# Patient Record
Sex: FEMALE | Race: WHITE | NOT HISPANIC OR LATINO | Employment: OTHER | URBAN - METROPOLITAN AREA
[De-identification: names, ages, dates, MRNs, and addresses within clinical notes are randomized per-mention and may not be internally consistent; named-entity substitution may affect disease eponyms.]

---

## 2017-02-05 ENCOUNTER — HOSPITAL ENCOUNTER (OUTPATIENT)
Dept: RADIOLOGY | Age: 67
Discharge: HOME | End: 2017-02-05
Attending: PHYSICIAN ASSISTANT | Admitting: FAMILY MEDICINE
Payer: COMMERCIAL

## 2017-02-05 ENCOUNTER — TRANSCRIBE ORDERS (OUTPATIENT)
Dept: URGENT CARE | Age: 67
End: 2017-02-05

## 2017-02-05 ENCOUNTER — OFFICE VISIT (OUTPATIENT)
Dept: URGENT CARE | Age: 67
End: 2017-02-05
Payer: COMMERCIAL

## 2017-02-05 DIAGNOSIS — M79.671 PAIN OF RIGHT FOOT: ICD-10-CM

## 2017-02-05 PROCEDURE — 73630 X-RAY EXAM OF FOOT: CPT

## 2017-02-05 PROCEDURE — 99203 OFFICE O/P NEW LOW 30 MIN: CPT | Performed by: FAMILY MEDICINE

## 2017-02-06 ENCOUNTER — ALLSCRIPTS OFFICE VISIT (OUTPATIENT)
Dept: OTHER | Facility: OTHER | Age: 67
End: 2017-02-06

## 2017-02-14 RX ORDER — CYCLOBENZAPRINE HCL 10 MG
10 TABLET ORAL ONCE AS NEEDED
COMMUNITY
End: 2020-04-08

## 2017-02-14 RX ORDER — OMEGA-3/DHA/EPA/FISH OIL 60 MG-90MG
1 CAPSULE ORAL DAILY
Status: ON HOLD | COMMUNITY
End: 2018-02-01 | Stop reason: ALTCHOICE

## 2017-02-14 RX ORDER — LISINOPRIL 10 MG/1
10 TABLET ORAL DAILY
COMMUNITY

## 2017-02-14 RX ORDER — CETIRIZINE HYDROCHLORIDE 10 MG/1
10 TABLET, CHEWABLE ORAL DAILY
COMMUNITY
End: 2019-04-08

## 2017-02-15 ENCOUNTER — ANESTHESIA EVENT (OUTPATIENT)
Dept: GASTROENTEROLOGY | Facility: HOSPITAL | Age: 67
End: 2017-02-15
Payer: COMMERCIAL

## 2017-02-15 ENCOUNTER — GENERIC CONVERSION - ENCOUNTER (OUTPATIENT)
Dept: OTHER | Facility: OTHER | Age: 67
End: 2017-02-15

## 2017-02-15 ENCOUNTER — ANESTHESIA (OUTPATIENT)
Dept: GASTROENTEROLOGY | Facility: HOSPITAL | Age: 67
End: 2017-02-15
Payer: COMMERCIAL

## 2017-02-15 ENCOUNTER — HOSPITAL ENCOUNTER (OUTPATIENT)
Facility: HOSPITAL | Age: 67
Setting detail: OUTPATIENT SURGERY
Discharge: HOME | End: 2017-02-15
Attending: INTERNAL MEDICINE | Admitting: INTERNAL MEDICINE
Payer: COMMERCIAL

## 2017-02-15 VITALS
OXYGEN SATURATION: 96 % | HEART RATE: 80 BPM | BODY MASS INDEX: 24.25 KG/M2 | WEIGHT: 160 LBS | RESPIRATION RATE: 16 BRPM | SYSTOLIC BLOOD PRESSURE: 132 MMHG | HEIGHT: 68 IN | TEMPERATURE: 97.1 F | DIASTOLIC BLOOD PRESSURE: 89 MMHG

## 2017-02-15 DIAGNOSIS — K21.9 ESOPHAGEAL REFLUX: ICD-10-CM

## 2017-02-15 PROCEDURE — 88305 TISSUE EXAM BY PATHOLOGIST: CPT | Performed by: INTERNAL MEDICINE

## 2017-02-15 PROCEDURE — 88342 IMHCHEM/IMCYTCHM 1ST ANTB: CPT | Performed by: INTERNAL MEDICINE

## 2017-02-15 RX ORDER — FENTANYL CITRATE 50 UG/ML
INJECTION, SOLUTION INTRAMUSCULAR; INTRAVENOUS AS NEEDED
Status: DISCONTINUED | OUTPATIENT
Start: 2017-02-15 | End: 2017-02-15 | Stop reason: SURG

## 2017-02-15 RX ORDER — SODIUM CHLORIDE 9 MG/ML
125 INJECTION, SOLUTION INTRAVENOUS CONTINUOUS
Status: DISCONTINUED | OUTPATIENT
Start: 2017-02-15 | End: 2017-02-15 | Stop reason: HOSPADM

## 2017-02-15 RX ORDER — SODIUM CHLORIDE 9 MG/ML
125 INJECTION, SOLUTION INTRAVENOUS CONTINUOUS
Status: CANCELLED | OUTPATIENT
Start: 2017-02-15

## 2017-02-15 RX ORDER — ONDANSETRON 2 MG/ML
4 INJECTION INTRAMUSCULAR; INTRAVENOUS ONCE
Status: DISCONTINUED | OUTPATIENT
Start: 2017-02-15 | End: 2017-02-15 | Stop reason: HOSPADM

## 2017-02-15 RX ORDER — DIPHENHYDRAMINE HYDROCHLORIDE 50 MG/ML
25 INJECTION INTRAMUSCULAR; INTRAVENOUS ONCE
Status: COMPLETED | OUTPATIENT
Start: 2017-02-15 | End: 2017-02-15

## 2017-02-15 RX ORDER — PROPOFOL 10 MG/ML
INJECTION, EMULSION INTRAVENOUS AS NEEDED
Status: DISCONTINUED | OUTPATIENT
Start: 2017-02-15 | End: 2017-02-15 | Stop reason: SURG

## 2017-02-15 RX ORDER — PROPOFOL 10 MG/ML
INJECTION, EMULSION INTRAVENOUS CONTINUOUS PRN
Status: DISCONTINUED | OUTPATIENT
Start: 2017-02-15 | End: 2017-02-15 | Stop reason: SURG

## 2017-02-15 RX ADMIN — PROPOFOL 140 MCG/KG/MIN: 10 INJECTION, EMULSION INTRAVENOUS at 13:03

## 2017-02-15 RX ADMIN — SODIUM CHLORIDE 125 ML/HR: 0.9 INJECTION, SOLUTION INTRAVENOUS at 12:49

## 2017-02-15 RX ADMIN — PROPOFOL 100 MG: 10 INJECTION, EMULSION INTRAVENOUS at 13:03

## 2017-02-15 RX ADMIN — DIPHENHYDRAMINE HYDROCHLORIDE 25 MG: 50 INJECTION, SOLUTION INTRAMUSCULAR; INTRAVENOUS at 13:46

## 2017-02-15 RX ADMIN — FENTANYL CITRATE 50 MCG: 50 INJECTION, SOLUTION INTRAMUSCULAR; INTRAVENOUS at 13:03

## 2017-02-15 NOTE — ANESTHESIA PREPROCEDURE EVALUATION
Patient summary reviewed and Nursing notes reviewed  Mallampati: II  TM distance: >3 FB  Neck ROM: full    PULMONARY - negative    NEURO/PSYCH - negative    ENDO/OTHER - negative    DENTAL   DENTAL Positives: upper dentures    CARDIOVASCULAR  CARDIOVASCULAR Positives: cardiovascular exam normalhypertension - well controlled     Comments:    GI/HEPATIC/RENAL  GI/HEPATIC/RENAL Positives: GERD poorly controlled,     Anesthesia type: IV sedation with anesthesia  intravenous induction   Risks, benefits, and alternatives discussed with patient.  ASA 2

## 2017-02-15 NOTE — ANESTHESIA POSTPROCEDURE EVALUATION
Post-Op Assessment Note      CV Status:  Stable    Mental Status:  Alert and awake    Hydration Status:  Euvolemic    PONV Controlled:  Controlled    Airway Patency:  Patent    Post Op Vitals Reviewed: Yes            Staff: Anesthesiologist, with CRNAs            BP      Temp     Pulse     Resp      SpO2

## 2017-02-17 ENCOUNTER — GENERIC CONVERSION - ENCOUNTER (OUTPATIENT)
Dept: OTHER | Facility: OTHER | Age: 67
End: 2017-02-17

## 2017-02-27 ENCOUNTER — TRANSCRIBE ORDERS (OUTPATIENT)
Dept: ADMINISTRATIVE | Facility: HOSPITAL | Age: 67
End: 2017-02-27

## 2017-02-27 DIAGNOSIS — Z12.31 SCREENING MAMMOGRAM FOR HIGH-RISK PATIENT: Primary | ICD-10-CM

## 2017-03-09 ENCOUNTER — HOSPITAL ENCOUNTER (OUTPATIENT)
Dept: RADIOLOGY | Age: 67
Discharge: HOME | End: 2017-03-09
Payer: COMMERCIAL

## 2017-03-09 ENCOUNTER — TRANSCRIBE ORDERS (OUTPATIENT)
Dept: ADMINISTRATIVE | Age: 67
End: 2017-03-09

## 2017-03-09 DIAGNOSIS — M79.671 RIGHT FOOT PAIN: ICD-10-CM

## 2017-03-09 DIAGNOSIS — M79.671 RIGHT FOOT PAIN: Primary | ICD-10-CM

## 2017-03-09 PROCEDURE — 73630 X-RAY EXAM OF FOOT: CPT

## 2017-03-29 ENCOUNTER — ALLSCRIPTS OFFICE VISIT (OUTPATIENT)
Dept: OTHER | Facility: OTHER | Age: 67
End: 2017-03-29

## 2017-04-17 ENCOUNTER — HOSPITAL ENCOUNTER (OUTPATIENT)
Dept: RADIOLOGY | Age: 67
Discharge: HOME | End: 2017-04-17
Payer: COMMERCIAL

## 2017-04-17 ENCOUNTER — TRANSCRIBE ORDERS (OUTPATIENT)
Dept: ADMINISTRATIVE | Age: 67
End: 2017-04-17

## 2017-04-17 DIAGNOSIS — R04.2 COUGHING UP BLOOD: Primary | ICD-10-CM

## 2017-04-17 DIAGNOSIS — Z12.31 ENCOUNTER FOR SCREENING MAMMOGRAM FOR HIGH-RISK PATIENT: ICD-10-CM

## 2017-04-17 DIAGNOSIS — R04.2 COUGHING UP BLOOD: ICD-10-CM

## 2017-04-17 PROCEDURE — 71020 HB CHEST X-RAY 2VW FRONTAL&LATL: CPT

## 2017-04-17 PROCEDURE — G0202 SCR MAMMO BI INCL CAD: HCPCS

## 2017-05-17 ENCOUNTER — APPOINTMENT (OUTPATIENT)
Dept: LAB | Age: 67
End: 2017-05-17
Payer: COMMERCIAL

## 2017-05-17 ENCOUNTER — TRANSCRIBE ORDERS (OUTPATIENT)
Dept: ADMINISTRATIVE | Age: 67
End: 2017-05-17

## 2017-05-17 DIAGNOSIS — R73.01 IMPAIRED FASTING GLUCOSE: ICD-10-CM

## 2017-05-17 DIAGNOSIS — E04.0 GOITER, SPECIFIED AS SIMPLE: ICD-10-CM

## 2017-05-17 DIAGNOSIS — D50.0 IRON DEFICIENCY ANEMIA SECONDARY TO BLOOD LOSS (CHRONIC): ICD-10-CM

## 2017-05-17 DIAGNOSIS — E78.2 MIXED HYPERLIPIDEMIA: Primary | ICD-10-CM

## 2017-05-17 DIAGNOSIS — E78.2 MIXED HYPERLIPIDEMIA: ICD-10-CM

## 2017-05-17 LAB
ALBUMIN SERPL BCP-MCNC: 3.9 G/DL (ref 3.5–5)
ALP SERPL-CCNC: 79 U/L (ref 46–116)
ALT SERPL W P-5'-P-CCNC: 30 U/L (ref 12–78)
ANION GAP SERPL CALCULATED.3IONS-SCNC: 6 MMOL/L (ref 4–13)
AST SERPL W P-5'-P-CCNC: 15 U/L (ref 5–45)
BASOPHILS # BLD AUTO: 0.02 THOUSANDS/ΜL (ref 0–0.1)
BASOPHILS NFR BLD AUTO: 0 % (ref 0–1)
BILIRUB SERPL-MCNC: 0.76 MG/DL (ref 0.2–1)
BUN SERPL-MCNC: 15 MG/DL (ref 5–25)
CALCIUM SERPL-MCNC: 9.6 MG/DL (ref 8.3–10.1)
CHLORIDE SERPL-SCNC: 105 MMOL/L (ref 100–108)
CHOLEST SERPL-MCNC: 229 MG/DL (ref 50–200)
CO2 SERPL-SCNC: 30 MMOL/L (ref 21–32)
CREAT SERPL-MCNC: 0.62 MG/DL (ref 0.6–1.3)
EOSINOPHIL # BLD AUTO: 0.25 THOUSAND/ΜL (ref 0–0.61)
EOSINOPHIL NFR BLD AUTO: 5 % (ref 0–6)
ERYTHROCYTE [DISTWIDTH] IN BLOOD BY AUTOMATED COUNT: 13.3 % (ref 11.6–15.1)
EST. AVERAGE GLUCOSE BLD GHB EST-MCNC: 120 MG/DL
GFR SERPL CREATININE-BSD FRML MDRD: >60 ML/MIN/1.73SQ M
GLUCOSE P FAST SERPL-MCNC: 107 MG/DL (ref 65–99)
HBA1C MFR BLD: 5.8 % (ref 4.2–6.3)
HCT VFR BLD AUTO: 42.9 % (ref 34.8–46.1)
HDLC SERPL-MCNC: 75 MG/DL (ref 40–60)
HGB BLD-MCNC: 13.7 G/DL (ref 11.5–15.4)
LDLC SERPL CALC-MCNC: 131 MG/DL (ref 0–100)
LYMPHOCYTES # BLD AUTO: 2.21 THOUSANDS/ΜL (ref 0.6–4.47)
LYMPHOCYTES NFR BLD AUTO: 43 % (ref 14–44)
MCH RBC QN AUTO: 30.2 PG (ref 26.8–34.3)
MCHC RBC AUTO-ENTMCNC: 31.9 G/DL (ref 31.4–37.4)
MCV RBC AUTO: 95 FL (ref 82–98)
MONOCYTES # BLD AUTO: 0.39 THOUSAND/ΜL (ref 0.17–1.22)
MONOCYTES NFR BLD AUTO: 8 % (ref 4–12)
NEUTROPHILS # BLD AUTO: 2.24 THOUSANDS/ΜL (ref 1.85–7.62)
NEUTS SEG NFR BLD AUTO: 44 % (ref 43–75)
NRBC BLD AUTO-RTO: 0 /100 WBCS
PLATELET # BLD AUTO: 219 THOUSANDS/UL (ref 149–390)
PMV BLD AUTO: 11 FL (ref 8.9–12.7)
POTASSIUM SERPL-SCNC: 4.8 MMOL/L (ref 3.5–5.3)
PROT SERPL-MCNC: 7.4 G/DL (ref 6.4–8.2)
RBC # BLD AUTO: 4.53 MILLION/UL (ref 3.81–5.12)
SODIUM SERPL-SCNC: 141 MMOL/L (ref 136–145)
TRIGL SERPL-MCNC: 113 MG/DL
TSH SERPL DL<=0.05 MIU/L-ACNC: 2.65 UIU/ML (ref 0.36–3.74)
WBC # BLD AUTO: 5.13 THOUSAND/UL (ref 4.31–10.16)

## 2017-05-17 PROCEDURE — 80053 COMPREHEN METABOLIC PANEL: CPT

## 2017-05-17 PROCEDURE — 83036 HEMOGLOBIN GLYCOSYLATED A1C: CPT

## 2017-05-17 PROCEDURE — 85025 COMPLETE CBC W/AUTO DIFF WBC: CPT

## 2017-05-17 PROCEDURE — 84443 ASSAY THYROID STIM HORMONE: CPT

## 2017-05-17 PROCEDURE — 36415 COLL VENOUS BLD VENIPUNCTURE: CPT

## 2017-05-17 PROCEDURE — 80061 LIPID PANEL: CPT

## 2017-06-06 ENCOUNTER — ALLSCRIPTS OFFICE VISIT (OUTPATIENT)
Dept: OTHER | Facility: OTHER | Age: 67
End: 2017-06-06

## 2017-09-12 ENCOUNTER — ALLSCRIPTS OFFICE VISIT (OUTPATIENT)
Dept: OTHER | Facility: OTHER | Age: 67
End: 2017-09-12

## 2017-09-14 ENCOUNTER — TRANSCRIBE ORDERS (OUTPATIENT)
Dept: ADMINISTRATIVE | Age: 67
End: 2017-09-14

## 2017-09-14 ENCOUNTER — APPOINTMENT (OUTPATIENT)
Dept: LAB | Age: 67
End: 2017-09-14
Payer: COMMERCIAL

## 2017-09-14 DIAGNOSIS — K29.70 GASTRITIS, PRESENCE OF BLEEDING UNSPECIFIED, UNSPECIFIED CHRONICITY, UNSPECIFIED GASTRITIS TYPE: Primary | ICD-10-CM

## 2017-09-14 DIAGNOSIS — K29.70 GASTRITIS, PRESENCE OF BLEEDING UNSPECIFIED, UNSPECIFIED CHRONICITY, UNSPECIFIED GASTRITIS TYPE: ICD-10-CM

## 2017-09-14 PROCEDURE — 82784 ASSAY IGA/IGD/IGG/IGM EACH: CPT

## 2017-09-14 PROCEDURE — 86255 FLUORESCENT ANTIBODY SCREEN: CPT

## 2017-09-14 PROCEDURE — 83516 IMMUNOASSAY NONANTIBODY: CPT

## 2017-09-14 PROCEDURE — 36415 COLL VENOUS BLD VENIPUNCTURE: CPT

## 2017-09-15 LAB
ENDOMYSIUM IGA SER QL: NEGATIVE
GLIADIN PEPTIDE IGA SER-ACNC: 7 UNITS (ref 0–19)
GLIADIN PEPTIDE IGG SER-ACNC: 2 UNITS (ref 0–19)
IGA SERPL-MCNC: 242 MG/DL (ref 87–352)
TTG IGA SER-ACNC: <2 U/ML (ref 0–3)
TTG IGG SER-ACNC: <2 U/ML (ref 0–5)

## 2017-09-20 ENCOUNTER — GENERIC CONVERSION - ENCOUNTER (OUTPATIENT)
Dept: OTHER | Facility: OTHER | Age: 67
End: 2017-09-20

## 2017-10-12 ENCOUNTER — HOSPITAL ENCOUNTER (OUTPATIENT)
Dept: GASTROENTEROLOGY | Facility: HOSPITAL | Age: 67
Discharge: HOME | End: 2017-10-12
Payer: COMMERCIAL

## 2017-10-12 VITALS
HEART RATE: 83 BPM | TEMPERATURE: 97.8 F | HEIGHT: 67 IN | WEIGHT: 160 LBS | SYSTOLIC BLOOD PRESSURE: 133 MMHG | RESPIRATION RATE: 20 BRPM | OXYGEN SATURATION: 100 % | BODY MASS INDEX: 25.11 KG/M2 | DIASTOLIC BLOOD PRESSURE: 91 MMHG

## 2017-10-12 PROCEDURE — 91020 GASTRIC MOTILITY STUDIES: CPT

## 2017-10-12 PROCEDURE — 91038 ESOPH IMPED FUNCT TEST > 1HR: CPT

## 2017-10-26 NOTE — PROGRESS NOTES
Assessment  1  Esophagitis, reflux (530 11) (K21 0)   2  Intestinal metaplasia of gastric mucosa (537 89) (K31 89)   3  Tingling sensation (782 0) (R20 2)    Plan  Esophageal reflux    · Pantoprazole Sodium 40 MG Oral Tablet Delayed Release; take 1 tablet by  mouth 2 times per day   Rx By: Donal Hess; Dispense: 90 Days ; #:180 Tablet Delayed Release; Refill: 3;For: Esophageal reflux; HAYLEI = N; Rx auto-faxed to [facility] #[id number] Last Updated By: System, [employer]; 9/12/2017 11:00:05 AM  Esophagitis, reflux    · Esophageal Manometry; Status:Active; Requested for:12Sep2017;    Perform:[facility]; Order Comments: On PPI; Due:22Sep2017; Last Updated Edkeaton Loving; 9/12/2017 11:20:18 AM;Ordered;For:Esophagitis, reflux; Ordered By:Dustin Phoenix;   · Esophagus gastroesophageal reflux test with nasal catheter pH electrodes placement  24 hours; Status:Active; Requested for:12Sep2017;    Perform:[facility]; Order Comments:On pantoprazole 40 mg po bid; Due:22Sep2017; Last Updated Edwmeredith Loving; 9/12/2017 11:20:32 AM;Ordered;For:Esophagitis, reflux; Ordered By:Dustin Phoenix;  PMH: History of gastritis    · (1) CELIAC DISEASE AB PROFILE; Status:Active; Requested for:24Jsn9369;    Perform:[facility]; Due:71Boo8237; Ordered; [address]: History of gastritis; Ordered By:Dustin Phoenix;   · Follow-up visit in 3 months Evaluation and Treatment  Follow-up  Status: Complete   Done: 59VFP1375   Ordered; For: PMH: History of gastritis; Ordered By: Donal Hess Performed:  Due: 18FST6700; Last Updated By: Shubham Ely; 9/12/2017 11:19:10 AM  Tingling sensation    · (LC) Vitamin B12 and Folate; Status:Active; Requested for:18Uba7516;    Perform:[facility]; Due:31Lkb8944; Ordered; For:Tingling sensation; Ordered By:Dustin Phoenix;    Discussion/Summary  Discussion Summary:   1  She continued to have reflux symptoms while on pantoprazole 40 mg twice a day  She reports daily symptoms especially worse at night   I will schedule her for 24 hour pH study with impedance to assess for adequate acid control a study would be good or PPI b i d  nd also evaluate for non acid acid reflux  will also get esophageal manometric study  study will be done on PPI b i d  had an extensive discussion regarding long-term side effects of PPI and also non medical management of gastroesophageal reflux disease  side effects include osteoporosis, chronic kidney disease, infection, vitamin B12 deficiency  Will check B12 level  also discussed none medical management including Stretta procedure and fundoplication  Focal intestinal metaplasia and stomach -although repeat EGD in February 2017 with multiple biopsies  will see her back in 3 months  Counseling Documentation With Imm: The patient was counseled regarding prognosis,-- patient and family education,-- impressions  Goals and Barriers: The patient has the current Goals: See above  The patent has the current Barriers: None  Chief Complaint  Chief Complaint Free Text Note Form: Patient is here for followup for reflux  History of Present Illness  HPI: She continued to have reflux symptoms despite taking pantoprazole 40 mg po bid  She has regurgitation and hoarsen of her voice  She was seen by Dr Hossein Maldonado and per patient no inflammation in her throat  symptoms are worse at night  She tried wedge pillow but she couldn't sleep well had esophagitis on EGD  reports tingling sensation in her feet      Review of Systems  Complete-Female GI Adult:   Constitutional: No fever, no chills, feels well, no tiredness, no recent weight gain or weight loss  Eyes: No complaints of eye pain, no red eyes, no eyesight problems, no discharge, no dry eyes, no itching of eyes  ENT: no complaints of earache, no loss of hearing, no nose bleeds, no nasal discharge, no sore throat, no hoarseness     Cardiovascular: No complaints of slow heart rate, no fast heart rate, no chest pain, no palpitations, no leg claudication, no lower extremity edema  Respiratory: No complaints of shortness of breath, no wheezing, no cough, no SOB on exertion, no orthopnea, no PND  Gastrointestinal: GERD, gastritis  Genitourinary: No complaints of dysuria, no incontinence, no pelvic pain, no dysmenorrhea, no vaginal discharge or bleeding  Musculoskeletal: No complaints of arthralgias, no myalgias, no joint swelling or stiffness, no limb pain or swelling  Integumentary: No complaints of skin rash or lesions, no itching, no skin wounds, no breast pain or lump  Neurological: No complaints of headache, no confusion, no convulsions, no numbness, no dizziness or fainting, no tingling, no limb weakness, no difficulty walking  Psychiatric: Not suicidal, no sleep disturbance, no anxiety or depression, no change in personality, no emotional problems  Endocrine: No complaints of proptosis, no hot flashes, no muscle weakness, no deepening of the voice, no feelings of weakness  Hematologic/Lymphatic: No complaints of swollen glands, no swollen glands in the neck, does not bleed easily, does not bruise easily  ROS Reviewed:   ROS reviewed  Active Problems  1  History of Adenocarcinoma In Situ Of The Breast (233 0)   2  Atypical chest pain (786 59) (R07 89)   3  Chronic low back pain (724 2,338 29) (M54 5,G89 29)   4  Clostridium difficile colitis (008 45) (A04 7)   5  Contusion of right foot, initial encounter (924 20) (S90 31XA)   6  DDD (degenerative disc disease), lumbar (722 52) (M51 36)   7  Diarrhea (787 91) (R19 7)   8  Esophageal reflux (530 81) (K21 9)   9  Esophageal stricture (530 3) (K22 2)   10  Esophagitis, reflux (530 11) (K21 0)   11  First degree burn of hand, left, initial encounter   12  Hip pain (719 45) (M25 559)   13  Hyperlipidemia (272 4) (E78 5)   14  Impaired fasting glucose (790 21) (R73 01)   15  Intestinal metaplasia of gastric mucosa (537 89) (K31 89)   16  Lumbar spinal stenosis (724 02) (M48 06)   17   Multilevel degenerative disc disease (722 6) (M53 9)   18  Osteoarthritis of spine with radiculopathy, lumbar region (721 3) (M47 26)   19  Osteopenia (733 90) (M85 80)   20  Piriformis syndrome of left side (355 0) (G57 02)   21  Sacroiliac joint pain (724 6) (M53 3)   22  Sciatica (724 3) (M54 30)   23  Spondylosis of cervical region without myelopathy or radiculopathy (721 0) (M47 812)   24  Thigh pain, musculoskeletal, left (729 5) (M04 370)    Past Medical History  1  History of Abdominal pain, LLQ (left lower quadrant) (789 04) (R10 32)   2  History of Acute serous otitis media, unspecified laterality   3  History of Adenocarcinoma In Situ Of The Breast (233 0)   4  History of Age At First Period 15 Years Old (Menarche)   11  History of Allergic Reaction (995 3)   6  History of Blood pressure elevated (401 9) (I10)   7  History of Breast Cancer (V10 3)   8  History of Breast Cancer (V10 3)   9  History of Breast Cancer (V10 3)   10  History of Disc degeneration, lumbosacral (722 52) (M51 37)   11  History of Dysfunction of Eustachian tube, unspecified laterality (381 81) (H69 80)   12  Former smoker (H84 87) (N85 706)   13  History of Fracture Of The Ankle (824 8)   14  History of Ganglion Of The Left Elbow (727 42)   15  History of Ganglion Of The Left Wrist (727 42)   16  History of diarrhea (V12 79) (Z87 898)   17  History of low back pain (V13 59) (Z87 39)   18  History of osteopenia (V13 59) (Z87 39)   19  History of sinusitis (V12 69) (Z87 09)   20  History of upper respiratory infection (V12 09) (Z87 09)   21  History of Loss Of Hair From Head   22  History of Plantar fasciitis (728 71) (M72 2)   23  History of Retinal Hemorrhage (362 81)  Active Problems And Past Medical History Reviewed: The active problems and past medical history were reviewed and updated today  Surgical History  1  History of Complete Colonoscopy   2  History of Left Breast Lumpectomy   3   History of Place Radiotherapy Expandable Cath Into Breast Following Partial Mastectomy  Surgical History Reviewed: The surgical history was reviewed and updated today  Family History  Mother    1  Family history of Osteoporosis (V17 81)  Father    2  Family history of Coronary Artery Disease (V17 49)   3  Family history of Diabetes Mellitus (V18 0)   4  Family history of Impaired Fasting Glucose  Maternal Aunt    5  Family history of Breast Cancer (V16 3)  Family History    6  Family history of Reported Family History Of Heart Disease  Family History Reviewed: The family history was reviewed and updated today  Social History   · Being A Social Drinker   · Former smoker (A81 33) (D91 506)   · Denied: History of IV drugs   · Never A Smoker   · Denied: History of Uses marijuana  Social History Reviewed: The social history was reviewed and updated today  The social history was reviewed and is unchanged  Current Meds   1  Centrum TABS; Therapy: (Recorded:12May2016) to Recorded   2  Citracal TABS; Therapy: (Recorded:06Feb2017) to Recorded   3  Fish Oil 500 MG Oral Capsule; Therapy: (Recorded:06Feb2017) to Recorded   4  Flexeril 10 MG TABS; Therapy: (Recorded:12May2016) to Recorded   5  Lisinopril 10 MG Oral Tablet; Therapy: (Recorded:06Feb2017) to Recorded   6  Pantoprazole Sodium 40 MG Oral Tablet Delayed Release; take 1 tablet by mouth 2   times per day; Therapy: 33GWJ7231 to (Evaluate:11Nov2017)  Requested for: 22Cvm0737; Last   Rx:05Xne0268 Ordered   7  RaNITidine HCl - 150 MG Oral Tablet; TAKE 1 TABLET AT BEDTIME; Therapy: 26LYN1946 to (Evaluate:27Bjh2434)  Requested for: 49EPJ6404; Last   FK:[id number] Ordered   8  Ultimate Probiotic Formula CAPS; Therapy: (Recorded:06Feb2017) to Recorded   9  Vitamin D3 CAPS; Therapy: (Recorded:06Feb2017) to Recorded   10  ZyrTEC 10 MG CHEW;    Therapy: (Recorded:12May2016) to Recorded  Medication List Reviewed: The medication list was reviewed and updated today  Allergies  1   Codeine Sulfate TABS   2  Morphine Sulfate (Concentrate) SOLN   3  Naprosyn TABS   4  OxyCODONE HCl TABS   5  Penicillins   6  Sulfa Drugs    Vitals  Vital Signs    Recorded: 12Sep2017 10:40AM   Temperature 97 3 F, Tympanic   Heart Rate 83   Respiration 18   Systolic 973, LUE, Sitting   Diastolic 90, LUE, Sitting   Height 5 ft 7 in   Weight 163 lb    BMI Calculated 25 53   BSA Calculated 1 85     Physical Exam    Constitutional   General appearance: No acute distress, well appearing and well nourished  Ears, Nose, Mouth, and Throat   Oropharynx: Normal with no erythema, edema, exudate or lesions  Pulmonary   Respiratory effort: No increased work of breathing or signs of respiratory distress  Auscultation of lungs: Clear to auscultation  Cardiovascular   Auscultation of heart: Normal rate and rhythm, normal S1 and S2, without murmurs  Abdomen   Abdomen: Non-tender, no masses  Liver and spleen: No hepatomegaly or splenomegaly  Lymphatic   Palpation of lymph nodes in neck: No lymphadenopathy  Skin   Skin and subcutaneous tissue: Normal without rashes or lesions      Psychiatric   Orientation to person, place, and time: Normal          Future Appointments    Date/Time Provider Specialty Site   12/28/2017 02:40 PM Nayana Alvarado MD Gastroenterology Adult [facility] [address] [place] [address] [address]     Signatures   Electronically signed by : Brando Victor MD; Sep 12 2017 11:00PM EST                       (Author)

## 2017-11-22 ENCOUNTER — ALLSCRIPTS OFFICE VISIT (OUTPATIENT)
Dept: OTHER | Facility: OTHER | Age: 67
End: 2017-11-22

## 2017-11-23 NOTE — PROGRESS NOTES
Assessment    1  Intestinal metaplasia of gastric mucosa (537 89) (K31 89)   2  Esophageal reflux (530 81) (K21 9)    Plan  Esophageal reflux    · Pantoprazole Sodium 40 MG Oral Tablet Delayed Release; TAKE 1 TABLETDAILY   Rx By: Patricia Lind; Dispense: 30 Days ; #:30 Tablet Delayed Release; Refill: 5;Esophageal reflux; HAYLIE = N; Rx auto-faxed to [id number] Last Updated By: System, [employer]; 11/22/2017 11:04:25 AM  Intestinal metaplasia of gastric mucosa    · Follow-up visit in 6 months Evaluation and Treatment  Follow-up  with pa  Status: HoldFor - Scheduling  Requested for: 79YHE0063   Ordered; For: Intestinal metaplasia of gastric mucosa; Ordered By: Patricia Lind Performed:  Due: 91ILC0079   · EGD; Status:Active; Requested for:22Nov2017;    Perform:[facility]; Order Comments:biopsy and dilation of GEJ using CRE balloon; BKN:[id number]; Last Updated By:Marie Hutchinson; 11/22/2017 11:16:38 AM;Ordered;metaplasia of gastric mucosa; Ordered By:Dustin Phoenix;    Discussion/Summary  Discussion Summary:   1  Gastroesophageal reflux disease her symptoms include frequent throat clearing and regurgitation of phlegm  Her esophageal manometry was grossly normal except slightly impaired relaxation of lower esophageal sphincter  This is not consistent with reflux disease  24 hours pH study with impedance showed total reflux episodes but there was good correlation with her symptoms and acid reflux  This study was done while she was on Protonix 40 mg twice a day  She had increased acid exposure  is concerned about long-term effect of high-dose PPI  I recommend cutting down her pantoprazole to 40 mg twice a day  reviewed reflux precaution including elevating head of bed by 4-6 inch at bedtime  at bedtime  will schedule her for EGD -possible dilation of distal esophagus if stricture is seen  intestinal metaplasia-I will schedule her for EGD with random biopsy from the stomach for surveillance   Biopsy will be taken from [facility]  Counseling Documentation With Imm: The patient was counseled regarding prognosis,-- patient and family education,-- impressions  Goals and Barriers: The patient has the current Goals: See above  The patent has the current Barriers: None  Patient's Capacity to Self-Care: Patient is able to Self-Care  Chief Complaint  Chief Complaint Free Text Note Form: Patient is here for followup for of labs and procedures  History of Present Illness  HPI: 26-year-old female here for follow-up visit for gastroesophageal reflux disease  Her predominant symptoms are constant throat clearing and regurgitation of phlegm  She has no heartburn, dysphagia or weight loss  Her EGD showed mild esophagitis in the distal esophagus and focal intestinal metaplasia in the stomach  had focal narrowing of distal esophagus on barium swallow but this was not noted on her EGD  did esophageal manometry which showed normal esophageal peristalsis  Integrated relaxation pressure is at borderline possibly slightly impaired LES relaxation which is not consistent with her reflux symptoms  She had complete bolus transit  hours pH with impedance showed slightly increased acid exposure  DeMeester score was not calculated  Total number of reflux was within normal  Her regurgitation symptoms (which was labeled as heartburn) correlated with acid reflux  Review of Systems  Complete-Female GI Adult:  Constitutional: No fever, no chills, feels well, no tiredness, no recent weight gain or weight loss  Eyes: No complaints of eye pain, no red eyes, no eyesight problems, no discharge, no dry eyes, no itching of eyes  ENT: no complaints of earache, no loss of hearing, no nose bleeds, no nasal discharge, no sore throat, no hoarseness  Cardiovascular: No complaints of slow heart rate, no fast heart rate, no chest pain, no palpitations, no leg claudication, no lower extremity edema    Respiratory: No complaints of shortness of breath, no wheezing, no cough, no SOB on exertion, no orthopnea, no PND  Gastrointestinal: as noted in HPI  Genitourinary: No complaints of dysuria, no incontinence, no pelvic pain, no dysmenorrhea, no vaginal discharge or bleeding  Musculoskeletal: No complaints of arthralgias, no myalgias, no joint swelling or stiffness, no limb pain or swelling  Integumentary: No complaints of skin rash or lesions, no itching, no skin wounds, no breast pain or lump  Neurological: No complaints of headache, no confusion, no convulsions, no numbness, no dizziness or fainting, no tingling, no limb weakness, no difficulty walking  Psychiatric: Not suicidal, no sleep disturbance, no anxiety or depression, no change in personality, no emotional problems  Endocrine: No complaints of proptosis, no hot flashes, no muscle weakness, no deepening of the voice, no feelings of weakness  Hematologic/Lymphatic: No complaints of swollen glands, no swollen glands in the neck, does not bleed easily, does not bruise easily  ROS Reviewed:   ROS reviewed  Active Problems  1  History of Adenocarcinoma In Situ Of The Breast (233 0)   2  Atypical chest pain (786 59) (R07 89)   3  Chronic low back pain (724 2,338 29) (M54 5,G89 29)   4  Clostridium difficile colitis (008 45) (A04 72)   5  Contusion of right foot, initial encounter (924 20) (S90 31XA)   6  DDD (degenerative disc disease), lumbar (722 52) (M51 36)   7  Diarrhea (787 91) (R19 7)   8  Esophageal reflux (530 81) (K21 9)   9  Esophageal stricture (530 3) (K22 2)   10  Esophagitis, reflux (530 11) (K21 0)   11  First degree burn of hand, left, initial encounter   12  Hip pain (719 45) (M25 559)   13  Hyperlipidemia (272 4) (E78 5)   14  Impaired fasting glucose (790 21) (R73 01)   15  Intestinal metaplasia of gastric mucosa (537 89) (K31 89)   16  Lumbar spinal stenosis (724 02) (M48 061)   17  Multilevel degenerative disc disease (722 6) (M53 9)   18   Osteoarthritis of spine with radiculopathy, lumbar region (721 3) (M47 26)   19  Osteopenia (733 90) (M85 80)   20  Piriformis syndrome of left side (355 0) (G57 02)   21  Sacroiliac joint pain (724 6) (M53 3)   22  Sciatica (724 3) (M54 30)   23  Spondylosis of cervical region without myelopathy or radiculopathy (721 0) (M47 812)   24  Thigh pain, musculoskeletal, left (729 5) (M79 652)   25  Tingling sensation (782 0) (R20 2)    Past Medical History  1  History of Abdominal pain, LLQ (left lower quadrant) (789 04) (R10 32)   2  History of Acute serous otitis media, unspecified laterality   3  History of Adenocarcinoma In Situ Of The Breast (233 0)   4  History of Age At First Period 15 Years Old (Menarche)   11  History of Allergic Reaction (995 3)   6  History of Blood pressure elevated (401 9) (I10)   7  History of Breast Cancer (V10 3)   8  History of Breast Cancer (V10 3)   9  History of Breast Cancer (V10 3)   10  History of Disc degeneration, lumbosacral (722 52) (M51 37)   11  History of Dysfunction of Eustachian tube, unspecified laterality (381 81) (H69 80)   12  Former smoker (X21 63) (W13 732)   13  History of Fracture Of The Ankle (824 8)   14  History of Ganglion Of The Left Elbow (727 42)   15  History of Ganglion Of The Left Wrist (727 42)   16  History of diarrhea (V12 79) (Z87 898)   17  History of low back pain (V13 59) (Z87 39)   18  History of osteopenia (V13 59) (Z87 39)   19  History of sinusitis (V12 69) (Z87 09)   20  History of upper respiratory infection (V12 09) (Z87 09)   21  History of Loss Of Hair From Head   22  History of Plantar fasciitis (728 71) (M72 2)   23  History of Retinal Hemorrhage (362 81)  Active Problems And Past Medical History Reviewed: The active problems and past medical history were reviewed and updated today  Surgical History  1  History of Complete Colonoscopy   2  History of Left Breast Lumpectomy   3   History of Place Radiotherapy Expandable Cath Into Breast Following Partial Mastectomy  Surgical History Reviewed: The surgical history was reviewed and updated today  Family History  Mother    1  Family history of Osteoporosis (V17 81)  Father    2  Family history of Coronary Artery Disease (V17 49)   3  Family history of Diabetes Mellitus (V18 0)   4  Family history of Impaired Fasting Glucose  Maternal Aunt    5  Family history of Breast Cancer (V16 3)  Family History    6  Family history of Reported Family History Of Heart Disease  Family History Reviewed: The family history was reviewed and updated today  Social History     · Being A Social Drinker   · Former smoker (L15 93) (R91 329)   · Denied: History of IV drugs   · Never A Smoker   · Denied: History of Uses marijuana  Social History Reviewed: The social history was reviewed and updated today  The social history was reviewed and is unchanged  Current Meds   1  Centrum TABS; Therapy: (Recorded:12May2016) to Recorded   2  Citracal TABS; Therapy: (Recorded:06Feb2017) to Recorded   3  Flexeril 10 MG TABS; Therapy: (Recorded:12May2016) to Recorded   4  Lisinopril 10 MG Oral Tablet; Therapy: (Recorded:06Feb2017) to Recorded   5  Pantoprazole Sodium 40 MG Oral Tablet Delayed Release; take 1 tablet by mouth 2 times per day; Therapy: 65LMF8680 to (Evaluate:74Egb0022)  Requested for: 94Ruk5969; Last Rx:63Cct3623 Ordered   6  RaNITidine HCl - 150 MG Oral Tablet; TAKE 1 TABLET AT BEDTIME; Therapy: 64KXC6934 to (Evaluate:31Xzd0299)  Requested for: 22ALB6509; Last VT:22UCR3988 Ordered   7  Ultimate Probiotic Formula CAPS; Therapy: (Recorded:06Feb2017) to Recorded   8  Vitamin D3 CAPS; Therapy: (Recorded:06Feb2017) to Recorded   9  ZyrTEC 10 MG CHEW; Therapy: (Recorded:12May2016) to Recorded  Medication List Reviewed: The medication list was reviewed and updated today  Allergies  1  Codeine Sulfate TABS   2  Morphine Sulfate (Concentrate) SOLN   3  Naprosyn TABS   4  OxyCODONE HCl TABS   5  Penicillins   6  Sulfa Drugs    Vitals  Vital Signs    Recorded: 22Nov2017 10:35AM   Temperature 97 8 F, Tympanic   Heart Rate 91   Respiration 18   Systolic 447, LUE, Sitting   Diastolic 93, LUE, Sitting   Height 5 ft 7 in   Weight 165 lb 4 oz   BMI Calculated 25 88   BSA Calculated 1 86       Physical Exam   Constitutional  General appearance: No acute distress, well appearing and well nourished  Ears, Nose, Mouth, and Throat  Oropharynx: Normal with no erythema, edema, exudate or lesions  Pulmonary  Respiratory effort: No increased work of breathing or signs of respiratory distress  Auscultation of lungs: Clear to auscultation  Cardiovascular  Auscultation of heart: Normal rate and rhythm, normal S1 and S2, without murmurs  Abdomen  Abdomen: Non-tender, no masses  Liver and spleen: No hepatomegaly or splenomegaly  Lymphatic  Palpation of lymph nodes in neck: No lymphadenopathy  Musculoskeletal  Inspection/palpation of joints, bones, and muscles: Normal    Skin  Skin and subcutaneous tissue: Normal without rashes or lesions  Neurologic  Reflexes: 2+ and symmetric  Psychiatric  Orientation to person, place, and time: Normal          Results/Data  (1) CELIAC DISEASE AB PROFILE 84Ket0632 07:12AM Leslie Gone     Test Name Result Flag Reference   tTG IGG <2 U/mL  0 - 5     Negative        0 - 5                               Weak Positive   6 - 9                               Positive           >9   tTG IGA <2 U/mL  0 - 3     Negative        0 -  3                               Weak Positive   4 - 10                               Positive           >10  Tissue Transglutaminase (tTG) has been identified  as the endomysial antigen  Studies have demonstr-  ated that endomysial IgA antibodies have over 99%  specificity for gluten sensitive enteropathy     GLIADA 7 units  0 - 19     Negative                   0 - 19                    Weak Positive             20 - 30                    Moderate to Strong Positive   >30 GLIADG 2 units  0 - 19     Negative                   0 - 19                    Weak Positive             20 - 30                    Moderate to Strong Positive   >30   ENDOMYSIAL AB IGA Negative  Negative     Performed at:  [facility]  [id number] : Paco Woodruff MD, Phone:  [phone]    mg/dL  87 - 352     (1) COMPREHENSIVE METABOLIC PANEL 18NNF9042 57:75TS EPIC, Provider   Test ordered by: Aldo Herrera     Test Name Result Flag Reference   SODIUM 141 mmol/L  136-145   POTASSIUM 4 8 mmol/L  3 5-5 3   CHLORIDE 105 mmol/L  100-108   CARBON DIOXIDE 30 mmol/L  21-32   ANION GAP (CALC) 6 mmol/L  4-13   BLOOD UREA NITROGEN 15 mg/dL  5-25   CREATININE 0 62 mg/dL  0 60-1 30   Standardized to IDMS reference method   CALCIUM 9 6 mg/dL  8 3-10 1   BILI, TOTAL 0 76 mg/dL  0 20-1 00   ALK PHOSPHATAS 79 U/L     ALT (SGPT) 30 U/L  12-78   AST(SGOT) 15 U/L  5-45   ALBUMIN 3 9 g/dL  3 5-5 0   TOTAL PROTEIN 7 4 g/dL  6 4-8 2   eGFR Non-African American      >60 0 ml/min/1 73sq m     [facility] Disease Education Program recommendations are as follows: GFR calculation is accurate only with a steady state creatinine Chronic Kidney disease less than 60 ml/min/1 73 sq  meters Kidney failure less than 15 ml/min/1 73 sq  meters     GLUCOSE FASTING 107 mg/dL H 65-99     Future Appointments    Date/Time Provider Specialty Site   02/01/2018 07:30 AM Corazon Wolf MD Gastroenterology Adult [facility] [address] [facility]       Signatures   Electronically signed by : Angelo Morgan MD; Nov 22 2017  2:02PM EST                       (Author)

## 2018-01-11 NOTE — RESULT NOTES
Verified Results  (1) TISSUE EXAM [id number] 01:05PM Patricia Lind     Test Name Result Flag Reference   LAB AP CASE REPORT (Report)     Surgical Pathology Report             Case: [id number]                   Authorizing Provider: Cecelia Olvera MD      Collected:      02/15/2017 1305        Ordering Location:   [address] [place] [address]   Received:      02/15/2017 [address] [place] [address] Endoscopy                               Pathologist:      Myranda Calixto MD                               Specimen:  Stomach, Cold bx, gastritis, r/o H  Pylori   LAB AP FINAL DIAGNOSIS      Stomach, biopsy:   - Chronic inactive gastritis with focal intestinal metaplasia   - H pylori organisms are not identified on immunostain   - No dysplasia or malignancy identified    Electronically signed by Myranda Calixto MD on 2/16/2017 at 1:13 PM   LAB AP NOTE      Immunohistochemical stains are performed with adequate controls  LAB AP SURGICAL ADDITIONAL INFORMATION (Report)     These tests were developed and their performance characteristics   determined by [facility] or [employer]  They may not be cleared or approved by the [place]  Food and   Drug Administration  The FDA has determined that such clearance or   approval is not necessary  These tests are used for clinical purposes  They should not be regarded as investigational or for research  This   laboratory has been approved by CLIA 88, designated as a high-complexity   laboratory and is qualified to perform these tests  Interpretation performed at , [address]    LAB AP GROSS DESCRIPTION (Report)     A  The specimen is received in formalin, labeled with the patient's name   and hospital number, and is designated stomach biopsy gastritis rule out   H  pylori  The specimen consists of 2 tan soft tissue fragments measuring   0 2 and 0 4 cm  Entirely submitted  One cassette      Note: The estimated total formalin fixation time based upon information   provided by the submitting clinician and the standard processing schedule   is 15 5 hours      MAC

## 2018-01-12 NOTE — MISCELLANEOUS
Message   Recorded as Task   Date: 09/26/2016 09:27 AM, Created By: Rubi Williamson   Task Name: Miscellaneous   Assigned To: Evelyn Ruth   Regarding Patient: Mar Velasquez, Status: Active   CommentHadley Amaro - 26 Sep 5744 6:00 AM     TASK CREATED  Patient states that she got relief for a couple weeks and is having the same pain just more constant  Can she have another injection or should she be seen  Huan Johnston - 26 Sep 2016 9:44 AM     TASK REPLIED TO: Previously Assigned To Huan Johnston                      It is early to do repeat the second injection but we had also discussed LESI  It may be best to discuss in ov  Rubi Williamson - 27 Sep 9218 5:70 AM     TASK EDITED                 Patient scheduled for 10/18/2016 to discuss tx        Active Problems    1  History of Adenocarcinoma In Situ Of The Breast (233 0)   2  Atypical chest pain (786 59) (R07 89)   3  Chronic low back pain (724 2,338 29) (M54 5,G89 29)   4  Clostridium difficile colitis (008 45) (A04 7)   5  DDD (degenerative disc disease), lumbar (722 52) (M51 36)   6  Diarrhea (787 91) (R19 7)   7  Esophageal reflux (530 81) (K21 9)   8  First degree burn of hand, left, initial encounter (944 18) (T23 102A)   9  Hip pain (719 45) (M25 559)   10  Hyperlipidemia (272 4) (E78 5)   11  Impaired fasting glucose (790 21) (R73 01)   12  Lumbar spinal stenosis (724 02) (M48 06)   13  Multilevel degenerative disc disease (722 6) (M53 9)   14  Osteoarthritis of spine with radiculopathy, lumbar region (721 3) (M47 26)   15  Osteopenia (733 90) (M85 80)   16  Piriformis syndrome of left side (355 0) (G57 02)   17  Sciatica (724 3) (M54 30)   18  Spondylosis of cervical region without myelopathy or radiculopathy (721 0) (M47 812)   19  Thigh pain, musculoskeletal, left (729 5) (M79 652)    Current Meds   1  Bactroban 2 % External Ointment (Mupirocin); APPLY A SMALL AMOUNT 3 TIMES   DAILY AS DIRECTED;    Therapy: 22OQC7827 to (Last Rx:63Pcq8568)  Requested for: 76Awq0967 Ordered   2  Biotin TABS; Therapy: (Recorded:12May2016) to Recorded   3  Centrum TABS; Therapy: (Recorded:12May2016) to Recorded   4  Cyclobenzaprine HCl - 10 MG Oral Tablet (Flexeril); TAKE 1 TABLET DAILY AS   NEEDED; Therapy: 32LJA1696 to (Evaluate:19Nov2013)  Requested for: 77TTR1745; Last   Rx:23May2013 Ordered   5  Flexeril 10 MG TABS (Cyclobenzaprine HCl); Therapy: (Recorded:12May2016) to Recorded   6  MethylPREDNISolone 4 MG Oral Tablet; USE AS DIRECTED; Therapy: 71QKV3643 to (Last Rx:12May2016)  Requested for: 14GTB4798 Ordered   7  Omega-3 CAPS; Therapy: (Recorded:12May2016) to Recorded   8  Omeprazole 20 MG Oral Capsule Delayed Release; TAKE 1 CAPSULE DAILY AS   NEEDED; Therapy: 53DIQ3084 to (Evaluate:19Nov2013)  Requested for: 98ZQL7778; Last   Rx:23May2013 Ordered   9  PriLOSEC 20 MG Oral Capsule Delayed Release (Omeprazole); Therapy: (Recorded:12May2016) to Recorded   10  Vitamin D 2000 UNIT Oral Tablet; Therapy: (Recorded:12May2016) to Recorded   11  ZyrTEC 10 MG CHEW;    Therapy: (Recorded:12May2016) to Recorded    Allergies    1  Codeine Sulfate TABS   2  Morphine Sulfate (Concentrate) SOLN   3  Penicillins   4   Sulfa Drugs    Signatures   Electronically signed by : Layla Sandy, ; Sep 27 2016  8:57AM EST                       (Author)

## 2018-01-13 NOTE — RESULT NOTES
Verified Results  (1) CELIAC DISEASE AB PROFILE 17Uia9152 07:12AM Sera Madden     Test Name Result Flag Reference   tTG IGG <2 U/mL  0 - 5   Negative        0 - 5                                Weak Positive   6 - 9                                Positive           >9   tTG IGA <2 U/mL  0 - 3   Negative        0 -  3                                Weak Positive   4 - 10                                Positive           >10   Tissue Transglutaminase (tTG) has been identified   as the endomysial antigen  Studies have demonstr-   ated that endomysial IgA antibodies have over 99%   specificity for gluten sensitive enteropathy     GLIADA 7 units  0 - 19   Negative                   0 - 19                     Weak Positive             20 - 30                     Moderate to Strong Positive   >30   GLIADG 2 units  0 - 19   Negative                   0 - 19                     Weak Positive             20 - 30                     Moderate to Strong Positive   >30   ENDOMYSIAL AB IGA Negative  Negative   Performed at:  [facility] [address]  [id number]  : Juanis Barraza MD, Phone:  [phone]    mg/dL  35 - 695

## 2018-01-14 VITALS
WEIGHT: 163 LBS | BODY MASS INDEX: 25.58 KG/M2 | TEMPERATURE: 97.3 F | SYSTOLIC BLOOD PRESSURE: 137 MMHG | RESPIRATION RATE: 18 BRPM | DIASTOLIC BLOOD PRESSURE: 90 MMHG | HEART RATE: 83 BPM | HEIGHT: 67 IN

## 2018-01-14 VITALS
DIASTOLIC BLOOD PRESSURE: 93 MMHG | SYSTOLIC BLOOD PRESSURE: 155 MMHG | HEART RATE: 91 BPM | RESPIRATION RATE: 18 BRPM | HEIGHT: 67 IN | WEIGHT: 165.25 LBS | BODY MASS INDEX: 25.94 KG/M2 | TEMPERATURE: 97.8 F

## 2018-01-15 VITALS
TEMPERATURE: 97.9 F | SYSTOLIC BLOOD PRESSURE: 137 MMHG | RESPIRATION RATE: 16 BRPM | DIASTOLIC BLOOD PRESSURE: 92 MMHG | BODY MASS INDEX: 25.64 KG/M2 | HEIGHT: 67 IN | WEIGHT: 163.38 LBS | HEART RATE: 92 BPM

## 2018-01-15 VITALS
WEIGHT: 164 LBS | OXYGEN SATURATION: 98 % | SYSTOLIC BLOOD PRESSURE: 150 MMHG | HEART RATE: 90 BPM | DIASTOLIC BLOOD PRESSURE: 90 MMHG | TEMPERATURE: 98 F | BODY MASS INDEX: 24.86 KG/M2 | HEIGHT: 68 IN

## 2018-01-15 NOTE — PROGRESS NOTES
Assessment    1  Piriformis syndrome of left side (355 0) (G57 02)    Discussion/Summary    70-year-old female, with left-sided perform a syndrome  That was effectively treated with an injection by pain management into the perform this region  Patient is very happy with her care  Recommend that the patient continue physical therapy, stretching, , strengthening, type exercises for her left hip  She has a recurrence in her symptoms  She may follow up with pain management for another round of injections versus epidural steroid injections  Followup when necessary  Chief Complaint    1  Back Pain  Followup left lower extremity  Pain      History of Present Illness  HPI: Patient is a 70-year-old female, with a left-sided L3-4 disc herniation/stenosis, Who was sent to pain management for consideration of injections  She received a left-sided piriformis injection  Patient reports, that her pain has nearly completely resolved  She is very happy with her care  She reports that occasionally she will feel a little bit of pain  However, she is very pleased with the outcome  She has not been compliant with formal physical therapy  However, reports, that she has remained active/walking  Review of Systems    Constitutional: No fever, no chills, feels well, no tiredness, no recent weight gain or loss  Eyes: No complaints of eyesight problems, no red eyes  ENT: no loss of hearing, no nosebleeds, no sore throat  Cardiovascular: No complaints of chest pain, no palpitations, no leg claudication or lower extremity edema  Respiratory: no compliants of shortness of breath, no wheezing, no cough  Gastrointestinal: no complaints of abdominal pain, no constipation, no nausea or diarrhea, no vomiting, no bloody stools  Genitourinary: no complaints of dysuria, no incontinence  Musculoskeletal: as noted in HPI  Integumentary: no complaints of skin rash or lesion, no itching or dry skin, no skin wounds  Neurological: no complaints of headache, no confusion, no numbness or tingling, no dizziness  Endocrine: No complaints of muscle weakness, no feelings of weakness, no frequent urination, no excessive thirst    Psychiatric: No suicidal thoughts, no anxiety, no feelings of depression  ROS reviewed  Active Problems    1  History of Adenocarcinoma In Situ Of The Breast (233 0)   2  Atypical chest pain (786 59) (R07 89)   3  Chronic low back pain (724 2,338 29) (M54 5,G89 29)   4  Clostridium difficile colitis (008 45) (A04 7)   5  DDD (degenerative disc disease), lumbar (722 52) (M51 36)   6  Diarrhea (787 91) (R19 7)   7  Esophageal reflux (530 81) (K21 9)   8  First degree burn of hand, left, initial encounter (944 18) (T23 102A)   9  Hip pain (719 45) (M25 559)   10  Hyperlipidemia (272 4) (E78 5)   11  Impaired fasting glucose (790 21) (R73 01)   12  Lumbar spinal stenosis (724 02) (M48 06)   13  Multilevel degenerative disc disease (722 6) (M53 9)   14  Osteoarthritis of spine with radiculopathy, lumbar region (721 3) (M47 26)   15  Osteopenia (733 90) (M85 80)   16  Piriformis syndrome of left side (355 0) (G57 02)   17  Sciatica (724 3) (M54 30)   18  Spondylosis of cervical region without myelopathy or radiculopathy (721 0) (M47 812)   19   Thigh pain, musculoskeletal, left (729 5) (G48 132)    Past Medical History    · History of Abdominal pain, LLQ (left lower quadrant) (789 04) (R10 32)   · History of Acute serous otitis media, unspecified laterality   · History of Adenocarcinoma In Situ Of The Breast (233 0)   · History of Age At First Period 15 Years Old (Menarche)   · History of Allergic Reaction (995 3)   · History of Blood pressure elevated (401 9) (I10)   · History of Breast Cancer (V10 3)   · History of Breast Cancer (V10 3)   · History of Breast Cancer (V10 3)   · History of Disc degeneration, lumbosacral (722 52) (M51 37)   · History of Dysfunction of eustachian tube, unspecified laterality (381 81) (H69 80)   · Former smoker (V15 82) (E12 125)   · History of Fracture Of The Ankle (824 8)   · History of Ganglion Of The Left Elbow (727 42)   · History of Ganglion Of The Left Wrist (727 42)   · History of diarrhea (V12 79) (R69 650)   · History of low back pain (V13 59) (Z87 39)   · History of osteopenia (V13 59) (Z87 39)   · History of sinusitis (V12 69) (Z87 09)   · History of upper respiratory infection (V12 09) (Z87 09)   · History of Loss Of Hair From Head   · History of Plantar fasciitis (728 71) (M72 2)   · History of Retinal Hemorrhage (362 81)    The active problems and past medical history were reviewed and updated today  Surgical History    · History of Complete Colonoscopy   · History of Left Breast Lumpectomy   · History of Place Radiotherapy Expandable Cath Into Breast Following Partial  Mastectomy    The surgical history was reviewed and updated today  Family History  Mother    · Family history of Osteoporosis (V17 81)  Father    · Family history of Coronary Artery Disease (V17 49)   · Family history of Diabetes Mellitus (V18 0)   · Family history of Impaired Fasting Glucose  Maternal Aunt    · Family history of Breast Cancer (V16 3)  Family History    · Family history of Reported Family History Of Heart Disease    The family history was reviewed and updated today  Social History    · Being A Social Drinker   · Former smoker (A72 43) (B28 542)   · Denied: History of IV drugs   · Never A Smoker   · Denied: History of Uses marijuana  The social history was reviewed and updated today  The social history was reviewed and is unchanged  Current Meds   1  Bactroban 2 % External Ointment; APPLY A SMALL AMOUNT 3 TIMES DAILY AS   DIRECTED; Therapy: 89EUU1554 to (Last Rx:39Tol0272)  Requested for: 25Tnh7109 Ordered   2  Biotin TABS; Therapy: (Recorded:46Oqn2131) to Recorded   3  Centrum TABS; Therapy: (Recorded:12May2016) to Recorded   4   Cyclobenzaprine HCl - 10 MG Oral Tablet; TAKE 1 TABLET DAILY AS NEEDED; Therapy: 94PRO5269 to (Evaluate:19Nov2013)  Requested for: 00OFQ4191; Last   Rx:23May2013 Ordered   5  Flexeril 10 MG TABS; Therapy: (Recorded:12May2016) to Recorded   6  MethylPREDNISolone 4 MG Oral Tablet; USE AS DIRECTED; Therapy: 37VVL7751 to (Last Rx:12May2016)  Requested for: 93NHF5298 Ordered   7  Omega-3 CAPS; Therapy: (Recorded:12May2016) to Recorded   8  Omeprazole 20 MG Oral Capsule Delayed Release; TAKE 1 CAPSULE DAILY AS   NEEDED; Therapy: 48BJR5858 to (Evaluate:19Nov2013)  Requested for: 42QLH3988; Last   Rx:23May2013 Ordered   9  PriLOSEC 20 MG Oral Capsule Delayed Release; Therapy: (Recorded:12May2016) to Recorded   10  Vitamin D 2000 UNIT Oral Tablet; Therapy: (Recorded:12May2016) to Recorded   11  ZyrTEC 10 MG CHEW;    Therapy: (Recorded:12May2016) to Recorded    The medication list was reviewed and updated today  Allergies    1  Codeine Sulfate TABS   2  Morphine Sulfate (Concentrate) SOLN   3  Penicillins   4  Sulfa Drugs    Vitals   Recorded: 55GIL0609 82:65KH   Systolic 599   Diastolic 88   Heart Rate 989   Weight 162 lb    BMI Calculated 25   BSA Calculated 1 86     Physical Exam   Lumbar spine  Nontender to palpation along the midline, paraspinal musculature  Nontender to palpation of the left SI joint  Nontender to palpation of the left greater trochanter, piriformis fossa, posterior, gluteal musculature and posterior thigh musculature  Patient is 5 out of 5 strength in the quadriceps, hamstring, tibialis anterior, gastrocnemius/soleus complex  She is able to ambulate normally  Grossly sensate in the deep peroneal, superficial peroneal, sural, and saphenous nerve distribution       Constitutional - General appearance: Normal    Musculoskeletal - Gait and station: Normal    Cardiovascular - Pulses: Normal    Lymphatic - Palpation of lymph nodes in other areas: Normal    Skin - Palpation of skin and subcutaneous tissue: Normal    Neurologic - Sensation: Normal  Upper extremity peripheral neuro exam: Normal  Lower extremity peripheral neuro exam: Normal    Psychiatric - Orientation to person, place, and time: Normal  Mood and affect: Normal    Eyes   Conjunctiva and lids: Normal        Attending Note  Attending Note [facility]: Attending Note: I interviewed, took the history and examined the patient, the staff discussed the patient on the day of the visit, I discussed the case with the Resident and reviewed the Resident's note, I supervised the Resident and I agree with the Resident management plan as it was presented to me  Level of Participation: I was present in clinic and examined the patient  Patient's History: Patient presents for followup regarding left lower extremity radiculopathy /Piriformis syndrome  She recently had injections for pain management and seen significant improvement in symptoms  She still gets occasional left buttock pain  She denies any distal leg pain or weakness  She does not report incontinence of bowel or bladder  Key Parts of the Exam: Minimally tender to palpation over the left  Piriformis fossa  Neurologically stable construct, L2-S1 bilaterally  Negative straight leg raise bilaterally  Patient ambulates independently  She is nontender over the midline  She is awake, alert, and oriented x3  Affect is appropriate  Diagnosis and Plan: Piriformis syndrome being managed conservatively  Followup when necessary        Signatures   Electronically signed by : BIANCA Juarez ; Aug 22 2016 11:34AM EST                       (Author)

## 2018-01-15 NOTE — RESULT NOTES
Message   Recorded as Task   Date: 12/08/2016 10:04 AM, Created By: Emanuel Hernandez   Task Name: Follow Up   Assigned To: [facility],Team   Regarding Patient: Valentino Cantu, Status: Active   CommentEvelene Pipes - 08 Dec 1084 52:53 AM     TASK CREATED  S/P L PIRIFORMIS INJ ON 11/30/2016 W/ DR HERNDON - NO F/U Abida Brannon - 08 Dec 6551 01:78 PM     TASK EDITED  1st attempt     Lm on VM to Kaylynn Hernandez - 08 Dec 9131 34:20 PM     TASK IN PROGRESS   Emanuel Hernandez - 12 Dec 6541 5:52 AM     TASK EDITED  Pt reports 90-95% relief and is doing very well  Tej Fraga She will f/u VARUN Bettencourt - 12 Dec 2016 10:54 AM     TASK REPLIED TO: Previously Assigned To [place]  Thanks          Signatures   Electronically signed by : Fantasma Mueller, ; Dec 12 2016 11:30AM EST                       (Author)

## 2018-01-15 NOTE — RESULT NOTES
Message   Recorded as Task   Date: 10/24/2016 09:40 AM, Created By: Sherly Sparks   Task Name: Med Renewal Request   Assigned To: [facility]   Regarding Patient: Preston Torre, Status: Active   Comment:    Telma Hamilton - 24 Oct 2016 9:40 AM     TASK CREATED  Caller: Self; Renew Medication; [phone] (Home)  Received a vmlom from the pt  today at [phone] requesting a refill be sent  into the [facility] pharmacy for a 90day supply of the Pennsaid cream  She states it is helping with the pain  HD to advise  thanks   Huan Johnston - 24 Oct 2016 4:35 PM     TASK REPLIED TO: Previously Assigned To [facility],Team                      I've ordered through [facility], it will come in a pump  If it is not approved, I will order generic  Telma Hamilton - 25 Oct 2016 8:37 AM     TASK EDITED  Is there anyway she can have a 90 day supply? She s/w her insurance company and they will approve the 90 day supply  HD to advise  thanks   [place] - 25 Oct 2016 12:07 PM     TASK REPLIED TO: Previously Assigned To Huan Johnston                  I sent it for 90 days and 3 pump bottles  Telma Hamilton - 25 Oct 2016 12:57 PM     TASK EDITED   S/w the pt  and she is aware          Signatures   Electronically signed by : Laurent Edmond, ; Oct 25 2016 12:57PM EST                       (Author)

## 2018-01-22 ENCOUNTER — ANESTHESIA EVENT (OUTPATIENT)
Dept: PERIOP | Facility: AMBULARY SURGERY CENTER | Age: 68
End: 2018-01-22
Payer: COMMERCIAL

## 2018-01-30 RX ORDER — RANITIDINE 150 MG/1
150 TABLET ORAL
COMMUNITY
End: 2019-04-08

## 2018-01-30 RX ORDER — PANTOPRAZOLE SODIUM 40 MG/1
40 TABLET, DELAYED RELEASE ORAL 2 TIMES DAILY
COMMUNITY
End: 2018-09-13 | Stop reason: SDUPTHER

## 2018-02-01 ENCOUNTER — ANESTHESIA (OUTPATIENT)
Dept: PERIOP | Facility: AMBULARY SURGERY CENTER | Age: 68
End: 2018-02-01
Payer: COMMERCIAL

## 2018-02-01 ENCOUNTER — HOSPITAL ENCOUNTER (OUTPATIENT)
Facility: AMBULARY SURGERY CENTER | Age: 68
Setting detail: OUTPATIENT SURGERY
Discharge: HOME | End: 2018-02-01
Attending: INTERNAL MEDICINE | Admitting: INTERNAL MEDICINE
Payer: COMMERCIAL

## 2018-02-01 VITALS
OXYGEN SATURATION: 75 % | DIASTOLIC BLOOD PRESSURE: 93 MMHG | SYSTOLIC BLOOD PRESSURE: 146 MMHG | WEIGHT: 165 LBS | RESPIRATION RATE: 18 BRPM | HEART RATE: 76 BPM | TEMPERATURE: 97.1 F | HEIGHT: 67 IN | BODY MASS INDEX: 25.9 KG/M2

## 2018-02-01 DIAGNOSIS — K31.89 OTHER DISEASES OF STOMACH AND DUODENUM: ICD-10-CM

## 2018-02-01 PROCEDURE — 88305 TISSUE EXAM BY PATHOLOGIST: CPT | Performed by: PATHOLOGY

## 2018-02-01 PROCEDURE — 88342 IMHCHEM/IMCYTCHM 1ST ANTB: CPT | Performed by: PATHOLOGY

## 2018-02-01 PROCEDURE — 88342 IMHCHEM/IMCYTCHM 1ST ANTB: CPT | Performed by: INTERNAL MEDICINE

## 2018-02-01 PROCEDURE — 88305 TISSUE EXAM BY PATHOLOGIST: CPT | Performed by: INTERNAL MEDICINE

## 2018-02-01 PROCEDURE — 43239 EGD BIOPSY SINGLE/MULTIPLE: CPT | Performed by: INTERNAL MEDICINE

## 2018-02-01 RX ORDER — PROPOFOL 10 MG/ML
INJECTION, EMULSION INTRAVENOUS AS NEEDED
Status: DISCONTINUED | OUTPATIENT
Start: 2018-02-01 | End: 2018-02-01 | Stop reason: SURG

## 2018-02-01 RX ORDER — SODIUM CHLORIDE 9 MG/ML
125 INJECTION, SOLUTION INTRAVENOUS CONTINUOUS
Status: DISCONTINUED | OUTPATIENT
Start: 2018-02-01 | End: 2018-02-01 | Stop reason: HOSPADM

## 2018-02-01 RX ADMIN — LIDOCAINE HYDROCHLORIDE 50 MG: 20 INJECTION, SOLUTION INTRAVENOUS at 07:45

## 2018-02-01 RX ADMIN — SODIUM CHLORIDE: 0.9 INJECTION, SOLUTION INTRAVENOUS at 07:45

## 2018-02-01 RX ADMIN — PROPOFOL 40 MG: 10 INJECTION, EMULSION INTRAVENOUS at 07:48

## 2018-02-01 RX ADMIN — PROPOFOL 60 MG: 10 INJECTION, EMULSION INTRAVENOUS at 07:52

## 2018-02-01 RX ADMIN — PROPOFOL 120 MG: 10 INJECTION, EMULSION INTRAVENOUS at 07:45

## 2018-02-01 RX ADMIN — PROPOFOL 40 MG: 10 INJECTION, EMULSION INTRAVENOUS at 07:54

## 2018-02-01 RX ADMIN — PROPOFOL 40 MG: 10 INJECTION, EMULSION INTRAVENOUS at 07:50

## 2018-02-01 NOTE — OP NOTE
OPERATIVE REPORT  PATIENT NAME: Hugo Harley    :  1950  MRN: [id number]  Pt Location: [facility]  GI ROOM 01    SURGERY DATE: 2018    Surgeon(s) and Role:     * Michael Frazier MD - Primary  ESOPHAGOGASTRODUODENOSCOPY    PROCEDURE: EGD    SEDATION: Monitored anesthesia care, check anesthesia records    ASA Class: 2    INDICATIONS:  Gastroesophageal reflux disease  Personal history of gastric intestinal metaplasia  Here for surveillance EGD  CONSENT:  Informed consent was obtained for the procedure, including sedation after explaining the risks and benefits of the procedure  Risks including but not limited to bleeding, perforation, infection, and missed lesion  PREPARATION:   Telemetry, pulse oximetry, blood pressure were monitored throughout the procedure  Patient was identified by myself both verbally and by visual inspection of ID band  DESCRIPTION:   Patient was placed in the left lateral decubitus position and was sedated with the above medication  The gastroscope was introduced in to the oropharynx and the esophagus was intubated under direct visualization  Scope was passed down the esophagus up to 2nd part of the duodenum  A careful inspection was made as the gastroscope was withdrawn, including a retroflexed view of the stomach; findings and interventions are described below  FINDINGS:    #1  Esophagus-normal esophagus  No stricture or esophagitis  Random biopsy taken from proximal and distal esophagus using cold biopsy forceps  Biopsies taken to rule out eosinophilic esophagitis  #2  Stomach-mild gastritis in the antrum  Multiple random biopsies were taken for surveillance  Biopsies were taken from lesser curve, proximal body, distal body, incisura, proximal antrum and distal antrum  No mucosal lesion or polyp seen    #3  Duodenum-normal duodenum  IMPRESSIONS:      Mild gastritis otherwise normal esophagus and duodenum    Multiple biopsies were taken from stomach for surveillance (history of gastric intestinal metaplasia)  No stricture noted  RECOMMENDATIONS:     Continue reflux precaution  Continue Protonix  Follow-up biopsy  EGD is recommended in 3 years for surveillance of intestinal metaplasia in the stomach  COMPLICATIONS:  None; patient tolerated the procedure well            DISPOSITION: PACU           CONDITION: Stable      SIGNATURE: Juju Mariano MD  DATE: February 1, 2018  TIME: 8:06 AM

## 2018-02-01 NOTE — DISCHARGE INSTRUCTIONS
Mild gastritis otherwise normal esophagus and duodenum  Multiple biopsies were taken from esophagus and stomach  No mass or stricture seen  Recommendation  Continue Protonix  Continue reflux precautions  Follow up biopsy result  EGD is recommended in 3 years

## 2018-02-01 NOTE — ANESTHESIA POSTPROCEDURE EVALUATION
Post-Op Assessment Note      CV Status:  Stable    Mental Status:  Alert and awake    Hydration Status:  Euvolemic    PONV Controlled:  Controlled    Airway Patency:  Patent    Post Op Vitals Reviewed:  Yes              /63 (02/01/18 0801)    Temp (!) 97 1 °F (36 2 °C) (02/01/18 0801)    Pulse 80 (02/01/18 0801)   Resp 18 (02/01/18 0801)    SpO2 96 % (02/01/18 0801)

## 2018-02-01 NOTE — H&P
History and Physical - [facility]  Annella Dancer 79 y o  female MRN: [id number]    HPI: Annella Dancer is a 79y o  year old female who presents for EGD  This is surveillance EGD for personal history of gastric intestinal metaplasia  She also has gastroesophageal reflux disease  Currently on pantoprazole 40 mg daily  Review of Systems    Historical Information   Past Medical History:   Diagnosis Date    Cancer [facility])     left     Chronic low back pain     Clostridium difficile colitis     DDD (degenerative disc disease), lumbar     GERD (gastroesophageal reflux disease)     Hypertension     Laryngopharyngeal reflux (LPR)     Lumbar spinal stenosis     Osteoarthritis of spine with radiculopathy, lumbar region     Osteopenia     Piriformis syndrome of left side     Sciatica     Spondylosis of cervical region without myelopathy or radiculopathy      Past Surgical History:   Procedure Laterality Date    BREAST LUMPECTOMY      BREAST LUMPECTOMY Left     BREAST SURGERY      COLONOSCOPY      [place] ESOPHAGOGASTRODUODENOSCOPY TRANSORAL DIAGNOSTIC N/A 2/15/2017    Procedure: ESOPHAGOGASTRODUODENOSCOPY (EGD); Surgeon: Marianne Martinez MD;  Location: [facility];   Service: Gastroenterology     Social History   History   Alcohol Use    Yes     Comment: social     History   Drug Use No     History   Smoking Status    Former Smoker   Smokeless Tobacco    Former User     Family History   Problem Relation Age of Onset    Osteoporosis Mother     Coronary artery disease Father     Diabetes Father     Cancer Maternal Aunt        Meds/Allergies     Prescriptions Prior to Admission   Medication    cetirizine (ZyrTEC) 10 MG chewable tablet    Cholecalciferol (VITAMIN D-3 PO)    cyclobenzaprine (FLEXERIL) 10 mg tablet    lisinopril (ZESTRIL) 10 mg tablet    Multiple Vitamins-Minerals (CENTRUM ADULTS PO)    pantoprazole (PROTONIX) 40 mg tablet    Probiotic Product (PROBIOTIC PO)    ranitidine (ZANTAC) 150 mg tablet    Calcium Citrate (CITRACAL PO)       Allergies   Allergen Reactions    Codeine     Morphine Sulfate [Morphine]     Naprosyn [Naproxen]     Oxycodone     Penicillins     Sulfa Antibiotics        Objective     Blood pressure 162/83, pulse 80, temperature (!) 97 °F (36 1 °C), temperature source Temporal, resp  rate 18, height 5' 7" (1 702 m), weight 74 8 kg (165 lb), SpO2 100 %, not currently breastfeeding  PHYSICAL EXAM    Gen: NAD  CV: RRR  CHEST: Clear  ABD: soft, NT/ND  EXT: no edema  Neuro: AAO      ASSESSMENT/PLAN:  This is a 79y o  year old female here for surveillance EGD for personal history of gastric intestinal metaplasia  PLAN:   Procedure:  EGD with biopsy

## 2018-02-01 NOTE — ANESTHESIA PREPROCEDURE EVALUATION
Review of Systems/Medical History          Cardiovascular  Hyperlipidemia, Hypertension ,    Pulmonary       GI/Hepatic    GERD ,   Comment: C Difff Colitis          Endo/Other     GYN       Hematology   Musculoskeletal  Sciatica, Osteoarthritis,   Arthritis     Neurology    Neuromuscular disease ,    Psychology       Piriformis Syndrome of Left Side    Physical Exam    Airway    Mallampati score: I  TM Distance: >3 FB  Neck ROM: full     Dental   upper dentures,     Cardiovascular      Pulmonary      Other Findings        Anesthesia Plan  ASA Score- 2     Anesthesia Type- IV sedation with anesthesia with ASA Monitors  Additional Monitors:   Airway Plan:         Plan Factors-    Induction- intravenous  Postoperative Plan-     Informed Consent- Anesthetic plan and risks discussed with patient and spouse  I personally reviewed this patient with the CRNA  Discussed and agreed on the Anesthesia Plan with the CRNA  Peggy Peace

## 2018-02-08 DIAGNOSIS — K21.9 CHRONIC GERD: Primary | ICD-10-CM

## 2018-02-08 RX ORDER — DEXLANSOPRAZOLE 60 MG/1
60 CAPSULE, DELAYED RELEASE ORAL DAILY
Qty: 90 CAPSULE | Refills: 2 | Status: SHIPPED | OUTPATIENT
Start: 2018-02-08 | End: 2019-04-08

## 2018-02-08 NOTE — TELEPHONE ENCOUNTER
I discussed the results with the patient  Gastric biopsy negative for intestinal metaplasia but given her prior history recommend repeat EGD in 3 months  She continued to have excessive mucus reflux symptoms  Not responding to Protonix  I will switch her to Dexilant 60 mg once daily  Prescription sent to her pharmacy

## 2018-03-08 ENCOUNTER — TRANSCRIBE ORDERS (OUTPATIENT)
Dept: ADMINISTRATIVE | Facility: HOSPITAL | Age: 68
End: 2018-03-08

## 2018-03-08 DIAGNOSIS — R13.10 DYSPHAGIA, UNSPECIFIED TYPE: Primary | ICD-10-CM

## 2018-03-14 ENCOUNTER — HOSPITAL ENCOUNTER (OUTPATIENT)
Dept: RADIOLOGY | Facility: HOSPITAL | Age: 68
Discharge: HOME | End: 2018-03-14
Attending: OTOLARYNGOLOGY
Payer: COMMERCIAL

## 2018-03-14 DIAGNOSIS — R13.10 DYSPHAGIA, UNSPECIFIED TYPE: ICD-10-CM

## 2018-03-14 PROCEDURE — 74220 X-RAY XM ESOPHAGUS 1CNTRST: CPT

## 2018-03-26 ENCOUNTER — TRANSCRIBE ORDERS (OUTPATIENT)
Dept: ADMINISTRATIVE | Facility: HOSPITAL | Age: 68
End: 2018-03-26

## 2018-03-26 DIAGNOSIS — Z12.39 SCREENING BREAST EXAMINATION: Primary | ICD-10-CM

## 2018-03-26 DIAGNOSIS — Z00.00 ROUTINE ADULT HEALTH MAINTENANCE: ICD-10-CM

## 2018-04-05 ENCOUNTER — APPOINTMENT (OUTPATIENT)
Dept: LAB | Age: 68
End: 2018-04-05
Payer: COMMERCIAL

## 2018-04-05 ENCOUNTER — TRANSCRIBE ORDERS (OUTPATIENT)
Dept: ADMINISTRATIVE | Age: 68
End: 2018-04-05

## 2018-04-05 DIAGNOSIS — E78.2 MIXED HYPERLIPIDEMIA: Primary | ICD-10-CM

## 2018-04-05 DIAGNOSIS — D50.0 BLOOD LOSS ANEMIA: ICD-10-CM

## 2018-04-05 DIAGNOSIS — E78.2 MIXED HYPERLIPIDEMIA: ICD-10-CM

## 2018-04-05 LAB
ALBUMIN SERPL BCP-MCNC: 4.1 G/DL (ref 3.5–5)
ALP SERPL-CCNC: 79 U/L (ref 46–116)
ALT SERPL W P-5'-P-CCNC: 33 U/L (ref 12–78)
ANION GAP SERPL CALCULATED.3IONS-SCNC: 6 MMOL/L (ref 4–13)
AST SERPL W P-5'-P-CCNC: 18 U/L (ref 5–45)
BASOPHILS # BLD AUTO: 0.02 THOUSANDS/ΜL (ref 0–0.1)
BASOPHILS NFR BLD AUTO: 0 % (ref 0–1)
BILIRUB SERPL-MCNC: 0.84 MG/DL (ref 0.2–1)
BUN SERPL-MCNC: 13 MG/DL (ref 5–25)
CALCIUM SERPL-MCNC: 9.2 MG/DL (ref 8.3–10.1)
CHLORIDE SERPL-SCNC: 104 MMOL/L (ref 100–108)
CHOLEST SERPL-MCNC: 238 MG/DL (ref 50–200)
CO2 SERPL-SCNC: 29 MMOL/L (ref 21–32)
CREAT SERPL-MCNC: 0.69 MG/DL (ref 0.6–1.3)
EOSINOPHIL # BLD AUTO: 0.24 THOUSAND/ΜL (ref 0–0.61)
EOSINOPHIL NFR BLD AUTO: 4 % (ref 0–6)
ERYTHROCYTE [DISTWIDTH] IN BLOOD BY AUTOMATED COUNT: 13.2 % (ref 11.6–15.1)
GFR SERPL CREATININE-BSD FRML MDRD: 90 ML/MIN/1.73SQ M
GLUCOSE P FAST SERPL-MCNC: 95 MG/DL (ref 65–99)
HCT VFR BLD AUTO: 44.6 % (ref 34.8–46.1)
HDLC SERPL-MCNC: 67 MG/DL (ref 40–60)
HGB BLD-MCNC: 14 G/DL (ref 11.5–15.4)
LDLC SERPL CALC-MCNC: 146 MG/DL (ref 0–100)
LYMPHOCYTES # BLD AUTO: 2.17 THOUSANDS/ΜL (ref 0.6–4.47)
LYMPHOCYTES NFR BLD AUTO: 34 % (ref 14–44)
MCH RBC QN AUTO: 30.3 PG (ref 26.8–34.3)
MCHC RBC AUTO-ENTMCNC: 31.4 G/DL (ref 31.4–37.4)
MCV RBC AUTO: 97 FL (ref 82–98)
MONOCYTES # BLD AUTO: 0.46 THOUSAND/ΜL (ref 0.17–1.22)
MONOCYTES NFR BLD AUTO: 7 % (ref 4–12)
NEUTROPHILS # BLD AUTO: 3.57 THOUSANDS/ΜL (ref 1.85–7.62)
NEUTS SEG NFR BLD AUTO: 55 % (ref 43–75)
NRBC BLD AUTO-RTO: 0 /100 WBCS
PLATELET # BLD AUTO: 255 THOUSANDS/UL (ref 149–390)
PMV BLD AUTO: 10.3 FL (ref 8.9–12.7)
POTASSIUM SERPL-SCNC: 4.1 MMOL/L (ref 3.5–5.3)
PROT SERPL-MCNC: 7.9 G/DL (ref 6.4–8.2)
RBC # BLD AUTO: 4.62 MILLION/UL (ref 3.81–5.12)
SODIUM SERPL-SCNC: 139 MMOL/L (ref 136–145)
TRIGL SERPL-MCNC: 127 MG/DL
WBC # BLD AUTO: 6.48 THOUSAND/UL (ref 4.31–10.16)

## 2018-04-05 PROCEDURE — 85025 COMPLETE CBC W/AUTO DIFF WBC: CPT

## 2018-04-05 PROCEDURE — 36415 COLL VENOUS BLD VENIPUNCTURE: CPT

## 2018-04-05 PROCEDURE — 80061 LIPID PANEL: CPT

## 2018-04-05 PROCEDURE — 80053 COMPREHEN METABOLIC PANEL: CPT

## 2018-05-01 ENCOUNTER — HOSPITAL ENCOUNTER (OUTPATIENT)
Dept: RADIOLOGY | Age: 68
Discharge: HOME | End: 2018-05-01
Payer: COMMERCIAL

## 2018-05-01 DIAGNOSIS — Z12.39 SCREENING BREAST EXAMINATION: ICD-10-CM

## 2018-05-01 DIAGNOSIS — Z00.00 ROUTINE ADULT HEALTH MAINTENANCE: ICD-10-CM

## 2018-05-01 PROCEDURE — 77067 SCR MAMMO BI INCL CAD: CPT

## 2018-05-01 PROCEDURE — 77080 DXA BONE DENSITY AXIAL: CPT

## 2018-05-01 NOTE — PERIOPERATIVE NURSING NOTE
Manometry catheter placed down right side  Pt tolerated 20 liquid swallows  Catheter removed without difficulty  PH probe placed down right side at 37cm  Pt taught [place] monitor using teach back  Pt left in NAD  Shola Cleary is a 23 y.o. male    Chief Complaint   Patient presents with    New Patient     Establish care     Other     went to [facility] ED on 04/12/18 for fever and swollen spleen     1. Have you been to the ER, urgent care clinic since your last visit? Hospitalized since your last visit? yes see above    2. Have you seen or consulted any other health care providers outside of the [address] [place] [address] since your last visit? Include any pap smears or colon screening.  No    Health Maintenance Due   Topic Date Due    HPV Age 9Y-34Y (2 of 1 - Male 3 Dose Series) 05/03/2017

## 2018-05-10 ENCOUNTER — TRANSCRIBE ORDERS (OUTPATIENT)
Dept: ADMINISTRATIVE | Facility: HOSPITAL | Age: 68
End: 2018-05-10

## 2018-05-10 DIAGNOSIS — K21.9 GASTROESOPHAGEAL REFLUX DISEASE WITHOUT ESOPHAGITIS: ICD-10-CM

## 2018-05-10 DIAGNOSIS — R05.9 COUGH: ICD-10-CM

## 2018-05-10 DIAGNOSIS — J04.0 LARYNGITIS: ICD-10-CM

## 2018-05-10 DIAGNOSIS — R13.19 ESOPHAGEAL DYSPHAGIA: Primary | ICD-10-CM

## 2018-05-21 ENCOUNTER — APPOINTMENT (OUTPATIENT)
Dept: LAB | Age: 68
End: 2018-05-21
Payer: COMMERCIAL

## 2018-05-21 ENCOUNTER — HOSPITAL ENCOUNTER (OUTPATIENT)
Dept: RADIOLOGY | Age: 68
Discharge: HOME | End: 2018-05-21
Payer: COMMERCIAL

## 2018-05-21 DIAGNOSIS — R05.9 COUGH: ICD-10-CM

## 2018-05-21 DIAGNOSIS — R13.19 ESOPHAGEAL DYSPHAGIA: ICD-10-CM

## 2018-05-21 DIAGNOSIS — J04.0 LARYNGITIS: ICD-10-CM

## 2018-05-21 DIAGNOSIS — K21.9 GASTROESOPHAGEAL REFLUX DISEASE WITHOUT ESOPHAGITIS: ICD-10-CM

## 2018-05-21 LAB
25(OH)D3 SERPL-MCNC: 40.5 NG/ML (ref 30–100)
T4 FREE SERPL-MCNC: 0.89 NG/DL (ref 0.76–1.46)
TSH SERPL DL<=0.05 MIU/L-ACNC: 1.53 UIU/ML (ref 0.36–3.74)

## 2018-05-21 PROCEDURE — 86038 ANTINUCLEAR ANTIBODIES: CPT

## 2018-05-21 PROCEDURE — 70491 CT SOFT TISSUE NECK W/DYE: CPT

## 2018-05-21 PROCEDURE — 84439 ASSAY OF FREE THYROXINE: CPT

## 2018-05-21 PROCEDURE — 82306 VITAMIN D 25 HYDROXY: CPT

## 2018-05-21 PROCEDURE — 86235 NUCLEAR ANTIGEN ANTIBODY: CPT

## 2018-05-21 PROCEDURE — 86618 LYME DISEASE ANTIBODY: CPT

## 2018-05-21 PROCEDURE — 36415 COLL VENOUS BLD VENIPUNCTURE: CPT

## 2018-05-21 PROCEDURE — 83516 IMMUNOASSAY NONANTIBODY: CPT

## 2018-05-21 PROCEDURE — 86003 ALLG SPEC IGE CRUDE XTRC EA: CPT

## 2018-05-21 PROCEDURE — 84443 ASSAY THYROID STIM HORMONE: CPT

## 2018-05-21 PROCEDURE — 86430 RHEUMATOID FACTOR TEST QUAL: CPT

## 2018-05-21 RX ADMIN — IOHEXOL 85 ML: 350 INJECTION, SOLUTION INTRAVENOUS at 12:26

## 2018-05-22 LAB
ALLERGEN COMMENT: NORMAL
B BURGDOR IGG SER IA-ACNC: 0.09
B BURGDOR IGM SER IA-ACNC: 0.17
ENA SS-A AB SER-ACNC: <0.2 AI (ref 0–0.9)
ENA SS-B AB SER-ACNC: <0.2 AI (ref 0–0.9)
RHEUMATOID FACT SER QL LA: NEGATIVE
WHEAT IGE QN: <0.1 KUA/I

## 2018-05-23 LAB — RYE IGE QN: NEGATIVE

## 2018-05-25 LAB — MISCELLANEOUS LAB TEST RESULT: NORMAL

## 2018-08-02 ENCOUNTER — TRANSCRIBE ORDERS (OUTPATIENT)
Dept: ADMINISTRATIVE | Age: 68
End: 2018-08-02

## 2018-08-02 ENCOUNTER — APPOINTMENT (OUTPATIENT)
Dept: LAB | Age: 68
End: 2018-08-02
Payer: COMMERCIAL

## 2018-08-02 DIAGNOSIS — R35.0 URINARY FREQUENCY: Primary | ICD-10-CM

## 2018-08-02 DIAGNOSIS — R35.0 URINARY FREQUENCY: ICD-10-CM

## 2018-08-02 LAB
BILIRUB UR QL STRIP: NEGATIVE
CLARITY UR: CLEAR
COLOR UR: YELLOW
GLUCOSE UR STRIP-MCNC: NEGATIVE MG/DL
HGB UR QL STRIP.AUTO: NEGATIVE
KETONES UR STRIP-MCNC: NEGATIVE MG/DL
LEUKOCYTE ESTERASE UR QL STRIP: NEGATIVE
NITRITE UR QL STRIP: NEGATIVE
PH UR STRIP.AUTO: 7 [PH] (ref 4.5–8)
PROT UR STRIP-MCNC: NEGATIVE MG/DL
SP GR UR STRIP.AUTO: 1.01 (ref 1–1.03)
UROBILINOGEN UR QL STRIP.AUTO: 0.2 E.U./DL

## 2018-08-02 PROCEDURE — 81003 URINALYSIS AUTO W/O SCOPE: CPT | Performed by: FAMILY MEDICINE

## 2018-08-02 PROCEDURE — 87086 URINE CULTURE/COLONY COUNT: CPT

## 2018-08-03 LAB — BACTERIA UR CULT: NORMAL

## 2018-09-04 ENCOUNTER — TRANSCRIBE ORDERS (OUTPATIENT)
Dept: ADMINISTRATIVE | Facility: HOSPITAL | Age: 68
End: 2018-09-04

## 2018-09-04 DIAGNOSIS — E04.1 THYROID NODULE: Primary | ICD-10-CM

## 2018-09-11 ENCOUNTER — HOSPITAL ENCOUNTER (OUTPATIENT)
Dept: RADIOLOGY | Age: 68
Discharge: HOME | End: 2018-09-11
Payer: COMMERCIAL

## 2018-09-11 DIAGNOSIS — E04.1 THYROID NODULE: ICD-10-CM

## 2018-09-11 PROCEDURE — 76536 US EXAM OF HEAD AND NECK: CPT

## 2018-09-12 ENCOUNTER — TELEPHONE (OUTPATIENT)
Dept: GASTROENTEROLOGY | Facility: AMBULARY SURGERY CENTER | Age: 68
End: 2018-09-12

## 2018-09-13 ENCOUNTER — TELEPHONE (OUTPATIENT)
Dept: GASTROENTEROLOGY | Facility: AMBULARY SURGERY CENTER | Age: 68
End: 2018-09-13

## 2018-09-13 DIAGNOSIS — K21.00 REFLUX ESOPHAGITIS: Primary | ICD-10-CM

## 2018-09-13 RX ORDER — PANTOPRAZOLE SODIUM 40 MG/1
40 TABLET, DELAYED RELEASE ORAL DAILY
Qty: 90 TABLET | Refills: 0 | Status: SHIPPED | OUTPATIENT
Start: 2018-09-13 | End: 2019-04-08

## 2018-09-13 RX ORDER — PANTOPRAZOLE SODIUM 40 MG/1
40 TABLET, DELAYED RELEASE ORAL 2 TIMES DAILY
Refills: 0 | Status: CANCELLED | OUTPATIENT
Start: 2018-09-13

## 2018-09-13 NOTE — TELEPHONE ENCOUNTER
Pt called requesting a refill of pantoprazole 40mg, once per day for 90 days supply  Pt stated the pharmacy need prior auth because she was taking twice per day before   [facility] pharmacy [phone]

## 2018-09-13 NOTE — TELEPHONE ENCOUNTER
Called the pharmacy, and the pharmacist verified that the Pantoprazole went through fine and does not need prior authorization for 90 day supply

## 2018-10-17 ENCOUNTER — APPOINTMENT (OUTPATIENT)
Dept: LAB | Age: 68
End: 2018-10-17
Payer: COMMERCIAL

## 2018-10-17 ENCOUNTER — TRANSCRIBE ORDERS (OUTPATIENT)
Dept: ADMINISTRATIVE | Age: 68
End: 2018-10-17

## 2018-10-17 DIAGNOSIS — R74.02 NONSPECIFIC ELEVATION OF LEVELS OF TRANSAMINASE OR LACTIC ACID DEHYDROGENASE (LDH): ICD-10-CM

## 2018-10-17 DIAGNOSIS — R74.01 NONSPECIFIC ELEVATION OF LEVELS OF TRANSAMINASE OR LACTIC ACID DEHYDROGENASE (LDH): ICD-10-CM

## 2018-10-17 DIAGNOSIS — E78.2 MIXED HYPERLIPIDEMIA: ICD-10-CM

## 2018-10-17 DIAGNOSIS — E78.2 MIXED HYPERLIPIDEMIA: Primary | ICD-10-CM

## 2018-10-17 DIAGNOSIS — D50.0 IRON DEFICIENCY ANEMIA SECONDARY TO BLOOD LOSS (CHRONIC): ICD-10-CM

## 2018-10-17 LAB
ALBUMIN SERPL BCP-MCNC: 4.2 G/DL (ref 3.5–5)
ALP SERPL-CCNC: 96 U/L (ref 46–116)
ALT SERPL W P-5'-P-CCNC: 29 U/L (ref 12–78)
ANION GAP SERPL CALCULATED.3IONS-SCNC: 5 MMOL/L (ref 4–13)
AST SERPL W P-5'-P-CCNC: 16 U/L (ref 5–45)
BASOPHILS # BLD AUTO: 0.04 THOUSANDS/ΜL (ref 0–0.1)
BASOPHILS NFR BLD AUTO: 1 % (ref 0–1)
BILIRUB SERPL-MCNC: 0.62 MG/DL (ref 0.2–1)
BUN SERPL-MCNC: 17 MG/DL (ref 5–25)
CALCIUM SERPL-MCNC: 9.5 MG/DL (ref 8.3–10.1)
CHLORIDE SERPL-SCNC: 105 MMOL/L (ref 100–108)
CHOLEST SERPL-MCNC: 243 MG/DL (ref 50–200)
CO2 SERPL-SCNC: 28 MMOL/L (ref 21–32)
CREAT SERPL-MCNC: 0.72 MG/DL (ref 0.6–1.3)
EOSINOPHIL # BLD AUTO: 0.16 THOUSAND/ΜL (ref 0–0.61)
EOSINOPHIL NFR BLD AUTO: 3 % (ref 0–6)
ERYTHROCYTE [DISTWIDTH] IN BLOOD BY AUTOMATED COUNT: 13.3 % (ref 11.6–15.1)
GFR SERPL CREATININE-BSD FRML MDRD: 86 ML/MIN/1.73SQ M
GLUCOSE P FAST SERPL-MCNC: 107 MG/DL (ref 65–99)
HCT VFR BLD AUTO: 44.4 % (ref 34.8–46.1)
HDLC SERPL-MCNC: 57 MG/DL (ref 40–60)
HGB BLD-MCNC: 13.6 G/DL (ref 11.5–15.4)
IMM GRANULOCYTES # BLD AUTO: 0.01 THOUSAND/UL (ref 0–0.2)
IMM GRANULOCYTES NFR BLD AUTO: 0 % (ref 0–2)
LDLC SERPL CALC-MCNC: 163 MG/DL (ref 0–100)
LYMPHOCYTES # BLD AUTO: 2.07 THOUSANDS/ΜL (ref 0.6–4.47)
LYMPHOCYTES NFR BLD AUTO: 39 % (ref 14–44)
MCH RBC QN AUTO: 29.3 PG (ref 26.8–34.3)
MCHC RBC AUTO-ENTMCNC: 30.6 G/DL (ref 31.4–37.4)
MCV RBC AUTO: 96 FL (ref 82–98)
MONOCYTES # BLD AUTO: 0.47 THOUSAND/ΜL (ref 0.17–1.22)
MONOCYTES NFR BLD AUTO: 9 % (ref 4–12)
NEUTROPHILS # BLD AUTO: 2.53 THOUSANDS/ΜL (ref 1.85–7.62)
NEUTS SEG NFR BLD AUTO: 48 % (ref 43–75)
NONHDLC SERPL-MCNC: 186 MG/DL
NRBC BLD AUTO-RTO: 0 /100 WBCS
PLATELET # BLD AUTO: 232 THOUSANDS/UL (ref 149–390)
PMV BLD AUTO: 10.3 FL (ref 8.9–12.7)
POTASSIUM SERPL-SCNC: 4 MMOL/L (ref 3.5–5.3)
PROT SERPL-MCNC: 7.8 G/DL (ref 6.4–8.2)
RBC # BLD AUTO: 4.64 MILLION/UL (ref 3.81–5.12)
SODIUM SERPL-SCNC: 138 MMOL/L (ref 136–145)
TRIGL SERPL-MCNC: 117 MG/DL
WBC # BLD AUTO: 5.28 THOUSAND/UL (ref 4.31–10.16)

## 2018-10-17 PROCEDURE — 36415 COLL VENOUS BLD VENIPUNCTURE: CPT

## 2018-10-17 PROCEDURE — 80061 LIPID PANEL: CPT

## 2018-10-17 PROCEDURE — 80053 COMPREHEN METABOLIC PANEL: CPT

## 2018-10-17 PROCEDURE — 85025 COMPLETE CBC W/AUTO DIFF WBC: CPT

## 2018-11-04 ENCOUNTER — OFFICE VISIT (OUTPATIENT)
Dept: URGENT CARE | Age: 68
End: 2018-11-04
Payer: COMMERCIAL

## 2018-11-04 VITALS
HEIGHT: 67 IN | OXYGEN SATURATION: 97 % | DIASTOLIC BLOOD PRESSURE: 72 MMHG | WEIGHT: 170 LBS | TEMPERATURE: 97.6 F | RESPIRATION RATE: 16 BRPM | SYSTOLIC BLOOD PRESSURE: 118 MMHG | HEART RATE: 90 BPM | BODY MASS INDEX: 26.68 KG/M2

## 2018-11-04 DIAGNOSIS — S61.213A LACERATION OF LEFT MIDDLE FINGER WITHOUT FOREIGN BODY WITHOUT DAMAGE TO NAIL, INITIAL ENCOUNTER: Primary | ICD-10-CM

## 2018-11-04 PROCEDURE — 99213 OFFICE O/P EST LOW 20 MIN: CPT | Performed by: FAMILY MEDICINE

## 2018-11-05 NOTE — PROGRESS NOTES
[facility]    NAME: Bryan Banegas is a 76 y o  female  : 1950    MRN: [id number]  DATE: 2018  TIME: 8:35 PM    Assessment and Plan   Laceration of left middle finger without foreign body without damage to nail, initial encounter [S61 [id number]]  1  Laceration of left middle finger without foreign body without damage to nail, initial encounter         Patient Instructions   Patient Instructions   Surgifoam should adhere to wound and cause hemostasis (stopping of bleeding)  Typically the foam will come off slowly with time  Initially keep wound / foam dry (5-7 days) then may get wet in shower  Over time may gently trim any loose foam but do not recommend ripping off as bleeding may restart  As finger heals, may try soaking and gently loosening surgifoam if not coming off on it's own over next 1-2 weeks  Would keep area elevated as best possible over next several hours  May use cold compress over dressing for pain as needed  Watch for signs of infection which could include increased pain, swelling, redness, purulent drainage  Seek further evaluation if so  Chief Complaint     Chief Complaint   Patient presents with    Laceration       History of Present Illness   Bryan Banegas presents to the clinic c/o  51-year-old female that cut the tip of her LMF with sharp scissors today around 1 PM   Won't stop bleeding  Last tetanus w/n year  Review of Systems   Review of Systems   Constitutional: Negative  Respiratory: Negative  Cardiovascular: Negative  Skin: Positive for wound         Current Medications     Long-Term Prescriptions   Medication Sig Dispense Refill    cetirizine (ZyrTEC) 10 MG chewable tablet Chew 10 mg daily      cyclobenzaprine (FLEXERIL) 10 mg tablet Take 10 mg by mouth once as needed for muscle spasms (takes one per week on average)      dexlansoprazole (DEXILANT) 60 MG capsule Take 1 capsule (60 mg total) by mouth daily (Patient not taking: Reported on 11/4/2018 ) 90 capsule 2    lisinopril (ZESTRIL) 10 mg tablet Take 10 mg by mouth daily      pantoprazole (PROTONIX) 40 mg tablet Take 1 tablet (40 mg total) by mouth daily (Patient not taking: Reported on 11/4/2018 ) 90 tablet 0    ranitidine (ZANTAC) 150 mg tablet Take 150 mg by mouth daily at bedtime         Current Allergies     Allergies as of 11/04/2018 - Reviewed 11/04/2018   Allergen Reaction Noted    Codeine  02/14/2017    Morphine sulfate [morphine]  02/14/2017    Naprosyn [naproxen]  02/14/2017    Oxycodone  02/14/2017    Penicillins  02/14/2017    Sulfa antibiotics  02/14/2017          The following portions of the patient's history were reviewed and updated as appropriate: allergies, current medications, past family history, past medical history, past social history, past surgical history and problem list   Past Medical History:   Diagnosis Date    Cancer ([facility] [address] )     left     Chronic low back pain     Clostridium difficile colitis     DDD (degenerative disc disease), lumbar     GERD (gastroesophageal reflux disease)     Hypertension     Laryngopharyngeal reflux (LPR)     Lumbar spinal stenosis     Osteoarthritis of spine with radiculopathy, lumbar region     Osteopenia     Piriformis syndrome of left side     Sciatica     Spondylosis of cervical region without myelopathy or radiculopathy      Past Surgical History:   Procedure Laterality Date    BREAST LUMPECTOMY      BREAST LUMPECTOMY Left     BREAST SURGERY      COLONOSCOPY      [facility] ESOPHAGOGASTRODUODENOSCOPY TRANSORAL DIAGNOSTIC N/A 2/15/2017    Procedure: ESOPHAGOGASTRODUODENOSCOPY (EGD); Surgeon: Stacy Reis MD;  [facility]; Service: Gastroenterology    [facility] ESOPHAGOGASTRODUODENOSCOPY TRANSORAL DIAGNOSTIC N/A 2/1/2018    Procedure: ESOPHAGOGASTRODUODENOSCOPY (EGD); Surgeon: Stacy Reis MD;  [facility];   Service: Gastroenterology     Family History   Problem Relation Age of Onset  Osteoporosis Mother     Coronary artery disease Father     Diabetes Father     Cancer Maternal Aunt        Objective   /72 (BP Location: Left arm, Patient Position: Sitting, Cuff Size: Standard)   Pulse 90   Temp 97 6 °F (36 4 °C) (Temporal)   Resp 16   Ht 5' 7" (1 702 m)   Wt 77 1 kg (170 lb)   SpO2 97%   BMI 26 63 kg/m²          Physical Exam     Physical Exam   Constitutional: She is oriented to person, place, and time  She appears well-developed and well-nourished  No distress  Neurological: She is alert and oriented to person, place, and time  Skin: She is not diaphoretic  Distal tip LMF with avulsion laceration approx 5x8 mm  Bleeding  Psychiatric: She has a normal mood and affect  Laceration repair  Date/Time: 11/4/2018 8:34 PM  Performed by: Millie Matta by: Kay Peralta   Consent: Verbal consent obtained    Consent given by: patient  Patient identity confirmed: verbally with patient  Body area: upper extremity  Location details: left long finger  Laceration length: 0 8 cm  Foreign bodies: no foreign bodies  Tendon involvement: none  Nerve involvement: none  Vascular damage: no    Sedation:  Patient sedated: no    Wound Dehiscence:  Superficial Wound Dehiscence: simple closure      Procedure Details:  Irrigation solution: saline  Debridement: none  Degree of undermining: none  Wound skin closure material used: surgifoam   Dressing: pressure dressing and gauze roll  Patient tolerance: Patient tolerated the procedure well with no immediate complications

## 2018-11-05 NOTE — PROGRESS NOTES
Pt  Cut her left middle finger tip off with kitchen sheers at 13:00 today  It has been bleeding profusely  TD in 2015

## 2018-11-05 NOTE — PATIENT INSTRUCTIONS
Surgifoam should adhere to wound and cause hemostasis (stopping of bleeding)  Typically the foam will come off slowly with time  Initially keep wound / foam dry (5-7 days) then may get wet in shower  Over time may gently trim any loose foam but do not recommend ripping off as bleeding may restart  As finger heals, may try soaking and gently loosening surgifoam if not coming off on it's own over next 1-2 weeks  Would keep area elevated as best possible over next several hours  May use cold compress over dressing for pain as needed  Watch for signs of infection which could include increased pain, swelling, redness, purulent drainage  Seek further evaluation if so

## 2019-03-11 ENCOUNTER — OFFICE VISIT (OUTPATIENT)
Dept: OBGYN CLINIC | Facility: HOSPITAL | Age: 69
End: 2019-03-11
Payer: COMMERCIAL

## 2019-03-11 ENCOUNTER — HOSPITAL ENCOUNTER (OUTPATIENT)
Dept: RADIOLOGY | Facility: HOSPITAL | Age: 69
Discharge: HOME | End: 2019-03-11
Attending: ORTHOPAEDIC SURGERY
Payer: COMMERCIAL

## 2019-03-11 VITALS
SYSTOLIC BLOOD PRESSURE: 145 MMHG | BODY MASS INDEX: 26.68 KG/M2 | DIASTOLIC BLOOD PRESSURE: 85 MMHG | HEART RATE: 94 BPM | WEIGHT: 170 LBS | HEIGHT: 67 IN

## 2019-03-11 DIAGNOSIS — M54.41 CHRONIC BILATERAL LOW BACK PAIN WITH RIGHT-SIDED SCIATICA: ICD-10-CM

## 2019-03-11 DIAGNOSIS — G89.29 CHRONIC BILATERAL LOW BACK PAIN WITH RIGHT-SIDED SCIATICA: ICD-10-CM

## 2019-03-11 DIAGNOSIS — M54.16 LUMBAR RADICULOPATHY: Primary | ICD-10-CM

## 2019-03-11 DIAGNOSIS — M54.16 LUMBAR RADICULOPATHY: ICD-10-CM

## 2019-03-11 PROCEDURE — 99214 OFFICE O/P EST MOD 30 MIN: CPT | Performed by: ORTHOPAEDIC SURGERY

## 2019-03-11 PROCEDURE — 72110 X-RAY EXAM L-2 SPINE 4/>VWS: CPT

## 2019-03-11 NOTE — ASSESSMENT & PLAN NOTE
Patient presents for evaluation of right-sided lower back pain that radiates into her calf muscle  She has had previous symptoms in the past on the left side which was treated conservatively with injections  Her last MRI from several years ago demonstrated multilevel areas of DDD and bilateral foraminal stenosis  On physical exam  She is tender to palpation in the region of the right lumbosacral spine piriformis fossa and right SI joint  She has a positive Anupama finger sign on the right  Positive Javier's test bilaterally  Negative modified straight leg raise bilaterally  5/5 strength L2-S1 bilaterally      X-ray today demonstrates extensive DDD from L2-S1 with mild loss of lordosis    Assessment and plan  Lower back and right leg symptoms are multifactorial in origin including lumbar DDD, of sacroiliitis  Referral to pain management will be provided for right SI joint injection as well as L4-5 and L5-S1 foraminal injections/BERT

## 2019-03-11 NOTE — PROGRESS NOTES
Assessment/Plan:    Lumbar radiculopathy  Patient presents for evaluation of right-sided lower back pain that radiates into her calf muscle  She has had previous symptoms in the past on the left side which was treated conservatively with injections  Her last MRI from several years ago demonstrated multilevel areas of DDD and bilateral foraminal stenosis  On physical exam  She is tender to palpation in the region of the right lumbosacral spine piriformis fossa and right SI joint  She has a positive Aunpama finger sign on the right  Positive Javier's test bilaterally  Negative modified straight leg raise bilaterally  5/5 strength L2-S1 bilaterally  X-ray today demonstrates extensive DDD from L2-S1 with mild loss of lordosis    Assessment and plan  Lower back and right leg symptoms are multifactorial in origin including lumbar DDD, of sacroiliitis  Referral to pain management will be provided for right SI joint injection as well as L4-5 and L5-S1 foraminal injections/BERT            · Low back pain with right posterior buttock and thigh pain  · History of left leg pain treated with successful BERT  · Patient refuses physical therapy  · Referral to pain management for lumbar and SI joint injections  · Follow up as needed     Problem List Items Addressed This Visit        Nervous and Auditory    Lumbar radiculopathy - Primary     Patient presents for evaluation of right-sided lower back pain that radiates into her calf muscle  She has had previous symptoms in the past on the left side which was treated conservatively with injections  Her last MRI from several years ago demonstrated multilevel areas of DDD and bilateral foraminal stenosis  On physical exam  She is tender to palpation in the region of the right lumbosacral spine piriformis fossa and right SI joint  She has a positive Anupama finger sign on the right  Positive Javier's test bilaterally  Negative modified straight leg raise bilaterally    5/5 strength L2-S1 bilaterally  X-ray today demonstrates extensive DDD from L2-S1 with mild loss of lordosis    Assessment and plan  Lower back and right leg symptoms are multifactorial in origin including lumbar DDD, of sacroiliitis  Referral to pain management will be provided for right SI joint injection as well as L4-5 and L5-S1 foraminal injections/BERT         Relevant Orders    XR spine lumbar minimum 4 views non injury    Ambulatory referral to Pain Management    Chronic bilateral low back pain with right-sided sciatica    Relevant Orders    Ambulatory referral to Pain Management            Subjective:      Patient ID: Wiley Salomon is a 76 y o  female  HPI   The patient is here for follow up of low back pain  She was last seen in 2016 for left low back and leg pain alleviated with 2x lumbar injections  Today she complains of bilateral low back pain, right buttock, lateral thigh and anterolateral shin pain  She rates her pain at 4/10 and 8-9/10 at its high point  Lying in bed upright aggravates  She has to sleep upright due to Laryngeal reflux  She does use flexeril occasionally  She denies past lumbar surgery  The following portions of the patient's history were reviewed and updated as appropriate: allergies, current medications, past family history, past medical history, past social history, past surgical history and problem list     Review of Systems   Constitutional: Negative for chills, fever and unexpected weight change  HENT: Negative for hearing loss, nosebleeds and sore throat  Eyes: Negative for pain, redness and visual disturbance  Respiratory: Negative for cough, shortness of breath and wheezing  Cardiovascular: Negative for chest pain, palpitations and leg swelling  Gastrointestinal: Negative for abdominal pain, nausea and vomiting  Genitourinary: Negative for dyspareunia, dysuria and frequency  Skin: Negative for rash and wound     Neurological: Negative for dizziness, numbness and headaches  Psychiatric/Behavioral: Negative for decreased concentration and suicidal ideas  The patient is not nervous/anxious  Objective:      /85   Pulse 94   Ht 5' 7" (1 702 m)   Wt 77 1 kg (170 lb)   BMI 26 63 kg/m²          Physical Exam   Constitutional: She is oriented to person, place, and time  She appears well-developed and well-nourished  HENT:   Head: Normocephalic  Eyes: Conjunctivae are normal    Neck: Normal range of motion  Cardiovascular: Normal rate  Pulmonary/Chest: Effort normal    Neurological: She is alert and oriented to person, place, and time  Skin: Skin is warm and dry         Patient ambulates without assistance   Tender to palpation Right SI joint  Modified straight leg raise negative bilterally  Strength L2-S1 5/5 bilaterally   Sensation L2-S1 intact bilaterally   Javier's test positive bilaterally      Imaging:  Lumbar x-rays taken and reviewed    Scribe Attestation    I,:   Ranjith Butler am acting as a scribe while in the presence of the attending physician :        I,:   Karen Portillo MD personally performed the services described in this documentation    as scribed in my presence :

## 2019-03-27 ENCOUNTER — APPOINTMENT (OUTPATIENT)
Dept: LAB | Age: 69
End: 2019-03-27
Payer: COMMERCIAL

## 2019-03-27 ENCOUNTER — APPOINTMENT (OUTPATIENT)
Dept: RADIOLOGY | Age: 69
End: 2019-03-27
Payer: COMMERCIAL

## 2019-03-27 ENCOUNTER — TRANSCRIBE ORDERS (OUTPATIENT)
Dept: ADMINISTRATIVE | Age: 69
End: 2019-03-27

## 2019-03-27 DIAGNOSIS — E78.2 MIXED HYPERLIPIDEMIA: ICD-10-CM

## 2019-03-27 DIAGNOSIS — M25.511 ACUTE PAIN OF RIGHT SHOULDER: ICD-10-CM

## 2019-03-27 DIAGNOSIS — D50.0 IRON DEFICIENCY ANEMIA SECONDARY TO BLOOD LOSS (CHRONIC): ICD-10-CM

## 2019-03-27 DIAGNOSIS — M25.561 ACUTE PAIN OF RIGHT KNEE: ICD-10-CM

## 2019-03-27 DIAGNOSIS — E78.2 MIXED HYPERLIPIDEMIA: Primary | ICD-10-CM

## 2019-03-27 DIAGNOSIS — R73.01 IMPAIRED FASTING GLUCOSE: ICD-10-CM

## 2019-03-27 LAB
ALBUMIN SERPL BCP-MCNC: 4.4 G/DL (ref 3.5–5)
ALP SERPL-CCNC: 85 U/L (ref 46–116)
ALT SERPL W P-5'-P-CCNC: 33 U/L (ref 12–78)
ANION GAP SERPL CALCULATED.3IONS-SCNC: 7 MMOL/L (ref 4–13)
AST SERPL W P-5'-P-CCNC: 20 U/L (ref 5–45)
BASOPHILS # BLD AUTO: 0.04 THOUSANDS/ΜL (ref 0–0.1)
BASOPHILS NFR BLD AUTO: 1 % (ref 0–1)
BILIRUB SERPL-MCNC: 0.96 MG/DL (ref 0.2–1)
BUN SERPL-MCNC: 19 MG/DL (ref 5–25)
CALCIUM SERPL-MCNC: 9.9 MG/DL (ref 8.3–10.1)
CHLORIDE SERPL-SCNC: 102 MMOL/L (ref 100–108)
CHOLEST SERPL-MCNC: 261 MG/DL (ref 50–200)
CO2 SERPL-SCNC: 29 MMOL/L (ref 21–32)
CREAT SERPL-MCNC: 0.7 MG/DL (ref 0.6–1.3)
EOSINOPHIL # BLD AUTO: 0.15 THOUSAND/ΜL (ref 0–0.61)
EOSINOPHIL NFR BLD AUTO: 3 % (ref 0–6)
ERYTHROCYTE [DISTWIDTH] IN BLOOD BY AUTOMATED COUNT: 13.2 % (ref 11.6–15.1)
EST. AVERAGE GLUCOSE BLD GHB EST-MCNC: 117 MG/DL
GFR SERPL CREATININE-BSD FRML MDRD: 89 ML/MIN/1.73SQ M
GLUCOSE P FAST SERPL-MCNC: 99 MG/DL (ref 65–99)
HBA1C MFR BLD: 5.7 % (ref 4.2–6.3)
HCT VFR BLD AUTO: 46.5 % (ref 34.8–46.1)
HDLC SERPL-MCNC: 68 MG/DL (ref 40–60)
HGB BLD-MCNC: 14.4 G/DL (ref 11.5–15.4)
IMM GRANULOCYTES # BLD AUTO: 0.02 THOUSAND/UL (ref 0–0.2)
IMM GRANULOCYTES NFR BLD AUTO: 0 % (ref 0–2)
LDLC SERPL CALC-MCNC: 166 MG/DL (ref 0–100)
LYMPHOCYTES # BLD AUTO: 1.94 THOUSANDS/ΜL (ref 0.6–4.47)
LYMPHOCYTES NFR BLD AUTO: 33 % (ref 14–44)
MCH RBC QN AUTO: 29.7 PG (ref 26.8–34.3)
MCHC RBC AUTO-ENTMCNC: 31 G/DL (ref 31.4–37.4)
MCV RBC AUTO: 96 FL (ref 82–98)
MONOCYTES # BLD AUTO: 0.51 THOUSAND/ΜL (ref 0.17–1.22)
MONOCYTES NFR BLD AUTO: 9 % (ref 4–12)
NEUTROPHILS # BLD AUTO: 3.24 THOUSANDS/ΜL (ref 1.85–7.62)
NEUTS SEG NFR BLD AUTO: 54 % (ref 43–75)
NONHDLC SERPL-MCNC: 193 MG/DL
NRBC BLD AUTO-RTO: 0 /100 WBCS
PLATELET # BLD AUTO: 220 THOUSANDS/UL (ref 149–390)
PMV BLD AUTO: 10.3 FL (ref 8.9–12.7)
POTASSIUM SERPL-SCNC: 4.3 MMOL/L (ref 3.5–5.3)
PROT SERPL-MCNC: 8.1 G/DL (ref 6.4–8.2)
RBC # BLD AUTO: 4.85 MILLION/UL (ref 3.81–5.12)
SODIUM SERPL-SCNC: 138 MMOL/L (ref 136–145)
TRIGL SERPL-MCNC: 136 MG/DL
WBC # BLD AUTO: 5.9 THOUSAND/UL (ref 4.31–10.16)

## 2019-03-27 PROCEDURE — 80061 LIPID PANEL: CPT

## 2019-03-27 PROCEDURE — 80053 COMPREHEN METABOLIC PANEL: CPT

## 2019-03-27 PROCEDURE — 83036 HEMOGLOBIN GLYCOSYLATED A1C: CPT

## 2019-03-27 PROCEDURE — 73562 X-RAY EXAM OF KNEE 3: CPT

## 2019-03-27 PROCEDURE — 85025 COMPLETE CBC W/AUTO DIFF WBC: CPT

## 2019-03-27 PROCEDURE — 36415 COLL VENOUS BLD VENIPUNCTURE: CPT

## 2019-03-27 PROCEDURE — 73030 X-RAY EXAM OF SHOULDER: CPT

## 2019-04-08 ENCOUNTER — CLINICAL SUPPORT (OUTPATIENT)
Dept: PAIN MEDICINE | Facility: CLINIC | Age: 69
End: 2019-04-08
Payer: COMMERCIAL

## 2019-04-08 VITALS
HEART RATE: 90 BPM | SYSTOLIC BLOOD PRESSURE: 126 MMHG | DIASTOLIC BLOOD PRESSURE: 86 MMHG | HEIGHT: 67 IN | BODY MASS INDEX: 26.68 KG/M2 | WEIGHT: 170 LBS | TEMPERATURE: 98.8 F

## 2019-04-08 DIAGNOSIS — M48.061 SPINAL STENOSIS OF LUMBAR REGION, UNSPECIFIED WHETHER NEUROGENIC CLAUDICATION PRESENT: ICD-10-CM

## 2019-04-08 DIAGNOSIS — M70.61 TROCHANTERIC BURSITIS OF RIGHT HIP: ICD-10-CM

## 2019-04-08 DIAGNOSIS — M47.816 LUMBAR SPONDYLOSIS: ICD-10-CM

## 2019-04-08 DIAGNOSIS — M54.16 LUMBAR RADICULOPATHY: ICD-10-CM

## 2019-04-08 DIAGNOSIS — M46.1 SACROILIITIS (HCC): Primary | ICD-10-CM

## 2019-04-08 DIAGNOSIS — M75.51 SUBACROMIAL BURSITIS OF RIGHT SHOULDER JOINT: ICD-10-CM

## 2019-04-08 DIAGNOSIS — M54.41 CHRONIC BILATERAL LOW BACK PAIN WITH RIGHT-SIDED SCIATICA: ICD-10-CM

## 2019-04-08 DIAGNOSIS — G89.29 CHRONIC BILATERAL LOW BACK PAIN WITH RIGHT-SIDED SCIATICA: ICD-10-CM

## 2019-04-08 PROCEDURE — 99204 OFFICE O/P NEW MOD 45 MIN: CPT | Performed by: ANESTHESIOLOGY

## 2019-04-08 RX ORDER — MONTELUKAST SODIUM 10 MG/1
10 TABLET ORAL
COMMUNITY
End: 2019-09-25 | Stop reason: ALTCHOICE

## 2019-04-08 RX ORDER — OMEPRAZOLE 40 MG/1
40 CAPSULE, DELAYED RELEASE ORAL DAILY
COMMUNITY
End: 2019-06-03

## 2019-04-08 RX ORDER — AZELASTINE HYDROCHLORIDE 137 UG/1
SPRAY, METERED NASAL
COMMUNITY
End: 2019-08-06

## 2019-04-12 PROBLEM — K21.9 REFLUX LARYNGITIS: Status: ACTIVE | Noted: 2019-04-12

## 2019-04-12 PROBLEM — J38.4 LARYNGEAL EDEMA: Status: ACTIVE | Noted: 2019-04-12

## 2019-04-12 PROBLEM — J31.0 CHRONIC RHINITIS: Status: ACTIVE | Noted: 2019-04-12

## 2019-04-12 PROBLEM — R13.14 PHARYNGOESOPHAGEAL DYSPHAGIA: Status: ACTIVE | Noted: 2019-04-12

## 2019-04-12 PROBLEM — K21.9 GASTROESOPHAGEAL REFLUX DISEASE: Status: ACTIVE | Noted: 2019-04-12

## 2019-04-12 PROBLEM — J30.9 ALLERGIC RHINITIS: Status: ACTIVE | Noted: 2019-04-12

## 2019-04-12 PROBLEM — K22.4 ESOPHAGEAL DYSMOTILITIES: Status: ACTIVE | Noted: 2019-04-12

## 2019-04-12 PROBLEM — R49.0 DYSPHONIA: Status: ACTIVE | Noted: 2019-04-12

## 2019-04-12 PROBLEM — J04.0 REFLUX LARYNGITIS: Status: ACTIVE | Noted: 2019-04-12

## 2019-04-12 PROBLEM — J38.01 PARESIS OF RIGHT VOCAL FOLD: Status: ACTIVE | Noted: 2019-04-12

## 2019-05-06 ENCOUNTER — HOSPITAL ENCOUNTER (OUTPATIENT)
Dept: RADIOLOGY | Age: 69
Discharge: HOME | End: 2019-05-06
Payer: COMMERCIAL

## 2019-05-06 VITALS — HEIGHT: 67 IN | BODY MASS INDEX: 26.68 KG/M2 | WEIGHT: 170 LBS

## 2019-05-06 DIAGNOSIS — Z12.31 ENCOUNTER FOR SCREENING MAMMOGRAM FOR MALIGNANT NEOPLASM OF BREAST: ICD-10-CM

## 2019-05-06 PROCEDURE — 77067 SCR MAMMO BI INCL CAD: CPT

## 2019-06-06 ENCOUNTER — TRANSCRIBE ORDERS (OUTPATIENT)
Dept: ADMINISTRATIVE | Age: 69
End: 2019-06-06

## 2019-06-06 ENCOUNTER — APPOINTMENT (OUTPATIENT)
Dept: LAB | Age: 69
End: 2019-06-06
Payer: COMMERCIAL

## 2019-06-06 DIAGNOSIS — R35.0 URINARY FREQUENCY: ICD-10-CM

## 2019-06-06 DIAGNOSIS — R35.0 URINARY FREQUENCY: Primary | ICD-10-CM

## 2019-06-06 DIAGNOSIS — I16.0 HYPERTENSIVE URGENCY: ICD-10-CM

## 2019-06-06 DIAGNOSIS — R23.2 FLUSHING: ICD-10-CM

## 2019-06-06 LAB
CORTIS AM PEAK SERPL-MCNC: 15.9 UG/DL (ref 4.2–22.4)
FOLATE SERPL-MCNC: >20 NG/ML (ref 3.1–17.5)
T3FREE SERPL-MCNC: 2.78 PG/ML (ref 2.3–4.2)
T4 FREE SERPL-MCNC: 0.92 NG/DL (ref 0.76–1.46)
TSH SERPL DL<=0.05 MIU/L-ACNC: 1.51 UIU/ML (ref 0.36–3.74)
VIT B12 SERPL-MCNC: 355 PG/ML (ref 100–900)

## 2019-06-06 PROCEDURE — 84439 ASSAY OF FREE THYROXINE: CPT

## 2019-06-06 PROCEDURE — 82533 TOTAL CORTISOL: CPT

## 2019-06-06 PROCEDURE — 82746 ASSAY OF FOLIC ACID SERUM: CPT

## 2019-06-06 PROCEDURE — 82607 VITAMIN B-12: CPT

## 2019-06-06 PROCEDURE — 36415 COLL VENOUS BLD VENIPUNCTURE: CPT

## 2019-06-06 PROCEDURE — 82384 ASSAY THREE CATECHOLAMINES: CPT

## 2019-06-06 PROCEDURE — 84481 FREE ASSAY (FT-3): CPT

## 2019-06-06 PROCEDURE — 83835 ASSAY OF METANEPHRINES: CPT

## 2019-06-06 PROCEDURE — 82570 ASSAY OF URINE CREATININE: CPT

## 2019-06-06 PROCEDURE — 87086 URINE CULTURE/COLONY COUNT: CPT

## 2019-06-06 PROCEDURE — 84443 ASSAY THYROID STIM HORMONE: CPT

## 2019-06-06 PROCEDURE — 84585 ASSAY OF URINE VMA: CPT

## 2019-06-07 LAB — BACTERIA UR CULT: NORMAL

## 2019-06-09 LAB
METANEPH FREE SERPL-MCNC: 27 PG/ML (ref 0–62)
NORMETANEPHRINE SERPL-MCNC: 66 PG/ML (ref 0–145)

## 2019-06-10 ENCOUNTER — ANNUAL EXAM (OUTPATIENT)
Dept: OBGYN CLINIC | Facility: CLINIC | Age: 69
End: 2019-06-10
Payer: COMMERCIAL

## 2019-06-10 VITALS
SYSTOLIC BLOOD PRESSURE: 162 MMHG | WEIGHT: 172 LBS | BODY MASS INDEX: 27 KG/M2 | DIASTOLIC BLOOD PRESSURE: 80 MMHG | HEIGHT: 67 IN

## 2019-06-10 DIAGNOSIS — Z01.419 ENCOUNTER FOR ANNUAL ROUTINE GYNECOLOGICAL EXAMINATION: ICD-10-CM

## 2019-06-10 DIAGNOSIS — N95.2 VAGINAL ATROPHY: ICD-10-CM

## 2019-06-10 DIAGNOSIS — Z12.39 SCREENING FOR BREAST CANCER: Primary | ICD-10-CM

## 2019-06-10 LAB — MISCELLANEOUS LAB TEST RESULT: NORMAL

## 2019-06-10 PROCEDURE — G0101 CA SCREEN;PELVIC/BREAST EXAM: HCPCS | Performed by: OBSTETRICS & GYNECOLOGY

## 2019-06-11 LAB
DOPAMINE 24H UR-MRATE: <30 PG/ML (ref 0–48)
EPINEPH PLAS-MCNC: <15 PG/ML (ref 0–62)
NOREPINEPH PLAS-MCNC: 332 PG/ML (ref 0–874)

## 2019-06-19 PROBLEM — R05.9 COUGH: Status: ACTIVE | Noted: 2019-06-19

## 2019-06-19 PROBLEM — R10.13 DYSPEPSIA: Status: ACTIVE | Noted: 2019-06-19

## 2019-07-15 ENCOUNTER — TRANSCRIBE ORDERS (OUTPATIENT)
Dept: ADMINISTRATIVE | Facility: HOSPITAL | Age: 69
End: 2019-07-15

## 2019-07-15 ENCOUNTER — HOSPITAL ENCOUNTER (OUTPATIENT)
Dept: RADIOLOGY | Facility: IMAGING CENTER | Age: 69
Discharge: HOME | End: 2019-07-15
Payer: COMMERCIAL

## 2019-07-15 DIAGNOSIS — M15.0 PRIMARY GENERALIZED HYPERTROPHIC OSTEOARTHROSIS: ICD-10-CM

## 2019-07-15 DIAGNOSIS — M79.672 LEFT FOOT PAIN: Primary | ICD-10-CM

## 2019-07-15 DIAGNOSIS — M79.672 LEFT FOOT PAIN: ICD-10-CM

## 2019-07-15 PROCEDURE — 73610 X-RAY EXAM OF ANKLE: CPT

## 2019-07-15 PROCEDURE — 73630 X-RAY EXAM OF FOOT: CPT

## 2019-07-31 ENCOUNTER — OFFICE VISIT (OUTPATIENT)
Dept: CARDIOLOGY CLINIC | Facility: CLINIC | Age: 69
End: 2019-07-31
Payer: COMMERCIAL

## 2019-07-31 VITALS
SYSTOLIC BLOOD PRESSURE: 138 MMHG | HEART RATE: 74 BPM | BODY MASS INDEX: 26.89 KG/M2 | WEIGHT: 171.3 LBS | HEIGHT: 67 IN | DIASTOLIC BLOOD PRESSURE: 86 MMHG

## 2019-07-31 DIAGNOSIS — R06.00 DYSPNEA ON EXERTION: ICD-10-CM

## 2019-07-31 DIAGNOSIS — I10 ESSENTIAL HYPERTENSION: ICD-10-CM

## 2019-07-31 DIAGNOSIS — Z76.89 ENCOUNTER TO ESTABLISH CARE: Primary | ICD-10-CM

## 2019-07-31 DIAGNOSIS — R06.02 SHORTNESS OF BREATH: ICD-10-CM

## 2019-07-31 PROBLEM — R06.09 DYSPNEA ON EXERTION: Status: ACTIVE | Noted: 2019-07-31

## 2019-07-31 PROCEDURE — 93000 ELECTROCARDIOGRAM COMPLETE: CPT | Performed by: INTERNAL MEDICINE

## 2019-07-31 PROCEDURE — 99204 OFFICE O/P NEW MOD 45 MIN: CPT | Performed by: INTERNAL MEDICINE

## 2019-07-31 PROCEDURE — 3075F SYST BP GE 130 - 139MM HG: CPT | Performed by: INTERNAL MEDICINE

## 2019-07-31 RX ORDER — FAMOTIDINE 20 MG/1
20 TABLET, FILM COATED ORAL DAILY
COMMUNITY
End: 2020-01-28

## 2019-07-31 NOTE — PROGRESS NOTES
Cardiology   Chandni Sanchez 71 y o  female MRN: [id number]        Impression:  1  Flushing - may be vagal reaction to her reflux  No obvious cardiac etiology  2  Dyspnea on exertion - will want to evaluate for structural heart disease and myocardial ischemia  3  Hypertension - borderline control  Recommendations:  1  Continue current treatment for reflux  2  Echocardiogram to evaluate for structural heart disease  3  Exercise stress echocardiogram to evaluate for myocardial ischemia  4  Continue remainder of medications  5  Follow up in one month  HPI: Chandni Sanchez is a 71y o  year old female with hypertension who presents for evaluation of facial flushing  Initially would occur with drinking ETOH, but now occurring intermittently  No chest pain, but some increased dyspnea on exertion which is new  Rare palpitations  Does have significant reflux, for which she is being treated  Review of Systems   Constitutional:        Flushing   HENT: Negative  Eyes: Negative  Respiratory: Positive for shortness of breath  Negative for chest tightness  Cardiovascular: Negative for chest pain, palpitations and leg swelling  Gastrointestinal: Negative  Endocrine: Negative  Genitourinary: Negative  Musculoskeletal: Negative  Skin: Negative  Allergic/Immunologic: Negative  Neurological: Negative  Hematological: Negative  Psychiatric/Behavioral: Negative  All other systems reviewed and are negative          Past Medical History:   Diagnosis Date    Breast cancer ([facility] [address] ) 08/21/2008    age 62    Cancer ([facility] [address] )     left     Chronic low back pain     Clostridium difficile colitis     DDD (degenerative disc disease), lumbar     GERD (gastroesophageal reflux disease)     History of radiation therapy 09/2008    for breast cancer    Hypertension     Laryngopharyngeal reflux (LPR)     Lumbar spinal stenosis     Osteoarthritis of spine with radiculopathy, lumbar region  Osteopenia     Piriformis syndrome of left side     Sciatica     Spondylosis of cervical region without myelopathy or radiculopathy      Past Surgical History:   Procedure Laterality Date    BREAST BIOPSY Left 07/30/2008    malignant    BREAST CYST ASPIRATION Left 1998    BREAST LUMPECTOMY Left 08/21/2008    BREAST SURGERY      COLONOSCOPY  2015    [facility] ESOPHAGOGASTRODUODENOSCOPY TRANSORAL DIAGNOSTIC N/A 2/15/2017    Procedure: ESOPHAGOGASTRODUODENOSCOPY (EGD); Surgeon: Jagdeep Crow MD;  [facility]; Service: Gastroenterology    [facility] ESOPHAGOGASTRODUODENOSCOPY TRANSORAL DIAGNOSTIC N/A 2/1/2018    Procedure: ESOPHAGOGASTRODUODENOSCOPY (EGD); Surgeon: Jagdeep Crow MD;  [facility]; Service: Gastroenterology     Social History     Substance and Sexual Activity   Alcohol Use Yes    Frequency: 2-3 times a week    Drinks per session: 1 or 2    Comment: social     Social History     Substance and Sexual Activity   Drug Use No     Social History     Tobacco Use   Smoking Status Former Smoker   Smokeless Tobacco Never Used     Family History   Problem Relation Age of Onset    Osteoporosis Mother     Coronary artery disease Father    Bettie Silence Diabetes Father    Bettie Silence COPD Father     No Known Problems Maternal Aunt     Breast cancer Paternal Aunt 68    No Known Problems Paternal Aunt     No Known Problems Maternal Aunt        Allergies:   Allergies   Allergen Reactions    Codeine     Morphine Sulfate [Morphine]     Naprosyn [Naproxen]     Oxycodone     Penicillins     Sulfa Antibiotics        Medications:     Current Outpatient Medications:     Cholecalciferol (VITAMIN D-3 PO), Take 1,000 mg by mouth daily  , Disp: , Rfl:     famotidine (PEPCID) 20 mg tablet, Take 20 mg by mouth daily, Disp: , Rfl:     lisinopril (ZESTRIL) 10 mg tablet, Take 10 mg by mouth daily, Disp: , Rfl:     montelukast (SINGULAIR) 10 mg tablet, Take 10 mg by mouth daily at bedtime, Disp: , Rfl:     Multiple Vitamins-Minerals (CENTRUM ADULTS PO), Take 1 tablet by mouth daily, Disp: , Rfl:     Probiotic Product (PROBIOTIC PO), Take 1 tablet by mouth, Disp: , Rfl:     RABEprazole (ACIPHEX) 20 MG tablet, Take 1 tablet (20 mg total) by mouth every morning Take 30 minutes prior to breakfast, Disp: 30 tablet, Rfl: 6    Azelastine HCl 137 MCG/SPRAY SOLN, into each nostril, Disp: , Rfl:     cyclobenzaprine (FLEXERIL) 10 mg tablet, Take 10 mg by mouth once as needed for muscle spasms (takes one per week on average), Disp: , Rfl:       Wt Readings from Last 3 Encounters:   07/31/19 77 7 kg (171 lb 4 8 oz)   06/10/19 78 kg (172 lb)   05/06/19 77 1 kg (170 lb)     Temp Readings from Last 3 Encounters:   04/08/19 98 8 °F (37 1 °C) (Oral)   11/04/18 97 6 °F (36 4 °C) (Temporal)   02/01/18 (!) 97 1 °F (36 2 °C) (Temporal)     BP Readings from Last 3 Encounters:   07/31/19 138/86   06/10/19 162/80   06/03/19 144/94     Pulse Readings from Last 3 Encounters:   07/31/19 74   04/08/19 90   03/11/19 94         Physical Exam   Constitutional: She is oriented to person, place, and time  She appears well-developed  HENT:   Head: Atraumatic  Eyes: EOM are normal    Neck: Normal range of motion  Cardiovascular: Normal rate, regular rhythm and normal heart sounds  Exam reveals no gallop and no friction rub  No murmur heard  Pulmonary/Chest: Effort normal and breath sounds normal  No stridor  No respiratory distress  Abdominal: Soft  Musculoskeletal: Normal range of motion  Neurological: She is alert and oriented to person, place, and time  Skin: Skin is warm and dry           Laboratory Studies:  CMP:  Lab Results   Component Value Date     10/22/2015    K 4 3 03/27/2019     03/27/2019    CO2 29 03/27/2019    ANIONGAP 8 10/22/2015    BUN 19 03/27/2019    CREATININE 0 70 03/27/2019    GLUCOSE 109 10/22/2015    AST 20 03/27/2019    ALT 33 03/27/2019    BILITOT 0 79 10/22/2015    EGFR 89 03/27/2019       Lipid Profile:   Lab Results   Component Value Date    CHOL 231 10/22/2015     Lab Results   Component Value Date    HDL 68 (H) 03/27/2019     Lab Results   Component Value Date    LDLCALC 166 (H) 03/27/2019     Lab Results   Component Value Date    TRIG 136 03/27/2019       Cardiac testing:   EKG reviewed personally: NSR 74 Inf-Lat TWI

## 2019-07-31 NOTE — PATIENT INSTRUCTIONS
Recommendations:  1  Continue current treatment for reflux  2  Echocardiogram to evaluate for structural heart disease  3  Exercise stress echocardiogram to evaluate for myocardial ischemia  4  Continue remainder of medications  5  Follow up in one month

## 2019-08-06 ENCOUNTER — OFFICE VISIT (OUTPATIENT)
Dept: OBGYN CLINIC | Facility: HOSPITAL | Age: 69
End: 2019-08-06
Payer: COMMERCIAL

## 2019-08-06 VITALS
HEIGHT: 67 IN | SYSTOLIC BLOOD PRESSURE: 136 MMHG | HEART RATE: 85 BPM | WEIGHT: 170.6 LBS | DIASTOLIC BLOOD PRESSURE: 84 MMHG | BODY MASS INDEX: 26.78 KG/M2

## 2019-08-06 DIAGNOSIS — M25.561 RIGHT KNEE PAIN, UNSPECIFIED CHRONICITY: ICD-10-CM

## 2019-08-06 DIAGNOSIS — M17.11 PATELLOFEMORAL ARTHRITIS OF RIGHT KNEE: Primary | ICD-10-CM

## 2019-08-06 PROCEDURE — 20610 DRAIN/INJ JOINT/BURSA W/O US: CPT | Performed by: ORTHOPAEDIC SURGERY

## 2019-08-06 PROCEDURE — 99213 OFFICE O/P EST LOW 20 MIN: CPT | Performed by: ORTHOPAEDIC SURGERY

## 2019-08-06 RX ORDER — BUPIVACAINE HYDROCHLORIDE 2.5 MG/ML
2 INJECTION, SOLUTION INFILTRATION; PERINEURAL
Status: COMPLETED | OUTPATIENT
Start: 2019-08-06 | End: 2019-08-06

## 2019-08-06 RX ORDER — METHYLPREDNISOLONE ACETATE 40 MG/ML
2 INJECTION, SUSPENSION INTRA-ARTICULAR; INTRALESIONAL; INTRAMUSCULAR; SOFT TISSUE
Status: COMPLETED | OUTPATIENT
Start: 2019-08-06 | End: 2019-08-06

## 2019-08-06 RX ADMIN — METHYLPREDNISOLONE ACETATE 2 ML: 40 INJECTION, SUSPENSION INTRA-ARTICULAR; INTRALESIONAL; INTRAMUSCULAR; SOFT TISSUE at 11:39

## 2019-08-06 RX ADMIN — BUPIVACAINE HYDROCHLORIDE 2 ML: 2.5 INJECTION, SOLUTION INFILTRATION; PERINEURAL at 11:39

## 2019-08-06 NOTE — PROGRESS NOTES
Assessment:  1  Patellofemoral arthritis of right knee  Large joint arthrocentesis: R knee   2  Right knee pain, unspecified chronicity         Plan:     Weight Bearing  as Tolerated   Patient received right knee steroid injection in the office today   Provided patient with VMO strengthening exercises   Will contact patient in 2 months for an update   Follow-up in 3 months        The above stated was discussed in layman's terms and the patient expressed understanding  All questions were answered to the patient's satisfaction  Subjective:   Kojo Arellano is a 71 y o  female who presents right knee pain  Patient states she started having right knee pain in March 2019 which subsided  , denies any falls or trauma  She notes about 3 weeks ago she started getting increased pain again over the anterior right knee  Pain is a constant dull ache  Denies any instability or locking  Pain is worse with kneeling, squatting, going down steps  Patient is taking Advil p r n  For pain  Patient has not had any physical therapy, surgeries, or injections in the right knee        Review of systems negative unless otherwise specified in HPI    Past Medical History:   Diagnosis Date    Breast cancer ([facility] [address] ) 08/21/2008    age 62    Cancer ([facility] [address] )     left     Chronic low back pain     Clostridium difficile colitis     DDD (degenerative disc disease), lumbar     GERD (gastroesophageal reflux disease)     History of radiation therapy 09/2008    for breast cancer    Hypertension     Laryngopharyngeal reflux (LPR)     Lumbar spinal stenosis     Osteoarthritis of spine with radiculopathy, lumbar region     Osteopenia     Piriformis syndrome of left side     Sciatica     Spondylosis of cervical region without myelopathy or radiculopathy        Past Surgical History:   Procedure Laterality Date    BREAST BIOPSY Left 07/30/2008    malignant    BREAST CYST ASPIRATION Left 1998    BREAST LUMPECTOMY Left 08/21/2008  BREAST SURGERY      COLONOSCOPY  2015    [place] ESOPHAGOGASTRODUODENOSCOPY TRANSORAL DIAGNOSTIC N/A 2/15/2017    Procedure: ESOPHAGOGASTRODUODENOSCOPY (EGD); Surgeon: Shivani Mane MD;  [facility]; Service: Gastroenterology    [place] ESOPHAGOGASTRODUODENOSCOPY TRANSORAL DIAGNOSTIC N/A 2/1/2018    Procedure: ESOPHAGOGASTRODUODENOSCOPY (EGD); Surgeon: Shivani Mane MD;  [facility];   Service: Gastroenterology       Family History   Problem Relation Age of Onset    Osteoporosis Mother     Coronary artery disease Father     Diabetes Father     COPD Father     No Known Problems Maternal Aunt     Breast cancer Paternal Aunt 68    No Known Problems Paternal Aunt     No Known Problems Maternal Aunt        Social History     Occupational History    Not on file   Tobacco Use    Smoking status: Former Smoker    Smokeless tobacco: Never Used   Substance and Sexual Activity    Alcohol use: Yes     Frequency: 2-3 times a week     Drinks per session: 1 or 2     Comment: social    Drug use: No    Sexual activity: Not Currently         Current Outpatient Medications:     Cholecalciferol (VITAMIN D-3 PO), Take 1,000 mg by mouth daily  , Disp: , Rfl:     cyclobenzaprine (FLEXERIL) 10 mg tablet, Take 10 mg by mouth once as needed for muscle spasms (takes one per week on average), Disp: , Rfl:     famotidine (PEPCID) 20 mg tablet, Take 20 mg by mouth daily, Disp: , Rfl:     lisinopril (ZESTRIL) 10 mg tablet, Take 10 mg by mouth daily, Disp: , Rfl:     montelukast (SINGULAIR) 10 mg tablet, Take 10 mg by mouth daily at bedtime, Disp: , Rfl:     Multiple Vitamins-Minerals (CENTRUM ADULTS PO), Take 1 tablet by mouth daily, Disp: , Rfl:     Probiotic Product (PROBIOTIC PO), Take 1 tablet by mouth, Disp: , Rfl:     RABEprazole (ACIPHEX) 20 MG tablet, Take 1 tablet (20 mg total) by mouth every morning Take 30 minutes prior to breakfast, Disp: 30 tablet, Rfl: 6    Allergies   Allergen Reactions    Codeine     Morphine Sulfate [Morphine]     Naprosyn [Naproxen]     Oxycodone     Penicillins     Sulfa Antibiotics             Vitals:    08/06/19 1128   BP: 136/84   Pulse: 85       Objective:            Physical Exam  · General: Awake, Alert, Oriented  · Eyes: Pupils equal, round and reactive to light  · Heart: regular rate and rhythm  · Lungs: No audible wheezing  · Abdomen: soft                    Ortho Exam  Right knee   No lacerations, no abrasions,no open wounds   No erythema   Mild swelling   No TTP over quadriceps tendon  No pain with ROM   Negative patella grind test  Mild tenderness to palpation of medial joint line   Stable to Coronal and Sagittal plan swelling   Neurovascularly Intact Distally     Diagnostics, reviewed and taken today if performed as documented:     The attending physician has personally reviewed the pertinent films in PACS and interpretation is as follows:  XR Right knee : mild Patellofemoral arthritis , no acute osseous abnormality     Procedures, if performed today:    Large joint arthrocentesis: R knee  Date/Time: 8/6/2019 11:39 AM  Consent given by: patient  Site marked: site marked  Timeout: Immediately prior to procedure a time out was called to verify the correct patient, procedure, equipment, support staff and site/side marked as required   Supporting Documentation  Indications: pain   Procedure Details  Location: knee - R knee  Preparation: Patient was prepped and draped in the usual sterile fashion  Needle size: 22 G  Ultrasound guidance: no  Approach: anterolateral  Medications administered: 2 mL bupivacaine 0 25 %; 2 mL methylPREDNISolone acetate 40 mg/mL    Patient tolerance: patient tolerated the procedure well with no immediate complications  Dressing:  Sterile dressing applied          None performed      Scribe Attestation    I,:   Thee Connolly am acting as a scribe while in the presence of the attending physician :        I,:   Peng Lizarraga MD personally performed the services described in this documentation    as scribed in my presence :              Portions of the record may have been created with voice recognition software  Occasional wrong word or "sound a like" substitutions may have occurred due to the inherent limitations of voice recognition software  Read the chart carefully and recognize, using context, where substitutions have occurred

## 2019-08-22 ENCOUNTER — APPOINTMENT (OUTPATIENT)
Dept: LAB | Age: 69
End: 2019-08-22
Payer: COMMERCIAL

## 2019-08-22 ENCOUNTER — TRANSCRIBE ORDERS (OUTPATIENT)
Dept: ADMINISTRATIVE | Age: 69
End: 2019-08-22

## 2019-08-22 DIAGNOSIS — R35.0 URINARY FREQUENCY: ICD-10-CM

## 2019-08-22 DIAGNOSIS — R35.0 URINARY FREQUENCY: Primary | ICD-10-CM

## 2019-08-22 PROCEDURE — 87086 URINE CULTURE/COLONY COUNT: CPT

## 2019-08-23 LAB — BACTERIA UR CULT: NORMAL

## 2019-08-28 ENCOUNTER — TELEPHONE (OUTPATIENT)
Dept: CARDIOLOGY CLINIC | Facility: CLINIC | Age: 69
End: 2019-08-28

## 2019-08-28 NOTE — TELEPHONE ENCOUNTER
Insurance denied her Stress Echo, to talk to a Medical Director call [phone] option 4 and enter ref# [id number]  What would you like to do?

## 2019-08-29 DIAGNOSIS — R06.00 DYSPNEA, UNSPECIFIED TYPE: Primary | ICD-10-CM

## 2019-09-10 ENCOUNTER — TELEPHONE (OUTPATIENT)
Dept: CARDIOLOGY CLINIC | Facility: CLINIC | Age: 69
End: 2019-09-10

## 2019-09-11 ENCOUNTER — HOSPITAL ENCOUNTER (OUTPATIENT)
Dept: NON INVASIVE DIAGNOSTICS | Facility: CLINIC | Age: 69
Discharge: HOME | End: 2019-09-11
Payer: COMMERCIAL

## 2019-09-11 DIAGNOSIS — R06.02 SHORTNESS OF BREATH: ICD-10-CM

## 2019-09-11 DIAGNOSIS — R06.00 DYSPNEA, UNSPECIFIED TYPE: ICD-10-CM

## 2019-09-11 PROCEDURE — 93306 TTE W/DOPPLER COMPLETE: CPT

## 2019-09-11 PROCEDURE — 93018 CV STRESS TEST I&R ONLY: CPT | Performed by: INTERNAL MEDICINE

## 2019-09-11 PROCEDURE — 93017 CV STRESS TEST TRACING ONLY: CPT

## 2019-09-11 PROCEDURE — 93016 CV STRESS TEST SUPVJ ONLY: CPT | Performed by: INTERNAL MEDICINE

## 2019-09-11 PROCEDURE — 93306 TTE W/DOPPLER COMPLETE: CPT | Performed by: INTERNAL MEDICINE

## 2019-09-12 LAB
ARRHY DURING EX: NORMAL
CHEST PAIN STATEMENT: NORMAL
MAX DIASTOLIC BP: 98 MMHG
MAX HEART RATE: 151 BPM
MAX PREDICTED HEART RATE: 151 BPM
MAX. SYSTOLIC BP: 170 MMHG
PROTOCOL NAME: NORMAL
REASON FOR TERMINATION: NORMAL
TARGET HR FORMULA: NORMAL
TEST INDICATION: NORMAL
TIME IN EXERCISE PHASE: NORMAL

## 2019-09-18 ENCOUNTER — TELEPHONE (OUTPATIENT)
Dept: CARDIOLOGY CLINIC | Facility: CLINIC | Age: 69
End: 2019-09-18

## 2019-09-18 NOTE — TELEPHONE ENCOUNTER
----- Message from Elvira Mosquera MD sent at 9/17/2019  5:27 PM EDT -----  Please call patient and let her know her echo and stress test both looked good  Can discuss more at office visit next week  Thanks

## 2019-09-25 ENCOUNTER — OFFICE VISIT (OUTPATIENT)
Dept: CARDIOLOGY CLINIC | Facility: CLINIC | Age: 69
End: 2019-09-25
Payer: COMMERCIAL

## 2019-09-25 VITALS
WEIGHT: 169 LBS | DIASTOLIC BLOOD PRESSURE: 72 MMHG | HEART RATE: 84 BPM | SYSTOLIC BLOOD PRESSURE: 118 MMHG | HEIGHT: 66 IN | BODY MASS INDEX: 27.16 KG/M2

## 2019-09-25 DIAGNOSIS — I10 ESSENTIAL HYPERTENSION: Primary | ICD-10-CM

## 2019-09-25 DIAGNOSIS — R06.00 DYSPNEA ON EXERTION: ICD-10-CM

## 2019-09-25 PROCEDURE — 3074F SYST BP LT 130 MM HG: CPT | Performed by: INTERNAL MEDICINE

## 2019-09-25 PROCEDURE — 99214 OFFICE O/P EST MOD 30 MIN: CPT | Performed by: INTERNAL MEDICINE

## 2019-09-25 PROCEDURE — 3078F DIAST BP <80 MM HG: CPT | Performed by: INTERNAL MEDICINE

## 2019-09-25 NOTE — PROGRESS NOTES
Cardiology   Abhilash Weaver 71 y o  female MRN: [id number]        Impression:  1  Flushing - no cardiac etiology  Only related to ETOH  2  Dyspnea on exertion - no evidence of ischemia  3  Hypertension - good control      Recommendations:  1  Continue current medications  2  Follow up in 6 months  HPI: Abhilash Weaver is a 71y o  year old female with hypertension and flushing who presents for follow up  Had echo and stress test that demonstrated no ischemia  Only has flushing with ETOH  Otherwise, asymptomatic  Review of Systems   Constitutional: Negative  HENT: Negative  Eyes: Negative  Respiratory: Negative for chest tightness and shortness of breath  Cardiovascular: Negative for chest pain, palpitations and leg swelling  Gastrointestinal: Negative  Endocrine: Negative  Genitourinary: Negative  Musculoskeletal: Negative  Skin: Negative  Allergic/Immunologic: Negative  Neurological: Negative  Hematological: Negative  Psychiatric/Behavioral: Negative  All other systems reviewed and are negative          Past Medical History:   Diagnosis Date    Breast cancer ([facility] [address] ) 08/21/2008    age 62    Cancer ([facility] [address] )     left     Chronic low back pain     Clostridium difficile colitis     DDD (degenerative disc disease), lumbar     GERD (gastroesophageal reflux disease)     History of radiation therapy 09/2008    for breast cancer    Hypertension     Laryngopharyngeal reflux (LPR)     Lumbar spinal stenosis     Osteoarthritis of spine with radiculopathy, lumbar region     Osteopenia     Piriformis syndrome of left side     Sciatica     Spondylosis of cervical region without myelopathy or radiculopathy      Past Surgical History:   Procedure Laterality Date    BREAST BIOPSY Left 07/30/2008    malignant    BREAST CYST ASPIRATION Left 1998    BREAST LUMPECTOMY Left 08/21/2008    BREAST SURGERY      COLONOSCOPY  2015    [place] ESOPHAGOGASTRODUODENOSCOPY TRANSORAL DIAGNOSTIC N/A 2/15/2017    Procedure: ESOPHAGOGASTRODUODENOSCOPY (EGD); Surgeon: Andreea Haynes MD;  [facility]; Service: Gastroenterology    [place] ESOPHAGOGASTRODUODENOSCOPY TRANSORAL DIAGNOSTIC N/A 2/1/2018    Procedure: ESOPHAGOGASTRODUODENOSCOPY (EGD); Surgeon: Andreea Haynes MD;  [facility]; Service: Gastroenterology     Social History     Substance and Sexual Activity   Alcohol Use Yes    Frequency: 2-3 times a week    Drinks per session: 1 or 2    Comment: social     Social History     Substance and Sexual Activity   Drug Use No     Social History     Tobacco Use   Smoking Status Former Smoker   Smokeless Tobacco Never Used     Family History   Problem Relation Age of Onset    Osteoporosis Mother     Coronary artery disease Father    Marie Diabetes Father    Marie COPD Father     No Known Problems Maternal Aunt     Breast cancer Paternal Aunt 68    No Known Problems Paternal Aunt     No Known Problems Maternal Aunt        Allergies:   Allergies   Allergen Reactions    Codeine     Morphine Sulfate [Morphine]     Naprosyn [Naproxen]     Oxycodone     Penicillins     Sulfa Antibiotics        Medications:     Current Outpatient Medications:     Cholecalciferol (VITAMIN D-3 PO), Take 1,000 mg by mouth daily  , Disp: , Rfl:     cyclobenzaprine (FLEXERIL) 10 mg tablet, Take 10 mg by mouth once as needed for muscle spasms (takes one per week on average), Disp: , Rfl:     famotidine (PEPCID) 20 mg tablet, Take 20 mg by mouth daily, Disp: , Rfl:     lisinopril (ZESTRIL) 10 mg tablet, Take 10 mg by mouth daily, Disp: , Rfl:     Multiple Vitamins-Minerals (CENTRUM ADULTS PO), Take 1 tablet by mouth daily, Disp: , Rfl:     Probiotic Product (PROBIOTIC PO), Take 1 tablet by mouth, Disp: , Rfl:       Wt Readings from Last 3 Encounters:   09/25/19 76 7 kg (169 lb)   08/19/19 77 1 kg (170 lb)   08/06/19 77 4 kg (170 lb 9 6 oz)     Temp Readings from Last 3 Encounters:   04/08/19 98 8 °F (37 1 °C) (Oral)   18 97 6 °F (36 4 °C) (Temporal)   18 (!) 97 1 °F (36 2 °C) (Temporal)     BP Readings from Last 3 Encounters:   19 118/72   19 132/84   19 136/84     Pulse Readings from Last 3 Encounters:   19 84   19 78   19 85         Physical Exam   Constitutional: She is oriented to person, place, and time  She appears well-developed  HENT:   Head: Atraumatic  Eyes: EOM are normal    Neck: Normal range of motion  Cardiovascular: Normal rate, regular rhythm and normal heart sounds  Exam reveals no friction rub  No murmur heard  Pulmonary/Chest: Effort normal and breath sounds normal  No respiratory distress  Abdominal: Soft  Musculoskeletal: Normal range of motion  Neurological: She is alert and oriented to person, place, and time  Skin: Skin is warm and dry  Psychiatric: She has a normal mood and affect           Laboratory Studies:  CMP:  Lab Results   Component Value Date     10/22/2015    K 4 3 2019     2019    CO2 29 2019    ANIONGAP 8 10/22/2015    BUN 19 2019    CREATININE 0 70 2019    GLUCOSE 109 10/22/2015    AST 20 2019    ALT 33 2019    BILITOT 0 79 10/22/2015    EGFR 89 2019       Lipid Profile:   Lab Results   Component Value Date    CHOL 231 10/22/2015     Lab Results   Component Value Date    HDL 68 (H) 2019     Lab Results   Component Value Date    LDLCALC 166 (H) 2019     Lab Results   Component Value Date    TRIG 136 2019       Cardiac testing:     Results for orders placed during the hospital encounter of 19   Echo complete with contrast if indicated    Narrative [address]  [address]  [phone]    Transthoracic Echocardiogram  2D, M-mode, Doppler, and Color Doppler    Study date:  11-Sep-2019    Patient: Guanaco Tello  MR number: [id number]  Account number: [de-identified]  : 1950  Age: 71 years  Gender: Female  Status: Outpatient  Location: [address] [place] [address] [facility]  Height: 66 in  Weight: 170 lb  BP: 132/ 84 mmHg    Indications: Shortness of breath  Diagnoses: R06 02 - Shortness of breath    Sonographer:  Bettylou Kanner, RDCS  Primary Physician:  Valdemar Bruno DO  Referring Physician:  Mundo Hagen MD  Group:  [facility]  Interpreting Physician:  Shala Worthington MD    SUMMARY    LEFT VENTRICLE:  Systolic function was normal  Ejection fraction was estimated to be 65 %  There were no regional wall motion abnormalities  Doppler parameters were consistent with abnormal left ventricular relaxation (grade 1 diastolic dysfunction)  RIGHT VENTRICLE:  The size was normal   Systolic function was normal     HISTORY: PRIOR HISTORY: GERD, hypertension    PROCEDURE: The study was performed in the [address] [facility]  This was a routine study  The transthoracic approach was used  The study included complete 2D imaging, M-mode, complete spectral Doppler, and color Doppler  The  heart rate was 74 bpm, at the start of the study  Images were obtained from the parasternal, apical, subcostal, and suprasternal notch acoustic windows  Image quality was adequate  LEFT VENTRICLE: Size was normal  Systolic function was normal  Ejection fraction was estimated to be 65 %  There were no regional wall motion abnormalities  Wall thickness was normal  There was no evidence of concentric hypertrophy  DOPPLER: Doppler parameters were consistent with abnormal left ventricular relaxation (grade 1 diastolic dysfunction)  RIGHT VENTRICLE: The size was normal  Systolic function was normal  Wall thickness was normal     LEFT ATRIUM: Size was normal     RIGHT ATRIUM: Size was normal     MITRAL VALVE: Valve structure was normal  There was normal leaflet separation  DOPPLER: The transmitral velocity was within the normal range   There was no evidence for stenosis  There was no significant regurgitation  AORTIC VALVE: The valve was trileaflet  Leaflets exhibited normal thickness and normal cuspal separation  DOPPLER: Transaortic velocity was within the normal range  There was no evidence for stenosis  There was no significant  regurgitation  TRICUSPID VALVE: The valve structure was normal  There was normal leaflet separation  DOPPLER: The transtricuspid velocity was within the normal range  There was no evidence for stenosis  There was trace regurgitation  The tricuspid jet  envelope definition was inadequate for estimation of RV systolic pressure  PULMONIC VALVE: Leaflets exhibited normal thickness, no calcification, and normal cuspal separation  DOPPLER: The transpulmonic velocity was within the normal range  There was no significant regurgitation  PERICARDIUM: There was no pericardial effusion  The pericardium was normal in appearance  AORTA: The root exhibited normal size  SYSTEMIC VEINS: IVC: The inferior vena cava was normal in size   Respirophasic changes were normal     SYSTEM MEASUREMENT TABLES    2D  %FS: 40 74 %  Ao Diam: 3 04 cm  EDV(Teich): 87 03 ml  EF(Cube): 79 19 %  EF(Teich): 71 76 %  ESV(Cube): 17 56 ml  ESV(Teich): 24 58 ml  IVSd: 0 93 cm  LA Area: 14 6 cm2  LA Diam: 2 75 cm  LVEDV MOD A4C: 74 43 ml  LVEF MOD A4C: 67 05 %  LVESV MOD A4C: 24 52 ml  LVIDd: 4 39 cm  LVIDs: 2 6 cm  LVLd A4C: 8 14 cm  LVLs A4C: 6 72 cm  LVPWd: 0 88 cm  RA Area: 14 2 cm2  RV Diam : 2 9 cm  SV MOD A4C: 49 91 ml  SV(Cube): 66 82 ml  SV(Teich): 62 45 ml    MM  TAPSE: 2 29 cm    PW  E': 0 05 m/s  E/E': 12 7  MV A [place]: 0 97 m/s  MV Dec [place]: 2 38 m/s2  MV DecT: 270 44 ms  MV E Erlin: 0 64 m/s  MV E/A Ratio: 0 66    [employer] Accredited Echocardiography Laboratory    Prepared and electronically signed by    Odalys Sorenson MD  Signed 11-Sep-2019 10:37:31

## 2019-09-30 PROBLEM — R11.0 NAUSEA: Status: ACTIVE | Noted: 2019-09-30

## 2019-09-30 PROBLEM — R49.0 MUSCLE TENSION DYSPHONIA: Status: ACTIVE | Noted: 2019-09-30

## 2019-09-30 PROBLEM — J38.3 GLOTTIC INSUFFICIENCY: Status: ACTIVE | Noted: 2019-09-30

## 2019-09-30 PROBLEM — J38.01 VOCAL FOLD PARESIS, RIGHT: Status: ACTIVE | Noted: 2019-09-30

## 2019-10-10 ENCOUNTER — APPOINTMENT (OUTPATIENT)
Dept: LAB | Age: 69
End: 2019-10-10
Payer: COMMERCIAL

## 2019-10-10 ENCOUNTER — TRANSCRIBE ORDERS (OUTPATIENT)
Dept: ADMINISTRATIVE | Age: 69
End: 2019-10-10

## 2019-10-10 DIAGNOSIS — E78.2 MIXED HYPERLIPIDEMIA: ICD-10-CM

## 2019-10-10 DIAGNOSIS — R73.01 IMPAIRED FASTING GLUCOSE: ICD-10-CM

## 2019-10-10 DIAGNOSIS — E78.2 MIXED HYPERLIPIDEMIA: Primary | ICD-10-CM

## 2019-10-10 LAB
ALBUMIN SERPL BCP-MCNC: 4.3 G/DL (ref 3.5–5)
ALP SERPL-CCNC: 80 U/L (ref 46–116)
ALT SERPL W P-5'-P-CCNC: 31 U/L (ref 12–78)
ANION GAP SERPL CALCULATED.3IONS-SCNC: 6 MMOL/L (ref 4–13)
AST SERPL W P-5'-P-CCNC: 17 U/L (ref 5–45)
BILIRUB SERPL-MCNC: 0.66 MG/DL (ref 0.2–1)
BUN SERPL-MCNC: 16 MG/DL (ref 5–25)
CALCIUM SERPL-MCNC: 10 MG/DL (ref 8.3–10.1)
CHLORIDE SERPL-SCNC: 106 MMOL/L (ref 100–108)
CHOLEST SERPL-MCNC: 227 MG/DL (ref 50–200)
CO2 SERPL-SCNC: 28 MMOL/L (ref 21–32)
CREAT SERPL-MCNC: 0.72 MG/DL (ref 0.6–1.3)
EST. AVERAGE GLUCOSE BLD GHB EST-MCNC: 126 MG/DL
GFR SERPL CREATININE-BSD FRML MDRD: 86 ML/MIN/1.73SQ M
GLUCOSE P FAST SERPL-MCNC: 108 MG/DL (ref 65–99)
HBA1C MFR BLD: 6 % (ref 4.2–6.3)
HDLC SERPL-MCNC: 59 MG/DL (ref 40–60)
LDLC SERPL CALC-MCNC: 136 MG/DL (ref 0–100)
NONHDLC SERPL-MCNC: 168 MG/DL
POTASSIUM SERPL-SCNC: 4.7 MMOL/L (ref 3.5–5.3)
PROT SERPL-MCNC: 7.7 G/DL (ref 6.4–8.2)
SODIUM SERPL-SCNC: 140 MMOL/L (ref 136–145)
TRIGL SERPL-MCNC: 161 MG/DL

## 2019-10-10 PROCEDURE — 36415 COLL VENOUS BLD VENIPUNCTURE: CPT

## 2019-10-10 PROCEDURE — 80053 COMPREHEN METABOLIC PANEL: CPT

## 2019-10-10 PROCEDURE — 80061 LIPID PANEL: CPT

## 2019-10-10 PROCEDURE — 83036 HEMOGLOBIN GLYCOSYLATED A1C: CPT

## 2019-11-12 ENCOUNTER — OFFICE VISIT (OUTPATIENT)
Dept: OBGYN CLINIC | Facility: HOSPITAL | Age: 69
End: 2019-11-12
Payer: COMMERCIAL

## 2019-11-12 VITALS
SYSTOLIC BLOOD PRESSURE: 142 MMHG | HEIGHT: 66 IN | DIASTOLIC BLOOD PRESSURE: 89 MMHG | HEART RATE: 84 BPM | WEIGHT: 162 LBS | BODY MASS INDEX: 26.03 KG/M2

## 2019-11-12 DIAGNOSIS — M79.601 RIGHT ARM PAIN: Primary | ICD-10-CM

## 2019-11-12 DIAGNOSIS — M17.11 PATELLOFEMORAL ARTHRITIS OF RIGHT KNEE: ICD-10-CM

## 2019-11-12 PROCEDURE — 99213 OFFICE O/P EST LOW 20 MIN: CPT | Performed by: ORTHOPAEDIC SURGERY

## 2019-11-12 NOTE — PROGRESS NOTES
Michelle Manuel 71 y o  female MRN: [id number]      Chief Complaint:   right knee pain right shoulder pain    HPI:   71 y  o female complaining of right knee pain right shoulder pain  Patient has been previously evaluated and treated for right knee pain due to patellofemoral osteoarthritis  Patient states the pain is worse with deep bends getting to standing position from a seated position  States the pain is localized anterior portion of her knee  States he is able to ambulate 2 miles without difficulty in her knee  She did receive intra-articular steroid injection which gave her 3 weeks of pain relief  She states the pain is localized to her knee without radiation  She denies any changes numbness tingling right lower extremity  Patient also complaining of right shoulder pain she has is pain is localized in the anterior portion of her right shoulder worse with motion mildly relieved with rest she also states his worse with opening doors and rotation of her forearm she denies any numbness tingling or weakness in her right upper extremity  Patient states he has been doing home range of motion exercises for right knee with only mild pain relief                  Review Of Systems:   · Skin: Normal  · Neuro: See HPI  · Musculoskeletal: See HPI  · All other systems reviewed and are negative    Past Medical History:   Past Medical History:   Diagnosis Date    [facility] 08/21/2008    age 62    [facility] )     left     Chronic low back pain     Clostridium difficile colitis     DDD (degenerative disc disease), lumbar     GERD (gastroesophageal reflux disease)     History of radiation therapy 09/2008    for breast cancer    Hypertension     Laryngopharyngeal reflux (LPR)     Lumbar spinal stenosis     Osteoarthritis of spine with radiculopathy, lumbar region     Osteopenia     Piriformis syndrome of left side     Sciatica     Spondylosis of cervical region without myelopathy or radiculopathy        Past Surgical History:   Past Surgical History:   Procedure Laterality Date    BREAST BIOPSY Left 07/30/2008    malignant    BREAST CYST ASPIRATION Left 1998    BREAST LUMPECTOMY Left 08/21/2008    BREAST SURGERY      COLONOSCOPY  2015    [place] ESOPHAGOGASTRODUODENOSCOPY TRANSORAL DIAGNOSTIC N/A 2/15/2017    Procedure: ESOPHAGOGASTRODUODENOSCOPY (EGD); Surgeon: Charles Vizcaino MD;  [facility]; Service: Gastroenterology    PA ESOPHAGOGASTRODUODENOSCOPY TRANSORAL DIAGNOSTIC N/A 2/1/2018    Procedure: ESOPHAGOGASTRODUODENOSCOPY (EGD); Surgeon: Charles Vizcaino MD;  [facility];   Service: Gastroenterology       Family History:  Family history reviewed and non-contributory  Family History   Problem Relation Age of Onset    Osteoporosis Mother     Coronary artery disease Father     Diabetes Father    [facility] COPD Father     No Known Problems Maternal Aunt     Breast cancer Paternal Aunt 68    No Known Problems Paternal Aunt     No Known Problems Maternal Aunt          Social History:  Social History     Socioeconomic History    Marital status: /Civil Union     Spouse name: None    Number of children: None    Years of education: None    Highest education level: None   Occupational History    None   Social Needs    Financial resource strain: None    Food insecurity:     Worry: None     Inability: None    Transportation needs:     Medical: None     Non-medical: None   Tobacco Use    Smoking status: Former Smoker    Smokeless tobacco: Never Used   Substance and Sexual Activity    Alcohol use: Yes     Frequency: 2-3 times a week     Drinks per session: 1 or 2     Comment: social    Drug use: No    Sexual activity: Not Currently   Lifestyle    Physical activity:     Days per week: None     Minutes per session: None    Stress: None   Relationships    Social connections:     Talks on phone: None     Gets together: None     Attends Qu[employer] service: None     Active member of club or organization: None     Attends meetings of clubs or organizations: None     Relationship status: None    Intimate partner violence:     Fear of current or ex partner: None     Emotionally abused: None     Physically abused: None     Forced sexual activity: None   Other Topics Concern    None   Social History Narrative    None       Allergies:    Allergies   Allergen Reactions    Codeine     Morphine Sulfate [Morphine]     Naprosyn [Naproxen]     Oxycodone     Penicillins     Sulfa Antibiotics        Labs:  0   Lab Value Date/Time    HCT 46 5 (H) 03/27/2019 1131    HCT 44 4 10/17/2018 0731    HCT 44 6 04/05/2018 0800    HGB 14 4 03/27/2019 1131    HGB 13 6 10/17/2018 0731    HGB 14 0 04/05/2018 0800    WBC 5 90 03/27/2019 1131    WBC 5 28 10/17/2018 0731    WBC 6 48 04/05/2018 0800       Meds:    Current Outpatient Medications:     azelastine (ASTELIN) 0 1 % nasal spray, 1 spray into each nostril 2 (two) times a day Use in each nostril as directed, Disp: 1 Bottle, Rfl: 6    Cholecalciferol (VITAMIN D-3 PO), Take 1,000 mg by mouth daily  , Disp: , Rfl:     cyclobenzaprine (FLEXERIL) 10 mg tablet, Take 10 mg by mouth once as needed for muscle spasms (takes one per week on average), Disp: , Rfl:     famotidine (PEPCID) 20 mg tablet, Take 20 mg by mouth daily, Disp: , Rfl:     lisinopril (ZESTRIL) 10 mg tablet, Take 10 mg by mouth daily, Disp: , Rfl:     Multiple Vitamins-Minerals (CENTRUM ADULTS PO), Take 1 tablet by mouth daily, Disp: , Rfl:     Probiotic Product (PROBIOTIC PO), Take 1 tablet by mouth, Disp: , Rfl:       Physical Exam:     General Appearance:    Alert, cooperative, no distress, appears stated age   Head:    Normocephalic, without obvious abnormality, atraumatic   Eyes:    conjunctiva/corneas clear, both eyes        Nose:   Nares normal, septum midline, no drainage    Throat:   Lips normal; teeth and gums normal   Neck:    symmetrical, trachea midline, ; thyroid:  no enlargement/   Back:     Symmetric, no curvature, ROM normal   Lungs:   No audible wheezing or labored breathing   Chest Wall:    No tenderness or deformity    Heart:    Regular rate and rhythm               Pulses:   2+ and symmetric all extremities   Skin:   Skin color, texture, turgor normal, no rashes or lesions   Neurologic:   normal strength, sensation and reflexes     throughout       Musculoskeletal: right lower extremity  · On examination of the right knee there is no effusion no erythema no laceration no abrasion no ecchymosis  Range of motion full active extension flexion greater than 120°  There is no pain on palpation medial and lateral joint lines, pes anserine bursa region  pain on palpation distal iliotibial band  No warmth palpation bony enlargement noted  No pain or laxity with varus or valgus stress  Quadriceps, hamstring strength 5/5  Sensation intact, distal pulses present  · Examination patient's right shoulder no effusion no erythema full forward flexion 170° active full abduction 160° external rotation greater than 40° internal rotation lumbar spine positive [place] impingement pain palpation of the proximal biceps region full range of motion right elbow wrist and hand        _*_*_*_*_*_*_*_*_*_*_*_*_*_*_*_*_*_*_*_*_*_*_*_*_*_*_*_*_*_*_*_*_*_*_*_*_*_*_*_*_*    Assessment:  71 y  o female with right knee patellofemoral osteoarthritis knee pain right shoulder proximal biceps pain      Plan:   · Weight bearing as tolerated  right lower extremity, right upper extremity  · Home range of motion stretching strengthening exercises right knee VMO strengthening right shoulder range of motion strengthening exercises provided  · The patient does not wish to pursue formal therapy at this time  · Prior authorization for viscosupplementation into the right knee initiated at this time  · Patient interviewed and examined by Dr Shadi Oneill and myself  · Follow up pending prior authorization right knee injection      Ally Ash PA-C

## 2019-11-15 ENCOUNTER — TELEPHONE (OUTPATIENT)
Dept: OBGYN CLINIC | Facility: HOSPITAL | Age: 69
End: 2019-11-15

## 2019-11-15 NOTE — TELEPHONE ENCOUNTER
TO BE COMPLETED BY CENTRAL AUTH TEAM:     Physician: DR Elkin Ann    Medication: [facility]    Number of Injections in Series (Appointments scheduled 1 week apart from one another): 1    Schedule after this date: 11/18/19    Billing Info: Buy and Bill/Specialty Pharmacy-Patient Supply>> BUY&BILL    Appointment Message Line: RIGHT KNEE [facility] BUY&BILL  (please copy and paste appointment message line when scheduling appointment)    Additional Comments: VALID DATES 11/13/19 - 2/14/20

## 2019-11-22 ENCOUNTER — OFFICE VISIT (OUTPATIENT)
Dept: GASTROENTEROLOGY | Facility: MEDICAL CENTER | Age: 69
End: 2019-11-22
Payer: COMMERCIAL

## 2019-11-22 VITALS
DIASTOLIC BLOOD PRESSURE: 78 MMHG | BODY MASS INDEX: 26.21 KG/M2 | HEART RATE: 79 BPM | SYSTOLIC BLOOD PRESSURE: 120 MMHG | RESPIRATION RATE: 98 BRPM | WEIGHT: 162.4 LBS | TEMPERATURE: 97.5 F

## 2019-11-22 DIAGNOSIS — K21.9 GASTROESOPHAGEAL REFLUX DISEASE WITHOUT ESOPHAGITIS: Primary | ICD-10-CM

## 2019-11-22 DIAGNOSIS — R10.13 DYSPEPSIA: ICD-10-CM

## 2019-11-22 PROCEDURE — 99214 OFFICE O/P EST MOD 30 MIN: CPT | Performed by: INTERNAL MEDICINE

## 2019-11-22 NOTE — PROGRESS NOTES
Outpatient Follow up  [address] [facility]  Bhanu Vital MD  Ph : [phone]  Fax : [phone]  Mobile : [phone]  Email : [email or web address]  Also available on [employer]    Deanna Nguyen 71 y o  female MRN: [id number]    PCP: Justin Mayfield DO  Referring: No referring provider defined for this encounter  Deanna Nguyen presented for a follow up visit  My recommendations are included  Please do not hesitate to contact me with any questions you may have  ASSESSMENT AND PLAN:      22-year-old lady who presents to us for evaluation of possible gastroesophageal reflux disease and LPR and to see if any endoscopic intervention would help with her symptoms     She has had an exhaustive workup for her reflux symptoms  The symptoms are not clear and her response to treatment is also not clear at this time  She could be a PPI nonresponder however that is less likely  I would recommend that we would repeat endoscopy off medications  She is presently on Pepcid  She should stop this 2 weeks prior to the endoscopy  I will also review her studies with our multi-disciplinary group  She may need repeat manometry and pH study as well  Follow-up in the office in 2 months  Diagnoses and all orders for this visit:    Gastroesophageal reflux disease without esophagitis  -     EGD; Future    Dyspepsia  -     EGD; Future    Other orders  -     Diet NPO; Sips with meds; Standing  -     Void on call to OR; Standing  -     Insert peripheral IV; Standing      ______________________________________________________________________    SUBJECTIVE:    22-year-old lady who is referred to us by Dr Rockie Denver for evaluation and follow-up of GERD symptoms  She had barium swallows and were ngative  She had an EGD last year which was unremarkable except for IM - repeat biopsies were negative     She reported primary symptom is stomach discomfort with gas/belching, bloating and feeling full; also more mucus and throat clearing early am and pm  Denied current virus/URI symptoms  She feels the mucus is post nasal drip  She is concerned that Omeprazole 40 mg bid has not helped  No dysphagia  Cough starts in pm with mucous  She has to cough/ clear her throat  mucinex has not helped in the past   Tried diet elimination trial (gluten and dairy) after discussion with ENT, not 100%, no perceived benefit  She also tried a wedge pillow which did not help  Azelastine with mild benefit, and side effect of throat irritation  Tried Omeprazole and Pantoprazole previously without relief  Larwence Pluck also has not helped that much  Rabeprazole 20 qam (insurance did not approve for BID)-  no benefit  She has since stopped all her PPI  She is presently taking Pepcid 20 mg twice daily  For allergies she has also tried Allegra, Singulair  Her last EGD in February 2017, revealed mild LA class B reflux-induced esophagitis in the distal third of esophagus as well as nonerosive gastritis  Biopsy revealed chronic inactive gastritis with focal intestinal metaplasia without H  pylori, dysplasia or malignancy  Barium swallow in 2018 showed small sliding hiatal hernia and mild occasional dysmotility with tertiary contractions  She had a study done in 2017  Her esophageal manometry was grossly normal except slightly impaired relaxation of lower esophageal sphincter  This is not consistent with reflux disease  24 hours pH study with impedance showed total reflux episodes but there was good correlation with her symptoms and acid reflux  This study was done while she was on Protonix 40 mg twice a day  She had increased acid exposure  REVIEW OF SYSTEMS IS OTHERWISE NEGATIVE        Historical Information   Past Medical History:   Diagnosis Date    [facility] 08[facility]2008    age 62    [facility] )     left     Chronic low back pain     Clostridium difficile colitis     DDD (degenerative disc disease), lumbar     GERD (gastroesophageal reflux disease)     History of radiation therapy 09/2008    for breast cancer    Hypertension     Laryngopharyngeal reflux (LPR)     Lumbar spinal stenosis     Osteoarthritis of spine with radiculopathy, lumbar region     Osteopenia     Piriformis syndrome of left side     Sciatica     Spondylosis of cervical region without myelopathy or radiculopathy      Past Surgical History:   Procedure Laterality Date    BREAST BIOPSY Left 07/30/2008    malignant    BREAST CYST ASPIRATION Left 1998    BREAST LUMPECTOMY Left 08/21/2008    BREAST SURGERY      COLONOSCOPY  2015    [place] ESOPHAGOGASTRODUODENOSCOPY TRANSORAL DIAGNOSTIC N/A 2/15/2017    Procedure: ESOPHAGOGASTRODUODENOSCOPY (EGD); Surgeon: Megha El MD;  [facility]; Service: Gastroenterology    [place] ESOPHAGOGASTRODUODENOSCOPY TRANSORAL DIAGNOSTIC N/A 2/1/2018    Procedure: ESOPHAGOGASTRODUODENOSCOPY (EGD); Surgeon: Megha El MD;  [facility];   Service: Gastroenterology     Social History   Social History     Substance and Sexual Activity   Alcohol Use Yes    Frequency: 2-3 times a week    Drinks per session: 1 or 2    Comment: social     Social History     Substance and Sexual Activity   Drug Use No     Social History     Tobacco Use   Smoking Status Former Smoker   Smokeless Tobacco Never Used     Family History   Problem Relation Age of Onset    Osteoporosis Mother     Coronary artery disease Father     Diabetes Father     COPD Father     No Known Problems Maternal Aunt     Breast cancer Paternal Aunt 68    No Known Problems Paternal Aunt     No Known Problems Maternal Aunt        Meds/Allergies       Current Outpatient Medications:     azelastine (ASTELIN) 0 1 % nasal spray    Cholecalciferol (VITAMIN D-3 PO)    cyclobenzaprine (FLEXERIL) 10 mg tablet    famotidine (PEPCID) 20 mg tablet    lisinopril (ZESTRIL) 10 mg tablet    Multiple Vitamins-Minerals (CENTRUM ADULTS PO)    Probiotic Product (PROBIOTIC PO)    Allergies   Allergen Reactions    Codeine     Morphine And Related      Note:  this allergy is not being included in drug screening  Please inactivate this item and re-enter it to enable screening   Morphine Sulfate [Morphine]     Naprosyn [Naproxen]     Oxycodone     Penicillins     Sulfa Antibiotics            Objective     Blood pressure 120/78, pulse 79, temperature 97 5 °F (36 4 °C), temperature source Tympanic, resp  rate (!) 98, weight 73 7 kg (162 lb 6 4 oz), not currently breastfeeding  Body mass index is 26 21 kg/m²  PHYSICAL EXAM:      Physical Exam   Constitutional: She is oriented to person, place, and time  Vital signs are normal  She appears well-developed and well-nourished  HENT:   Head: Normocephalic and atraumatic  Eyes: Pupils are equal, round, and reactive to light  Conjunctivae are normal  No scleral icterus  Neck: Normal range of motion  Cardiovascular: Normal rate, regular rhythm and normal heart sounds  Pulmonary/Chest: Effort normal and breath sounds normal  No respiratory distress  Abdominal: Soft  Normal appearance and bowel sounds are normal  She exhibits no distension, no ascites and no mass  There is no hepatosplenomegaly  There is no tenderness  No hernia  Musculoskeletal: Normal range of motion  Lymphadenopathy:     She has no cervical adenopathy  Neurological: She is alert and oriented to person, place, and time  Skin: Skin is warm  Psychiatric: She has a normal mood and affect  Her behavior is normal  Thought content normal        Lab Results:   No visits with results within 1 Day(s) from this visit     Latest known visit with results is:   Appointment on 10/10/2019   Component Date Value    Cholesterol 10/10/2019 227*    Triglycerides 10/10/2019 161*    HDL, Direct 10/10/2019 59     LDL Calculated 10/10/2019 136*    Non-HDL-Chol (CHOL-HDL) 10/10/2019 168     Sodium 10/10/2019 140     Potassium 10/10/2019 4 7     Chloride 10/10/2019 106     CO2 10/10/2019 28     ANION GAP 10/10/2019 6     BUN 10/10/2019 16     Creatinine 10/10/2019 0 72     Glucose, Fasting 10/10/2019 108*    Calcium 10/10/2019 10 0     AST 10/10/2019 17     ALT 10/10/2019 31     Alkaline Phosphatase 10/10/2019 80     Total Protein 10/10/2019 7 7     Albumin 10/10/2019 4 3     Total Bilirubin 10/10/2019 0 66     eGFR 10/10/2019 86     Hemoglobin A1C 10/10/2019 6 0     EAG 10/10/2019 126          Radiology Results:   No results found  Answers for HPI/ROS submitted by the patient on 11/17/2019   Abdominal pain  Chronicity: recurrent  Onset: more than 1 year ago  Onset quality: gradual  Frequency: rarely  Progression since onset: resolved  Pain location: epigastric region  Pain - numeric: 0/10  Pain quality: dull  Radiates to: does not radiate  anorexia: No  arthralgias: Yes  belching: Yes  constipation: No  diarrhea: No  dysuria: No  fever: No  flatus: No  frequency: No  headaches: No  hematochezia: No  hematuria: No  melena: No  myalgias: Yes  nausea:  No  weight loss: No  vomiting: No  Aggravated by: nothing  Relieved by: nothing

## 2019-11-22 NOTE — LETTER
November 26, 2019     Luis Butler MD  [address] [address]    Patient: Ponce Luevano   YOB: 1950   Date of Visit: 11/22/2019       Dear Dr Jerad Coulter:    Thank you for referring Ce Acharya to me for evaluation  Below are my notes for this consultation  If you have questions, please do not hesitate to call me  I look forward to following your patient along with you  Sincerely,        Chris Castillo MD        CC: MD Chris Rubin MD  11/26/2019  5:01 PM  Incomplete  Outpatient Follow up  [facility]  [address] [facility]  Chris Castillo MD  Ph : [phone]  Fax : [phone]  Mobile : [phone]  Email : [email or web address]  Also available on [employer]    Ponce Luevano 71 y o  female MRN: [id number]    PCP: Ike Cruz DO  Referring: No referring provider defined for this encounter  Ponce Luevano presented for a follow up visit  My recommendations are included  Please do not hesitate to contact me with any questions you may have  ASSESSMENT AND PLAN:      71-year-old lady who presents to us for evaluation of possible gastroesophageal reflux disease and LPR and to see if any endoscopic intervention would help with her symptoms     She has had an exhaustive workup for her reflux symptoms  The symptoms are not clear and her response to treatment is also not clear at this time  I would recommend that we would repeat endoscopy off medications  She is presently on Pepcid  She should stop this 2 weeks prior to the endoscopy  I will also review her studies with our multi-disciplinary group  Follow-up in the office in 2 months  Diagnoses and all orders for this visit:    Gastroesophageal reflux disease without esophagitis  -     EGD; Future    Dyspepsia  -     EGD;  Future    Other orders  -     Diet NPO; Sips with meds; Standing  -     Void on call to OR; Standing  -     Insert peripheral IV; Standing      ______________________________________________________________________    SUBJECTIVE:    77-year-old lady who is referred to us by Dr Marky Hyatt for evaluation and follow-up of GERD symptoms  She had barium swallows and were ngative  She had an EGD last year which was unremarkable except for IM - repeat biopsies were negative  She reported primary symptom is stomach discomfort with gas/belching, bloating and feeling full; also more mucus and throat clearing early am and pm  Denied current virus/URI symptoms  She feels the mucus is post nasal drip  She is concerned that Omeprazole 40 mg bid has not helped  No dysphagia  Cough starts in pm with mucous  She has to cough/ clear her throat  mucinex has not helped in the past   Tried diet elimination trial (gluten and dairy) after discussion with ENT, not 100%, no perceived benefit  She also tried a wedge pillow which did not help  Azelastine with mild benefit, and side effect of throat irritation  Tried Omeprazole and Pantoprazole previously without relief  Rhae Skinner also has not helped that much  Rabeprazole 20 qam (insurance did not approve for BID)-  no benefit  She has since stopped all her PPI  She is presently taking Pepcid 20 mg twice daily  For allergies she has also tried Allegra, Singulair  Her last EGD in February 2017, revealed mild LA class B reflux-induced esophagitis in the distal third of esophagus as well as nonerosive gastritis  Biopsy revealed chronic inactive gastritis with focal intestinal metaplasia without H  pylori, dysplasia or malignancy  Barium swallow in 2018 showed small sliding hiatal hernia and mild occasional dysmotility with tertiary contractions  She had a study done in 2017  Her esophageal manometry was grossly normal except slightly impaired relaxation of lower esophageal sphincter  This is not consistent with reflux disease   24 hours pH study with impedance showed total reflux episodes but there was good correlation with her symptoms and acid reflux  This study was done while she was on Protonix 40 mg twice a day  She had increased acid exposure  REVIEW OF SYSTEMS IS OTHERWISE NEGATIVE  Historical Information   Past Medical History:   Diagnosis Date    Breast cancer ([facility] [address] ) 08/21/2008    age 62    Cancer ([facility] [address] )     left     Chronic low back pain     Clostridium difficile colitis     DDD (degenerative disc disease), lumbar     GERD (gastroesophageal reflux disease)     History of radiation therapy 09/2008    for breast cancer    Hypertension     Laryngopharyngeal reflux (LPR)     Lumbar spinal stenosis     Osteoarthritis of spine with radiculopathy, lumbar region     Osteopenia     Piriformis syndrome of left side     Sciatica     Spondylosis of cervical region without myelopathy or radiculopathy      Past Surgical History:   Procedure Laterality Date    BREAST BIOPSY Left 07/30/2008    malignant    BREAST CYST ASPIRATION Left 1998    BREAST LUMPECTOMY Left 08/21/2008    BREAST SURGERY      COLONOSCOPY  2015    [place] ESOPHAGOGASTRODUODENOSCOPY TRANSORAL DIAGNOSTIC N/A 2/15/2017    Procedure: ESOPHAGOGASTRODUODENOSCOPY (EGD); Surgeon: Elizabeth Garcia MD;  [facility]; Service: Gastroenterology    [place] ESOPHAGOGASTRODUODENOSCOPY TRANSORAL DIAGNOSTIC N/A 2/1/2018    Procedure: ESOPHAGOGASTRODUODENOSCOPY (EGD); Surgeon: Eilzabeth Garcia MD;  [facility];   Service: Gastroenterology     Social History   Social History     Substance and Sexual Activity   Alcohol Use Yes    Frequency: 2-3 times a week    Drinks per session: 1 or 2    Comment: social     Social History     Substance and Sexual Activity   Drug Use No     Social History     Tobacco Use   Smoking Status Former Smoker   Smokeless Tobacco Never Used     Family History   Problem Relation Age of Onset    Osteoporosis Mother     Coronary artery disease Father     Diabetes Father     COPD Father     No Known Problems Maternal Aunt     Breast cancer Paternal Aunt 68    No Known Problems Paternal Aunt     No Known Problems Maternal Aunt        Meds/Allergies       Current Outpatient Medications:     azelastine (ASTELIN) 0 1 % nasal spray    Cholecalciferol (VITAMIN D-3 PO)    cyclobenzaprine (FLEXERIL) 10 mg tablet    famotidine (PEPCID) 20 mg tablet    lisinopril (ZESTRIL) 10 mg tablet    Multiple Vitamins-Minerals (CENTRUM ADULTS PO)    Probiotic Product (PROBIOTIC PO)    Allergies   Allergen Reactions    Codeine     Morphine And Related      Note:  this allergy is not being included in drug screening  Please inactivate this item and re-enter it to enable screening   Morphine Sulfate [Morphine]     Naprosyn [Naproxen]     Oxycodone     Penicillins     Sulfa Antibiotics            Objective     Blood pressure 120/78, pulse 79, temperature 97 5 °F (36 4 °C), temperature source Tympanic, resp  rate (!) 98, weight 73 7 kg (162 lb 6 4 oz), not currently breastfeeding  Body mass index is 26 21 kg/m²  PHYSICAL EXAM:      Physical Exam   Constitutional: She is oriented to person, place, and time  Vital signs are normal  She appears well-developed and well-nourished  HENT:   Head: Normocephalic and atraumatic  Eyes: Pupils are equal, round, and reactive to light  Conjunctivae are normal  No scleral icterus  Neck: Normal range of motion  Cardiovascular: Normal rate, regular rhythm and normal heart sounds  Pulmonary/Chest: Effort normal and breath sounds normal  No respiratory distress  Abdominal: Soft  Normal appearance and bowel sounds are normal  She exhibits no distension, no ascites and no mass  There is no hepatosplenomegaly  There is no tenderness  No hernia  Musculoskeletal: Normal range of motion  Lymphadenopathy:     She has no cervical adenopathy  Neurological: She is alert and oriented to person, place, and time  Skin: Skin is warm  Psychiatric: She has a normal mood and affect  Her behavior is normal  Thought content normal        Lab Results:   No visits with results within 1 Day(s) from this visit  Latest known visit with results is:   Appointment on 10/10/2019   Component Date Value    Cholesterol 10/10/2019 227*    Triglycerides 10/10/2019 161*    HDL, Direct 10/10/2019 59     LDL Calculated 10/10/2019 136*    Non-HDL-Chol (CHOL-HDL) 10/10/2019 168     Sodium 10/10/2019 140     Potassium 10/10/2019 4 7     Chloride 10/10/2019 106     CO2 10/10/2019 28     ANION GAP 10/10/2019 6     BUN 10/10/2019 16     Creatinine 10/10/2019 0 72     Glucose, Fasting 10/10/2019 108*    Calcium 10/10/2019 10 0     AST 10/10/2019 17     ALT 10/10/2019 31     Alkaline Phosphatase 10/10/2019 80     Total Protein 10/10/2019 7 7     Albumin 10/10/2019 4 3     Total Bilirubin 10/10/2019 0 66     eGFR 10/10/2019 86     Hemoglobin A1C 10/10/2019 6 0     EAG 10/10/2019 126          Radiology Results:   No results found  Amarjit Juarez MD  11/22/2019 11:09 AM  Incomplete  Outpatient Follow up  [facility] [address] [facility]  Amarjit Juarez MD  Ph : [phone]  Fax : [phone]  Mobile : [phone]  Email : [email or web address]  Also available on [employer]    Maira Beasley 71 y o  female MRN: [id number]    PCP: Latonya Lunsford DO  Referring: No referring provider defined for this encounter  Maira Beasley presented for a follow up visit  My recommendations are included  Please do not hesitate to contact me with any questions you may have  ASSESSMENT AND PLAN:      No problem-specific Assessment & Plan notes found for this encounter  There are no diagnoses linked to this encounter   ______________________________________________________________________    SUBJECTIVE:  She had barium swallows and were ngative   She had an EGD last year which was unremarkable except for IM - repeat biopsies were negative  She reported primary symptom is stomach discomfort with gas/belching, bloating and feeling full; also more mucus and throat clearing early am and pm  Denied current virus/URI symptoms  She had omeprazole and that had helped  HER MAIN C/O IS MUCOUS  She feels the mucous is post nasal drip  She is concerned that Omeprazole 40 mg bid has not helped  Cough starts in pm with mucous  Tried diet elimination trial (gluten and dairy), not 100%, no perceived benefit  Azelastine without benefit, and side effect of throat irritation      Tried Omeprazole and Pantoprazole previously without relief      Rabeprazole 20 qam (insurance did not approve for BID)-  no benefit  Famotidine 40 qhs  Gaviscon     Allegra  Singulair   Azelastine nasal spray- has helped before    She is status post EGD on February 15, 2017, which revealed mild LA class B reflux-induced esophagitis in the distal third of esophagus as well as nonerosive gastritis  Biopsy revealed chronic inactive gastritis with focal intestinal metaplasia without H  pylori, dysplasia or malignancy  1  Gastroesophageal reflux disease with atypical symptoms  Her symptoms could be secondary to LPR  I asked her to continue pantoprazole 40 mg daily  Take Zantac 150 mg at bedtime  If no improvement I will double the dose of Protonix to 40 mg twice a day  If she continues to have symptoms, I'll consider getting esophageal manometry and pH study with impedance to assess adequate acid control  She had a study done in 2017  Her esophageal manometry was grossly normal except slightly impaired relaxation of lower esophageal sphincter  This is not consistent with reflux disease  24 hours pH study with impedance showed total reflux episodes but there was good correlation with her symptoms and acid reflux  This study was done while she was on Protonix 40 mg twice a day   She had increased acid exposure  She is presently on pepcid 20 in am and pm  No change  Wedge pillow did not help  Theodore Boyle did not work  No dysphagia  She has to cough/ clear her throat  mucinex has not helped in the past            REVIEW OF SYSTEMS IS OTHERWISE NEGATIVE  Historical Information   Past Medical History:   Diagnosis Date    Breast cancer ([facility] [address] ) 08/21/2008    age 62    Cancer ([facility] [address] )     left     Chronic low back pain     Clostridium difficile colitis     DDD (degenerative disc disease), lumbar     GERD (gastroesophageal reflux disease)     History of radiation therapy 09/2008    for breast cancer    Hypertension     Laryngopharyngeal reflux (LPR)     Lumbar spinal stenosis     Osteoarthritis of spine with radiculopathy, lumbar region     Osteopenia     Piriformis syndrome of left side     Sciatica     Spondylosis of cervical region without myelopathy or radiculopathy      Past Surgical History:   Procedure Laterality Date    BREAST BIOPSY Left 07/30/2008    malignant    BREAST CYST ASPIRATION Left 1998    BREAST LUMPECTOMY Left 08/21/2008    BREAST SURGERY      COLONOSCOPY  2015    [place] ESOPHAGOGASTRODUODENOSCOPY TRANSORAL DIAGNOSTIC N/A 2/15/2017    Procedure: ESOPHAGOGASTRODUODENOSCOPY (EGD); Surgeon: Efren Cranker, MD;  [facility]; Service: Gastroenterology    [place] ESOPHAGOGASTRODUODENOSCOPY TRANSORAL DIAGNOSTIC N/A 2/1/2018    Procedure: ESOPHAGOGASTRODUODENOSCOPY (EGD); Surgeon: Efren Cranker, MD;  [facility];   Service: Gastroenterology     Social History   Social History     Substance and Sexual Activity   Alcohol Use Yes    Frequency: 2-3 times a week    Drinks per session: 1 or 2    Comment: social     Social History     Substance and Sexual Activity   Drug Use No     Social History     Tobacco Use   Smoking Status Former Smoker   Smokeless Tobacco Never Used     Family History   Problem Relation Age of Onset    Osteoporosis Mother     Coronary artery disease Father     Diabetes Father     COPD Father     No Known Problems Maternal Aunt     Breast cancer Paternal Aunt 68    No Known Problems Paternal Aunt     No Known Problems Maternal Aunt        Meds/Allergies       Current Outpatient Medications:     azelastine (ASTELIN) 0 1 % nasal spray    Cholecalciferol (VITAMIN D-3 PO)    cyclobenzaprine (FLEXERIL) 10 mg tablet    famotidine (PEPCID) 20 mg tablet    lisinopril (ZESTRIL) 10 mg tablet    Multiple Vitamins-Minerals (CENTRUM ADULTS PO)    Probiotic Product (PROBIOTIC PO)    Allergies   Allergen Reactions    Codeine     Morphine And Related      Note:  this allergy is not being included in drug screening  Please inactivate this item and re-enter it to enable screening   Morphine Sulfate [Morphine]     Naprosyn [Naproxen]     Oxycodone     Penicillins     Sulfa Antibiotics            Objective     Blood pressure 120/78, pulse 79, temperature 97 5 °F (36 4 °C), temperature source Tympanic, resp  rate (!) 98, weight 73 7 kg (162 lb 6 4 oz), not currently breastfeeding  Body mass index is 26 21 kg/m²  PHYSICAL EXAM:      Physical Exam    Lab Results:   No visits with results within 1 Day(s) from this visit     Latest known visit with results is:   Appointment on 10/10/2019   Component Date Value    Cholesterol 10/10/2019 227*    Triglycerides 10/10/2019 161*    HDL, Direct 10/10/2019 59     LDL Calculated 10/10/2019 136*    Non-HDL-Chol (CHOL-HDL) 10/10/2019 168     Sodium 10/10/2019 140     Potassium 10/10/2019 4 7     Chloride 10/10/2019 106     CO2 10/10/2019 28     ANION GAP 10/10/2019 6     BUN 10/10/2019 16     Creatinine 10/10/2019 0 72     Glucose, Fasting 10/10/2019 108*    Calcium 10/10/2019 10 0     AST 10/10/2019 17     ALT 10/10/2019 31     Alkaline Phosphatase 10/10/2019 80     Total Protein 10/10/2019 7 7     Albumin 10/10/2019 4 3     Total Bilirubin 10/10/2019 0 66     eGFR 10/10/2019 86     Hemoglobin A1C 10/10/2019 6 0     EAG 10/10/2019 126          Radiology Results:   No results found

## 2019-12-11 ENCOUNTER — OFFICE VISIT (OUTPATIENT)
Dept: OBGYN CLINIC | Facility: CLINIC | Age: 69
End: 2019-12-11
Payer: COMMERCIAL

## 2019-12-11 VITALS
BODY MASS INDEX: 25.11 KG/M2 | DIASTOLIC BLOOD PRESSURE: 80 MMHG | HEIGHT: 67 IN | HEART RATE: 84 BPM | SYSTOLIC BLOOD PRESSURE: 124 MMHG | WEIGHT: 160 LBS

## 2019-12-11 DIAGNOSIS — M25.511 RIGHT SHOULDER PAIN, UNSPECIFIED CHRONICITY: ICD-10-CM

## 2019-12-11 DIAGNOSIS — M77.11 LATERAL EPICONDYLITIS OF RIGHT ELBOW: Primary | ICD-10-CM

## 2019-12-11 DIAGNOSIS — G56.31 RADIAL TUNNEL SYNDROME, RIGHT: ICD-10-CM

## 2019-12-11 DIAGNOSIS — M79.601 RIGHT ARM PAIN: ICD-10-CM

## 2019-12-11 PROCEDURE — 99213 OFFICE O/P EST LOW 20 MIN: CPT | Performed by: SURGERY

## 2019-12-11 RX ORDER — DEXAMETHASONE SODIUM PHOSPHATE 4 MG/ML
1 INJECTION, SOLUTION INTRA-ARTICULAR; INTRALESIONAL; INTRAMUSCULAR; INTRAVENOUS; SOFT TISSUE EVERY 6 HOURS SCHEDULED
Qty: 30 ML | Refills: 4 | Status: SHIPPED | OUTPATIENT
Start: 2019-12-11 | End: 2020-02-19

## 2019-12-11 NOTE — PROGRESS NOTES
Kimberli DIOP  Attending, Orthopaedic Surgery  Hand, Wrist, and Elbow Surgery  [facility]      ORTHOPAEDIC HAND, WRIST, AND ELBOW OFFICE  VISIT       ASSESSMENT/PLAN:      Patient is 51-year-old female with right lateral epicondylitis and right radial tunnel syndrome  Etiology of conditions were discussed the patient in the office  I will refer her to PT/OT hand therapy for graston, iontophoresis, and other exercises  Dexamethasone was prescribed patient for iontophoresis  He is also provided with a wrist cock-up brace which she should wear at night  The patient should also be conscientious of a she is lifting objects  In the future CSI may be warranted  In regard to her shoulder pain I will refer her to Dr Luis E Hawk for further evaluation  I will see her back in 6 weeks time for re-evaluation  The patient verbalized understanding of exam findings and treatment plan  We engaged in the shared decision-making process and treatment options were discussed at length with the patient  Surgical and conservative management discussed today along with risks and benefits  Diagnoses and all orders for this visit:    Lateral epicondylitis of right elbow  -     Ambulatory referral to PT/OT hand therapy; Future  -     dexamethasone (DECADRON) 4 mg/mL; 0 25 mL (1 mg total) by Iontophoresis route every 6 (six) hours    Radial tunnel syndrome, right  -     dexamethasone (DECADRON) 4 mg/mL; 0 25 mL (1 mg total) by Iontophoresis route every 6 (six) hours    Right arm pain  -     Ambulatory referral to Hand Surgery    Right shoulder pain, unspecified chronicity  -     Ambulatory referral to Orthopedic Surgery; Future        Follow Up:  Return in about 6 weeks (around 1/22/2020)  To Do Next Visit:  Re-evaluation of current issue      General Discussions:  Lateral Epicondylitis: The anatomy and physiology of lateral epicondylitis was discussed with the patient today    Typically, degenerative changes in the extensor carpi radialis brevis muscle occur over time  These degenerative changes appear as tears of the tendon on MRI  This creates pain over the lateral epicondyle (outside part of elbow)  This pain typically is made worse with palm down lifting activities as well as anything that involves strength and stability of the wrist   The pain may radiate from the wrist up to the elbow  At times, the shoulder may be weak as well which can predispose or cause continuation of the problem  Conservative treatment usually cures a majority of patients; however, this may take up to 6-9 months  Conservative treatment options typically include activity modification, therapy for strengthening of the shoulder and elbow, tennis elbow straps, and possible corticosteroid injections  Corticosteroid injections are very effective at relieving pain but do not alter the natural history of this process  Rather, steroid injections decrease the pain temporarily to allow for therapy to take place without discomfort  It was discussed that therapy to prevent recurrence is a "life long" process and that if patient relies on the steroid injection alone without performing therapeutic exercises the risk of recurrence is likely  Typical home regimen includes heat and stretching and resisted wrist extension exercises discussed in the office  Surgery is required in fewer than 10% of patients  Operative Discussions:        ____________________________________________________________________________________________________________________________________________      CHIEF COMPLAINT:  Chief Complaint   Patient presents with    Right Arm - Pain       SUBJECTIVE:  Laci Ramsey is a 71y o  year old RHD female who presents the office today for evaluation of her right forearm  This is a consultation from Dr Milly Harris  Patient notes 2 months ago she started to experience pain to her lateral elbow that radiates to her forearm  She denies any incident of injury  She notes pain with supination as well as when her elbow is fully extended and her wrist is extended  She has trouble picking up heavy objects due to pain at the elbow  Patient also notes pain when reaching behind her to get something from the back seat  She has tried CBD oil  in the past which provides some relief  She also occasionally takes Advil for pain relief  She has not tried any bracing  She has not tried any physical therapy  Patient also notes pain to her right shoulder that is worse with activity         Pain/symptom timing:  Worse during the day when active  Pain/symptom context:  Worse with activites and work  Pain/symptom modifying factors:  Rest makes better, activities make worse  Pain/symptom associated signs/symptoms: none    Prior treatment   · NSAIDsYes   · Injections No   · Bracing/Orthotics No    Physical Therapy No     I have personally reviewed all the relevant PMH, PSH, SH, FH, Medications and allergies      PAST MEDICAL HISTORY:  Past Medical History:   Diagnosis Date    Breast cancer ([facility] [address] ) 08/21/2008    age 62    Cancer ([facility] [address] )     left     Chronic low back pain     Clostridium difficile colitis     DDD (degenerative disc disease), lumbar     GERD (gastroesophageal reflux disease)     History of radiation therapy 09/2008    for breast cancer    Hypertension     Laryngopharyngeal reflux (LPR)     Lumbar spinal stenosis     Osteoarthritis of spine with radiculopathy, lumbar region     Osteopenia     Piriformis syndrome of left side     Sciatica     Spondylosis of cervical region without myelopathy or radiculopathy        PAST SURGICAL HISTORY:  Past Surgical History:   Procedure Laterality Date    BREAST BIOPSY Left 07/30/2008    malignant    BREAST CYST ASPIRATION Left 1998    BREAST LUMPECTOMY Left 08/21/2008    BREAST SURGERY      COLONOSCOPY  2015    [place] ESOPHAGOGASTRODUODENOSCOPY TRANSORAL DIAGNOSTIC N/A 2/15/2017 Procedure: ESOPHAGOGASTRODUODENOSCOPY (EGD); Surgeon: Kay Lennox, MD;  [facility]; Service: Gastroenterology    [place] ESOPHAGOGASTRODUODENOSCOPY TRANSORAL DIAGNOSTIC N/A 2/1/2018    Procedure: ESOPHAGOGASTRODUODENOSCOPY (EGD); Surgeon: Kay Lennox, MD;  [facility]; Service: Gastroenterology       FAMILY HISTORY:  Family History   Problem Relation Age of Onset    Osteoporosis Mother     Coronary artery disease Father     Diabetes Father    Willena Emmaus COPD Father     No Known Problems Maternal Aunt     Breast cancer Paternal Aunt 68    No Known Problems Paternal Aunt     No Known Problems Maternal Aunt        SOCIAL HISTORY:  Social History     Tobacco Use    Smoking status: Former Smoker    Smokeless tobacco: Never Used   Substance Use Topics    Alcohol use: Yes     Frequency: 2-3 times a week     Drinks per session: 1 or 2     Comment: social    Drug use: No       MEDICATIONS:    Current Outpatient Medications:     azelastine (ASTELIN) 0 1 % nasal spray, 1 spray into each nostril 2 (two) times a day Use in each nostril as directed, Disp: 1 Bottle, Rfl: 6    Cholecalciferol (VITAMIN D-3 PO), Take 1,000 mg by mouth daily  , Disp: , Rfl:     cyclobenzaprine (FLEXERIL) 10 mg tablet, Take 10 mg by mouth once as needed for muscle spasms (takes one per week on average), Disp: , Rfl:     famotidine (PEPCID) 20 mg tablet, Take 20 mg by mouth daily, Disp: , Rfl:     lisinopril (ZESTRIL) 10 mg tablet, Take 10 mg by mouth daily, Disp: , Rfl:     Multiple Vitamins-Minerals (CENTRUM ADULTS PO), Take 1 tablet by mouth daily, Disp: , Rfl:     Probiotic Product (PROBIOTIC PO), Take 1 tablet by mouth, Disp: , Rfl:     ALLERGIES:  Allergies   Allergen Reactions    Codeine     Morphine And Related      Note:  this allergy is not being included in drug screening  Please inactivate this item and re-enter it to enable screening      Morphine Sulfate [Morphine]     Naprosyn [Naproxen]     Oxycodone  Penicillins     Sulfa Antibiotics            REVIEW OF SYSTEMS:  Review of Systems   Constitutional: Negative for chills, fever and unexpected weight change  HENT: Negative for hearing loss, nosebleeds and sore throat  Eyes: Negative for pain, redness and visual disturbance  Respiratory: Negative for cough, shortness of breath and wheezing  Cardiovascular: Negative for chest pain, palpitations and leg swelling  Gastrointestinal: Negative for abdominal pain, nausea and vomiting  Endocrine: Negative for polydipsia and polyuria  Genitourinary: Negative for dyspareunia and hematuria  Musculoskeletal: Positive for myalgias  Negative for arthralgias and joint swelling  Skin: Negative for rash and wound  Neurological: Negative for dizziness, numbness and headaches  Psychiatric/Behavioral: Negative for decreased concentration and suicidal ideas  The patient is not nervous/anxious          VITALS:  Vitals:    12/11/19 1145   BP: 124/80   Pulse: 84       LABS:  HgA1c:   Lab Results   Component Value Date    HGBA1C 6 0 10/10/2019     BMP:   Lab Results   Component Value Date    GLUCOSE 109 10/22/2015    CALCIUM 10 0 10/10/2019     10/22/2015    K 4 7 10/10/2019    CO2 28 10/10/2019     10/10/2019    BUN 16 10/10/2019    CREATININE 0 72 10/10/2019       _____________________________________________________  PHYSICAL EXAMINATION:  General: well developed and well nourished, alert, oriented times 3 and appears comfortable  Psychiatric: Normal  HEENT: Normocephalic, Atraumatic Trachea Midline, No torticollis  Pulmonary: No audible wheezing or respiratory distress   Cardiovascular: No pitting edema, 2+ radial pulse   Skin: No masses, erythema, lacerations, fluctation, ulcerations  Neurovascular: Sensation Intact to the Median, Ulnar, Radial Nerve, Motor Intact to the Median, Ulnar, Radial Nerve and Pulses Intact  Musculoskeletal: Normal, except as noted in detailed exam and in HPI       MUSCULOSKELETAL EXAMINATION:  Right Elbow:    No swelling or deformity  Full range of motion with flexion, extension, supination and pronation  Positive tenderness to palpation over the Lateral Epicondyle and radial tunnel   No pain with passive flexion of the wrist with elbow fully extended  Pain with resisted wrist extension with elbow fully extended  Pain with resisted forearm supination with the elbow fully extended  Negative tinels over the ulnar nerve at the elbow      ___________________________________________________  STUDIES REVIEWED:  No new images to review        PROCEDURES PERFORMED:  Procedures  No Procedures performed today    _____________________________________________________      Scribe Attestation    I,:   Aure Danielle MA am acting as a scribe while in the presence of the attending physician :        I,:   Margo Foy MD personally performed the services described in this documentation    as scribed in my presence :

## 2019-12-17 ENCOUNTER — EVALUATION (OUTPATIENT)
Dept: OCCUPATIONAL THERAPY | Age: 69
End: 2019-12-17
Payer: COMMERCIAL

## 2019-12-17 ENCOUNTER — OFFICE VISIT (OUTPATIENT)
Dept: OBGYN CLINIC | Facility: HOSPITAL | Age: 69
End: 2019-12-17
Payer: COMMERCIAL

## 2019-12-17 VITALS
HEART RATE: 69 BPM | DIASTOLIC BLOOD PRESSURE: 82 MMHG | BODY MASS INDEX: 25.06 KG/M2 | WEIGHT: 160 LBS | SYSTOLIC BLOOD PRESSURE: 118 MMHG

## 2019-12-17 DIAGNOSIS — M77.11 RIGHT LATERAL EPICONDYLITIS: Primary | ICD-10-CM

## 2019-12-17 DIAGNOSIS — M17.11 PATELLOFEMORAL ARTHRITIS OF RIGHT KNEE: Primary | ICD-10-CM

## 2019-12-17 DIAGNOSIS — M25.561 RIGHT KNEE PAIN, UNSPECIFIED CHRONICITY: ICD-10-CM

## 2019-12-17 PROCEDURE — 97033 APP MDLTY 1+IONTPHRSIS EA 15: CPT

## 2019-12-17 PROCEDURE — 97165 OT EVAL LOW COMPLEX 30 MIN: CPT

## 2019-12-17 PROCEDURE — 20610 DRAIN/INJ JOINT/BURSA W/O US: CPT | Performed by: ORTHOPAEDIC SURGERY

## 2019-12-17 RX ORDER — TESTOSTERONE CYPIONATE 200 MG/ML
INJECTION INTRAMUSCULAR
Refills: 4 | COMMUNITY
Start: 2019-12-11 | End: 2020-02-19

## 2019-12-17 NOTE — PROGRESS NOTES
OT Evaluation     Today's date: 2019  Patient name: Corey Lee  : 1950  MRN: [id number]  Referring provider: Olena Fletcher MD  Dx:   Encounter Diagnosis     ICD-10-CM    1  Right lateral epicondylitis M77 11                   Assessment  Assessment details: Patient presents with right arm pain since 2019  She more recently noticed forearm pain especially when she props herself up in bed  She had tenderness of the lateral and medial epicondyles  No pain with resisted wrist extension  Pain with forearm rotation  She reports shoulder pain and is going to an orthopedic doctor for evaluation  Her strength patterns were WNL's  Impairments: lacks appropriate home exercise program and pain with function  Understanding of Dx/Px/POC: good   Prognosis: good    Plan  Patient would benefit from: skilled occupational therapy  Planned modality interventions: thermotherapy: hydrocollator packs and iontophoresis  Planned therapy interventions: manual therapy, activity modification, patient education, strengthening, stretching, therapeutic activities, home exercise program and therapeutic exercise  Frequency: 2x week  Duration in weeks: 6        Subjective Evaluation    History of Present Illness  Mechanism of injury: Patient referred to therapy with right lateral epicondylosis  She started with upper arm pain back in 2019    3 wks ago she experienced right volar forearm pain  She notices the pain with she supinates  She thinks it may be from propping herself up at night  She has to sleep on a wedge due to her reflux  Pain  Current pain ratin  At best pain ratin  At worst pain ratin  Progression: no change    Hand dominance: right    Patient Goals  Patient goals for therapy: decreased pain and independence with ADLs/IADLs          Objective     Tenderness     Right Elbow   Tenderness in the lateral epicondyle and medial epicondyle       Right Wrist/Hand   Tenderness in the lateral epicondyle and medial epicondyle  Active Range of Motion     Left Wrist   Normal active range of motion    Right Wrist   Normal active range of motion    Strength/Myotome Testing     Right Shoulder     Planes of Motion   Flexion: 4   Abduction: 4-   External rotation at 90°: 4-   Internal rotation at 90°: 4-     Right Elbow   Normal strength    Left Wrist/Hand   Normal wrist strength     (2nd hand position)     Trial 1: 50    Trial 2: 45    Trial 3: 40    Thumb Strength  Key/Lateral Pinch     Trail 1: 18  Tip/Two-Point Pinch     Trial 1: 16  Palmar/Three-Point Pinch     Trial 1: 16    Right Wrist/Hand   Normal wrist strength     (2nd hand position)     Trial 1: 60    Trial 2: 60    Trial 3: 60    Thumb Strength   Key/Lateral Pinch     Trial 1: 18  Tip/Two-Point Pinch     Trial 1: 14  Palmar/Three-Point Pinch     Trial 1: 14    Tests     Right Elbow   Negative Cozen's         Flowsheet Rows      Most Recent Value   PT/OT G-Codes   Current Score  54   Projected Score  70             Precautions: universal        Manual              Massage to lateral epicondyle             IASTM to medial elbow and forearm             Nerve glides-radial,median, ulnar                                           Exercise Diary              Forearm flexor and extensor stretches             Eccentric wrist extensor strengthening                                                                                                                                                                                                                                                           Modalities

## 2019-12-17 NOTE — PROGRESS NOTES
Assessment:  1  Patellofemoral arthritis of right knee  Large joint arthrocentesis   2  Right knee pain, unspecified chronicity  Large joint arthrocentesis       Plan:     Patient had right knee Monovisc injection    Continue with home exercises   Follow-up in 3 months        The above stated was discussed in layman's terms and the patient expressed understanding  All questions were answered to the patient's satisfaction  Subjective:   Jennifer Christian is a 71 y o  female who presents right knee patellofemoral arthritis  Patient is here today for Right knee Monovisc injection  Patient states pain is over the anterior right knee  Pain is worse with the bending and transitional positions  Patient denies having numbness or tingling  Review of systems negative unless otherwise specified in HPI    Past Medical History:   Diagnosis Date    Breast cancer ([facility] [address] ) 08/21/2008    age 62    Cancer ([facility] [address] )     left     Chronic low back pain     Clostridium difficile colitis     DDD (degenerative disc disease), lumbar     GERD (gastroesophageal reflux disease)     History of radiation therapy 09/2008    for breast cancer    Hypertension     Laryngopharyngeal reflux (LPR)     Lumbar spinal stenosis     Osteoarthritis of spine with radiculopathy, lumbar region     Osteopenia     Piriformis syndrome of left side     Sciatica     Spondylosis of cervical region without myelopathy or radiculopathy        Past Surgical History:   Procedure Laterality Date    BREAST BIOPSY Left 07/30/2008    malignant    BREAST CYST ASPIRATION Left 1998    BREAST LUMPECTOMY Left 08/21/2008    BREAST SURGERY      COLONOSCOPY  2015    [place] ESOPHAGOGASTRODUODENOSCOPY TRANSORAL DIAGNOSTIC N/A 2/15/2017    Procedure: ESOPHAGOGASTRODUODENOSCOPY (EGD); Surgeon: Anjelica Remy MD;  [facility];   Service: Gastroenterology    [place] ESOPHAGOGASTRODUODENOSCOPY TRANSORAL DIAGNOSTIC N/A 2/1/2018    Procedure: ESOPHAGOGASTRODUODENOSCOPY (EGD); Surgeon: James Bridges MD;  [facility]; Service: Gastroenterology       Family History   Problem Relation Age of Onset    Osteoporosis Mother     Coronary artery disease Father     Diabetes Father     COPD Father     No Known Problems Maternal Aunt     Breast cancer Paternal Aunt 68    No Known Problems Paternal Aunt     No Known Problems Maternal Aunt        Social History     Occupational History    Not on file   Tobacco Use    Smoking status: Former Smoker    Smokeless tobacco: Never Used   Substance and Sexual Activity    Alcohol use: Yes     Frequency: 2-3 times a week     Drinks per session: 1 or 2     Comment: social    Drug use: No    Sexual activity: Not Currently         Current Outpatient Medications:     azelastine (ASTELIN) 0 1 % nasal spray, 1 spray into each nostril 2 (two) times a day Use in each nostril as directed, Disp: 1 Bottle, Rfl: 6    Cholecalciferol (VITAMIN D-3 PO), Take 1,000 mg by mouth daily  , Disp: , Rfl:     cyclobenzaprine (FLEXERIL) 10 mg tablet, Take 10 mg by mouth once as needed for muscle spasms (takes one per week on average), Disp: , Rfl:     dexamethasone (DECADRON) 4 mg/mL, 0 25 mL (1 mg total) by Iontophoresis route every 6 (six) hours, Disp: 30 mL, Rfl: 4    famotidine (PEPCID) 20 mg tablet, Take 20 mg by mouth daily, Disp: , Rfl:     lisinopril (ZESTRIL) 10 mg tablet, Take 10 mg by mouth daily, Disp: , Rfl:     Multiple Vitamins-Minerals (CENTRUM ADULTS PO), Take 1 tablet by mouth daily, Disp: , Rfl:     Probiotic Product (PROBIOTIC PO), Take 1 tablet by mouth, Disp: , Rfl:     dexamethasone (DECADRON) 120 mg/30 mL SOLN, 0 25 ML (1 MG TOTAL) BY IONTOPHORESIS ROUTE EVERY 6 (SIX) HOURS, Disp: , Rfl: 4    Allergies   Allergen Reactions    Codeine     Morphine And Related      Note:  this allergy is not being included in drug screening  Please inactivate this item and re-enter it to enable screening      Morphine Sulfate [Morphine]     Naprosyn [Naproxen]     Oxycodone     Penicillins     Sulfa Antibiotics             Vitals:    12/17/19 1517   BP: 118/82   Pulse: 69       Objective:            Physical Exam  · General: Awake, Alert, Oriented  · Eyes: Pupils equal, round and reactive to light  · Heart: regular rate and rhythm  · Lungs: No audible wheezing  · Abdomen: soft                    Ortho Exam  Right knee   No lacerations, no abrasions,no open wounds  No erythema   Full range of motion  Medial joint line tenderness  Positive patella grind test  No tenderness to palpation over the lateral joint line  Stable to varus and valgus stress  Neurovascularly Intact Distally     Diagnostics, reviewed and taken today if performed as documented:    None performed       Procedures, if performed today:    Large joint arthrocentesis  Date/Time: 12/17/2019 3:24 PM  Consent given by: patient  Site marked: site marked  Timeout: Immediately prior to procedure a time out was called to verify the correct patient, procedure, equipment, support staff and site/side marked as required   Supporting Documentation  Indications: pain   Procedure Details  Location: knee -   Preparation: Patient was prepped and draped in the usual sterile fashion  Needle size: 22 G  Ultrasound guidance: no  Approach: anterolateral  Medications administered: 88 mg hyaluronan 88 MG/4ML    Patient tolerance: patient tolerated the procedure well with no immediate complications  Dressing:  Sterile dressing applied          None performed      Scribe Attestation    I,:   Nabeel Liu am acting as a scribe while in the presence of the attending physician :        I,:   Agnes Ferrera MD personally performed the services described in this documentation    as scribed in my presence :              Portions of the record may have been created with voice recognition software    Occasional wrong word or "sound a like" substitutions may have occurred due to the inherent limitations of voice recognition software  Read the chart carefully and recognize, using context, where substitutions have occurred

## 2019-12-19 ENCOUNTER — TELEPHONE (OUTPATIENT)
Dept: PAIN MEDICINE | Facility: MEDICAL CENTER | Age: 69
End: 2019-12-19

## 2019-12-20 ENCOUNTER — OFFICE VISIT (OUTPATIENT)
Dept: PAIN MEDICINE | Facility: CLINIC | Age: 69
End: 2019-12-20
Payer: COMMERCIAL

## 2019-12-20 VITALS
BODY MASS INDEX: 25.11 KG/M2 | HEIGHT: 67 IN | HEART RATE: 85 BPM | WEIGHT: 160 LBS | SYSTOLIC BLOOD PRESSURE: 128 MMHG | DIASTOLIC BLOOD PRESSURE: 81 MMHG

## 2019-12-20 DIAGNOSIS — M54.16 LUMBAR RADICULOPATHY: Primary | ICD-10-CM

## 2019-12-20 DIAGNOSIS — M47.816 LUMBAR SPONDYLOSIS: ICD-10-CM

## 2019-12-20 DIAGNOSIS — M48.061 SPINAL STENOSIS OF LUMBAR REGION, UNSPECIFIED WHETHER NEUROGENIC CLAUDICATION PRESENT: ICD-10-CM

## 2019-12-20 PROCEDURE — 99214 OFFICE O/P EST MOD 30 MIN: CPT | Performed by: NURSE PRACTITIONER

## 2019-12-20 NOTE — PROGRESS NOTES
Assessment:  1  Lumbar radiculopathy    2  Lumbar spondylosis    3  Spinal stenosis of lumbar region, unspecified whether neurogenic claudication present        Plan:  1  I will schedule the patient for a right L4 and L5 TFESI to address the inflammatory component of the patient's pain  Complete risks and benefits including bleeding, infection, tissue reaction, nerve injury and allergic reaction were discussed  The patient was agreeable and verbalized an understanding  2  Approximately 2 weeks later, the patient will also be scheduled for a right greater trochanter bursa injection under ultrasound guidance  The use of direct ultrasound visualization of the needle (rather than a non-guided injection) is required for this procedure to ensure accurate injection placement so there can be diagnostic specificity when evaluating effectiveness of the injection, and for safety purposes to minimize risk of bleeding or injury to surrounding structures  3  I offered to initiate the patient on gabapentin, however she is not interested in this at this time I would like to try injections 1st  4  Patient may continue cyclobenzaprine as prescribed  5  I will follow up with the patient after the procedure or sooner if needed    M*Modal software was used to dictate this note  It may contain errors with dictating incorrect words or incorrect spelling  Please contact the provider directly with any questions  History of Present Illness: The patient is a 71 y o  female last seen on 4/8/19 who presents for a follow up office visit in regards to chronic lumbosacral back pain that radiates into the buttock and lateral aspect of the right lower extremity to the knee secondary to lumbar degenerative disc disease, and lumbar stenosis  The patient denies left lower extremity symptoms, bowel or bladder incontinence, or saddle anesthesia    MRI of the lumbar spine reveals degenerative disc disease and spondylosis with side holes for rule so to the L3-4 through L5-S1  She has been evaluated by Dr Josh Faustin who did not feel that surgical intervention was required at that time and referred her to our practice for conservative management  The patient does feel that her pain has exacerbated since being seen last in April 2019 and is interested in moving forward with injections  The patient currently rates her pain a 9/10 on the numeric pain rating scale  She states her pain is intermittent in nature and bothersome the morning and night  She characterizes the pain as sharp, pressure like and shooting  I have personally reviewed and/or updated the patient's past medical history, past surgical history, family history, social history, current medications, allergies, and vital signs today  Review of Systems:    Review of Systems   Respiratory: Negative for shortness of breath  Cardiovascular: Negative for chest pain  Gastrointestinal: Negative for constipation, diarrhea, nausea and vomiting  Musculoskeletal: Positive for gait problem  Negative for arthralgias, joint swelling and myalgias  Skin: Negative for rash  Neurological: Negative for dizziness, seizures and weakness  All other systems reviewed and are negative          Past Medical History:   Diagnosis Date    Breast cancer ([facility] ) 08/21/2008    age 62    Cancer ([facility] )     left     Chronic low back pain     Clostridium difficile colitis     DDD (degenerative disc disease), lumbar     GERD (gastroesophageal reflux disease)     History of radiation therapy 09/2008    for breast cancer    Hypertension     Laryngopharyngeal reflux (LPR)     Lumbar spinal stenosis     Osteoarthritis of spine with radiculopathy, lumbar region     Osteopenia     Piriformis syndrome of left side     Sciatica     Spondylosis of cervical region without myelopathy or radiculopathy        Past Surgical History:   Procedure Laterality Date    BREAST BIOPSY Left 07/30/2008    malignant    BREAST CYST ASPIRATION Left 1998    BREAST LUMPECTOMY Left 08/21/2008    BREAST SURGERY      COLONOSCOPY  2015    [place] ESOPHAGOGASTRODUODENOSCOPY TRANSORAL DIAGNOSTIC N/A 2/15/2017    Procedure: ESOPHAGOGASTRODUODENOSCOPY (EGD); Surgeon: Charles Vizcaino MD;  [facility]; Service: Gastroenterology    [place] ESOPHAGOGASTRODUODENOSCOPY TRANSORAL DIAGNOSTIC N/A 2/1/2018    Procedure: ESOPHAGOGASTRODUODENOSCOPY (EGD); Surgeon: Charles Vizcaino MD;  [facility];   Service: Gastroenterology       Family History   Problem Relation Age of Onset    Osteoporosis Mother     Coronary artery disease Father     Diabetes Father     COPD Father     No Known Problems Maternal Aunt     Breast cancer Paternal Aunt 68    No Known Problems Paternal Aunt     No Known Problems Maternal Aunt        Social History     Occupational History    Not on file   Tobacco Use    Smoking status: Former Smoker    Smokeless tobacco: Never Used   Substance and Sexual Activity    Alcohol use: Yes     Frequency: 2-3 times a week     Drinks per session: 1 or 2     Comment: social    Drug use: No    Sexual activity: Not Currently         Current Outpatient Medications:     azelastine (ASTELIN) 0 1 % nasal spray, 1 spray into each nostril 2 (two) times a day Use in each nostril as directed, Disp: 1 Bottle, Rfl: 6    Cholecalciferol (VITAMIN D-3 PO), Take 1,000 mg by mouth daily  , Disp: , Rfl:     cyclobenzaprine (FLEXERIL) 10 mg tablet, Take 10 mg by mouth once as needed for muscle spasms (takes one per week on average), Disp: , Rfl:     dexamethasone (DECADRON) 120 mg/30 mL SOLN, 0 25 ML (1 MG TOTAL) BY IONTOPHORESIS ROUTE EVERY 6 (SIX) HOURS, Disp: , Rfl: 4    dexamethasone (DECADRON) 4 mg/mL, 0 25 mL (1 mg total) by Iontophoresis route every 6 (six) hours, Disp: 30 mL, Rfl: 4    famotidine (PEPCID) 20 mg tablet, Take 20 mg by mouth daily, Disp: , Rfl:     lisinopril (ZESTRIL) 10 mg tablet, Take 10 mg by mouth daily, Disp: , Rfl:     Multiple Vitamins-Minerals (CENTRUM ADULTS PO), Take 1 tablet by mouth daily, Disp: , Rfl:     Probiotic Product (PROBIOTIC PO), Take 1 tablet by mouth, Disp: , Rfl:     Allergies   Allergen Reactions    Codeine     Morphine And Related      Note:  this allergy is not being included in drug screening  Please inactivate this item and re-enter it to enable screening   Morphine Sulfate [Morphine]     Naprosyn [Naproxen]     Oxycodone     Penicillins     Sulfa Antibiotics        Physical Exam:    /81   Pulse 85   Ht 5' 7" (1 702 m)   Wt 72 6 kg (160 lb)   LMP  (LMP Unknown)   BMI 25 06 kg/m²     Constitutional:normal, well developed, well nourished, alert, in no distress and non-toxic and no overt pain behavior  Eyes:anicteric  HEENT:grossly intact  Neck:supple, symmetric, trachea midline and no masses   Pulmonary:even and unlabored  Cardiovascular:No edema or pitting edema present  Skin:Normal without rashes or lesions and well hydrated  Psychiatric:Mood and affect appropriate  Neurologic:Cranial Nerves II-XII grossly intact  Musculoskeletal:normal gait  Positive seated straight leg raise on the right negative on the left  Right greater trochanter bursa tender to palpation  Bilateral SI joints nontender to palpation  Imaging  FL spine and pain procedure    (Results Pending)   MRI LUMBAR SPINE WITHOUT CONTRAST     INDICATION:  Left lower extremity pain with associated numbness leading from the buttock region towards the anterolateral aspect of the thigh      COMPARISON:  5/12/2016     TECHNIQUE:  Sagittal T1, sagittal T2, sagittal inversion recovery, axial T1 and axial T2, coronal T2        IMAGE QUALITY:  Diagnostic     FINDINGS:     ALIGNMENT:  Mild dextroscoliosis of the mid lumbar spine  Normal lordosis      MARROW SIGNAL:  Scattered degenerative endplate changes noted  No bone marrow edema or focally suspicious marrow lesions    No compression abnormality      DISTAL CORD AND CONUS:  Normal size and signal within the distal cord and conus  The conus ends at the L1-L2 level      PARASPINAL SOFT TISSUES:  Paraspinal soft tissues are unremarkable      SACRUM:  Normal signal within the sacrum  No evidence of insufficiency or stress fracture      LOWER THORACIC DISC SPACES:  Normal disc height and signal   No disc herniation, canal stenosis or foraminal narrowing      LUMBAR DISC SPACES:          L1-L2:  Normal      L2-L3:  There is a diffuse disc bulge  There is mild tricompartmental narrowing      L3-L4:  There is loss of disc height and signal   There is a left neural foraminal disc protrusion  There is bilateral facet hypertrophy  Mild to moderate central canal narrowing  Moderate left neural foraminal narrowing  Mild right neural foraminal   narrowing      L4-L5:  There is a diffuse disc bulge  There is bilateral facet hypertrophy  Mild central canal narrowing  Moderate bilateral neural foraminal narrowing      L5-S1:  There is a diffuse disc bulge  There is bilateral facet hypertrophy    Mild tricompartmental narrowing      IMPRESSION:     Multilevel lumbar spondylosis      Orders Placed This Encounter   Procedures    FL spine and pain procedure

## 2019-12-23 ENCOUNTER — TELEPHONE (OUTPATIENT)
Dept: RADIOLOGY | Facility: CLINIC | Age: 69
End: 2019-12-23

## 2019-12-23 ENCOUNTER — OFFICE VISIT (OUTPATIENT)
Dept: OCCUPATIONAL THERAPY | Age: 69
End: 2019-12-23
Payer: COMMERCIAL

## 2019-12-23 DIAGNOSIS — M77.11 RIGHT LATERAL EPICONDYLITIS: Primary | ICD-10-CM

## 2019-12-23 PROCEDURE — 97140 MANUAL THERAPY 1/> REGIONS: CPT

## 2019-12-23 PROCEDURE — 97110 THERAPEUTIC EXERCISES: CPT

## 2019-12-23 NOTE — TELEPHONE ENCOUNTER
PER ZOE ON 12/20- PT TO BE SCHEDULED FOR RT L4 & L5 TFESI- SCHEDULED ON TUES 01/07/20 ARR AT 08:45AM, AND ALSO RT GTB INJ UNDER USGI, SCHEDULED ON THURS 01/30/20 ARR AT 10:45AM     Lysle Balling over pre procedure instructions, no vaccinations 2 weeks prior/post proc, NPO 1 hr prior, if sick or on abx needs to call to rs, wear loose, comf clothing- no buttons/zippers, needs   Pt verbalized understanding

## 2019-12-23 NOTE — PROGRESS NOTES
Daily Note     Today's date: 2019  Patient name: Corey Lee  : 1950  MRN: [id number]  Referring provider: Olena Fletcher MD  Dx: No diagnosis found  Subjective: VPR 2/10      Objective: See treatment diary below     Patient completed exercises per log  Minimal tenderness left lateral elbow  Assessment: Tolerated treatment well  Plan: Progress treatment as tolerated  Precautions: universal        Manual              Massage to lateral epicondyle             IASTM to medial elbow and forearm 8min              Nerve glides-radial,median, ulnar                                           Exercise Diary              Forearm flexor and extensor stretches reviewed HEP              Eccentric wrist extensor strengthening Any twist 2x10            EDC RB            Forearm rotation 8oz hammer   3x10                                                                                                                                                                                                                                Modalities              E-stim 5

## 2019-12-26 ENCOUNTER — TELEPHONE (OUTPATIENT)
Dept: OBGYN CLINIC | Facility: HOSPITAL | Age: 69
End: 2019-12-26

## 2019-12-26 ENCOUNTER — OFFICE VISIT (OUTPATIENT)
Dept: OCCUPATIONAL THERAPY | Age: 69
End: 2019-12-26
Payer: COMMERCIAL

## 2019-12-26 DIAGNOSIS — M77.11 RIGHT LATERAL EPICONDYLITIS: Primary | ICD-10-CM

## 2019-12-26 PROCEDURE — 97140 MANUAL THERAPY 1/> REGIONS: CPT

## 2019-12-26 PROCEDURE — 97110 THERAPEUTIC EXERCISES: CPT

## 2019-12-26 NOTE — TELEPHONE ENCOUNTER
Message reviewed    I assume that the pain that is being discussed by the patient is from patellofemoral symptomatology      Your actions reviewed    Additionally,   This can be reviewed by Dr Carlyle Murillo and Mr Radha Harvey next week,    We will observe her response to your instructions    STEPHANIE,-cross coverage

## 2019-12-26 NOTE — TELEPHONE ENCOUNTER
Caller:  Lakesha Morataya  Call back:  [phone]    Carmen Pina is calling in reference to the gel shot received on 12/17/19  Carmen Pina is reporting increased pain since receiving the shot  Please advise

## 2019-12-26 NOTE — PROGRESS NOTES
Daily Note     Today's date: 2019  Patient name: Renuka Metzger  : 1950  MRN: [id number]  Referring provider: Nacho Robles MD  Dx:   Encounter Diagnosis     ICD-10-CM    1  Right lateral epicondylitis M77 11                   Subjective: c/o radial volar pain that radiates from forearm to shoulder  Describes it as an ache  Objective: See treatment diary below     Patient completed exercises per log  Minimal tenderness left lateral elbow  (+) median nerve tension  Patient wearing a wrist brace at night  (-) Tinel's at wrist     Assessment: Tolerated treatment well  Possible neural tension contributing to pain  Plan: Progress treatment as tolerated  Precautions: universal        Manual             Massage to lateral epicondyle             IASTM to medial elbow and forearm 8min   8'           Nerve glides-radial,median, ulnar  Median nerve glides                                           Exercise Diary             Forearm flexor and extensor stretches reviewed HEP              Eccentric wrist extensor strengthening Any twist 2x10 2# 3x10            EDC RB RB           Forearm rotation 8oz hammer   3x10 8oz hammer 3x10                                                                                                                                                                                                                               Modalities              E-stim 5

## 2019-12-26 NOTE — TELEPHONE ENCOUNTER
Called patient back  She stated the pain she is experiencing is no worse than she had before but it is more frequent  She stated it occurs when she is bending down or going up steps  Stated the pain is not occurring when she walks  Denies redness, heat to touch or an increase in swelling  The knee swells as it has been but no more than before  Advised her to try ice to relieve swelling and pain, 20 min son 20 mins off as often as she would like  She stated she is taking Advil 400 mg Q6 to 8 Hr  Advised her tylenol 1000 mg Q8h could help as well  Patient is seeing [facility] for her sciatica which is acting up on the R side now  Advised her that the advice may be to follow up with them as well since she has other issues that are happening on the R side now too  Verbalized understanding  Please advise

## 2019-12-30 ENCOUNTER — OFFICE VISIT (OUTPATIENT)
Dept: OCCUPATIONAL THERAPY | Age: 69
End: 2019-12-30
Payer: COMMERCIAL

## 2019-12-30 DIAGNOSIS — M77.11 RIGHT LATERAL EPICONDYLITIS: Primary | ICD-10-CM

## 2019-12-30 PROCEDURE — 97110 THERAPEUTIC EXERCISES: CPT

## 2019-12-30 NOTE — PROGRESS NOTES
Daily Note     Today's date: 2019  Patient name: Silke Brown  : 1950  MRN: [id number]  Referring provider: Emily Marshall MD  Dx:   Encounter Diagnosis     ICD-10-CM    1  Right lateral epicondylitis M77 11                   Subjective: arm felt good over the weekend, until she woke up this morning  Objective: See treatment diary below     Patient completed exercises per log  Improved motion with nerve glides  Shoulder discomfort with Passive ER  Assessment: Tolerated treatment well  Possible neural tension contributing to pain  Plan: Progress treatment as tolerated  Precautions: universal        Manual            Massage to lateral epicondyle             IASTM to medial elbow and forearm 8min   8' 5'          Nerve glides-radial,median, ulnar  Median nerve glides   Median/ulnar    5'                                        Exercise Diary            Forearm flexor and extensor stretches reviewed HEP              Eccentric wrist extensor strengthening Any twist 2x10 2# 3x10  2# 3x10          EDC RB RB RB          Forearm rotation 8oz hammer   3x10 8oz hammer 3x10                                                                                                                                                                                                                               Modalities             E-stim 5 5

## 2020-01-02 ENCOUNTER — OFFICE VISIT (OUTPATIENT)
Dept: OCCUPATIONAL THERAPY | Age: 70
End: 2020-01-02
Payer: COMMERCIAL

## 2020-01-02 DIAGNOSIS — M77.11 RIGHT LATERAL EPICONDYLITIS: Primary | ICD-10-CM

## 2020-01-02 PROCEDURE — 97110 THERAPEUTIC EXERCISES: CPT

## 2020-01-02 NOTE — PROGRESS NOTES
Daily Note     Today's date: 2020  Patient name: Mellisa Cleary  : 1950  MRN: [id number]  Referring provider: Radha Garcia MD  Dx:   Encounter Diagnosis     ICD-10-CM    1  Right lateral epicondylitis M77 11                   Subjective: reports pain from wrist to elbow  Objective: See treatment diary below  Mild tenderness lateral epicondyle  Improved ROM with nerve glides  Less tension in arm  Assessment: Tolerated treatment well  Plan: Progress treatment as tolerated  Precautions: universal        Manual           Massage to lateral epicondyle             IASTM to medial elbow and forearm 8min   8' 5' 5'         Nerve glides-radial,median, ulnar  Median nerve glides   Median/ulnar    5' Median/ulnar5'                                       Exercise Diary           Forearm flexor and extensor stretches reviewed HEP              Eccentric wrist extensor strengthening Any twist 2x10 2# 3x10  2# 3x10 2# 3x10         EDC RB RB RB          Forearm rotation 8oz hammer   3x10 8oz hammer 3x10           Rotator cuff strengthening    MRE 2x10 ER                                                                                                                                                                                                                Modalities            E-stim 5 5 Done

## 2020-01-06 ENCOUNTER — OFFICE VISIT (OUTPATIENT)
Dept: OCCUPATIONAL THERAPY | Age: 70
End: 2020-01-06
Payer: COMMERCIAL

## 2020-01-06 DIAGNOSIS — M77.11 RIGHT LATERAL EPICONDYLITIS: Primary | ICD-10-CM

## 2020-01-06 PROCEDURE — 97110 THERAPEUTIC EXERCISES: CPT

## 2020-01-06 PROCEDURE — 97140 MANUAL THERAPY 1/> REGIONS: CPT

## 2020-01-06 NOTE — PROGRESS NOTES
Daily Note     Today's date: 2020  Patient name: Laci Ramsey  : 1950  MRN: [id number]  Referring provider: Tracy Prabhakar MD  Dx:   Encounter Diagnosis     ICD-10-CM    1  Right lateral epicondylitis M77 11                   Subjective: Patient states she fell of her chair at home  Experiencing pain in right lower extremity      Objective: See treatment diary below  Mild tenderness lateral epicondyle  (+) median nerve tension  Assessment: Tolerated treatment well  Plan: Progress treatment as tolerated  Precautions: universal        Manual          Massage to lateral epicondyle             IASTM to medial elbow and forearm 8min   8' 5' 5' 5'        Nerve glides-radial,median, ulnar  Median nerve glides   Median/ulnar    5' Median/ulnar5' Med/ulnar 5'                                      Exercise Diary          Forearm flexor and extensor stretches reviewed HEP              Eccentric wrist extensor strengthening Any twist 2x10 2# 3x10  2# 3x10 2# 3x10 2# 3x10        EDC RB RB RB  RB        Forearm rotation 8oz hammer   3x10 8oz hammer 3x10           Rotator cuff strengthening    MRE 2x10 ER MRE 2x10 ER                                                                                                                                                                                                               Modalities           E-stim 5 5 Done   done

## 2020-01-07 ENCOUNTER — HOSPITAL ENCOUNTER (OUTPATIENT)
Dept: RADIOLOGY | Facility: CLINIC | Age: 70
Discharge: HOME | End: 2020-01-07
Admitting: ANESTHESIOLOGY
Payer: COMMERCIAL

## 2020-01-07 VITALS
OXYGEN SATURATION: 98 % | RESPIRATION RATE: 20 BRPM | HEART RATE: 82 BPM | DIASTOLIC BLOOD PRESSURE: 94 MMHG | TEMPERATURE: 98.4 F | SYSTOLIC BLOOD PRESSURE: 152 MMHG

## 2020-01-07 DIAGNOSIS — M54.16 LUMBAR RADICULOPATHY: ICD-10-CM

## 2020-01-07 PROCEDURE — 64484 NJX AA&/STRD TFRM EPI L/S EA: CPT | Performed by: ANESTHESIOLOGY

## 2020-01-07 PROCEDURE — 64483 NJX AA&/STRD TFRM EPI L/S 1: CPT | Performed by: ANESTHESIOLOGY

## 2020-01-07 RX ORDER — LIDOCAINE HYDROCHLORIDE 10 MG/ML
5 INJECTION, SOLUTION EPIDURAL; INFILTRATION; INTRACAUDAL; PERINEURAL ONCE
Status: COMPLETED | OUTPATIENT
Start: 2020-01-07 | End: 2020-01-07

## 2020-01-07 RX ORDER — PAPAVERINE HCL 150 MG
20 CAPSULE, EXTENDED RELEASE ORAL ONCE
Status: COMPLETED | OUTPATIENT
Start: 2020-01-07 | End: 2020-01-07

## 2020-01-07 RX ADMIN — IOHEXOL 2 ML: 300 INJECTION, SOLUTION INTRAVENOUS at 09:17

## 2020-01-07 RX ADMIN — DEXAMETHASONE SODIUM PHOSPHATE 15 MG: 10 INJECTION, SOLUTION INTRAMUSCULAR; INTRAVENOUS at 09:20

## 2020-01-07 RX ADMIN — LIDOCAINE HYDROCHLORIDE 2 ML: 20 INJECTION, SOLUTION EPIDURAL; INFILTRATION; INTRACAUDAL; PERINEURAL at 09:20

## 2020-01-07 RX ADMIN — LIDOCAINE HYDROCHLORIDE 4 ML: 10 INJECTION, SOLUTION EPIDURAL; INFILTRATION; INTRACAUDAL; PERINEURAL at 09:15

## 2020-01-07 NOTE — H&P
History of Present Illness: The patient is a 71 y o  female who presents with complaints of low back and right leg pain  Patient Active Problem List   Diagnosis    Lumbar radiculopathy    Chronic bilateral low back pain with right-sided sciatica    Sacroiliitis (HCC)    Lumbar spondylosis    Trochanteric bursitis of right hip    Spinal stenosis of lumbar region    Subacromial bursitis of right shoulder joint    Dysphonia    Pharyngoesophageal dysphagia    Reflux laryngitis    Gastroesophageal reflux disease    Paresis of right vocal fold    Laryngeal edema    Allergic rhinitis    Chronic rhinitis    Esophageal dysmotilities    Dyspepsia    Cough    Dyspnea on exertion    Essential hypertension    Nausea    Glottic insufficiency    Vocal fold paresis, right    Muscle tension dysphonia       Past Medical History:   Diagnosis Date    [facility] ([facility] ) 08/21/2008    age 62    Cancer ([facility] )     left     Chronic low back pain     Clostridium difficile colitis     DDD (degenerative disc disease), lumbar     GERD (gastroesophageal reflux disease)     History of radiation therapy 09/2008    for breast cancer    Hypertension     Laryngopharyngeal reflux (LPR)     Lumbar spinal stenosis     Osteoarthritis of spine with radiculopathy, lumbar region     Osteopenia     Piriformis syndrome of left side     Sciatica     Spondylosis of cervical region without myelopathy or radiculopathy        Past Surgical History:   Procedure Laterality Date    BREAST BIOPSY Left 07/30/2008    malignant    BREAST CYST ASPIRATION Left 1998    BREAST LUMPECTOMY Left 08/21/2008    BREAST SURGERY      COLONOSCOPY  2015    [place] ESOPHAGOGASTRODUODENOSCOPY TRANSORAL DIAGNOSTIC N/A 2/15/2017    Procedure: ESOPHAGOGASTRODUODENOSCOPY (EGD); Surgeon: Soniya Lauren MD;  [facility];   Service: Gastroenterology    [place] ESOPHAGOGASTRODUODENOSCOPY TRANSORAL DIAGNOSTIC N/A 2/1/2018    Procedure: ESOPHAGOGASTRODUODENOSCOPY (EGD); Surgeon: Clary Robles MD;  [facility]; Service: Gastroenterology         Current Outpatient Medications:     azelastine (ASTELIN) 0 1 % nasal spray, 1 spray into each nostril 2 (two) times a day Use in each nostril as directed, Disp: 1 Bottle, Rfl: 6    Cholecalciferol (VITAMIN D-3 PO), Take 1,000 mg by mouth daily  , Disp: , Rfl:     cyclobenzaprine (FLEXERIL) 10 mg tablet, Take 10 mg by mouth once as needed for muscle spasms (takes one per week on average), Disp: , Rfl:     dexamethasone (DECADRON) 120 mg/30 mL SOLN, 0 25 ML (1 MG TOTAL) BY IONTOPHORESIS ROUTE EVERY 6 (SIX) HOURS, Disp: , Rfl: 4    dexamethasone (DECADRON) 4 mg/mL, 0 25 mL (1 mg total) by Iontophoresis route every 6 (six) hours, Disp: 30 mL, Rfl: 4    famotidine (PEPCID) 20 mg tablet, Take 20 mg by mouth daily, Disp: , Rfl:     lisinopril (ZESTRIL) 10 mg tablet, Take 10 mg by mouth daily, Disp: , Rfl:     Multiple Vitamins-Minerals (CENTRUM ADULTS PO), Take 1 tablet by mouth daily, Disp: , Rfl:     Probiotic Product (PROBIOTIC PO), Take 1 tablet by mouth, Disp: , Rfl:     Allergies   Allergen Reactions    Codeine     Morphine And Related      Note:  this allergy is not being included in drug screening  Please inactivate this item and re-enter it to enable screening   Morphine Sulfate [Morphine]     Naprosyn [Naproxen]     Oxycodone     Penicillins     Sulfa Antibiotics        Physical Exam:   Vitals:    01/07/20 0856   BP: 136/89   Pulse: 76   Resp: 20   Temp: 98 4 °F (36 9 °C)   SpO2: 99%     General: Awake, Alert, Oriented x 3, Mood and affect appropriate  Respiratory: Respirations even and unlabored  Cardiovascular: Peripheral pulses intact; no edema  Musculoskeletal Exam:  Right lumbar paraspinals tender to palpation      ASA Score: 3    Patient/Chart Verification  Patient ID Verified: Verbal  ID Band Applied: No  Consents Confirmed: Procedural  H&P( within 30 days) Verified: To be obtained in the Pre-Procedure area  Interval H&P(within 24 hr) Complete (required for Outpatients and Surgery Admit only): To be obtained in the Pre-Procedure area  Allergies Reviewed: Yes  Anticoag/NSAID held?: No(denies)  Currently on antibiotics?: No  Pre-op Lab/Test Results Available: N/A  Pregnancy Lab Collected: N/A comment    Assessment:   1   Lumbar radiculopathy        Plan: Right L4 and L5 TFESI

## 2020-01-07 NOTE — DISCHARGE INSTRUCTIONS
Epidural Steroid Injection   WHAT YOU NEED TO KNOW:   An epidural steroid injection (BERT) is a procedure to inject steroid medicine into the epidural space  The epidural space is between your spinal cord and vertebrae  Steroids reduce inflammation and fluid buildup in your spine that may be causing pain  You may be given pain medicine along with the steroids  ACTIVITY  · Do not drive or operate machinery today  · No strenuous activity today - bending, lifting, etc   · You may resume normal activites starting tomorrow - start slowly and as tolerated  · You may shower today, but no tub baths or hot tubs  · You may have numbness for several hours from the local anesthetic  Please use caution and common sense, especially with weight-bearing activities  CARE OF THE INJECTION SITE  · If you have soreness or pain, apply ice to the area today (20 minutes on/20 minutes off)  · Starting tomorrow, you may use warm, moist heat or ice if needed  · You may have an increase or change in your discomfort for 36-48 hours after your treatment  · Apply ice and continue with any pain medication you have been prescribed  · Notify [facility] if you have any of the following: redness, drainage, swelling, headache, stiff neck or fever above 100°F     SPECIAL INSTRUCTIONS  · Our office will contact you in approximately 7 days for a progress report  MEDICATIONS  · Continue to take all routine medications  · Our office may have instructed you to hold some medications  If you have a problem specifically related to your procedure, please call our office at [phone]  Problems not related to your procedure should be directed to your primary care physician

## 2020-01-09 ENCOUNTER — OFFICE VISIT (OUTPATIENT)
Dept: OCCUPATIONAL THERAPY | Age: 70
End: 2020-01-09
Payer: COMMERCIAL

## 2020-01-09 DIAGNOSIS — M77.11 RIGHT LATERAL EPICONDYLITIS: Primary | ICD-10-CM

## 2020-01-09 PROCEDURE — 97140 MANUAL THERAPY 1/> REGIONS: CPT

## 2020-01-09 PROCEDURE — 97110 THERAPEUTIC EXERCISES: CPT

## 2020-01-09 PROCEDURE — 97014 ELECTRIC STIMULATION THERAPY: CPT

## 2020-01-09 NOTE — PROGRESS NOTES
Daily Note     Today's date: 2020  Patient name: Estelle Rivas  : 1950  MRN: [id number]  Referring provider: Estephanie Shaver MD  Dx:   No diagnosis found  Subjective: Reports pain in radial forearm      Objective: See treatment diary below  (+) tenderness and pain in radial tunnel syndrome  Pain radiates distally down the radial forearm and proximally above the elbow  Forearm pain with radial nerve glides  Applied iontophoresis to radial tunnel today  Instructed patient to avoid repetitive forearm rotation  Instructed also in radial nerve glides  Assessment: Tolerated treatment well  Patient presents with possible radial tunnel syndrome  Plan: Progress treatment as tolerated  Precautions: universal        Manual         Massage to lateral epicondyle             IASTM to medial elbow and forearm 8min   8' 5' 5' 5'        Nerve glides-radial,median  Median nerve glides   Median/ulnar    5' Median/ulnar5' Med/ulnar 5' Median/radial  15'                                     Exercise Diary         Forearm flexor and extensor stretches reviewed HEP       5'       Eccentric wrist extensor strengthening Any twist 2x10 2# 3x10  2# 3x10 2# 3x10 2# 3x10        EDC RB RB RB  RB         8oz hammer   3x10 8oz hammer 3x10    deferred       Rotator cuff strengthening    MRE 2x10 ER MRE 2x10 ER                                                                                                                                                                                                               Modalities         E-stim 5 5 Done   done done 10       MH to dorsal forearm      10'

## 2020-01-13 ENCOUNTER — OFFICE VISIT (OUTPATIENT)
Dept: OCCUPATIONAL THERAPY | Age: 70
End: 2020-01-13
Payer: COMMERCIAL

## 2020-01-13 DIAGNOSIS — M77.11 RIGHT LATERAL EPICONDYLITIS: Primary | ICD-10-CM

## 2020-01-13 PROCEDURE — 97110 THERAPEUTIC EXERCISES: CPT

## 2020-01-13 PROCEDURE — 97140 MANUAL THERAPY 1/> REGIONS: CPT

## 2020-01-13 NOTE — PROGRESS NOTES
Daily Note     Today's date: 2020  Patient name: Ponce Luevano  : 1950  MRN: [id number]  Referring provider: Donna Starkey MD  Dx:   Encounter Diagnosis     ICD-10-CM    1  Right lateral epicondylitis M77 11                   Subjective: Reports pain in radial forearm      Objective: See treatment diary below  Focusing on radial nerve gliding  Assessment: Tolerated treatment well  Patient presents with possible radial tunnel syndrome  Plan: Progress treatment as tolerated  Applying ionto to radial tunnel     Precautions: universal        Manual        Massage to lateral epicondyle       5'      IASTM to medial elbow and forearm 8min   8' 5' 5' 5'        Nerve glides-radial,median  Median nerve glides   Median/ulnar    5' Median/ulnar5' Med/ulnar 5' Median/radial  15' Med/radial 10'                                    Exercise Diary        Forearm flexor and extensor stretches reviewed HEP       5' Wrist bar for extensor stretches      Eccentric wrist extensor strengthening Any twist 2x10 2# 3x10  2# 3x10 2# 3x10 2# 3x10        EDC RB RB RB  RB  RB       8oz hammer   3x10 8oz hammer 3x10    deferred       Rotator cuff strengthening    MRE 2x10 ER MRE 2x10 ER        eccentrics       Wrist ext 2#                                                                                                                                                                                                Modalities        E-stim 5 5 Done   done done 10 5'      MH to dorsal forearm      10' 10'

## 2020-01-14 ENCOUNTER — TELEPHONE (OUTPATIENT)
Dept: PAIN MEDICINE | Facility: CLINIC | Age: 70
End: 2020-01-14

## 2020-01-15 ENCOUNTER — OFFICE VISIT (OUTPATIENT)
Dept: OBGYN CLINIC | Facility: CLINIC | Age: 70
End: 2020-01-15
Payer: COMMERCIAL

## 2020-01-15 VITALS — WEIGHT: 160 LBS | HEIGHT: 67 IN | BODY MASS INDEX: 25.11 KG/M2

## 2020-01-15 DIAGNOSIS — M77.11 LATERAL EPICONDYLITIS OF RIGHT ELBOW: ICD-10-CM

## 2020-01-15 DIAGNOSIS — G56.31 RADIAL NEURITIS, RIGHT: Primary | ICD-10-CM

## 2020-01-15 DIAGNOSIS — M25.511 RIGHT SHOULDER PAIN, UNSPECIFIED CHRONICITY: ICD-10-CM

## 2020-01-15 DIAGNOSIS — M19.011 ARTHRITIS OF RIGHT ACROMIOCLAVICULAR JOINT: ICD-10-CM

## 2020-01-15 PROCEDURE — 99214 OFFICE O/P EST MOD 30 MIN: CPT | Performed by: ORTHOPAEDIC SURGERY

## 2020-01-15 NOTE — PROGRESS NOTES
Assessment/Plan:  1  Radial neuritis, right     2  Right shoulder pain, unspecified chronicity  Ambulatory referral to Orthopedic Surgery    Ambulatory referral to Physical Therapy    CANCELED: Ambulatory referral to Physical Therapy   3  Arthritis of right acromioclavicular joint  Ambulatory referral to Physical Therapy    CANCELED: Ambulatory referral to Physical Therapy   4  Lateral epicondylitis of right elbow       Patient Active Problem List   Diagnosis    Lumbar radiculopathy    Chronic bilateral low back pain with right-sided sciatica    Sacroiliitis (HCC)    Lumbar spondylosis    Trochanteric bursitis of right hip    Spinal stenosis of lumbar region    Subacromial bursitis of right shoulder joint    Dysphonia    Pharyngoesophageal dysphagia    Reflux laryngitis    Gastroesophageal reflux disease    Paresis of right vocal fold    Laryngeal edema    Allergic rhinitis    Chronic rhinitis    Esophageal dysmotilities    Dyspepsia    Cough    Dyspnea on exertion    Essential hypertension    Nausea    Glottic insufficiency    Vocal fold paresis, right    Muscle tension dysphonia    Radial neuritis, right    Arthritis of right acromioclavicular joint    Lateral epicondylitis of right elbow       Discussion/Summary:    71 y o  female with right shoulder weakness that is related to and exacerbating her lateral epicondylitis  She also has positive radial nerve tension testing today on exam  Recommend continued nerve glides by PT and focus on ROM and strengthening of the shoulder and rotator cuff muscles as well as exercises for the cervical spine and continued nerve glides  Follow up with Dr Armani Canales as scheduled for the lateral epicondylitis  Follow up with us on an as needed basis  The patient was seen and examined by Dr Nuria Kaye and myself  The assessment and plan were formulated by Dr Nuria Kaye and I assisted in carrying it out  Subjective:   Patient ID: Peggy Sevilla is a 71 y o  female   HPI    Patient presents to the office complaining of right shoulder pain  Patient is referred to us by Dr Yaneth Dickson for consultation for these symptoms  Symptoms began almost 1 year  No shoulder pain currently, a little neck pain bilaterally  Has had some minor discomfort in bilateral shoulders which does not radiate, discomfort is 1-2/10 at worst, 0/10 at best  It is made worse by physical therapy and extension  It is made better by rest  So far the patient has tried nothing for shoulder specifically  The patient denies past episodes similar to this  The patient denies any numbness or tingling      The following portions of the patient's history were reviewed and updated as appropriate: allergies, current medications, past family history, past social history, past surgical history and problem list     Social History     Socioeconomic History    Marital status: /Civil Union     Spouse name: Not on file    Number of children: Not on file    Years of education: Not on file    Highest education level: Not on file   Occupational History    Not on file   Social Needs    Financial resource strain: Not on file    Food insecurity:     Worry: Not on file     Inability: Not on file    Transportation needs:     Medical: Not on file     Non-medical: Not on file   Tobacco Use    Smoking status: Former Smoker    Smokeless tobacco: Never Used   Substance and Sexual Activity    Alcohol use: Yes     Frequency: 2-3 times a week     Drinks per session: 1 or 2     Comment: social    Drug use: No    Sexual activity: Not Currently   Lifestyle    Physical activity:     Days per week: Not on file     Minutes per session: Not on file    Stress: Not on file   Relationships    Social connections:     Talks on phone: Not on file     Gets together: Not on file     Attends Oriental orthodox service: Not on file     Active member of club or organization: Not on file     Attends meetings of clubs or organizations: Not on file     Relationship status: Not on file    Intimate partner violence:     Fear of current or ex partner: Not on file     Emotionally abused: Not on file     Physically abused: Not on file     Forced sexual activity: Not on file   Other Topics Concern    Not on file   Social History Narrative    Not on file     Past Medical History:   Diagnosis Date    Breast cancer ([facility] [address] ) 08/21/2008    age 62    Cancer ([facility] [address] )     left     Chronic low back pain     Clostridium difficile colitis     DDD (degenerative disc disease), lumbar     GERD (gastroesophageal reflux disease)     History of radiation therapy 09/2008    for breast cancer    Hypertension     Laryngopharyngeal reflux (LPR)     Lumbar spinal stenosis     Osteoarthritis of spine with radiculopathy, lumbar region     Osteopenia     Piriformis syndrome of left side     Sciatica     Spondylosis of cervical region without myelopathy or radiculopathy      Past Surgical History:   Procedure Laterality Date    BREAST BIOPSY Left 07/30/2008    malignant    BREAST CYST ASPIRATION Left 1998    BREAST LUMPECTOMY Left 08/21/2008    BREAST SURGERY      COLONOSCOPY  2015    NH ESOPHAGOGASTRODUODENOSCOPY TRANSORAL DIAGNOSTIC N/A 2/15/2017    Procedure: ESOPHAGOGASTRODUODENOSCOPY (EGD); Surgeon: Nicole Srinivasan MD;  Location: [facility]; Service: Gastroenterology    NH ESOPHAGOGASTRODUODENOSCOPY TRANSORAL DIAGNOSTIC N/A 2/1/2018    Procedure: ESOPHAGOGASTRODUODENOSCOPY (EGD); Surgeon: Nicole Srinivasan MD;  Location: [facility]; Service: Gastroenterology     Allergies   Allergen Reactions    Codeine     Morphine And Related      Note:  this allergy is not being included in drug screening  Please inactivate this item and re-enter it to enable screening      Morphine Sulfate [Morphine]     Naprosyn [Naproxen]     Oxycodone     Penicillins     Sulfa Antibiotics      Current Outpatient Medications on File Prior to Visit   Medication Sig Dispense Refill    azelastine (ASTELIN) 0 1 % nasal spray 1 spray into each nostril 2 (two) times a day Use in each nostril as directed 1 Bottle 6    Cholecalciferol (VITAMIN D-3 PO) Take 1,000 mg by mouth daily        cyclobenzaprine (FLEXERIL) 10 mg tablet Take 10 mg by mouth once as needed for muscle spasms (takes one per week on average)      dexamethasone (DECADRON) 120 mg/30 mL SOLN 0 25 ML (1 MG TOTAL) BY IONTOPHORESIS ROUTE EVERY 6 (SIX) HOURS  4    dexamethasone (DECADRON) 4 mg/mL 0 25 mL (1 mg total) by Iontophoresis route every 6 (six) hours 30 mL 4    famotidine (PEPCID) 20 mg tablet Take 20 mg by mouth daily      lisinopril (ZESTRIL) 10 mg tablet Take 10 mg by mouth daily      montelukast (SINGULAIR) 10 mg tablet TAKE 1 TABLET BY MOUTH EVERY DAY 90 tablet 3    Multiple Vitamins-Minerals (CENTRUM ADULTS PO) Take 1 tablet by mouth daily      Probiotic Product (PROBIOTIC PO) Take 1 tablet by mouth       No current facility-administered medications on file prior to visit  Review of Systems      Objective: There were no vitals filed for this visit  Physical Exam    Right Shoulder Exam     Tenderness   The patient is experiencing tenderness in the acromioclavicular joint  Range of Motion   Active abduction: normal   Passive abduction: normal   Extension: normal   External rotation: normal   Forward flexion: normal   Internal rotation 0 degrees: normal   Internal rotation 90 degrees: normal     Muscle Strength   Abduction: 5/5   Internal rotation: 5/5   External rotation: 4/5   Supraspinatus: 5/5   Subscapularis: 5/5   Biceps: 5/5     Tests   Lizarraga test: negative  Cross arm: positive  Impingement: negative  Drop arm: negative    Other   Erythema: absent  Scars: absent  Right shoulder sensation:  sensation to light touch intact in axillary, musculocutaneous, radial, median, and ulnar nerve distribution  Right shoulder pulse absent:  2+ radial pulse      Comments:  Negative Speed's Test   Negative O' Parker's test  Radial nerve tension test positive  Negative Spurlings      Left Shoulder Exam     Range of Motion   Active abduction: normal   Passive abduction: normal   Extension: normal   External rotation: normal   Forward flexion: normal   Internal rotation 0 degrees: normal   Internal rotation 90 degrees: normal     Muscle Strength   Abduction: 5/5   Internal rotation: 5/5   External rotation: 4/5   Supraspinatus: 5/5   Subscapularis: 5/5   Biceps: 5/5     Other   Left shoulder sensation:  sensation to light touch intact in axillary, musculocutaneous, radial, median, and ulnar nerve distribution  Left shoulder pulse absent: 2+ Radial pulse  I have personally reviewed pertinent films in PACS and my interpretation is XR of R shoulder from 3/2019 shows mild AC arthritis no other significant degenerative changes no fractures  Procedures  No Procedures performed today    Portions of the record may have been created with voice recognition software  Occasional wrong word or "sound a like" substitutions may have occurred due to the inherent limitations of voice recognition software  Read the chart carefully and recognize, using context, where substitutions have occurred

## 2020-01-16 ENCOUNTER — OFFICE VISIT (OUTPATIENT)
Dept: OCCUPATIONAL THERAPY | Age: 70
End: 2020-01-16
Payer: COMMERCIAL

## 2020-01-16 DIAGNOSIS — M77.11 RIGHT LATERAL EPICONDYLITIS: ICD-10-CM

## 2020-01-16 DIAGNOSIS — G56.31 RADIAL NEURITIS, RIGHT: Primary | ICD-10-CM

## 2020-01-16 PROCEDURE — 97140 MANUAL THERAPY 1/> REGIONS: CPT

## 2020-01-16 PROCEDURE — 97110 THERAPEUTIC EXERCISES: CPT

## 2020-01-16 NOTE — PROGRESS NOTES
Daily Note     Today's date: 2020  Patient name: Macrina Philippe  : 1950  MRN: [id number]  Referring provider: Myra Sanders MD  Dx:   Encounter Diagnosis     ICD-10-CM    1  Radial neuritis, right G56 31    2  Right lateral epicondylitis M77 11                 Subjective:  Patient feels her forearm pain might be a little better  VPR  5/10 last night and this morning  Objective: See treatment diary below  Patient seen by Dr Corin Hobson   He dx her with radial neuritis in addition to right shoulder arthritis  Patient to schedule PT evaluation  Assessment: Tolerated treatment well  Plan: Progress treatment as tolerated  Applying ionto to radial tunnel     Precautions: universal        Manual  12/23 12/26 12/30 01/02 01/06 01/9 1/13 1/15     Massage to lateral epicondyle       5'      IASTM to medial elbow and forearm 8min   8' 5' 5' 5'   5'     Nerve glides-radial,median  Median nerve glides   Median/ulnar    5' Median/ulnar5' Med/ulnar 5' Median/radial  15' Med/radial 10' Med/radial   5'                                   Exercise Diary  12/23 12/26 12/30 01/02 01/06 1/9 1/13 1/15     Forearm flexor and extensor stretches reviewed HEP       5' Wrist bar for extensor stretches Wrist bar     Eccentric wrist extensor strengthening Any twist 2x10 2# 3x10  2# 3x10 2# 3x10 2# 3x10        EDC RB RB RB  RB  RB       8oz hammer   3x10 8oz hammer 3x10    deferred       Rotator cuff strengthening    MRE 2x10 ER MRE 2x10 ER        eccentrics       Wrist ext 2#                                                                                                                                                                                                Modalities   01/     E-stim 5 5 Done   done done 10 5' 5'     MH to dorsal forearm      10' 10' 10'

## 2020-01-20 ENCOUNTER — OFFICE VISIT (OUTPATIENT)
Dept: OCCUPATIONAL THERAPY | Age: 70
End: 2020-01-20
Payer: COMMERCIAL

## 2020-01-20 DIAGNOSIS — G56.31 RADIAL NEURITIS, RIGHT: Primary | ICD-10-CM

## 2020-01-20 DIAGNOSIS — M77.11 RIGHT LATERAL EPICONDYLITIS: ICD-10-CM

## 2020-01-20 PROCEDURE — 97140 MANUAL THERAPY 1/> REGIONS: CPT

## 2020-01-20 PROCEDURE — 97110 THERAPEUTIC EXERCISES: CPT

## 2020-01-20 NOTE — PROGRESS NOTES
Re-assessment    Today's date: 2020  Patient name: Mellisa Cleary  : 1950  MRN: [id number]  Referring provider: Radha Garcia MD  Dx:   Encounter Diagnosis     ICD-10-CM    1  Radial neuritis, right G56 31    2  Right lateral epicondylitis M77 11                 Subjective:  "It hurts"     VPR  6/10      Objective: See treatment diary below  Patient attending therapy 2x/wk  Initial evaluation on on 2019  Therapy has been addressing lateral epicondylosis and radial neuritis  Mild tenderness lateral epicondyle  Moderate tenderness radial tunnel  Pain with resisted wrist extension  :  R/L  60/50 psi   with right arm in pronation and extended 40lbs  With no increased pain  Patient has pain with lifting pots at home during cooking  Also  Has pain with vacuuming   (+) median nerve tension  Goals:  STGs  2wks 1) resolve tenderness right lateral epicondyle 2) decrease tenderness radial tunnel by 25%  LTGs  4-6wks  1) decrease median nerve tension 2) decrease tenderness radial tunnel  3) Decrease VPR to <3/10 4) improve tolerance to perform ADL's  Assessment: Tolerated treatment well  Plan: Progress treatment as tolerated  Applying ionto to radial tunnel     Precautions: universal        Manual  12/23 12/26 12/30 01/02 01/06 01/9 1/13 1/15 1/20    Massage to lateral epicondyle       5'      IASTM to medial elbow and forearm 8min   8' 5' 5' 5'   5' 5'    Nerve glides-radial,median  Median nerve glides   Median/ulnar    5' Median/ulnar5' Med/ulnar 5' Median/radial  15' Med/radial 10' Med/radial   5' Med/radial/ulnar nerve glides  10'                                  Exercise Diary  12/23 12/26 12/30 01/02 01/06 1/9 1/13 1/15 1/20    Forearm flexor and extensor stretches reviewed HEP       5' Wrist bar for extensor stretches Wrist bar Wrist bar    Eccentric wrist extensor strengthening Any twist 2x10 2# 3x10  2# 3x10 2# 3x10 2# 3x10 EDC RB RB RB  RB  RB RB RB     8oz hammer   3x10 8oz hammer 3x10    deferred       Rotator cuff strengthening    MRE 2x10 ER MRE 2x10 ER        eccentrics       Wrist ext 2#                                                                                                                                                                                                Modalities  12/23 12/31 01/2 1/06 1/9 1/9 1/13 1/16 1/20    E-stim 5 5 Done   done done 10 5' 5'  5'        MH to dorsal forearm      10' 10' 10' 15

## 2020-01-21 ENCOUNTER — EVALUATION (OUTPATIENT)
Dept: PHYSICAL THERAPY | Age: 70
End: 2020-01-21
Payer: COMMERCIAL

## 2020-01-21 DIAGNOSIS — G89.29 CHRONIC RIGHT SHOULDER PAIN: Primary | ICD-10-CM

## 2020-01-21 DIAGNOSIS — M25.511 CHRONIC RIGHT SHOULDER PAIN: Primary | ICD-10-CM

## 2020-01-21 PROCEDURE — 97110 THERAPEUTIC EXERCISES: CPT | Performed by: PHYSICAL THERAPIST

## 2020-01-21 PROCEDURE — 97162 PT EVAL MOD COMPLEX 30 MIN: CPT | Performed by: PHYSICAL THERAPIST

## 2020-01-21 NOTE — TELEPHONE ENCOUNTER
Pt reports 75% improvement post inj   Pain level 2-3/10 (She said her pain is worse in the morning)   She said she feels pain in her muscle in her right buttock   She said she gets pins and needles from her knee to her foot

## 2020-01-21 NOTE — PROGRESS NOTES
PT Evaluation     Today's date: 2020  Patient name: Veronica Diaz  : 1950  MRN: [id number]  Referring provider: Leopoldo Stall  Dx:   Encounter Diagnosis     ICD-10-CM    1  Chronic right shoulder pain M25 511     G89 29                   Assessment  Assessment details: Patient presents with R shoulder tendonitis/bursitis secondary to postural dysfunction and decreased shld ROM  Patient is an excellent candidate for PT intervention  Impairments: abnormal or restricted ROM, impaired physical strength, lacks appropriate home exercise program and pain with function  Understanding of Dx/Px/POC: excellent   Prognosis: good    Goals  Short Term goals - 4 weeks  1  Patient will be independent HEP  2   Patient will report a 50% decrease in pain complaints  3   Increase strength 1/2 grade  4   Increase ROM 5-10 degrees  Long Term goals - 8 weeks  1  Patient will report elimination of pain complaints  2   Patient will return to all work related activities without restriction  3   Patient will return to all recreational activities without restriction  4   ROM WFL  5   Strength 5/5  Plan  Planned therapy interventions: manual therapy, strengthening, stretching and therapeutic exercise  Frequency: 2x week  Duration in weeks: 4        Subjective Evaluation    History of Present Illness  Mechanism of injury: Patient currently under the care of Hand therapy here at [facility] for Radial Neuritis  Patient also with more proximal sx's - lateral deltoid region  Sx's aggravated by lifting, cooking, and sleeping  Patient is R hand dominant  Neg radiating sx's  Neg Pmhx  Supination movement affects sx's distal to elbow    Quality of life: good    Pain  Current pain ratin  At best pain ratin  At worst pain ratin  Quality: sharp, tight and pulling  Progression: worsening    Patient Goals  Patient goals for therapy: decreased pain, increased strength, independence with ADLs/IADLs and increased motion          Objective     Active Range of Motion   Cervical/Thoracic Spine       Cervical    Flexion:  WFL  Extension:  Restriction level: minimal  Left lateral flexion:  Restriction level: moderate  Right lateral flexion:  Restriction level moderate  Left rotation:  Restriction level: minimal  Right rotation:  Restriction level: minimal    Thoracic    Extension:  Restriction level: maximal    Passive Range of Motion   Left Shoulder   External rotation 90°: 65 degrees   Internal rotation 90°: 75 degrees     Right Shoulder   Flexion: WFL  External rotation 90°: 70 degrees   Internal rotation 90°: 50 degrees     Joint Play     Hypomobile: T1, T2, T3, T4, T5, T6, T7, T8 and T9     Strength/Myotome Testing     Right Shoulder     Planes of Motion   Flexion: 4+   Abduction: 4+   External rotation at 0°: 5   Internal rotation at 0°: 5     Isolated Muscles   Lower trapezius: 3+   Middle trapezius: 3+     Tests   Cervical   Negative vertical compression, lumbar distraction, Lhermitte's sign, alar ligament test, Sharp-Meet test, transverse ligament test and VBI  Right   Negative Spurling's Test A and Spurling's Test B  Right Shoulder   Negative ULTT1, ULTT2, ULTT3 and ULTT4  Lumbar   Negative vertical compression                Precautions: None      Manual                           PROM to R shoulder             Jt mobs to R shoulder             Jt mobs to Thoracic spine             Cervical SB stretch to L only                 Exercise Diary                                        Corner stretch             Prone T             Lifefitness rows             Lifefitness shld ext                                                                                                                                                                                                       Modalities

## 2020-01-22 ENCOUNTER — OFFICE VISIT (OUTPATIENT)
Dept: OBGYN CLINIC | Facility: CLINIC | Age: 70
End: 2020-01-22
Payer: COMMERCIAL

## 2020-01-22 VITALS
SYSTOLIC BLOOD PRESSURE: 116 MMHG | WEIGHT: 160 LBS | DIASTOLIC BLOOD PRESSURE: 78 MMHG | HEART RATE: 83 BPM | HEIGHT: 67 IN | BODY MASS INDEX: 25.11 KG/M2

## 2020-01-22 DIAGNOSIS — G56.31 RADIAL TUNNEL SYNDROME, RIGHT: Primary | ICD-10-CM

## 2020-01-22 DIAGNOSIS — G56.31 RADIAL NEURITIS, RIGHT: ICD-10-CM

## 2020-01-22 PROCEDURE — 99213 OFFICE O/P EST LOW 20 MIN: CPT | Performed by: SURGERY

## 2020-01-22 NOTE — PROGRESS NOTES
ASSESSMENT/PLAN:      71 y o  female with right radial tunnel syndrome  It was discussed with Juan Francisco Contreras today that her lateral epicondylitis has resolved  With regards to her radial tunnel syndrome, an ultrasound guided radial tunnel CSI may be beneficial to calm down the radial nerve  A MSK procedure was ordered today for a radial tunnel CSI  She will continue OT, a new script was provided  I will see her back in the office in 6 weeks time  The patient verbalized understanding of exam findings and treatment plan  We engaged in the shared decision-making process and treatment options were discussed at length with the patient  Surgical and conservative management discussed today along with risks and benefits  Diagnoses and all orders for this visit:    Radial tunnel syndrome, right  -     Ambulatory referral to PT/OT hand therapy; Future  -     US guided msk procedure; Future    Radial neuritis, right  -     Ambulatory referral to PT/OT hand therapy; Future  -     US guided msk procedure; Future        Follow Up:  Return in about 6 weeks (around 3/4/2020)  To Do Next Visit:  Re-evaluation of current issue    ____________________________________________________________________________________________________________________________________________      CHIEF COMPLAINT:  Chief Complaint   Patient presents with    Right Arm - Follow-up       SUBJECTIVE:  Kinjal Cerda is a 71y o  year old RHD female who presents to the office today for a follow up regarding right elbow pain  Juan Francisco Contreras was seen in the office aprox  6 weeks ago for lateral epicondylitis and radial tunnel syndrome  Juan Francisco Contreras denies any lateral elbow pain today  She notes continued pain over her radial tunnel  She has been attending OT for aprox  3 weeks  I have personally reviewed all the relevant PMH, PSH, SH, FH, Medications and allergies       PAST MEDICAL HISTORY:  Past Medical History:   Diagnosis Date    Breast cancer ([facility] [address] ) 08/21/2008 age 62    Cancer ([facility] [address] )     left     Chronic low back pain     Clostridium difficile colitis     DDD (degenerative disc disease), lumbar     GERD (gastroesophageal reflux disease)     History of radiation therapy 09/2008    for breast cancer    Hypertension     Laryngopharyngeal reflux (LPR)     Lumbar spinal stenosis     Osteoarthritis of spine with radiculopathy, lumbar region     Osteopenia     Piriformis syndrome of left side     Sciatica     Spondylosis of cervical region without myelopathy or radiculopathy        PAST SURGICAL HISTORY:  Past Surgical History:   Procedure Laterality Date    BREAST BIOPSY Left 07/30/2008    malignant    BREAST CYST ASPIRATION Left 1998    BREAST LUMPECTOMY Left 08/21/2008    BREAST SURGERY      COLONOSCOPY  2015    MS ESOPHAGOGASTRODUODENOSCOPY TRANSORAL DIAGNOSTIC N/A 2/15/2017    Procedure: ESOPHAGOGASTRODUODENOSCOPY (EGD); Surgeon: Nicole Srinivasan MD;  [facility]; Service: Gastroenterology    MS ESOPHAGOGASTRODUODENOSCOPY TRANSORAL DIAGNOSTIC N/A 2/1/2018    Procedure: ESOPHAGOGASTRODUODENOSCOPY (EGD); Surgeon: Nicole Srinivasan MD;  [facility];   Service: Gastroenterology       FAMILY HISTORY:  Family History   Problem Relation Age of Onset    Osteoporosis Mother     Coronary artery disease Father     Diabetes Father    [employer] COPD Father     No Known Problems Maternal Aunt     Breast cancer Paternal Aunt 68    No Known Problems Paternal Aunt     No Known Problems Maternal Aunt        SOCIAL HISTORY:  Social History     Tobacco Use    Smoking status: Former Smoker    Smokeless tobacco: Never Used   Substance Use Topics    Alcohol use: Yes     Frequency: 2-3 times a week     Drinks per session: 1 or 2     Comment: social    Drug use: No       MEDICATIONS:    Current Outpatient Medications:     azelastine (ASTELIN) 0 1 % nasal spray, 1 spray into each nostril 2 (two) times a day Use in each nostril as directed, Disp: 1 Bottle, Rfl: 6    Cholecalciferol (VITAMIN D-3 PO), Take 1,000 mg by mouth daily  , Disp: , Rfl:     cyclobenzaprine (FLEXERIL) 10 mg tablet, Take 10 mg by mouth once as needed for muscle spasms (takes one per week on average), Disp: , Rfl:     dexamethasone (DECADRON) 120 mg/30 mL SOLN, 0 25 ML (1 MG TOTAL) BY IONTOPHORESIS ROUTE EVERY 6 (SIX) HOURS, Disp: , Rfl: 4    dexamethasone (DECADRON) 4 mg/mL, 0 25 mL (1 mg total) by Iontophoresis route every 6 (six) hours, Disp: 30 mL, Rfl: 4    famotidine (PEPCID) 20 mg tablet, Take 20 mg by mouth daily, Disp: , Rfl:     lisinopril (ZESTRIL) 10 mg tablet, Take 10 mg by mouth daily, Disp: , Rfl:     montelukast (SINGULAIR) 10 mg tablet, TAKE 1 TABLET BY MOUTH EVERY DAY, Disp: 90 tablet, Rfl: 3    Multiple Vitamins-Minerals (CENTRUM ADULTS PO), Take 1 tablet by mouth daily, Disp: , Rfl:     Probiotic Product (PROBIOTIC PO), Take 1 tablet by mouth, Disp: , Rfl:     ALLERGIES:  Allergies   Allergen Reactions    Codeine     Morphine And Related      Note:  this allergy is not being included in drug screening  Please inactivate this item and re-enter it to enable screening   Morphine Sulfate [Morphine]     Naprosyn [Naproxen]     Oxycodone     Penicillins     Sulfa Antibiotics        REVIEW OF SYSTEMS:  Review of Systems   Constitutional: Negative for chills, fever and unexpected weight change  HENT: Negative for hearing loss, nosebleeds and sore throat  Eyes: Negative for pain, redness and visual disturbance  Respiratory: Negative for cough, shortness of breath and wheezing  Cardiovascular: Negative for chest pain, palpitations and leg swelling  Gastrointestinal: Negative for abdominal pain, nausea and vomiting  Endocrine: Negative for polydipsia and polyuria  Genitourinary: Negative for difficulty urinating and hematuria  Musculoskeletal: Negative for arthralgias, joint swelling and myalgias  Skin: Negative for rash and wound     Neurological: Negative for dizziness, numbness and headaches  Psychiatric/Behavioral: Negative for decreased concentration, dysphoric mood and suicidal ideas  The patient is not nervous/anxious  VITALS:  Vitals:    01/22/20 1404   BP: 116/78   Pulse: 83       LABS:  HgA1c:   Lab Results   Component Value Date    HGBA1C 6 0 10/10/2019     BMP:   Lab Results   Component Value Date    GLUCOSE 109 10/22/2015    CALCIUM 10 0 10/10/2019     10/22/2015    K 4 7 10/10/2019    CO2 28 10/10/2019     10/10/2019    BUN 16 10/10/2019    CREATININE 0 72 10/10/2019       _____________________________________________________  PHYSICAL EXAMINATION:  General: well developed and well nourished, alert, oriented times 3 and appears comfortable  Psychiatric: Normal  HEENT: Normocephalic, Atraumatic Trachea Midline, No torticollis  Pulmonary: No audible wheezing or respiratory distress   Cardiovascular: No pitting edema, 2+ radial pulse   Skin: No masses, erythema, lacerations, fluctation, ulcerations  Neurovascular: Sensation Intact to the Median, Ulnar, Radial Nerve, Motor Intact to the Median, Ulnar, Radial Nerve and Pulses Intact  Musculoskeletal: Normal, except as noted in detailed exam and in HPI  MUSCULOSKELETAL EXAMINATION:    Right Elbow:    No swelling or deformity  Full range of motion with flexion, extension, supination and pronation  Nontender to palpation to lateral epicondyle, tender to palpation lateral epicondyle  No pain with passive flexion of the wrist with elbow fully extended  No pain with resisted wrist extension with elbow fully extended  Mod pain with resisted long finger extension with the elbow fully extended  Negative tinels over the ulnar nerve at the elbow      ___________________________________________________  STUDIES REVIEWED:  No new imaging to review           PROCEDURES PERFORMED:  Procedures  No Procedures performed today    _____________________________________________________      Marivel Contreras    I,:   Mika Solis am acting as a scribe while in the presence of the attending physician :        I,:   Berto Griffin MD personally performed the services described in this documentation    as scribed in my presence :

## 2020-01-23 ENCOUNTER — OFFICE VISIT (OUTPATIENT)
Dept: PHYSICAL THERAPY | Age: 70
End: 2020-01-23
Payer: COMMERCIAL

## 2020-01-23 ENCOUNTER — OFFICE VISIT (OUTPATIENT)
Dept: OCCUPATIONAL THERAPY | Age: 70
End: 2020-01-23
Payer: COMMERCIAL

## 2020-01-23 DIAGNOSIS — G89.29 CHRONIC RIGHT SHOULDER PAIN: Primary | ICD-10-CM

## 2020-01-23 DIAGNOSIS — M25.511 CHRONIC RIGHT SHOULDER PAIN: Primary | ICD-10-CM

## 2020-01-23 DIAGNOSIS — G56.31 RADIAL NEURITIS, RIGHT: Primary | ICD-10-CM

## 2020-01-23 PROCEDURE — 97140 MANUAL THERAPY 1/> REGIONS: CPT

## 2020-01-23 PROCEDURE — 97110 THERAPEUTIC EXERCISES: CPT | Performed by: PHYSICAL THERAPIST

## 2020-01-23 PROCEDURE — 97140 MANUAL THERAPY 1/> REGIONS: CPT | Performed by: PHYSICAL THERAPIST

## 2020-01-23 PROCEDURE — 97110 THERAPEUTIC EXERCISES: CPT

## 2020-01-23 NOTE — PROGRESS NOTES
Re-assessment    Today's date: 2020  Patient name: Silva Gilbert  : 1950  MRN: [id number]  Referring provider: Kinsey Lester MD  Dx:   Encounter Diagnosis     ICD-10-CM    1  Radial neuritis, right G56 31                 Subjective:   Pain with carrying groceries  Objective: See treatment diary below  (+) tenderness proximal dorsal forearm  Goals:  STGs  2wks 1) resolve tenderness right lateral epicondyle 2) decrease tenderness radial tunnel by 25%  LTGs  4-6wks  1) decrease median nerve tension 2) decrease tenderness radial tunnel  3) Decrease VPR to <3/10 4) improve tolerance to perform ADL's  Assessment: Tolerated treatment well  Plan: Progress treatment as tolerated  Switched to moist heat to the forearm  Precautions: universal        Manual  12/23 12/26 12/30 01/02 01/06 01/9 1/13 1/15 1/20 1/23   Massage to lateral epicondyle       5'      IASTM to medial elbow and forearm 8min   8' 5' 5' 5'   5' 5' 5'   Nerve glides-radial,median  Median nerve glides   Median/ulnar    5' Median/ulnar5' Med/ulnar 5' Median/radial  15' Med/radial 10' Med/radial   5' Med/radial/ulnar nerve glides  10' Med/radial/ulnar nerve glides 10'                                 Exercise Diary  12/23 12/26 12/30 01/02 01/06 1/9 1/13 1/15 1/20 1/23   Forearm flexor and extensor stretches reviewed HEP       5' Wrist bar for extensor stretches Wrist bar Wrist bar    Eccentric wrist extensor strengthening Any twist 2x10 2# 3x10  2# 3x10 2# 3x10 2# 3x10        EDC RB RB RB  RB  RB RB RB RB    8oz hammer   3x10 8oz hammer 3x10    deferred       Rotator cuff strengthening    MRE 2x10 ER MRE 2x10 ER        Isometrics wrist extension       Wrist ext 2#   Instructed HEP, red power bar 2 x10 hold 15secs Modalities  12/23 12/31 01/2 1/06 1/9 1/9 1/13 1/16 1/20 1/23   E-stim 5 5 Done   done done 10 5' 5'  5'        MH to dorsal forearm      10' 10' 10' 15 10-15min

## 2020-01-23 NOTE — PROGRESS NOTES
Daily Note     Today's date: 2020  Patient name: Mellisa Cleary  : 1950  MRN: [id number]  Referring provider: Thomasene Romberg  Dx:   Encounter Diagnosis     ICD-10-CM    1  Chronic right shoulder pain M25 511     G89 29                   Subjective: No complaints offered      Objective: See treatment diary below      Assessment: Tolerated treatment well  Patient would benefit from continued PT      Plan: Continue per plan of care  Precautions:       Manual              UBE 8 minutes            PROM to R shoulder 15 minutes             Jt mobs to R shoulder Grade III            Jt mobs to Thoracic spine Grade III            Cervical SB stretch to L only 15 minutes                Exercise Diary                                        Corner stretch x5 reps hold 30 sec              Prone T 2x15            Lifefitness rows nt            Lifefitness shld ext nt                                                                                                                                                                                                      Modalities

## 2020-01-24 ENCOUNTER — ANESTHESIA EVENT (OUTPATIENT)
Dept: GASTROENTEROLOGY | Facility: MEDICAL CENTER | Age: 70
End: 2020-01-24

## 2020-01-27 ENCOUNTER — OFFICE VISIT (OUTPATIENT)
Dept: PHYSICAL THERAPY | Age: 70
End: 2020-01-27
Payer: COMMERCIAL

## 2020-01-27 ENCOUNTER — OFFICE VISIT (OUTPATIENT)
Dept: OCCUPATIONAL THERAPY | Age: 70
End: 2020-01-27
Payer: COMMERCIAL

## 2020-01-27 DIAGNOSIS — G56.31 RADIAL NEURITIS, RIGHT: Primary | ICD-10-CM

## 2020-01-27 DIAGNOSIS — M25.511 CHRONIC RIGHT SHOULDER PAIN: Primary | ICD-10-CM

## 2020-01-27 DIAGNOSIS — G89.29 CHRONIC RIGHT SHOULDER PAIN: Primary | ICD-10-CM

## 2020-01-27 PROCEDURE — 97140 MANUAL THERAPY 1/> REGIONS: CPT | Performed by: PHYSICAL THERAPIST

## 2020-01-27 PROCEDURE — 97110 THERAPEUTIC EXERCISES: CPT | Performed by: PHYSICAL THERAPIST

## 2020-01-27 PROCEDURE — 97010 HOT OR COLD PACKS THERAPY: CPT

## 2020-01-27 PROCEDURE — 97110 THERAPEUTIC EXERCISES: CPT

## 2020-01-27 PROCEDURE — 97140 MANUAL THERAPY 1/> REGIONS: CPT

## 2020-01-27 NOTE — PROGRESS NOTES
Daily Note     Today's date: 2020  Patient name: Lisa Youngblood  : 1950  MRN: [id number]  Referring provider: Parvin Lu  Dx:   Encounter Diagnosis     ICD-10-CM    1  Chronic right shoulder pain M25 511     G89 29                   Subjective: Feeling good today - increased sx's 2 days after last treatment  Objective: See treatment diary below      Assessment: Tolerated treatment well  Patient would benefit from continued PT      Plan: Continue per plan of care  Precautions:       Manual             UBE 8 minutes completed           PROM to R shoulder 15 minutes  x15 minutes           Jt mobs to R shoulder Grade III completed           Jt mobs to Thoracic spine Grade III completed           Cervical SB stretch to L only 15 minutes 15 minutes               Exercise Diary                                        Corner stretch x5 reps hold 30 sec   x5           Prone T 2x15 2x15           Lifefitness rows nt            Lifefitness shld ext nt                                                                                                                                                                                                      Modalities

## 2020-01-27 NOTE — PROGRESS NOTES
Re-assessment    Today's date: 2020  Patient name: Paul Nava  : 1950  MRN: [id number]  Referring provider: Chandni Cool MD  Dx:   No diagnosis found  Subjective:   Patient stated she made a big pot of  Soup and her arm felt  Pretty good  Objective: See treatment diary below  Patient to make an appt with Dr Lowell Swenson for U/S guided injection for radial tunnel  Goals:  STGs  2wks 1) resolve tenderness right lateral epicondyle  MET 2) decrease tenderness radial tunnel by 25%  LTGs  4-6wks  1) decrease median nerve tension 2) decrease tenderness radial tunnel  3) Decrease VPR to <3/10 4) improve tolerance to perform ADL's  Assessment: Tolerated treatment well  Plan: Progress treatment as tolerated  Switched to moist heat to the forearm         Precautions: universal        Manual     Massage to lateral epicondyle             IASTM to medial elbow and forearm 5'         5'   Nerve glides-radial,median 10'         Med/radial/ulnar nerve glides 10'                                 Exercise Diary     Forearm flexor and extensor stretches             Eccentric wrist extensor strengthening             EDC RB         RB                Rotator cuff strengthening             Isometrics wrist extension HEP         Instructed HEP, red power bar 2 x10 hold 15secs   Wrist flexion 1# 3x10                                                                                                                                                                                         Modalities     E-stim 5 5 Done   done done 10 5' 5'  5'        MH to dorsal forearm      10' 10' 10' 15 10-15min

## 2020-01-28 ENCOUNTER — ANESTHESIA (OUTPATIENT)
Dept: GASTROENTEROLOGY | Facility: MEDICAL CENTER | Age: 70
End: 2020-01-28

## 2020-01-28 ENCOUNTER — HOSPITAL ENCOUNTER (OUTPATIENT)
Dept: GASTROENTEROLOGY | Facility: MEDICAL CENTER | Age: 70
Setting detail: OUTPATIENT SURGERY
Discharge: HOME | End: 2020-01-28
Attending: INTERNAL MEDICINE
Payer: COMMERCIAL

## 2020-01-28 VITALS
DIASTOLIC BLOOD PRESSURE: 93 MMHG | OXYGEN SATURATION: 99 % | HEART RATE: 84 BPM | HEIGHT: 67 IN | SYSTOLIC BLOOD PRESSURE: 142 MMHG | TEMPERATURE: 97.9 F | BODY MASS INDEX: 24.33 KG/M2 | RESPIRATION RATE: 16 BRPM | WEIGHT: 155 LBS

## 2020-01-28 DIAGNOSIS — R10.13 DYSPEPSIA: ICD-10-CM

## 2020-01-28 DIAGNOSIS — K21.9 GASTROESOPHAGEAL REFLUX DISEASE WITHOUT ESOPHAGITIS: ICD-10-CM

## 2020-01-28 PROCEDURE — 88305 TISSUE EXAM BY PATHOLOGIST: CPT | Performed by: PATHOLOGY

## 2020-01-28 PROCEDURE — 43239 EGD BIOPSY SINGLE/MULTIPLE: CPT | Performed by: INTERNAL MEDICINE

## 2020-01-28 RX ORDER — PROPOFOL 10 MG/ML
INJECTION, EMULSION INTRAVENOUS AS NEEDED
Status: DISCONTINUED | OUTPATIENT
Start: 2020-01-28 | End: 2020-01-28 | Stop reason: SURG

## 2020-01-28 RX ORDER — SODIUM CHLORIDE 9 MG/ML
125 INJECTION, SOLUTION INTRAVENOUS CONTINUOUS
Status: DISCONTINUED | OUTPATIENT
Start: 2020-01-28 | End: 2020-02-01 | Stop reason: HOSPADM

## 2020-01-28 RX ADMIN — PROPOFOL 20 MG: 10 INJECTION, EMULSION INTRAVENOUS at 13:58

## 2020-01-28 RX ADMIN — PROPOFOL 100 MG: 10 INJECTION, EMULSION INTRAVENOUS at 13:51

## 2020-01-28 RX ADMIN — LIDOCAINE HYDROCHLORIDE 40 MG: 20 INJECTION, SOLUTION INTRAVENOUS at 13:51

## 2020-01-28 RX ADMIN — SODIUM CHLORIDE 125 ML/HR: 0.9 INJECTION, SOLUTION INTRAVENOUS at 12:40

## 2020-01-28 RX ADMIN — PROPOFOL 50 MG: 10 INJECTION, EMULSION INTRAVENOUS at 13:52

## 2020-01-28 RX ADMIN — PROPOFOL 30 MG: 10 INJECTION, EMULSION INTRAVENOUS at 13:55

## 2020-01-28 NOTE — ANESTHESIA PREPROCEDURE EVALUATION
Review of Systems/Medical History  Patient summary reviewed  Chart reviewed  No history of anesthetic complications     Cardiovascular  EKG reviewed, Hypertension ,   Comment: Echo results reviewed,  Pulmonary  Smoker ex-smoker  , Shortness of breath,        GI/Hepatic    GERD ,             Endo/Other     GYN    Breast cancer        Hematology   Musculoskeletal  Back pain , lumbar pain, Sciatica,   Arthritis     Neurology   Psychology   Negative psychology ROS              Physical Exam    Airway    Mallampati score: II  TM Distance: >3 FB  Neck ROM: full     Dental   upper dentures,     Cardiovascular  Rhythm: regular, Rate: normal, Cardiovascular exam normal    Pulmonary  Pulmonary exam normal Breath sounds clear to auscultation,     Other Findings        Anesthesia Plan  ASA Score- 3     Anesthesia Type- IV sedation with anesthesia with ASA Monitors  Additional Monitors:   Airway Plan:         Plan Factors-    Induction- intravenous  Postoperative Plan-     Informed Consent- Anesthetic plan and risks discussed with patient

## 2020-01-28 NOTE — H&P
History and Physical - [facility]  Silva Gilbert 71 y o  female MRN: [id number]    HPI: Silva Gilbert is a 71y o  year old female who presents with GERD and intestinal metaplasia  Review of Systems    Historical Information   Past Medical History:   Diagnosis Date    Breast cancer ([facility] [address] ) 08/21/2008    age 62    Cancer ([facility] [address] )     left     Chronic low back pain     Clostridium difficile colitis     DDD (degenerative disc disease), lumbar     GERD (gastroesophageal reflux disease)     History of radiation therapy 09/2008    for breast cancer    Hypertension     Laryngopharyngeal reflux (LPR)     Lumbar spinal stenosis     Osteoarthritis of spine with radiculopathy, lumbar region     Osteopenia     Piriformis syndrome of left side     Sciatica     Spondylosis of cervical region without myelopathy or radiculopathy      Past Surgical History:   Procedure Laterality Date    BREAST BIOPSY Left 07/30/2008    malignant    BREAST CYST ASPIRATION Left 1998    BREAST LUMPECTOMY Left 08/21/2008    BREAST SURGERY      COLONOSCOPY  2015    CO ESOPHAGOGASTRODUODENOSCOPY TRANSORAL DIAGNOSTIC N/A 2/15/2017    Procedure: ESOPHAGOGASTRODUODENOSCOPY (EGD); Surgeon: Jovanna Cameron MD;  [facility]; Service: Gastroenterology    CO ESOPHAGOGASTRODUODENOSCOPY TRANSORAL DIAGNOSTIC N/A 2/1/2018    Procedure: ESOPHAGOGASTRODUODENOSCOPY (EGD); Surgeon: Jovanna Cameron MD;  [facility];   Service: Gastroenterology     Social History   Social History     Substance and Sexual Activity   Alcohol Use Yes    Frequency: 2-3 times a week    Drinks per session: 1 or 2    Comment: social     Social History     Substance and Sexual Activity   Drug Use No     Social History     Tobacco Use   Smoking Status Former Smoker   Smokeless Tobacco Never Used     Family History   Problem Relation Age of Onset    Osteoporosis Mother     Coronary artery disease Father     Diabetes Father  COPD Father     No Known Problems Maternal Aunt     Breast cancer Paternal Aunt 68    No Known Problems Paternal Aunt     No Known Problems Maternal Aunt        Meds/Allergies       (Not in a hospital admission)    Allergies   Allergen Reactions    Codeine     Morphine And Related      Note:  this allergy is not being included in drug screening  Please inactivate this item and re-enter it to enable screening   Morphine Sulfate [Morphine]     Naprosyn [Naproxen]     Oxycodone     Penicillins     Sulfa Antibiotics        Objective     BP (!) 180/98   Pulse 76   Temp 97 9 °F (36 6 °C) (Temporal)   Resp 15   Ht 5' 7" (1 702 m)   Wt 70 3 kg (155 lb)   LMP  (LMP Unknown)   SpO2 97%   BMI 24 28 kg/m²       PHYSICAL EXAM    Gen: NAD  CV: RRR  CHEST: Clear  ABD: soft, NT/ND  EXT: no edema  Neuro: AAO      ASSESSMENT/PLAN:  This is a 71y o  year old female here for EGD for GERD and IM  PLAN:   Procedure: EGD

## 2020-01-28 NOTE — DISCHARGE INSTRUCTIONS
Upper Endoscopy   WHAT YOU NEED TO KNOW:   What do I need to know about an upper endoscopy? An upper endoscopy is also called an upper gastrointestinal (GI) endoscopy, or an esophagogastroduodenoscopy (EGD)  A scope (thin, flexible tube with a light and camera) is used to examine the walls of your upper intestines  The upper intestines include the esophagus, stomach, and duodenum (first part of the small intestine)  An upper endoscopy is used to look for problems, such as bleeding, polyps, ulcers, or infection  How do I prepare for an upper endoscopy? Your healthcare provider will talk to you about how to prepare for your procedure  You may need to not eat or drink anything except water for 6 to 12 hours before the procedure  He will tell you what medicines to take or not take on the day of your procedure  Arrange to have someone drive you home  What will happen during an upper endoscopy? · You will be given medicine through your IV to help you relax and make you drowsy  You will also be given medicine to numb your throat  You may need to wear a plastic mouthpiece to help hold your mouth open and protect your teeth and tongue  Your healthcare provider will gently insert the endoscope through your mouth and down into your throat  You may be asked to swallow once to help move the scope into your upper intestines  You may feel pressure in your throat but you should not feel pain  The endoscope does not restrict your breathing  · Your healthcare provider will watch the scope on a monitor  He will take pictures with the scope  He may gently inject air so he can see your digestive tract clearly  Your healthcare provider may take tissue samples and send them to the lab for tests  He may remove foreign objects, tumors, or polyps that may be blocking your upper intestines  Your healthcare provider may also insert tools with the scope to treat bleeding or place a stent (tube)   When the procedure is finished, the endoscope will be slowly removed  What will happen after an upper endoscopy? You may feel bloated, gassy, or have some abdominal discomfort  Your throat may be sore for 24 to 36 hours after the procedure  You may burp or pass gas from air that is still inside your body after your procedure  You may need to take short walks to help move the gas out  Eat small meals, if you feel bloated  Do not drive or make important decisions until the day after your procedure  What are the risks of an upper endoscopy? Your esophagus, stomach, or duodenum may be punctured or torn during the procedure  This is because of increased pressure as the scope and air are passing through  You may bleed more than expected or get an infection  You may have a slow or irregular heartbeat, or low blood pressure  This can cause sweating and fainting  Fluid may enter your lungs and you may have trouble breathing  These problems can be life-threatening  CARE AGREEMENT:   You have the right to help plan your care  Learn about your health condition and how it may be treated  Discuss treatment options with your caregivers to decide what care you want to receive  You always have the right to refuse treatment  The above information is an  only  It is not intended as medical advice for individual conditions or treatments  Talk to your doctor, nurse or pharmacist before following any medical regimen to see if it is safe and effective for you  © 2017 [employer] Information is for End User's use only and may not be sold, redistributed or otherwise used for commercial purposes  All illustrations and images included in CareNotes® are the copyrighted property of [employer]  or [employer]

## 2020-01-30 ENCOUNTER — PROCEDURE VISIT (OUTPATIENT)
Dept: PAIN MEDICINE | Facility: CLINIC | Age: 70
End: 2020-01-30
Payer: COMMERCIAL

## 2020-01-30 ENCOUNTER — APPOINTMENT (OUTPATIENT)
Dept: OCCUPATIONAL THERAPY | Age: 70
End: 2020-01-30
Payer: COMMERCIAL

## 2020-01-30 VITALS
WEIGHT: 159 LBS | HEIGHT: 67 IN | TEMPERATURE: 98.6 F | HEART RATE: 80 BPM | SYSTOLIC BLOOD PRESSURE: 142 MMHG | DIASTOLIC BLOOD PRESSURE: 96 MMHG | BODY MASS INDEX: 24.96 KG/M2

## 2020-01-30 DIAGNOSIS — M70.61 TROCHANTERIC BURSITIS OF RIGHT HIP: Primary | ICD-10-CM

## 2020-01-30 PROCEDURE — 20611 DRAIN/INJ JOINT/BURSA W/US: CPT | Performed by: ANESTHESIOLOGY

## 2020-01-30 RX ORDER — METHYLPREDNISOLONE ACETATE 40 MG/ML
40 INJECTION, SUSPENSION INTRA-ARTICULAR; INTRALESIONAL; INTRAMUSCULAR; SOFT TISSUE ONCE
Status: COMPLETED | OUTPATIENT
Start: 2020-01-30 | End: 2020-01-30

## 2020-01-30 RX ORDER — BUPIVACAINE HYDROCHLORIDE 2.5 MG/ML
10 INJECTION, SOLUTION EPIDURAL; INFILTRATION; INTRACAUDAL ONCE
Status: COMPLETED | OUTPATIENT
Start: 2020-01-30 | End: 2020-01-30

## 2020-01-30 RX ADMIN — METHYLPREDNISOLONE ACETATE 40 MG: 40 INJECTION, SUSPENSION INTRA-ARTICULAR; INTRALESIONAL; INTRAMUSCULAR; SOFT TISSUE at 12:39

## 2020-01-30 RX ADMIN — BUPIVACAINE HYDROCHLORIDE 10 ML: 2.5 INJECTION, SOLUTION EPIDURAL; INFILTRATION; INTRACAUDAL at 12:40

## 2020-01-30 NOTE — PROGRESS NOTES
Large joint arthrocentesis: R greater trochanteric bursa  Date/Time: 1/30/2020 11:25 AM  Consent given by: patient  Site marked: site marked  Timeout: Immediately prior to procedure a time out was called to verify the correct patient, procedure, equipment, support staff and site/side marked as required   Supporting Documentation  Indications: pain   Procedure Details  Location: hip - R greater trochanteric bursa  Preparation: Patient was prepped and draped in the usual sterile fashion  Needle size: 25 G  Ultrasound guidance: yes  Medication group details: 4 cc of 0 25% bupivacaine and 40 mg of Depo-Medrol  Indication:  Right lateral hippain  Preoperative diagnosis: Greater trochanteric bursitis  Postoperative diagnosis: Greater trochanteric bursitis  Procedure: Ultrasound guided right hip greater trochanteric bursa injection    After discussing the risks, benefits, and alternatives to the procedure, the patient expressed understanding and wished to proceed  The patient was brought to the procedure suite and placed in the side-lying position  A procedural pause was conducted to verify: Correct patient identity, procedure to be performed and as applicable, correct side and site, correct patient position, and availability of implants, special equipment or special requirements  A simple surgical tray was used  Prior to the procedure, the right hip peritrochanteric bursal region was examined with a 12 MHz curvilinear transducer to visualize the right hip peritrochanteric bursal region and determine the optimal needle  Following this, the groin and lateral hip was prepared with a ChloraPrep scrub, then reexamined using the same transducer, sterile ultrasound transducer cover, and sterile ultrasound transducer gel  Thereafter, using ultrasound guidance, a 2 5 inch 25-gauge spinal needle was advanced into the right hip greater trochanteric bursal region   After visualization of the tip in the target area and negative aspiration of blood and other bodily fluids, a mixture of 40 mg of Depo-Medrol in 3 mL of 0 25% bupivacaine was injected into the right hip greater trochanteric bursal region  The patient tolerated the procedure well and there were no apparent complications  After an appropriate amount of observation, the patient was dismissed from the recovery area under their own power  The patient received a total steroid dose of 40 mg of Depo-Medrol

## 2020-01-31 ENCOUNTER — OFFICE VISIT (OUTPATIENT)
Dept: PHYSICAL THERAPY | Age: 70
End: 2020-01-31
Payer: COMMERCIAL

## 2020-01-31 ENCOUNTER — OFFICE VISIT (OUTPATIENT)
Dept: OCCUPATIONAL THERAPY | Age: 70
End: 2020-01-31
Payer: COMMERCIAL

## 2020-01-31 DIAGNOSIS — M25.511 CHRONIC RIGHT SHOULDER PAIN: Primary | ICD-10-CM

## 2020-01-31 DIAGNOSIS — G89.29 CHRONIC RIGHT SHOULDER PAIN: Primary | ICD-10-CM

## 2020-01-31 DIAGNOSIS — G56.31 RADIAL NEURITIS, RIGHT: Primary | ICD-10-CM

## 2020-01-31 PROCEDURE — 97140 MANUAL THERAPY 1/> REGIONS: CPT

## 2020-01-31 PROCEDURE — 97110 THERAPEUTIC EXERCISES: CPT | Performed by: PHYSICAL THERAPIST

## 2020-01-31 PROCEDURE — 97010 HOT OR COLD PACKS THERAPY: CPT

## 2020-01-31 PROCEDURE — 97140 MANUAL THERAPY 1/> REGIONS: CPT | Performed by: PHYSICAL THERAPIST

## 2020-01-31 NOTE — PROGRESS NOTES
Daily Note    Today's date: 2020  Patient name: Jennifer Christian  : 1950  MRN: [id number]  Referring provider: Yamilet Merida MD  Dx:   Encounter Diagnosis     ICD-10-CM    1  Radial neuritis, right G56 31                 Subjective:   Reports arm feels good for the past 2 days  Objective: See treatment diary below  Minimal proximal forearm tenderness with deep palpation  Tight striation in extensor muscle that reproduces pain  Less neural tension today  Assessment: Tolerated treatment well  Plan: Progress treatment as tolerated  Myofascial release techniques proximal forearm       Precautions: universal        Manual     Massage to lateral epicondyle             IASTM to medial elbow and forearm 5' 10'        5'   Nerve glides-radial,median 10' 10'        Med/radial/ulnar nerve glides 10'                                 Exercise Diary     Forearm flexor and extensor stretches             Eccentric wrist extensor strengthening             EDC RB         RB                Rotator cuff strengthening             Isometrics wrist extension HEP         Instructed HEP, red power bar 2 x10 hold 15secs   Wrist flexion 1# 3x10                                                                                                                                                                                         Modalities     E-stim             MH to dorsal forearm 10           10-15min

## 2020-01-31 NOTE — PROGRESS NOTES
Daily Note     Today's date: 2020  Patient name: Sonya Messina  : 1950  MRN: [id number]  Referring provider: Dariel Schreiber  Dx:   Encounter Diagnosis     ICD-10-CM    1  Chronic right shoulder pain M25 511     G89 29                   Subjective: Arm has felt good over the past 2 days  Objective: See treatment diary below    Manual            UBE 8 minutes completed 8 minutes          PROM to R shoulder 15 minutes  x15 minutes completed          Jt mobs to R shoulder Grade III completed completed          Jt mobs to Thoracic spine Grade III completed completed          Cervical SB stretch to L only 15 minutes 15 minutes completed              Exercise Diary                                        Corner stretch x5 reps hold 30 sec  x5 nt          Prone T 2x15 2x15 2x15          Lifefitness rows nt            Lifefitness shld ext nt                                                                                                                                                                                                      Modalities                                                            Assessment: Tolerated treatment well  Patient would benefit from continued PT      Plan: Continue per plan of care        Precautions: universal
Fracture of toe of right foot

## 2020-02-03 ENCOUNTER — OFFICE VISIT (OUTPATIENT)
Dept: OCCUPATIONAL THERAPY | Age: 70
End: 2020-02-03
Payer: COMMERCIAL

## 2020-02-03 ENCOUNTER — OFFICE VISIT (OUTPATIENT)
Dept: PHYSICAL THERAPY | Age: 70
End: 2020-02-03
Payer: COMMERCIAL

## 2020-02-03 DIAGNOSIS — G56.31 RADIAL NEURITIS, RIGHT: Primary | ICD-10-CM

## 2020-02-03 DIAGNOSIS — M77.11 RIGHT LATERAL EPICONDYLITIS: ICD-10-CM

## 2020-02-03 DIAGNOSIS — G89.29 CHRONIC RIGHT SHOULDER PAIN: Primary | ICD-10-CM

## 2020-02-03 DIAGNOSIS — M25.511 CHRONIC RIGHT SHOULDER PAIN: Primary | ICD-10-CM

## 2020-02-03 PROCEDURE — 97140 MANUAL THERAPY 1/> REGIONS: CPT

## 2020-02-03 PROCEDURE — 97110 THERAPEUTIC EXERCISES: CPT

## 2020-02-03 PROCEDURE — 97110 THERAPEUTIC EXERCISES: CPT | Performed by: PHYSICAL THERAPIST

## 2020-02-03 NOTE — PROGRESS NOTES
Daily Note     Today's date: 2/3/2020  Patient name: Silke Brown  : 1950  MRN: [id number]  Referring provider: Keely Triplett  Dx:   Encounter Diagnosis     ICD-10-CM    1  Chronic right shoulder pain M25 511     G89 29                   Subjective: No R UE sx's noted  Some R sided cervical sx's - domingo in am       Objective: See treatment diary below    Manual  1/23 1/27 1/31 2/3         UBE 8 minutes completed 8 minutes 8 minutes         PROM to R shoulder 15 minutes  x15 minutes completed 15 minutes         Jt mobs to R shoulder Grade III completed completed Grade III         Jt mobs to Thoracic spine Grade III completed completed Grade III         Cervical SB stretch to L only 15 minutes 15 minutes completed completed             Exercise Diary                                        Corner stretch x5 reps hold 30 sec  x5 nt x5 hold 30 sec         Prone T 2x15 2x15 2x15 2x15         Lifefitness rows nt   nt         Lifefitness shld ext nt   nt                                                                                                                                                                                                   Modalities                                                                Assessment: Tolerated treatment well  Patient exhibited good technique with therapeutic exercises and would benefit from continued PT      Plan: Continue per plan of care        Precautions: universal

## 2020-02-03 NOTE — PROGRESS NOTES
Daily Note     Today's date: 2/3/2020  Patient name: Jennifer Christian  : 1950  MRN: [id number]  Referring provider: Yamilet Merida MD  Dx:   Encounter Diagnosis     ICD-10-CM    1  Radial neuritis, right G56 31    2  Right lateral epicondylitis M77 11                   Subjective: "My forearm feels a little sore this morning, I think because I was struggling with changing my bed sheets "  Pt denied pain during treatment  Objective: See treatment diary below      Assessment: Tolerated treatment well  Patient would benefit from continued OT      Plan: Continue per plan of care        Precautions: universal       Manual  1/27 1/31  2/3             1/23   Massage to lateral epicondyle                       IASTM to medial elbow and forearm 5' 10'  10'             5'   Nerve glides-radial,median 10' 10'  10'             Med/radial/ulnar nerve glides 10'                                                         Exercise Diary  1/27  2/3               1/23   Forearm flexor and extensor stretches                       Eccentric wrist extensor strengthening                       EDC RB  RB               RB                           Rotator cuff strengthening                       Isometrics wrist extension HEP                 Instructed HEP, red power bar 2 x10 hold 15secs   Wrist flexion 1# 3x10                                                                                                                                                                                                                                                                                                                                                   Modalities  1/31  2/3               1/23   E-stim                       MH to dorsal forearm 10     10' b4 tx               10-15min

## 2020-02-06 ENCOUNTER — TELEPHONE (OUTPATIENT)
Dept: PAIN MEDICINE | Facility: CLINIC | Age: 70
End: 2020-02-06

## 2020-02-06 ENCOUNTER — HOSPITAL ENCOUNTER (OUTPATIENT)
Dept: RADIOLOGY | Facility: HOSPITAL | Age: 70
Discharge: HOME | End: 2020-02-06
Attending: SURGERY
Payer: COMMERCIAL

## 2020-02-06 ENCOUNTER — TRANSCRIBE ORDERS (OUTPATIENT)
Dept: RADIOLOGY | Facility: HOSPITAL | Age: 70
End: 2020-02-06

## 2020-02-06 DIAGNOSIS — G56.31 RADIAL TUNNEL SYNDROME, RIGHT: ICD-10-CM

## 2020-02-06 DIAGNOSIS — G56.31 RADIAL NEURITIS, RIGHT: ICD-10-CM

## 2020-02-06 DIAGNOSIS — R35.0 URINARY FREQUENCY: Primary | ICD-10-CM

## 2020-02-06 PROCEDURE — 20606 DRAIN/INJ JOINT/BURSA W/US: CPT

## 2020-02-06 RX ORDER — LIDOCAINE HYDROCHLORIDE 10 MG/ML
5 INJECTION, SOLUTION EPIDURAL; INFILTRATION; INTRACAUDAL; PERINEURAL ONCE
Status: COMPLETED | OUTPATIENT
Start: 2020-02-06 | End: 2020-02-06

## 2020-02-06 RX ORDER — METHYLPREDNISOLONE ACETATE 80 MG/ML
80 INJECTION, SUSPENSION INTRA-ARTICULAR; INTRALESIONAL; INTRAMUSCULAR; SOFT TISSUE ONCE
Status: COMPLETED | OUTPATIENT
Start: 2020-02-06 | End: 2020-02-06

## 2020-02-06 RX ADMIN — LIDOCAINE HYDROCHLORIDE 5 ML: 10 INJECTION, SOLUTION EPIDURAL; INFILTRATION; INTRACAUDAL; PERINEURAL at 11:25

## 2020-02-06 RX ADMIN — METHYLPREDNISOLONE ACETATE 80 MG: 80 INJECTION, SUSPENSION INTRA-ARTICULAR; INTRALESIONAL; INTRAMUSCULAR; SOFT TISSUE at 11:25

## 2020-02-06 NOTE — TELEPHONE ENCOUNTER
Pt  Returned call and stated that  she can feel twinges of muscle spasm which was present after the epidural   Not all the time but it is there once and a while  This is not pain, but a twinge  Pt stated her right leg has tingling from the knee down  Pain level is not measurable because her tingling in the leg and the twinge are not pain        Pt can be reached [phone]

## 2020-02-07 ENCOUNTER — APPOINTMENT (OUTPATIENT)
Dept: PHYSICAL THERAPY | Age: 70
End: 2020-02-07
Payer: COMMERCIAL

## 2020-02-07 ENCOUNTER — APPOINTMENT (OUTPATIENT)
Dept: OCCUPATIONAL THERAPY | Age: 70
End: 2020-02-07
Payer: COMMERCIAL

## 2020-02-07 ENCOUNTER — APPOINTMENT (OUTPATIENT)
Dept: LAB | Age: 70
End: 2020-02-07
Payer: COMMERCIAL

## 2020-02-07 DIAGNOSIS — R35.0 URINARY FREQUENCY: ICD-10-CM

## 2020-02-07 PROCEDURE — 87086 URINE CULTURE/COLONY COUNT: CPT

## 2020-02-08 LAB — BACTERIA UR CULT: NORMAL

## 2020-02-12 ENCOUNTER — OFFICE VISIT (OUTPATIENT)
Dept: PHYSICAL THERAPY | Age: 70
End: 2020-02-12
Payer: COMMERCIAL

## 2020-02-12 ENCOUNTER — OFFICE VISIT (OUTPATIENT)
Dept: OCCUPATIONAL THERAPY | Age: 70
End: 2020-02-12
Payer: COMMERCIAL

## 2020-02-12 DIAGNOSIS — M25.511 CHRONIC RIGHT SHOULDER PAIN: Primary | ICD-10-CM

## 2020-02-12 DIAGNOSIS — G56.31 RADIAL NEURITIS, RIGHT: Primary | ICD-10-CM

## 2020-02-12 DIAGNOSIS — G89.29 CHRONIC RIGHT SHOULDER PAIN: Primary | ICD-10-CM

## 2020-02-12 PROCEDURE — 97110 THERAPEUTIC EXERCISES: CPT

## 2020-02-12 PROCEDURE — 97140 MANUAL THERAPY 1/> REGIONS: CPT

## 2020-02-12 PROCEDURE — 97110 THERAPEUTIC EXERCISES: CPT | Performed by: PHYSICAL THERAPIST

## 2020-02-12 NOTE — PROGRESS NOTES
Daily Note     Today's date: 2020  Patient name: Veronica Diaz  : 1950  MRN: [id number]  Referring provider: Aysha Valadez MD  Dx:   Encounter Diagnosis     ICD-10-CM    1  Radial neuritis, right G56 31                   Subjective:  Patient stated she had her forearm injection on 2020  Patient reports pain yesterday and today in her arm  She rates in 1-2/10  More pain with supination  Objective: See treatment diary below  D/C hot packs  Pain with forearm supination  Tender from injection  Assessment: Tolerated treatment well  Patient would benefit from continued OT      Plan: Continue per plan of care        Precautions: universal       Manual  1/27 1/31  2/3  2/12           1/23   Massage to lateral epicondyle                       IASTM to medial elbow and forearm 5' 10'  10'  hold due to injection           5'   Nerve glides-radial,median 10' 10'  10'  10'           Med/radial/ulnar nerve glides 10'                                                         Exercise Diary  1/27  2/3  2/12             1/23   Forearm flexor and extensor stretches      wrist bar  3x10                 Eccentric wrist extensor strengthening      RPB 15 sec hold 2x10                 EDC RB  RB  RB             RB                           Rotator cuff strengthening                       Isometrics wrist extension HEP                 Instructed HEP, red power bar 2 x10 hold 15secs   Wrist flexion 1# 3x10                      ROM wrist forearm     Wrist maze                                                                                                                                                                                                                                                                                                                       Modalities  1/31  2/3               1/23   E-stim                       MH to dorsal forearm 10     10' b4 tx               10-15min

## 2020-02-12 NOTE — PROGRESS NOTES
Daily Note     Today's date: 2020  Patient name: Marysol Aceves  : 1950  MRN: [id number]  Referring provider: Harinder De  Dx:   Encounter Diagnosis     ICD-10-CM    1  Chronic right shoulder pain M25 511     G89 29                   Subjective: Had forearm injection - relief noted immediately - sx's have returned somewhat  Objective: See treatment diary below      Assessment: Tolerated treatment well  Patient would benefit from continued PT      Plan: Continue per plan of care  Precautions: universal    Manual  1/23 1/27 1/31 2/3 2/12        UBE 8 minutes completed 8 minutes 8 minutes 8 minutes        PROM to R shoulder 15 minutes  x15 minutes completed 15 minutes 15 minutes        Jt mobs to R shoulder Grade III completed completed Grade III Grade III        Jt mobs to Thoracic spine Grade III completed completed Grade III Grade III        Cervical SB stretch to L only 15 minutes 15 minutes completed completed 15 minutes            Exercise Diary                                        Corner stretch x5 reps hold 30 sec  x5 nt x5 hold 30 sec x5 reps hold 30 sec          Prone T 2x15 2x15 2x15 2x15 2x15        Lifefitness rows nt   nt nt        Lifefitness shld ext nt   nt nt                                                                                                                                                                                                  Modalities

## 2020-02-13 NOTE — TELEPHONE ENCOUNTER
Patient states she has 0  % of improvement & pain level  She has the side effects pain in her side, hip area , and  like pins and needles   pain down her leg  Please advise apryl      Patient contact # [phone]

## 2020-02-14 ENCOUNTER — OFFICE VISIT (OUTPATIENT)
Dept: PHYSICAL THERAPY | Age: 70
End: 2020-02-14
Payer: COMMERCIAL

## 2020-02-14 ENCOUNTER — OFFICE VISIT (OUTPATIENT)
Dept: OCCUPATIONAL THERAPY | Age: 70
End: 2020-02-14
Payer: COMMERCIAL

## 2020-02-14 ENCOUNTER — TELEPHONE (OUTPATIENT)
Dept: GASTROENTEROLOGY | Facility: MEDICAL CENTER | Age: 70
End: 2020-02-14

## 2020-02-14 DIAGNOSIS — M25.511 CHRONIC RIGHT SHOULDER PAIN: Primary | ICD-10-CM

## 2020-02-14 DIAGNOSIS — G56.31 RADIAL NEURITIS, RIGHT: Primary | ICD-10-CM

## 2020-02-14 DIAGNOSIS — G89.29 CHRONIC RIGHT SHOULDER PAIN: Primary | ICD-10-CM

## 2020-02-14 PROCEDURE — 97140 MANUAL THERAPY 1/> REGIONS: CPT

## 2020-02-14 PROCEDURE — 97110 THERAPEUTIC EXERCISES: CPT | Performed by: PHYSICAL THERAPIST

## 2020-02-14 PROCEDURE — 97012 MECHANICAL TRACTION THERAPY: CPT | Performed by: PHYSICAL THERAPIST

## 2020-02-14 NOTE — PROGRESS NOTES
Daily Note     Today's date: 2020  Patient name: Peggy Sevilla  : 1950  MRN: [id number]  Referring provider: Xochilt Higgins  Dx:   Encounter Diagnosis     ICD-10-CM    1  Chronic right shoulder pain M25 511     G89 29                   Subjective: Increased LBP and radicular sx's noted      Objective: See treatment diary below  Trial of cervical traction  Assessment: Tolerated treatment well  Patient would benefit from continued PT      Plan: Continue per plan of care  Precautions: universal    Manual  1/23 1/27 1/31 2/3 2/12 2/14       UBE 8 minutes completed 8 minutes 8 minutes 8 minutes x8 minutes       PROM to R shoulder 15 minutes  x15 minutes completed 15 minutes 15 minutes 15 minutes       Jt mobs to R shoulder Grade III completed completed Grade III Grade III Grade III       Jt mobs to Thoracic spine Grade III completed completed Grade III Grade III nt       Cervical SB stretch to L only 15 minutes 15 minutes completed completed 15 minutes x15 minutes           Exercise Diary                    Cervical traction - 15#/5# - intermittent - 15 minutes                    Corner stretch x5 reps hold 30 sec   x5 nt x5 hold 30 sec x5 reps hold 30 sec  nt       Prone T 2x15 2x15 2x15 2x15 2x15 nt       Lifefitness rows nt   nt nt        Lifefitness shld ext nt   nt nt                                                                                                                                                                                                  Modalities

## 2020-02-14 NOTE — TELEPHONE ENCOUNTER
----- Message from Kerry Mann MD sent at 2/14/2020  9:53 AM EST -----  Please call patient :  Biopsies were unremarkable  If symptoms of reflux continue may consider endoscopic therapy such as Lam[facility] Aguilar  Follow up with Dr Robi Chao

## 2020-02-14 NOTE — PROGRESS NOTES
Daily Note     Today's date: 2020  Patient name: Viky Greene  : 1950  MRN: [id number]  Referring provider: Adonay Peres MD  Dx:   Encounter Diagnosis     ICD-10-CM    1  Radial neuritis, right G56 31                   Subjective:  6-7/10 VPR in right arm  Objective: See treatment diary below  Mild tenderness lateral epicondyle  Moderate tenderness proximal forearm  Discomfort with radial nerve glides  Instructed patient to wear her wrist brace at night  Assessment: Tolerated treatment well  Patient would benefit from continued OT      Plan: Continue per plan of care  Precautions: universal       Manual  1/27 1/31  2/3  2/12  2/14         1/23   Massage to lateral epicondyle          IASTM lateral epic    8'             IASTM to medial elbow and forearm 5' 10'  10'  hold due to injection           5'   Nerve glides-radial,median 10' 10'  10'  10'  12'         Med/radial/ulnar nerve glides 10'                                                         Exercise Diary  1/27  2/3  2/12             1/23   Forearm flexor and extensor stretches      wrist bar  3x10                 Eccentric wrist extensor strengthening      RPB 15 sec hold 2x10                 EDC RB  RB  RB             RB                           Rotator cuff strengthening                       Isometrics wrist extension HEP                 Instructed HEP, red power bar 2 x10 hold 15secs   Wrist flexion 1# 3x10                      ROM wrist forearm     Wrist maze                                                                                                                                                                                                                                                                                                                       Modalities  1/31  2/3  2/14             1/23   E-stim                       MH to dorsal forearm 10     10' b4 tx  10               10-15min

## 2020-02-17 ENCOUNTER — OFFICE VISIT (OUTPATIENT)
Dept: PHYSICAL THERAPY | Age: 70
End: 2020-02-17
Payer: COMMERCIAL

## 2020-02-17 ENCOUNTER — OFFICE VISIT (OUTPATIENT)
Dept: OCCUPATIONAL THERAPY | Age: 70
End: 2020-02-17
Payer: COMMERCIAL

## 2020-02-17 DIAGNOSIS — M77.11 RIGHT LATERAL EPICONDYLITIS: ICD-10-CM

## 2020-02-17 DIAGNOSIS — G89.29 CHRONIC RIGHT SHOULDER PAIN: Primary | ICD-10-CM

## 2020-02-17 DIAGNOSIS — G56.31 RADIAL NEURITIS, RIGHT: Primary | ICD-10-CM

## 2020-02-17 DIAGNOSIS — M25.511 CHRONIC RIGHT SHOULDER PAIN: Primary | ICD-10-CM

## 2020-02-17 PROCEDURE — 97140 MANUAL THERAPY 1/> REGIONS: CPT

## 2020-02-17 PROCEDURE — 97110 THERAPEUTIC EXERCISES: CPT | Performed by: PHYSICAL THERAPIST

## 2020-02-17 NOTE — PROGRESS NOTES
Daily Note     Today's date: 2020  Patient name: Antonia Mims  : 1950  MRN: [id number]  Referring provider: Karena Ruiz MD  Dx:   Encounter Diagnosis     ICD-10-CM    1  Radial neuritis, right G56 31    2  Right lateral epicondylitis M77 11                   Subjective: Patient states her pain gets better as the done goes on  Objective: See treatment diary below  (+) tenderness lateral elbow and proximal forearm extensors  Radiation of symptoms both distally and proximally with radial nerve gliding  Assessment: Tolerated treatment well  Patient would benefit from continued OT      Plan: Continue per plan of care  Precautions: universal       Manual  1/27 1/31  2/3  2/12  2/14  2/17          Massage to lateral epicondyle          IASTM lateral epic  8'  IASTM lat   Epic 10'          IASTM to medial elbow and forearm 5' 10'  10'  hold due to injection              Nerve glides-radial,median 10' 10'  10'  10'  12'  10'                                                               Exercise Diary  1/27  2/3  2/12  2/17           1/23   Forearm flexor and extensor stretches      wrist bar  3x10                 Eccentric wrist extensor strengthening      RPB 15 sec hold 2x10                 EDC RB  RB  RB             RB                           Rotator cuff strengthening                       Isometrics wrist extension HEP                 Instructed HEP, red power bar 2 x10 hold 15secs   Wrist flexion 1# 3x10                      ROM wrist forearm     Wrist maze                                                                                                                                                                                                                                                                                                                       Modalities  1/31  2/3  2/14             1/23   E-stim                       MH to dorsal forearm 10     10' b4 tx  10               10-15min

## 2020-02-17 NOTE — PROGRESS NOTES
Daily Note     Today's date: 2020  Patient name: Kinjal Cerda  : 1950  MRN: [id number]  Referring provider: Stella Iniguez  Dx:   Encounter Diagnosis     ICD-10-CM    1  Chronic right shoulder pain M25 511     G89 29                   Subjective: Unable to tolerate traction today - to much pressure on the occiput region  Objective: See treatment diary below      Assessment: Tolerated treatment well  Patient would benefit from continued PT      Plan: Continue per plan of care  Precautions: universal       Manual  1/23 1/27 1/31 2/3 2/12 2/14 2/17      UBE 8 minutes completed 8 minutes 8 minutes 8 minutes x8 minutes 6 minutes      PROM to R shoulder 15 minutes  x15 minutes completed 15 minutes 15 minutes 15 minutes x15 minutes      Jt mobs to R shoulder Grade III completed completed Grade III Grade III Grade III STM to R UT      Jt mobs to Thoracic spine Grade III completed completed Grade III Grade III nt nt      Cervical SB stretch to L only 15 minutes 15 minutes completed completed 15 minutes x15 minutes x15 minutes          Exercise Diary                    Cervical traction - 15#/5# - intermittent - 15 minutes unable to tolerate                   Corner stretch x5 reps hold 30 sec  x5 nt x5 hold 30 sec x5 reps hold 30 sec  nt nt      Prone T 2x15 2x15 2x15 2x15 2x15 nt nt      Lifefitness rows nt   nt nt  nt      Lifefitness shld ext nt   nt nt  nt             Cervical retraction - 2x10              Supine cane flexion - x10 reps hold 10 sec                                                                                                                                                                        Modalities

## 2020-02-19 ENCOUNTER — TELEPHONE (OUTPATIENT)
Dept: PAIN MEDICINE | Facility: CLINIC | Age: 70
End: 2020-02-19

## 2020-02-19 ENCOUNTER — OFFICE VISIT (OUTPATIENT)
Dept: PAIN MEDICINE | Facility: CLINIC | Age: 70
End: 2020-02-19
Payer: COMMERCIAL

## 2020-02-19 ENCOUNTER — OFFICE VISIT (OUTPATIENT)
Dept: PHYSICAL THERAPY | Age: 70
End: 2020-02-19
Payer: COMMERCIAL

## 2020-02-19 ENCOUNTER — OFFICE VISIT (OUTPATIENT)
Dept: OCCUPATIONAL THERAPY | Age: 70
End: 2020-02-19
Payer: COMMERCIAL

## 2020-02-19 VITALS
DIASTOLIC BLOOD PRESSURE: 81 MMHG | BODY MASS INDEX: 24.96 KG/M2 | HEART RATE: 89 BPM | SYSTOLIC BLOOD PRESSURE: 130 MMHG | WEIGHT: 159 LBS | HEIGHT: 67 IN

## 2020-02-19 DIAGNOSIS — M48.062 SPINAL STENOSIS OF LUMBAR REGION WITH NEUROGENIC CLAUDICATION: ICD-10-CM

## 2020-02-19 DIAGNOSIS — G89.29 CHRONIC RIGHT SHOULDER PAIN: Primary | ICD-10-CM

## 2020-02-19 DIAGNOSIS — M77.11 RIGHT LATERAL EPICONDYLITIS: ICD-10-CM

## 2020-02-19 DIAGNOSIS — M25.511 CHRONIC RIGHT SHOULDER PAIN: Primary | ICD-10-CM

## 2020-02-19 DIAGNOSIS — G56.31 RADIAL NEURITIS, RIGHT: Primary | ICD-10-CM

## 2020-02-19 DIAGNOSIS — M54.16 LUMBAR RADICULOPATHY: Primary | ICD-10-CM

## 2020-02-19 DIAGNOSIS — M51.26 LUMBAR DISC HERNIATION: ICD-10-CM

## 2020-02-19 DIAGNOSIS — M47.816 LUMBAR SPONDYLOSIS: ICD-10-CM

## 2020-02-19 PROCEDURE — 97140 MANUAL THERAPY 1/> REGIONS: CPT

## 2020-02-19 PROCEDURE — 99214 OFFICE O/P EST MOD 30 MIN: CPT | Performed by: NURSE PRACTITIONER

## 2020-02-19 PROCEDURE — 97110 THERAPEUTIC EXERCISES: CPT | Performed by: PHYSICAL THERAPIST

## 2020-02-19 RX ORDER — GABAPENTIN 100 MG/1
CAPSULE ORAL
Qty: 90 CAPSULE | Refills: 1 | Status: SHIPPED | OUTPATIENT
Start: 2020-02-19 | End: 2021-05-28 | Stop reason: ALTCHOICE

## 2020-02-19 NOTE — PROGRESS NOTES
Assessment:  1  Lumbar radiculopathy    2  Lumbar spondylosis    3  Lumbar disc herniation    4  Spinal stenosis of lumbar region with neurogenic claudication        Plan:  1  I will schedule the patient for repeat right L4 and L5 TFESI to address the inflammatory component the patient's pain  She did report about 85-90% relief from 1st injection on January 7, 2020, however the patient states her pain and fortunately has returned to baseline at this point  Complete risks and benefits including bleeding, infection, tissue reaction, nerve injury and allergic reaction were discussed  The patient was agreeable and verbalized an understanding  2  I did offer to initiate the patient on gabapentin, however she declines at this time  3  I will follow up with the patient after the procedure or sooner if needed  M*Modal software was used to dictate this note  It may contain errors with dictating incorrect words or incorrect spelling  Please contact the provider directly with any questions  History of Present Illness: The patient is a 71 y o  female last seen on 12/20/19 who presents for a follow up office visit in regards to chronic lumbosacral back pain that radiates into the anterolateral aspect of the right lower extremity secondary to lumbar degenerative disc disease, and lumbar stenosis  The patient denies left lower extremity symptoms, bowel or bladder incontinence, or saddle anesthesia  The patient is status post right L4 and L5 TFESI with Dr Dereje Avendaño on January 7, 2020 and reported an 85-90% improvement of her symptoms  Unfortunately, her pain returned to baseline shortly after  She has also had a right greater trochanter bursa injection without any significant relief  MRI of the lumbar spine reveals degenerative disc disease and spondylosis with side holes for rule so to the L3-4 through L5-S1    She has been evaluated by Dr Hailee Coates who did not feel that surgical intervention was required at that time and referred her to our practice for conservative management  The patient currently rates her pain a 10/10 on the numeric pain rating scale  She states her pain is constant nature and bothersome the morning  She characterizes the pain as sharp, shooting and pins and needles  Patient is not currently taking any daily medication for pain and does not wish to start a medication at this time  I have personally reviewed and/or updated the patient's past medical history, past surgical history, family history, social history, current medications, allergies, and vital signs today  Review of Systems:    Review of Systems   Respiratory: Negative for shortness of breath  Cardiovascular: Negative for chest pain  Gastrointestinal: Negative for constipation, diarrhea, nausea and vomiting  Musculoskeletal: Positive for gait problem  Negative for arthralgias, joint swelling and myalgias  Skin: Negative for rash  Neurological: Negative for dizziness, seizures and weakness  All other systems reviewed and are negative          Past Medical History:   Diagnosis Date    Breast cancer ([facility] ) 08/21/2008    age 62    [facility] )     left     Chronic low back pain     Clostridium difficile colitis     DDD (degenerative disc disease), lumbar     GERD (gastroesophageal reflux disease)     History of radiation therapy 09/2008    for breast cancer    Hypertension     Laryngopharyngeal reflux (LPR)     Lumbar spinal stenosis     Osteoarthritis of spine with radiculopathy, lumbar region     Osteopenia     Piriformis syndrome of left side     Sciatica     Spondylosis of cervical region without myelopathy or radiculopathy        Past Surgical History:   Procedure Laterality Date    BREAST BIOPSY Left 07/30/2008    malignant    BREAST CYST ASPIRATION Left 1998    BREAST LUMPECTOMY Left 08/21/2008    BREAST SURGERY      COLONOSCOPY  2015    [place] ESOPHAGOGASTRODUODENOSCOPY TRANSORAL DIAGNOSTIC N/A 2/15/2017    Procedure: ESOPHAGOGASTRODUODENOSCOPY (EGD); Surgeon: Kenisha Gibbons MD;  Location: [facility]; Service: Gastroenterology    [place] ESOPHAGOGASTRODUODENOSCOPY TRANSORAL DIAGNOSTIC N/A 2/1/2018    Procedure: ESOPHAGOGASTRODUODENOSCOPY (EGD); Surgeon: Kenisha Gibbons MD;  [facility]; Service: Gastroenterology   [facility] MSK PROCEDURE  2/6/2020       Family History   Problem Relation Age of Onset    Osteoporosis Mother     Coronary artery disease Father     Diabetes Father    [facility] COPD Father     No Known Problems Maternal Aunt     Breast cancer Paternal Aunt 68    No Known Problems Paternal Aunt     No Known Problems Maternal Aunt        Social History     Occupational History    Not on file   Tobacco Use    Smoking status: Former Smoker    Smokeless tobacco: Never Used   Substance and Sexual Activity    Alcohol use: Yes     Frequency: 2-3 times a week     Drinks per session: 1 or 2     Comment: social    Drug use: No    Sexual activity: Not Currently         Current Outpatient Medications:     azelastine (ASTELIN) 0 1 % nasal spray, 1 spray into each nostril 2 (two) times a day Use in each nostril as directed, Disp: 1 Bottle, Rfl: 6    Cholecalciferol (VITAMIN D-3 PO), Take 1,000 mg by mouth daily  , Disp: , Rfl:     cyclobenzaprine (FLEXERIL) 10 mg tablet, Take 10 mg by mouth once as needed for muscle spasms (takes one per week on average), Disp: , Rfl:     lisinopril (ZESTRIL) 10 mg tablet, Take 10 mg by mouth daily, Disp: , Rfl:     montelukast (SINGULAIR) 10 mg tablet, TAKE 1 TABLET BY MOUTH EVERY DAY, Disp: 90 tablet, Rfl: 3    Multiple Vitamins-Minerals (CENTRUM ADULTS PO), Take 1 tablet by mouth daily, Disp: , Rfl:     Probiotic Product (PROBIOTIC PO), Take 1 tablet by mouth, Disp: , Rfl:     Allergies   Allergen Reactions    Codeine     Morphine And Related      Note:  this allergy is not being included in drug screening    Please inactivate this item and re-enter it to enable screening   Morphine Sulfate [Morphine]     Naprosyn [Naproxen]     Oxycodone     Penicillins     Sulfa Antibiotics        Physical Exam:    /81   Pulse 89   Ht 5' 7" (1 702 m)   Wt 72 1 kg (159 lb)   LMP  (LMP Unknown)   BMI 24 90 kg/m²     Constitutional:normal, well developed, well nourished, alert, in no distress and non-toxic and no overt pain behavior  Eyes:anicteric  HEENT:grossly intact  Neck:supple, symmetric, trachea midline and no masses   Pulmonary:even and unlabored  Cardiovascular:No edema or pitting edema present  Skin:Normal without rashes or lesions and well hydrated  Psychiatric:Mood and affect appropriate  Neurologic:Cranial Nerves II-XII grossly intact  Musculoskeletal:antalgic gait but steady without the use of assistive devices  Positive seated straight leg raise on the right negative on the left  Imaging  FL spine and pain procedure    (Results Pending)     MRI LUMBAR SPINE WITHOUT CONTRAST     INDICATION:  Left lower extremity pain with associated numbness leading from the buttock region towards the anterolateral aspect of the thigh      COMPARISON:  5/12/2016     TECHNIQUE:  Sagittal T1, sagittal T2, sagittal inversion recovery, axial T1 and axial T2, coronal T2        IMAGE QUALITY:  Diagnostic     FINDINGS:     ALIGNMENT:  Mild dextroscoliosis of the mid lumbar spine  Normal lordosis      MARROW SIGNAL:  Scattered degenerative endplate changes noted  No bone marrow edema or focally suspicious marrow lesions  No compression abnormality      DISTAL CORD AND CONUS:  Normal size and signal within the distal cord and conus  The conus ends at the L1-L2 level      PARASPINAL SOFT TISSUES:  Paraspinal soft tissues are unremarkable      SACRUM:  Normal signal within the sacrum   No evidence of insufficiency or stress fracture      LOWER THORACIC DISC SPACES:  Normal disc height and signal   No disc herniation, canal stenosis or foraminal narrowing      LUMBAR DISC SPACES:          L1-L2:  Normal      L2-L3:  There is a diffuse disc bulge  There is mild tricompartmental narrowing      L3-L4:  There is loss of disc height and signal   There is a left neural foraminal disc protrusion  There is bilateral facet hypertrophy  Mild to moderate central canal narrowing  Moderate left neural foraminal narrowing  Mild right neural foraminal   narrowing      L4-L5:  There is a diffuse disc bulge  There is bilateral facet hypertrophy  Mild central canal narrowing  Moderate bilateral neural foraminal narrowing      L5-S1:  There is a diffuse disc bulge  There is bilateral facet hypertrophy    Mild tricompartmental narrowing      IMPRESSION:     Multilevel lumbar spondylosis    Orders Placed This Encounter   Procedures    FL spine and pain procedure

## 2020-02-19 NOTE — TELEPHONE ENCOUNTER
Gabapentin 100 mg sent to pharmacy  She is to initiate by taking 1 capsule at bedtime x5 days, then increase to 2 capsules at bedtime x5 days, then increase to 3 capsules at bedtime and stay on this dose until we see her again office  Please have her call our office with any side effects or issues  Most common side effects which include dizziness, drowsiness, and swelling of the hands and feet  If she would like to stop this medication, please have her call for weaning instructions

## 2020-02-19 NOTE — TELEPHONE ENCOUNTER
Patient in to let  Marissa Khan know that she has changed her mind in regard to medication  She is not sure what the name was but would like for Sonia to prescribe the medication  Please advise thank you    Patient call back # [phone]      Pharmacy Name:   [place] [id number] [address], [place], [address]    [facility]     [address] [facility], [place], [address]  [address] [facility], [place], [address]      [phone]

## 2020-02-19 NOTE — TELEPHONE ENCOUNTER
S/w the patient and reviewed the instructions  She stated Obinna called her and left a message to schedule the next epidural  She stated she will try to call you tomorrow

## 2020-02-19 NOTE — PROGRESS NOTES
Daily Note     Today's date: 2020  Patient name: Marcela Menard  : 1950  MRN: [id number]  Referring provider: Linda Connolly MD  Dx:   Encounter Diagnosis     ICD-10-CM    1  Radial neuritis, right G56 31    2  Right lateral epicondylitis M77 11                   Subjective: Patient having pain in multiple areas including her back/right leg related to sciatica, her neck/upper trap  And upper arm  Less pain reported in elbow and forearm  Objective: See treatment diary below  Patient has a rash on her forearm  May be from graston emollient  Only slight tenderness lateral elbow  (+) neural tension right UE      Assessment: Tolerated treatment well  Patient would benefit from continued OT      Plan: Continue per plan of care  Precautions: universal       Manual  1/27 1/31  2/3  2/12  2/14  2/17  2/19        Massage to lateral epicondyle          IASTM lateral epic  8'  IASTM lat   Epic 10'  10'        IASTM to medial elbow and forearm 5' 10'  10'  hold due to injection              Nerve glides-radial,median 10' 10'  10'  10'  12'  10'  10'                                                             Exercise Diary  1/27  2/3  2/12  2/17           1/23   Forearm flexor and extensor stretches      wrist bar  3x10                 Eccentric wrist extensor strengthening      RPB 15 sec hold 2x10                 EDC RB  RB  RB             RB                           Rotator cuff strengthening                       Isometrics wrist extension HEP                 Instructed HEP, red power bar 2 x10 hold 15secs   Wrist flexion 1# 3x10                      ROM wrist forearm     Wrist maze                                                                                                                                                                                                                                                                                                                     Modalities  1/31  2/3  2/14  2/19           1/23   E-stim                       MH to dorsal forearm 10     10' b4 tx  10    10'           10-15min

## 2020-02-19 NOTE — PROGRESS NOTES
Daily Note     Today's date: 2020  Patient name: Peggy Sevilla  : 1950  MRN: [id number]  Referring provider: Xochilt Higgins  Dx:   Encounter Diagnosis     ICD-10-CM    1  Chronic right shoulder pain M25 511     G89 29                   Subjective: No change in sx's at this point  Objective: See treatment diary below      Assessment: Tolerated treatment well  Patient would benefit from continued PT      Plan: Continue per plan of care  Precautions: universal       Manual  1/23 1/27 1/31 2/3 2/12 2/14 2/17 2/19     UBE 8 minutes completed 8 minutes 8 minutes 8 minutes x8 minutes 6 minutes 6 minutes     PROM to R shoulder 15 minutes  x15 minutes completed 15 minutes 15 minutes 15 minutes x15 minutes x15 minutes     Jt mobs to R shoulder Grade III completed completed Grade III Grade III Grade III STM to R UT STM and graston - 15 minutes     Jt mobs to Thoracic spine Grade III completed completed Grade III Grade III nt nt nt     Cervical SB stretch to L only 15 minutes 15 minutes completed completed 15 minutes x15 minutes x15 minutes completed         Exercise Diary                    Cervical traction - 15#/5# - intermittent - 15 minutes unable to tolerate nt                  Corner stretch x5 reps hold 30 sec  x5 nt x5 hold 30 sec x5 reps hold 30 sec  nt nt nt     Prone T 2x15 2x15 2x15 2x15 2x15 nt nt nt     Lifefitness rows nt   nt nt  nt nt     Lifefitness shld ext nt   nt nt  nt nt            Cervical retraction - 2x10  2x10            Supine cane flexion - x10 reps hold 10 sec   2x10             Lifefitness - rows and shld ext - 2x10                                                                                                                                                        Modalities

## 2020-02-20 NOTE — TELEPHONE ENCOUNTER
Spoke to pt, scheduled RT L4 & L5 TFESI on Wed 02/26/20 arr at 08:10  Went over pre procedure instructions, no vaccinations 2 weeks prior/post proc, NPO 1 hr prior, if sick or on abx needs to call to rs, wear loose, comf clothing- no buttons/zippers, needs   Pt verbalized understanding  Called pt back and LM with her  to call back and schedule a 4week follow up from appt on Tues 02/25- should be on or around 03/25/20

## 2020-02-24 ENCOUNTER — OFFICE VISIT (OUTPATIENT)
Dept: PHYSICAL THERAPY | Age: 70
End: 2020-02-24
Payer: COMMERCIAL

## 2020-02-24 ENCOUNTER — OFFICE VISIT (OUTPATIENT)
Dept: OCCUPATIONAL THERAPY | Age: 70
End: 2020-02-24
Payer: COMMERCIAL

## 2020-02-24 DIAGNOSIS — M77.11 RIGHT LATERAL EPICONDYLITIS: ICD-10-CM

## 2020-02-24 DIAGNOSIS — G89.29 CHRONIC RIGHT SHOULDER PAIN: Primary | ICD-10-CM

## 2020-02-24 DIAGNOSIS — G56.31 RADIAL NEURITIS, RIGHT: Primary | ICD-10-CM

## 2020-02-24 DIAGNOSIS — M25.511 CHRONIC RIGHT SHOULDER PAIN: Primary | ICD-10-CM

## 2020-02-24 PROCEDURE — 97110 THERAPEUTIC EXERCISES: CPT | Performed by: PHYSICAL THERAPIST

## 2020-02-24 PROCEDURE — 97140 MANUAL THERAPY 1/> REGIONS: CPT

## 2020-02-24 NOTE — PROGRESS NOTES
Daily Note     Today's date: 2020  Patient name: Aiden Zurita  : 1950  MRN: [id number]  Referring provider: Tata Whitt  Dx:   Encounter Diagnosis     ICD-10-CM    1  Chronic right shoulder pain M25 511     G89 29                   Subjective: Feeling good - minimal pain over the weekend and today  Objective: See treatment diary below      Assessment: Tolerated treatment well  Patient would benefit from continued PT      Plan: Continue per plan of care  Precautions: universal       Manual  1/23 1/27 1/31 2/3 2/12 2/14 2/17 2/19 2/24    UBE 8 minutes completed 8 minutes 8 minutes 8 minutes x8 minutes 6 minutes 6 minutes 6 minutes    PROM to R shoulder 15 minutes  x15 minutes completed 15 minutes 15 minutes 15 minutes x15 minutes x15 minutes completed    Jt mobs to R shoulder Grade III completed completed Grade III Grade III Grade III STM to R UT STM and graston - 15 minutes completed    Jt mobs to Thoracic spine Grade III completed completed Grade III Grade III nt nt nt nt    Cervical SB stretch to L only 15 minutes 15 minutes completed completed 15 minutes x15 minutes x15 minutes completed completed with manual traction  Exercise Diary                    Cervical traction - 15#/5# - intermittent - 15 minutes unable to tolerate nt                  Corner stretch x5 reps hold 30 sec  x5 nt x5 hold 30 sec x5 reps hold 30 sec  nt nt nt     Prone T 2x15 2x15 2x15 2x15 2x15 nt nt nt     Lifefitness rows nt   nt nt  nt nt TB today    Lifefitness shld ext nt   nt nt  nt nt TB today           Cervical retraction - 2x10  2x10 completed           Supine cane flexion - x10 reps hold 10 sec   2x10 completed            Lifefitness - rows and shld ext - 2x10 nt                                                                                                                                                       Modalities

## 2020-02-24 NOTE — PROGRESS NOTES
Daily Note     Today's date: 2020  Patient name: Lisa Youngblood  : 1950  MRN: [id number]  Referring provider: Britney Fernandez MD  Dx:   No diagnosis found  Subjective:  Patient states the last 4 days her neck, shoulder , and arm felt good  Objective: See treatment diary below  Assessment: Tolerated treatment well  Patient would benefit from continued OT      Plan: Continue per plan of care  Precautions: universal       Manual  1/27 1/31  2/3  2/12  2/14  2/17  2/19  2/24      Massage to lateral epicondyle          IASTM lateral epic  8'  IASTM lat   Epic 10'  10'  10'  IASTM      IASTM to medial elbow and forearm 5' 10'  10'  hold due to injection              Nerve glides-radial,median 10' 10'  10'  10'  12'  10'  10'  5'                                                           Exercise Diary  1/27  2/3  2/12  2/17  2/24         1/23   Forearm flexor and extensor stretches      wrist bar  3x10                 Eccentric wrist extensor strengthening         1# 3x10             EDC RB  RB  RB    RB         RB                           Rotator cuff strengthening                       Isometrics wrist extension HEP                 Instructed HEP, red power bar 2 x10 hold 15secs   Wrist flexion 1# 3x10                      ROM wrist forearm     Wrist maze                                                                                                                                                                                                                                                                                                                       Modalities  1/31  2/3  2/14  2/19           1/23   E-stim                       MH to dorsal forearm 10     10' b4 tx  10    10'           10-15min

## 2020-02-26 ENCOUNTER — APPOINTMENT (OUTPATIENT)
Dept: OCCUPATIONAL THERAPY | Age: 70
End: 2020-02-26
Payer: COMMERCIAL

## 2020-02-26 ENCOUNTER — APPOINTMENT (OUTPATIENT)
Dept: PHYSICAL THERAPY | Age: 70
End: 2020-02-26
Payer: COMMERCIAL

## 2020-02-28 ENCOUNTER — OFFICE VISIT (OUTPATIENT)
Dept: GASTROENTEROLOGY | Facility: MEDICAL CENTER | Age: 70
End: 2020-02-28
Payer: COMMERCIAL

## 2020-02-28 VITALS
BODY MASS INDEX: 24.64 KG/M2 | SYSTOLIC BLOOD PRESSURE: 112 MMHG | DIASTOLIC BLOOD PRESSURE: 76 MMHG | TEMPERATURE: 97.6 F | WEIGHT: 157 LBS | HEIGHT: 67 IN | HEART RATE: 86 BPM

## 2020-02-28 DIAGNOSIS — K21.9 GASTROESOPHAGEAL REFLUX DISEASE WITHOUT ESOPHAGITIS: Primary | ICD-10-CM

## 2020-02-28 DIAGNOSIS — K63.5 POLYP OF COLON, UNSPECIFIED PART OF COLON, UNSPECIFIED TYPE: ICD-10-CM

## 2020-02-28 DIAGNOSIS — J38.4 LARYNGEAL EDEMA: ICD-10-CM

## 2020-02-28 PROCEDURE — 99214 OFFICE O/P EST MOD 30 MIN: CPT | Performed by: INTERNAL MEDICINE

## 2020-02-28 RX ORDER — SODIUM, POTASSIUM,MAG SULFATES 17.5-3.13G
1 SOLUTION, RECONSTITUTED, ORAL ORAL ONCE
Qty: 2 BOTTLE | Refills: 0 | Status: SHIPPED | OUTPATIENT
Start: 2020-02-28 | End: 2020-05-18 | Stop reason: ALTCHOICE

## 2020-02-28 NOTE — PATIENT INSTRUCTIONS
1  Follow up with Dr Margy Rodriguez in May  2  Make an appointment for the pH study in end of May or June  You may cancer or keep the appointment depending on what Dr Margy Rodriguez and I discuss with you  3  colonoscopy at your convenience  4  Pepcid as needed  The patient will call back to schedule her colonoscopy with Dr Price/Mike/Susi after some other appointments that she has

## 2020-02-28 NOTE — PROGRESS NOTES
Outpatient Follow up  [address]  [address] [facility]  Jie Kraus MD  Ph : [phone]  Fax : [phone]  Mobile : [phone]  Email : [email or web address]  Also available on [employer]    Marli Casillas 71 y o  female MRN: [id number]    PCP: Shay Moore DO  Referring: No referring provider defined for this encounter  Marli Casillas presented for a follow up visit  My recommendations are included  Please do not hesitate to contact me with any questions you may have  ASSESSMENT AND PLAN:      1  GERD/ LPR  No symptoms off medications and no inflammation/ esophagitis was seen on EGD  She does c/o mucous but there was no real change with her being on PPI therapy either  At this time we will hold off on using medications and continue with diet and lifestyle changes (she did lose weight)  She will be seeing Dr Dada Neil in May and at that time if there is any inflammation seen then we will consider doing a repeat pH study off medications  She is agreeable  She will use pepcid as needed  2  Colon polyps : recommend colonoscopy  She will schedule this in the next few months  Diagnoses and all orders for this visit:    Gastroesophageal reflux disease without esophagitis  -     Esoph dual sens rob/24hr; Future    Laryngeal edema  -     Esoph dual sens rob/24hr; Future    Polyp of colon, unspecified part of colon, unspecified type  -     Colonoscopy; Future  -     SUPREP BOWEL PREP KIT 17 5-3 13-1 6 GM/177ML SOLN; Take 1 kit by mouth once for 1 dose Please follow instructions as per the office  Other orders  -     Diet NPO; Sips with meds; Standing  -     Void on call to OR; Standing  -     Insert peripheral IV; Standing  -     Diet NPO; Sips with meds; Standing  -     Void on call to OR; Standing  -     Insert peripheral IV; Standing      Patient recommendations :   1  Follow up with Dr Vee Perry in May     2  Make an appointment for the pH study in end of May or June  You may cancer or keep the appointment depending on what Dr Ibeth Mcmahan and I discuss with you  3  colonoscopy at your convenience  4  Pepcid as needed  ______________________________________________________________________    SUBJECTIVE:  72 y/o lady with GERD and LPR  She is off medications at this time and had an EGD done off medications which was unremarkable and no inflammation was seen  Overall she is doing well  No dysphagia  She does c/o mucous  She is always clearing her throat  She has not felt any change with PPI therapy though  Interestingly her previous EGD's did show esophagitis and has had laryngeal inflammation as well on ENT evaluation  She did try gluten free diet and did not help  She is eating healthy and has lost about 17 lbs (intentionally)  Her last colonoscopy was about 5 years ago and was recommended 3 years since she had polyps  She had them done by Dr Belen Brar and is now due at this time  She does have h/o constipation  REVIEW OF SYSTEMS IS OTHERWISE NEGATIVE        Historical Information   Past Medical History:   Diagnosis Date    Breast cancer ([facility] [address] ) 08/21/2008    age 62    Cancer ([facility] [address] )     left     Chronic low back pain     Clostridium difficile colitis     DDD (degenerative disc disease), lumbar     GERD (gastroesophageal reflux disease)     History of radiation therapy 09/2008    for breast cancer    Hypertension     Laryngopharyngeal reflux (LPR)     Lumbar spinal stenosis     Osteoarthritis of spine with radiculopathy, lumbar region     Osteopenia     Piriformis syndrome of left side     Sciatica     Spondylosis of cervical region without myelopathy or radiculopathy      Past Surgical History:   Procedure Laterality Date    BREAST BIOPSY Left 07/30/2008    malignant    BREAST CYST ASPIRATION Left 1998    BREAST LUMPECTOMY Left 08/21/2008    BREAST SURGERY      COLONOSCOPY  2015    UT ESOPHAGOGASTRODUODENOSCOPY TRANSORAL DIAGNOSTIC N/A 2/15/2017    Procedure: ESOPHAGOGASTRODUODENOSCOPY (EGD); Surgeon: Carroll Steward MD;  [facility]; Service: Gastroenterology    [place] ESOPHAGOGASTRODUODENOSCOPY TRANSORAL DIAGNOSTIC N/A 2/1/2018    Procedure: ESOPHAGOGASTRODUODENOSCOPY (EGD); Surgeon: Carroll Steward MD;  [facility]; Service: Gastroenterology    UPPER GASTROINTESTINAL ENDOSCOPY  01/2020    US GUIDED MSK PROCEDURE  2/6/2020     Social History   Social History     Substance and Sexual Activity   Alcohol Use Yes    Frequency: 2-3 times a week    Drinks per session: 1 or 2    Comment: social     Social History     Substance and Sexual Activity   Drug Use No     Social History     Tobacco Use   Smoking Status Former Smoker   Smokeless Tobacco Never Used     Family History   Problem Relation Age of Onset    Osteoporosis Mother     Coronary artery disease Father     Diabetes Father     COPD Father     No Known Problems Maternal Aunt     Breast cancer Paternal Aunt 68    No Known Problems Paternal Aunt     No Known Problems Maternal Aunt        Meds/Allergies       Current Outpatient Medications:     azelastine (ASTELIN) 0 1 % nasal spray    Cholecalciferol (VITAMIN D-3 PO)    cyclobenzaprine (FLEXERIL) 10 mg tablet    gabapentin (NEURONTIN) 100 mg capsule    lisinopril (ZESTRIL) 10 mg tablet    montelukast (SINGULAIR) 10 mg tablet    Multiple Vitamins-Minerals (CENTRUM ADULTS PO)    Probiotic Product (PROBIOTIC PO)    SUPREP BOWEL PREP KIT 17 5-3 13-1 6 GM/177ML SOLN    Allergies   Allergen Reactions    Codeine     Morphine And Related      Note:  this allergy is not being included in drug screening  Please inactivate this item and re-enter it to enable screening      Morphine Sulfate [Morphine]     Naprosyn [Naproxen]     Oxycodone     Penicillins     Sulfa Antibiotics            Objective     Blood pressure 112/76, pulse 86, temperature 97 6 °F (36 4 °C), temperature source Tympanic, height 5' 7" (1 702 m), weight 71 2 kg (157 lb), not currently breastfeeding  Body mass index is 24 59 kg/m²  PHYSICAL EXAM:      Physical Exam   Constitutional: She is oriented to person, place, and time  Vital signs are normal  She appears well-developed and well-nourished  HENT:   Head: Normocephalic and atraumatic  Eyes: Pupils are equal, round, and reactive to light  Conjunctivae are normal  No scleral icterus  Neck: Normal range of motion  Cardiovascular: Normal rate, regular rhythm and normal heart sounds  Pulmonary/Chest: Effort normal and breath sounds normal  No respiratory distress  Abdominal: Soft  Normal appearance and bowel sounds are normal  She exhibits no distension, no ascites and no mass  There is no hepatosplenomegaly  There is no tenderness  No hernia  Musculoskeletal: Normal range of motion  Lymphadenopathy:     She has no cervical adenopathy  Neurological: She is alert and oriented to person, place, and time  Skin: Skin is warm  Psychiatric: She has a normal mood and affect  Her behavior is normal  Thought content normal        Lab Results:   No visits with results within 1 Day(s) from this visit  Latest known visit with results is:   Appointment on 02/07/2020   Component Date Value    Urine Culture 02/07/2020 No Growth <1000 cfu/mL          Radiology Results:   Us Guided Msk Procedure    Result Date: 2/6/2020  Narrative: Ultrasound guided right radial tunnel anesthetic and steroid injection  INDICATION:   G56 31: Lesion of radial nerve, right upper limb G56 31: Lesion of radial nerve, right upper limb  Patient has radial side elbow pain, radiating down forearm, and clinical suspicion radial nerve impingement at the radial tunnel level  COMPARISON:  None  TECHNIQUE: Patient was brought to the  ultrasound suite and placed supine on the stretcher   A brief ultrasound examination was performed to localize the the radial condyle of the right elbow  A clear fascial plane between the brachioradialis and the supinator muscles was identified and targeted for injection  Risks and benefits of the procedure were explained to the patient, and written informed consent was obtained  The patient verbalized expressed understanding of the above risks and wished to proceed with the procedure  Final standard "time-out" procedure performed  An area on the skin was prepped, and draped in the usual sterile fashion  Lidocaine was administered to the skin and a 25-gauge 5 inch needle was inserted under ultrasound guidance into the radial tunnel  0 5 mL 80 mg/mL Depomedrol and 1 mL 1% Xylocaine was injected, distending the fascial plane  After the procedure, the  needle was removed and bandage applied  The patient tolerated the procedure well and suffered no complications  I asked the patient to call us with any questions, concerns, or acute problems  The patient expressed understanding of the above  Prior to the procedure, patient had 4 out of 10 right elbow pain  Immediately after the procedure, this became 1 out of 10  Impression: Successful ultrasound-guided right radial tunnel anesthetic and steroid injection  Patient describes significant partial relief of her typical right elbow pain immediately after injection   Workstation performed: [id number]

## 2020-03-03 ENCOUNTER — OFFICE VISIT (OUTPATIENT)
Dept: OCCUPATIONAL THERAPY | Age: 70
End: 2020-03-03
Payer: COMMERCIAL

## 2020-03-03 ENCOUNTER — EVALUATION (OUTPATIENT)
Dept: PHYSICAL THERAPY | Age: 70
End: 2020-03-03
Payer: COMMERCIAL

## 2020-03-03 DIAGNOSIS — M25.511 CHRONIC RIGHT SHOULDER PAIN: Primary | ICD-10-CM

## 2020-03-03 DIAGNOSIS — G89.29 CHRONIC RIGHT SHOULDER PAIN: Primary | ICD-10-CM

## 2020-03-03 DIAGNOSIS — G56.31 RADIAL NEURITIS, RIGHT: Primary | ICD-10-CM

## 2020-03-03 PROCEDURE — 97140 MANUAL THERAPY 1/> REGIONS: CPT

## 2020-03-03 PROCEDURE — 97110 THERAPEUTIC EXERCISES: CPT | Performed by: PHYSICAL THERAPIST

## 2020-03-03 PROCEDURE — 97110 THERAPEUTIC EXERCISES: CPT

## 2020-03-03 NOTE — PROGRESS NOTES
Daily Note     Today's date: 3/3/2020  Patient name: Deanna Nguyen  : 1950  MRN: [id number]  Referring provider: Omi Burrell  Dx:   Encounter Diagnosis     ICD-10-CM    1  Chronic right shoulder pain M25 511     G89 29                   Subjective: Neck/shoulder area is feeling good - no sx's over the weekend  Objective: See treatment diary below    CROM: flexion, ext, b/l SB'ing, b/l rotation - mild restriction noted  R shoulder PROM:  Flexion: 170, [email or web address], IR WNL  Postural: Fair - FHP and protracted scapula  Palpation:  Trigger points in R UT noted  R shoulder strength:  Flexion - 4, abd - 4, IR - 5, ER - 5    Short Term goals - 4 weeks  1  Patient will be independent HEP  2   Patient will report a 50% decrease in pain complaints  3   Increase strength 1/2 grade  4   Increase ROM 5-10 degrees  Long Term goals - 8 weeks  1  Patient will report elimination of pain complaints  2   Patient will return to all work related activities without restriction  3   Patient will return to all recreational activities without restriction  4   ROM WFL  5   Strength 5/5      Manual  1/23 1/27 1/31 2/3 2/12 2/14 2/17 2/19 2/24 3/3   UBE 8 minutes completed 8 minutes 8 minutes 8 minutes x8 minutes 6 minutes 6 minutes 6 minutes 6 minutes   PROM to R shoulder 15 minutes  x15 minutes completed 15 minutes 15 minutes 15 minutes x15 minutes x15 minutes completed completed   Jt mobs to R shoulder Grade III completed completed Grade III Grade III Grade III STM to R UT STM and graston - 15 minutes completed completed                Cervical SB stretch to L only 15 minutes 15 minutes completed completed 15 minutes x15 minutes x15 minutes completed completed with manual traction   completed       Exercise Diary                                                     Prone T 2x15 2x15 2x15 2x15 2x15 nt nt nt     Lifefitness rows nt   nt nt  nt nt TB today [place] - shld ext and rows - 3x10 Cervical retraction - 2x10  2x10 completed 2x10          Supine cane flexion - x10 reps hold 10 sec  2x10 completed 2x10                                                                                                                                                                   Modalities                                                            Assessment: Tolerated treatment well  Patient would benefit from continued PT      Plan: Continue per plan of care        Precautions: universal

## 2020-03-03 NOTE — PROGRESS NOTES
Daily Note     Today's date: 3/3/2020  Patient name: Jennifer Christian  : 1950  MRN: [id number]  Referring provider: Yamilet Merida MD  Dx:   Encounter Diagnosis     ICD-10-CM    1  Radial neuritis, right G56 31                   Subjective:   Patient feels her neck is getting better  "Its getting better"  Average VPR 6-7  There are some days  I don't notice it        Objective: See treatment diary below  Slight tenderness lateral elbow  (-) radial nerve tension  Progressing with exercises  Assessment: Tolerated treatment well  Patient would benefit from continued OT  Combination of PT/OT seems to be helping  Plan: Per physician recommendations  Precautions: universal       Manual  1/27 1/31  2/3  2/12  2/14  2/17  2/19  2/24  3/3    Massage to lateral epicondyle          IASTM lateral epic  8'  IASTM lat   Epic 10'  10'  10'  IASTM  8'    IASTM to lateral elbow 5' 10'  10'  hold due to injection              Nerve glides-radial,median 10' 10'  10'  10'  12'  10'  10'  5'  5'                                                         Exercise Diary  1/27  2/3  2/12  2/17  2/24  3/3       1/23   Forearm flexor and extensor stretches      wrist bar  3x10                 Eccentric wrist extensor strengthening         1# 3x10  RPB 2 x10           EDC RB  RB  RB    RB  RB       RB                           Rotator cuff strengthening                       Isometrics wrist extension HEP                 Instructed HEP, red power bar 2 x10 hold 15secs   Wrist flexion 1# 3x10                      ROM wrist forearm     Wrist maze      2# 3x10            Wrist PRE's                                                                                                                                                                                                                                                                                                     Modalities  3/3             1/23 E-stim                  MH to dorsal forearm 10'             10-15min

## 2020-03-04 ENCOUNTER — OFFICE VISIT (OUTPATIENT)
Dept: CARDIOLOGY CLINIC | Facility: CLINIC | Age: 70
End: 2020-03-04
Payer: COMMERCIAL

## 2020-03-04 VITALS
WEIGHT: 155.2 LBS | HEART RATE: 86 BPM | BODY MASS INDEX: 24.36 KG/M2 | HEIGHT: 67 IN | DIASTOLIC BLOOD PRESSURE: 72 MMHG | SYSTOLIC BLOOD PRESSURE: 118 MMHG

## 2020-03-04 DIAGNOSIS — R06.00 DYSPNEA ON EXERTION: Primary | ICD-10-CM

## 2020-03-04 DIAGNOSIS — I10 ESSENTIAL HYPERTENSION: ICD-10-CM

## 2020-03-04 PROCEDURE — 99214 OFFICE O/P EST MOD 30 MIN: CPT | Performed by: INTERNAL MEDICINE

## 2020-03-04 RX ORDER — EFINACONAZOLE 100 MG/ML
SOLUTION TOPICAL
COMMUNITY
Start: 2020-02-26 | End: 2021-05-28 | Stop reason: ALTCHOICE

## 2020-03-04 NOTE — PROGRESS NOTES
Cardiology   Melinda Duvall 71 y o  female MRN: [id number]        Impression:  1  Flushing - no cardiac etiology  Only related to ETOH  2  Dyspnea on exertion - no evidence of ischemia  3  Hypertension - good control      Recommendations:  1  Continue current medications  2  Follow up on an as needed basis  HPI: Melinda Duvall is a 71y o  year old female with hypertension and flushing who presents for follow up  Had echo and stress test that demonstrated no ischemia  Only has flushing with ETOH  Otherwise, asymptomatic  Dyspnea improving  Major complaint is sciatica  Review of Systems   Constitutional: Negative  HENT: Negative  Eyes: Negative  Respiratory: Negative for chest tightness and shortness of breath  Cardiovascular: Negative for chest pain, palpitations and leg swelling  Gastrointestinal: Negative  Endocrine: Negative  Genitourinary: Negative  Musculoskeletal: Negative  Skin: Negative  Allergic/Immunologic: Negative  Neurological: Negative  Hematological: Negative  Psychiatric/Behavioral: Negative  All other systems reviewed and are negative          Past Medical History:   Diagnosis Date    Breast cancer ([facility] [address] [facility] 08/21/2008    age 62    Cancer ([facility] [address] )     left     Chronic low back pain     Clostridium difficile colitis     DDD (degenerative disc disease), lumbar     GERD (gastroesophageal reflux disease)     History of radiation therapy 09/2008    for breast cancer    Hypertension     Laryngopharyngeal reflux (LPR)     Lumbar spinal stenosis     Osteoarthritis of spine with radiculopathy, lumbar region     Osteopenia     Piriformis syndrome of left side     Sciatica     Spondylosis of cervical region without myelopathy or radiculopathy      Past Surgical History:   Procedure Laterality Date    BREAST BIOPSY Left 07/30/2008    malignant    BREAST CYST ASPIRATION Left 1998    BREAST LUMPECTOMY Left 08/21/2008    BREAST SURGERY  COLONOSCOPY  2015    [place] ESOPHAGOGASTRODUODENOSCOPY TRANSORAL DIAGNOSTIC N/A 2/15/2017    Procedure: ESOPHAGOGASTRODUODENOSCOPY (EGD); Surgeon: James Bridges MD;  [facility]; Service: Gastroenterology    [place] ESOPHAGOGASTRODUODENOSCOPY TRANSORAL DIAGNOSTIC N/A 2/1/2018    Procedure: ESOPHAGOGASTRODUODENOSCOPY (EGD); Surgeon: James Bridges MD;  [facility]; Service: Gastroenterology    UPPER GASTROINTESTINAL ENDOSCOPY  01/2020    US GUIDED MSK PROCEDURE  2/6/2020     Social History     Substance and Sexual Activity   Alcohol Use Yes    Frequency: 2-3 times a week    Drinks per session: 1 or 2    Comment: social     Social History     Substance and Sexual Activity   Drug Use No     Social History     Tobacco Use   Smoking Status Former Smoker   Smokeless Tobacco Never Used     Family History   Problem Relation Age of Onset    Osteoporosis Mother     Coronary artery disease Father    Mingo Yrn Diabetes Father     COPD Father     No Known Problems Maternal Aunt     Breast cancer Paternal Aunt 68    No Known Problems Paternal Aunt     No Known Problems Maternal Aunt        Allergies: Allergies   Allergen Reactions    Codeine     Morphine And Related      Note:  this allergy is not being included in drug screening  Please inactivate this item and re-enter it to enable screening      Morphine Sulfate [Morphine]     Naprosyn [Naproxen]     Oxycodone     Penicillins     Sulfa Antibiotics        Medications:     Current Outpatient Medications:     azelastine (ASTELIN) 0 1 % nasal spray, 1 spray into each nostril 2 (two) times a day Use in each nostril as directed, Disp: 1 Bottle, Rfl: 6    Cholecalciferol (VITAMIN D-3 PO), Take 1,000 mg by mouth daily  , Disp: , Rfl:     cyclobenzaprine (FLEXERIL) 10 mg tablet, Take 10 mg by mouth once as needed for muscle spasms (takes one per week on average), Disp: , Rfl:     JUBLIA 10 % SOLN, APPLY TO NAILS DAILY, Disp: , Rfl:     lisinopril (ZESTRIL) 10 mg tablet, Take 10 mg by mouth daily, Disp: , Rfl:     montelukast (SINGULAIR) 10 mg tablet, TAKE 1 TABLET BY MOUTH EVERY DAY, Disp: 90 tablet, Rfl: 3    Multiple Vitamins-Minerals (CENTRUM ADULTS PO), Take 1 tablet by mouth daily, Disp: , Rfl:     Probiotic Product (PROBIOTIC PO), Take 1 tablet by mouth, Disp: , Rfl:     gabapentin (NEURONTIN) 100 mg capsule, Take 1 PO HS x 5 days, then 2 PO HS x 5 days, then 3 PO HS (Patient not taking: Reported on 3/4/2020), Disp: 90 capsule, Rfl: 1    SUPREP BOWEL PREP KIT 17 5-3 13-1 6 GM/177ML SOLN, Take 1 kit by mouth once for 1 dose Please follow instructions as per the office  , Disp: 2 Bottle, Rfl: 0      Wt Readings from Last 3 Encounters:   03/04/20 70 4 kg (155 lb 3 2 oz)   02/28/20 71 2 kg (157 lb)   02/19/20 72 1 kg (159 lb)     Temp Readings from Last 3 Encounters:   02/28/20 97 6 °F (36 4 °C) (Tympanic)   01/30/20 98 6 °F (37 °C) (Oral)   01/28/20 97 9 °F (36 6 °C) (Temporal)     BP Readings from Last 3 Encounters:   03/04/20 118/72   02/28/20 112/76   02/19/20 130/81     Pulse Readings from Last 3 Encounters:   03/04/20 86   02/28/20 86   02/19/20 89         Physical Exam   Constitutional: She is oriented to person, place, and time  She appears well-developed  HENT:   Head: Atraumatic  Eyes: EOM are normal    Neck: Normal range of motion  Cardiovascular: Normal rate, regular rhythm and normal heart sounds  Exam reveals no gallop  No murmur heard  Pulmonary/Chest: Effort normal and breath sounds normal    Abdominal: Soft  Musculoskeletal: Normal range of motion  Neurological: She is alert and oriented to person, place, and time  Skin: Skin is warm and dry  Psychiatric: She has a normal mood and affect           Laboratory Studies:  CMP:  Lab Results   Component Value Date     10/22/2015    K 4 7 10/10/2019     10/10/2019    CO2 28 10/10/2019    ANIONGAP 8 10/22/2015    BUN 16 10/10/2019    CREATININE 0 72 10/10/2019 GLUCOSE 109 10/22/2015    AST 17 10/10/2019    ALT 31 10/10/2019    BILITOT 0 79 10/22/2015    EGFR 86 10/10/2019       Lipid Profile:   Lab Results   Component Value Date    CHOL 231 10/22/2015     Lab Results   Component Value Date    HDL 59 10/10/2019     Lab Results   Component Value Date    LDLCALC 136 (H) 10/10/2019     Lab Results   Component Value Date    TRIG 161 (H) 10/10/2019       Cardiac testing:     Results for orders placed during the hospital encounter of 19   Echo complete with contrast if indicated    Narrative [address]  [address]  [phone]    Transthoracic Echocardiogram  2D, M-mode, Doppler, and Color Doppler    Study date:  11-Sep-2019    Patient: Hernesto Sawyer  MR number: [id number]  Account number: [de-identified]  : 1950  Age: 71 years  Gender: Female  Status: Outpatient  Location: [address] [place] [address] [facility]  Height: 66 in  Weight: 170 lb  BP: 132/ 84 mmHg    Indications: Shortness of breath  Diagnoses: R06 02 - Shortness of breath    Sonographer:  Sky Melvin RDCS  Primary Physician:  Rafael Mathias DO  Referring Physician:  Shelton Pradhan MD  Group:  [id number]  Interpreting Physician:  Alia Luna MD    SUMMARY    LEFT VENTRICLE:  Systolic function was normal  Ejection fraction was estimated to be 65 %  There were no regional wall motion abnormalities  Doppler parameters were consistent with abnormal left ventricular relaxation (grade 1 diastolic dysfunction)  RIGHT VENTRICLE:  The size was normal   Systolic function was normal     HISTORY: PRIOR HISTORY: GERD, hypertension    PROCEDURE: The study was performed in the [address] [facility]  This was a routine study  The transthoracic approach was used  The study included complete 2D imaging, M-mode, complete spectral Doppler, and color Doppler  The  heart rate was 74 bpm, at the start of the study   Images were obtained from the parasternal, apical, subcostal, and suprasternal notch acoustic windows  Image quality was adequate  LEFT VENTRICLE: Size was normal  Systolic function was normal  Ejection fraction was estimated to be 65 %  There were no regional wall motion abnormalities  Wall thickness was normal  There was no evidence of concentric hypertrophy  DOPPLER: Doppler parameters were consistent with abnormal left ventricular relaxation (grade 1 diastolic dysfunction)  RIGHT VENTRICLE: The size was normal  Systolic function was normal  Wall thickness was normal     LEFT ATRIUM: Size was normal     RIGHT ATRIUM: Size was normal     MITRAL VALVE: Valve structure was normal  There was normal leaflet separation  DOPPLER: The transmitral velocity was within the normal range  There was no evidence for stenosis  There was no significant regurgitation  AORTIC VALVE: The valve was trileaflet  Leaflets exhibited normal thickness and normal cuspal separation  DOPPLER: Transaortic velocity was within the normal range  There was no evidence for stenosis  There was no significant  regurgitation  TRICUSPID VALVE: The valve structure was normal  There was normal leaflet separation  DOPPLER: The transtricuspid velocity was within the normal range  There was no evidence for stenosis  There was trace regurgitation  The tricuspid jet  envelope definition was inadequate for estimation of RV systolic pressure  PULMONIC VALVE: Leaflets exhibited normal thickness, no calcification, and normal cuspal separation  DOPPLER: The transpulmonic velocity was within the normal range  There was no significant regurgitation  PERICARDIUM: There was no pericardial effusion  The pericardium was normal in appearance  AORTA: The root exhibited normal size  SYSTEMIC VEINS: IVC: The inferior vena cava was normal in size   Respirophasic changes were normal     SYSTEM MEASUREMENT TABLES    2D  %FS: 40 74 %  Ao Diam: 3 04 cm  EDV(Teich): 87 03 ml  EF(Cube): 79 19 %  EF(Teich): 71 76 %  ESV(Cube): 17 56 ml  ESV(Teich): 24 58 ml  IVSd: 0 93 cm  LA Area: 14 6 cm2  LA Diam: 2 75 cm  LVEDV MOD A4C: 74 43 ml  LVEF MOD A4C: 67 05 %  LVESV MOD A4C: 24 52 ml  LVIDd: 4 39 cm  LVIDs: 2 6 cm  LVLd A4C: 8 14 cm  LVLs A4C: 6 72 cm  LVPWd: 0 88 cm  RA Area: 14 2 cm2  RV Diam : 2 9 cm  SV MOD A4C: 49 91 ml  SV(Cube): 66 82 ml  SV(Teich): 62 45 ml    MM  TAPSE: 2 29 cm    PW  E': 0 05 m/s  E/E': 12 7  MV A [place]lin: 0 97 m/s  MV Dec [place]: 2 38 m/s2  MV DecT: 270 44 ms  MV E Erlin: 0 64 m/s  MV E/A Ratio: 0 66    [employer] Accredited Echocardiography Laboratory    Prepared and electronically signed by    Alia Luna MD  Signed 11-Sep-2019 10:37:31

## 2020-03-05 ENCOUNTER — OFFICE VISIT (OUTPATIENT)
Dept: OBGYN CLINIC | Facility: CLINIC | Age: 70
End: 2020-03-05
Payer: COMMERCIAL

## 2020-03-05 VITALS
WEIGHT: 155 LBS | BODY MASS INDEX: 24.33 KG/M2 | HEART RATE: 88 BPM | HEIGHT: 67 IN | DIASTOLIC BLOOD PRESSURE: 88 MMHG | SYSTOLIC BLOOD PRESSURE: 137 MMHG

## 2020-03-05 DIAGNOSIS — G56.31 RADIAL NERVE IRRITATION, RIGHT: ICD-10-CM

## 2020-03-05 DIAGNOSIS — G56.31 RADIAL TUNNEL SYNDROME, RIGHT: Primary | ICD-10-CM

## 2020-03-05 PROCEDURE — 99213 OFFICE O/P EST LOW 20 MIN: CPT | Performed by: SURGERY

## 2020-03-05 NOTE — PROGRESS NOTES
ASSESSMENT/PLAN:      71 y o  female with right radial tunnel syndrome and right radial sensory nerve irritation  It was discussed with Becca Scott today that her radial tunnel pain seems to be more muscular then nerve related  With regards to her dorsal radial sensory nerve irritation, a CSI may be performed vs grastin technique with therapy  At this time Becca Scott will transition to a HEP  If her symptoms have not improved in aprox  2 months time she may undergo a CSI  The patient verbalized understanding of exam findings and treatment plan  We engaged in the shared decision-making process and treatment options were discussed at length with the patient  Surgical and conservative management discussed today along with risks and benefits  Diagnoses and all orders for this visit:    Radial tunnel syndrome, right  -     Ambulatory referral to PT/OT hand therapy; Future    Radial nerve irritation, right  -     Ambulatory referral to PT/OT hand therapy; Future      Follow Up:  Return if symptoms worsen or fail to improve  To Do Next Visit:  Re-evaluation of current issue    ____________________________________________________________________________________________________________________________________________      CHIEF COMPLAINT:  Chief Complaint   Patient presents with    Right Arm - Follow-up       SUBJECTIVE:  Melinda Duvall is a 71y o  year old RHD female who presents to the office today for a follow up regarding right radial tunnel syndrome  Becca Scott underwent an ultrasound guided radial tunnel CSI on 02/06/2020  She as been attending OT  Becca Sonia states that overall her pain has improved  She notes mild pain with supination of her forearm when her elbow is fully extended  She also notes + tinel's sign over her dorsal radial sensory nerve  I have personally reviewed all the relevant PMH, PSH, SH, FH, Medications and allergies       PAST MEDICAL HISTORY:  Past Medical History:   Diagnosis Date    [facility]    age 62    [facility] )     left     Chronic low back pain     Clostridium difficile colitis     DDD (degenerative disc disease), lumbar     GERD (gastroesophageal reflux disease)     History of radiation therapy 09/2008    for breast cancer    Hypertension     Laryngopharyngeal reflux (LPR)     Lumbar spinal stenosis     Osteoarthritis of spine with radiculopathy, lumbar region     Osteopenia     Piriformis syndrome of left side     Sciatica     Spondylosis of cervical region without myelopathy or radiculopathy        PAST SURGICAL HISTORY:  Past Surgical History:   Procedure Laterality Date    BREAST BIOPSY Left 07/30/2008    malignant    BREAST CYST ASPIRATION Left 1998    BREAST LUMPECTOMY Left 08/21/2008    BREAST SURGERY      COLONOSCOPY  2015    [place] ESOPHAGOGASTRODUODENOSCOPY TRANSORAL DIAGNOSTIC N/A 2/15/2017    Procedure: ESOPHAGOGASTRODUODENOSCOPY (EGD); Surgeon: Shamika Yates MD;  [facility]; Service: Gastroenterology    [place] ESOPHAGOGASTRODUODENOSCOPY TRANSORAL DIAGNOSTIC N/A 2/1/2018    Procedure: ESOPHAGOGASTRODUODENOSCOPY (EGD); Surgeon: Shamika Yates MD;  [facility];   Service: Gastroenterology    UPPER GASTROINTESTINAL ENDOSCOPY  01/2020    US GUIDED MSK PROCEDURE  2/6/2020       FAMILY HISTORY:  Family History   Problem Relation Age of Onset    Osteoporosis Mother     Coronary artery disease Father     Diabetes Father    Ginny Joe COPD Father     No Known Problems Maternal Aunt     Breast cancer Paternal Aunt 68    No Known Problems Paternal Aunt     No Known Problems Maternal Aunt        SOCIAL HISTORY:  Social History     Tobacco Use    Smoking status: Former Smoker    Smokeless tobacco: Never Used   Substance Use Topics    Alcohol use: Yes     Frequency: 2-3 times a week     Drinks per session: 1 or 2     Comment: social    Drug use: No       MEDICATIONS:    Current Outpatient Medications:     azelastine (ASTELIN) 0 1 % nasal spray, 1 spray into each nostril 2 (two) times a day Use in each nostril as directed, Disp: 1 Bottle, Rfl: 6    Cholecalciferol (VITAMIN D-3 PO), Take 1,000 mg by mouth daily  , Disp: , Rfl:     cyclobenzaprine (FLEXERIL) 10 mg tablet, Take 10 mg by mouth once as needed for muscle spasms (takes one per week on average), Disp: , Rfl:     JUBLIA 10 % SOLN, APPLY TO NAILS DAILY, Disp: , Rfl:     lisinopril (ZESTRIL) 10 mg tablet, Take 10 mg by mouth daily, Disp: , Rfl:     montelukast (SINGULAIR) 10 mg tablet, TAKE 1 TABLET BY MOUTH EVERY DAY, Disp: 90 tablet, Rfl: 3    Multiple Vitamins-Minerals (CENTRUM ADULTS PO), Take 1 tablet by mouth daily, Disp: , Rfl:     Probiotic Product (PROBIOTIC PO), Take 1 tablet by mouth, Disp: , Rfl:     gabapentin (NEURONTIN) 100 mg capsule, Take 1 PO HS x 5 days, then 2 PO HS x 5 days, then 3 PO HS (Patient not taking: Reported on 3/4/2020), Disp: 90 capsule, Rfl: 1    SUPREP BOWEL PREP KIT 17 5-3 13-1 6 GM/177ML SOLN, Take 1 kit by mouth once for 1 dose Please follow instructions as per the office  , Disp: 2 Bottle, Rfl: 0    ALLERGIES:  Allergies   Allergen Reactions    Codeine     Morphine And Related      Note:  this allergy is not being included in drug screening  Please inactivate this item and re-enter it to enable screening   Morphine Sulfate [Morphine]     Naprosyn [Naproxen]     Oxycodone     Penicillins     Sulfa Antibiotics        REVIEW OF SYSTEMS:  Review of Systems   Constitutional: Negative for chills, fever and unexpected weight change  HENT: Negative for hearing loss, nosebleeds and sore throat  Eyes: Negative for pain, redness and visual disturbance  Respiratory: Negative for cough, shortness of breath and wheezing  Cardiovascular: Negative for chest pain, palpitations and leg swelling  Gastrointestinal: Negative for abdominal pain, nausea and vomiting  Endocrine: Negative for polydipsia and polyuria     Genitourinary: Negative for difficulty urinating and hematuria  Musculoskeletal: Negative for arthralgias, joint swelling and myalgias  Skin: Negative for rash and wound  Neurological: Negative for dizziness, numbness and headaches  Psychiatric/Behavioral: Negative for decreased concentration, dysphoric mood and suicidal ideas  The patient is not nervous/anxious  VITALS:  Vitals:    03/05/20 1350   BP: 137/88   Pulse: 88       LABS:  HgA1c:   Lab Results   Component Value Date    HGBA1C 6 0 10/10/2019     BMP:   Lab Results   Component Value Date    GLUCOSE 109 10/22/2015    CALCIUM 10 0 10/10/2019     10/22/2015    K 4 7 10/10/2019    CO2 28 10/10/2019     10/10/2019    BUN 16 10/10/2019    CREATININE 0 72 10/10/2019       _____________________________________________________  PHYSICAL EXAMINATION:  General: well developed and well nourished, alert, oriented times 3 and appears comfortable  Psychiatric: Normal  HEENT: Normocephalic, Atraumatic Trachea Midline, No torticollis  Pulmonary: No audible wheezing or respiratory distress   Cardiovascular: No pitting edema, 2+ radial pulse   Skin: No masses, erythema, lacerations, fluctation, ulcerations  Neurovascular: Sensation Intact to the Median, Ulnar, Radial Nerve, Motor Intact to the Median, Ulnar, Radial Nerve and Pulses Intact  Musculoskeletal: Normal, except as noted in detailed exam and in HPI        MUSCULOSKELETAL EXAMINATION:    Right elbow:     No erythema  No ecchymosis   No edema  Non tender to palpation lateral epicondyle   Tender to palpation radial tunnel   Full elbow ROM   Slight discomfort with forearm supination with the elbow fully extended   + tinel's over the dorsal radial sensory nerve   2+ radial pulse     ___________________________________________________  STUDIES REVIEWED:  No new imaging to review           PROCEDURES PERFORMED:  Procedures  No Procedures performed today    _____________________________________________________      [employer] Attestation    I,:   Sandy Fernandez am acting as a scribe while in the presence of the attending physician :        I,:   Riccardo Lucas MD personally performed the services described in this documentation    as scribed in my presence :

## 2020-03-06 ENCOUNTER — OFFICE VISIT (OUTPATIENT)
Dept: OCCUPATIONAL THERAPY | Age: 70
End: 2020-03-06
Payer: COMMERCIAL

## 2020-03-06 ENCOUNTER — OFFICE VISIT (OUTPATIENT)
Dept: PHYSICAL THERAPY | Age: 70
End: 2020-03-06
Payer: COMMERCIAL

## 2020-03-06 DIAGNOSIS — G56.31 RADIAL NEURITIS, RIGHT: Primary | ICD-10-CM

## 2020-03-06 DIAGNOSIS — M25.511 CHRONIC RIGHT SHOULDER PAIN: Primary | ICD-10-CM

## 2020-03-06 DIAGNOSIS — M77.11 RIGHT LATERAL EPICONDYLITIS: ICD-10-CM

## 2020-03-06 DIAGNOSIS — G89.29 CHRONIC RIGHT SHOULDER PAIN: Primary | ICD-10-CM

## 2020-03-06 PROCEDURE — 97110 THERAPEUTIC EXERCISES: CPT | Performed by: PHYSICAL THERAPIST

## 2020-03-06 PROCEDURE — 97110 THERAPEUTIC EXERCISES: CPT

## 2020-03-06 NOTE — PROGRESS NOTES
Daily Note     Today's date: 3/6/2020  Patient name: Kim Nolasco  : 1950  MRN: [id number]  Referring provider: Shahnaz Parada  Dx:   Encounter Diagnosis     ICD-10-CM    1  Chronic right shoulder pain M25 511     G89 29                   Subjective: Overall, feeling good  Objective: See treatment diary below    Manual  3/6            UBE 6 minutes            PROM to R shoulder completed            Graston and STM to R UT region 15 minutes                         Cervical SB stretch to L only completed                Exercise Diary                           Supine cane flexion x10 reps hold 10 sec                         Prone T 3x10            Lifefitness rows TB - rows and shld ext - 2x10                                                                                                                                                                                                                   Modalities                                                            Assessment: Tolerated treatment well  Patient would benefit from continued PT      Plan: Continue per plan of care        Precautions: universal

## 2020-03-06 NOTE — PROGRESS NOTES
Daily Note     Today's date: 3/6/2020  Patient name: Rosie Gold  : 1950  MRN: [id number]  Referring provider: Radha Rodriguez MD  Dx:   Encounter Diagnosis     ICD-10-CM    1  Radial neuritis, right G56 31    2  Right lateral epicondylitis M77 11                   Subjective:   Patient expressing feeling of altered sensation distal radial wrist          Objective: See treatment diary below  (+) dorsal radial sensory nerve irritation  Instructed patient to make sure she is not applying her night time brace tight around the wrist   If symptoms persist, will fabricate a volar custom splint to make sure no pressure on DRSN  (-) tenderness lateral epicondyle  Assessment: Tolerated treatment well  Patient would benefit from continued OT  Pain subsiding in UE and upper body  Plan: cont therapy x 1 wks  Patient feels ready for HEP     Precautions: universal       Manual  1/27 1/31  2/3  2/12  2/14  2/17  2/19  2/24  3/3 3/6   Massage to lateral epicondyle/extensors, radial wrist          IASTM lateral epic  8'  IASTM lat   Epic 10'  10'  10'  IASTM  8' 8'   IASTM to lateral elbow 5' 10'  10'  hold due to injection              Nerve glides-radial,median 10' 10'  10'  10'  12'  10'  10'  5'  5'                                                         Exercise Diary  1/27  2/3  2/12  2/17  2/24  3/3  3/6     1/23   Forearm flexor and extensor stretches      wrist bar  3x10        wrist bar flexion for stretching 2x10         Eccentric wrist extensor strengthening         1# 3x10  RPB 2 x10  2x12         EDC RB  RB  RB    RB  RB       RB                           Rotator cuff strengthening                       Isometrics wrist extension HEP                 Instructed HEP, red power bar 2 x10 hold 15secs   Wrist flexion 1# 3x10                      ROM wrist forearm     Wrist maze      2# 3x10            Wrist PRE's                       Radial nerve glides               cane behind back and glide radial nerve                                                                                                                                                                                                                                                                 Modalities  3/3             1/23   E-stim                  MH to dorsal forearm 10'             10-15min

## 2020-03-10 ENCOUNTER — OFFICE VISIT (OUTPATIENT)
Dept: OCCUPATIONAL THERAPY | Age: 70
End: 2020-03-10
Payer: COMMERCIAL

## 2020-03-10 ENCOUNTER — OFFICE VISIT (OUTPATIENT)
Dept: PHYSICAL THERAPY | Age: 70
End: 2020-03-10
Payer: COMMERCIAL

## 2020-03-10 DIAGNOSIS — G56.31 RADIAL NEURITIS, RIGHT: Primary | ICD-10-CM

## 2020-03-10 DIAGNOSIS — G89.29 CHRONIC RIGHT SHOULDER PAIN: Primary | ICD-10-CM

## 2020-03-10 DIAGNOSIS — M25.511 CHRONIC RIGHT SHOULDER PAIN: Primary | ICD-10-CM

## 2020-03-10 PROCEDURE — 97110 THERAPEUTIC EXERCISES: CPT | Performed by: PHYSICAL THERAPIST

## 2020-03-10 PROCEDURE — 97140 MANUAL THERAPY 1/> REGIONS: CPT

## 2020-03-10 NOTE — PROGRESS NOTES
Daily Note     Today's date: 3/10/2020  Patient name: Melinda Duvall  : 1950  MRN: [id number]  Referring provider: Mirian Donnelyl MD  Dx:   Encounter Diagnosis     ICD-10-CM    1  Radial neuritis, right G56 31                   Subjective:   Patient expressing feeling of altered sensation distal radial wrist          Objective: See treatment diary below  Still having neural tension with radial nerve  Glides  Assessment: Tolerated treatment well  Patient would benefit from continued OT  Pain subsiding in UE and upper body  Plan: cont therapy x 1 wks  Fabricate a custom wrist orthosis without pressure over SSBRN     Splint needs to get insurance approval     Precautions: universal       Manual  3/9  3/10          Massage to lateral epicondyle/extensors, radial wrist             IASTM to lateral elbow   8          Nerve glides-radial,median   10                                            Exercise Diary  1/27  2/3  2/12  2/17  2/24  3/3  3/6  3/10   1/23   Forearm flexor and extensor stretches      wrist bar  3x10        wrist bar flexion for stretching 2x10  10x       Eccentric wrist extensor strengthening         1# 3x10  RPB 2 x10  2x12         EDC RB  RB  RB    RB  RB       RB                           Rotator cuff strengthening                       Isometrics wrist extension HEP                 Instructed HEP, red power bar 2 x10 hold 15secs   Wrist flexion 1# 3x10                      ROM wrist forearm     Wrist maze      2# 3x10            Wrist PRE's                       Radial nerve glides               cane behind back and glide radial nerve                                                                                                                                                                                                                                                                 Modalities  3/3 3/10            1/23   E-stim                  MH to dorsal forearm 10' 10'            10-15min

## 2020-03-10 NOTE — PROGRESS NOTES
Daily Note     Today's date: 3/10/2020  Patient name: Rachel Yates  : 1950  MRN: [id number]  Referring provider: Sedrick Polk  Dx:   Encounter Diagnosis     ICD-10-CM    1  Chronic right shoulder pain M25 511     G89 29                   Subjective: Patient still feeling good  Objective: See treatment diary below    Manual  3/6 3/10           UBE 6 minutes 6 minutes           PROM to R shoulder completed completed           Graston and STM to R UT region 15 minutes 15 minutes                        Cervical SB stretch to L only completed completed               Exercise Diary                           Supine cane flexion x10 reps hold 10 sec x10 reps hold 10 sec                        Prone T 3x10 3x10           Lifefitness rows TB - rows and shld ext - 2x10 Johnny today                                                                                                                                                                                                                  Modalities                                                                Assessment: Tolerated treatment well  Patient would benefit from continued PT      Plan: Continue per plan of care        Precautions: universal

## 2020-03-11 ENCOUNTER — HOSPITAL ENCOUNTER (OUTPATIENT)
Dept: RADIOLOGY | Facility: CLINIC | Age: 70
Discharge: HOME | End: 2020-03-11
Attending: ANESTHESIOLOGY | Admitting: ANESTHESIOLOGY
Payer: COMMERCIAL

## 2020-03-11 VITALS
SYSTOLIC BLOOD PRESSURE: 138 MMHG | OXYGEN SATURATION: 97 % | RESPIRATION RATE: 20 BRPM | DIASTOLIC BLOOD PRESSURE: 86 MMHG | TEMPERATURE: 98.1 F | HEART RATE: 93 BPM

## 2020-03-11 DIAGNOSIS — M54.16 LUMBAR RADICULOPATHY: ICD-10-CM

## 2020-03-11 PROCEDURE — 64483 NJX AA&/STRD TFRM EPI L/S 1: CPT | Performed by: ANESTHESIOLOGY

## 2020-03-11 PROCEDURE — 64484 NJX AA&/STRD TFRM EPI L/S EA: CPT | Performed by: ANESTHESIOLOGY

## 2020-03-11 RX ORDER — LIDOCAINE HYDROCHLORIDE 10 MG/ML
5 INJECTION, SOLUTION EPIDURAL; INFILTRATION; INTRACAUDAL; PERINEURAL ONCE
Status: COMPLETED | OUTPATIENT
Start: 2020-03-11 | End: 2020-03-11

## 2020-03-11 RX ORDER — PAPAVERINE HCL 150 MG
20 CAPSULE, EXTENDED RELEASE ORAL ONCE
Status: COMPLETED | OUTPATIENT
Start: 2020-03-11 | End: 2020-03-11

## 2020-03-11 RX ADMIN — DEXAMETHASONE SODIUM PHOSPHATE 15 MG: 10 INJECTION, SOLUTION INTRAMUSCULAR; INTRAVENOUS at 13:23

## 2020-03-11 RX ADMIN — LIDOCAINE HYDROCHLORIDE 2 ML: 20 INJECTION, SOLUTION EPIDURAL; INFILTRATION; INTRACAUDAL; PERINEURAL at 13:23

## 2020-03-11 RX ADMIN — LIDOCAINE HYDROCHLORIDE 4 ML: 10 INJECTION, SOLUTION EPIDURAL; INFILTRATION; INTRACAUDAL; PERINEURAL at 13:18

## 2020-03-11 RX ADMIN — IOHEXOL 2 ML: 300 INJECTION, SOLUTION INTRAVENOUS at 13:21

## 2020-03-11 NOTE — DISCHARGE INSTRUCTIONS
Epidural Steroid Injection   WHAT YOU NEED TO KNOW:   An epidural steroid injection (BERT) is a procedure to inject steroid medicine into the epidural space  The epidural space is between your spinal cord and vertebrae  Steroids reduce inflammation and fluid buildup in your spine that may be causing pain  You may be given pain medicine along with the steroids  ACTIVITY  · Do not drive or operate machinery today  · No strenuous activity today - bending, lifting, etc   · You may resume normal activites starting tomorrow - start slowly and as tolerated  · You may shower today, but no tub baths or hot tubs  · You may have numbness for several hours from the local anesthetic  Please use caution and common sense, especially with weight-bearing activities  CARE OF THE INJECTION SITE  · If you have soreness or pain, apply ice to the area today (20 minutes on/20 minutes off)  · Starting tomorrow, you may use warm, moist heat or ice if needed  · You may have an increase or change in your discomfort for 36-48 hours after your treatment  · Apply ice and continue with any pain medication you have been prescribed  · Notify [facility] if you have any of the following: redness, drainage, swelling, headache, stiff neck or fever above 100°F     SPECIAL INSTRUCTIONS  · Our office will contact you in approximately 7 days for a progress report  MEDICATIONS  · Continue to take all routine medications  · Our office may have instructed you to hold some medications  If you have a problem specifically related to your procedure, please call our office at [phone]  Problems not related to your procedure should be directed to your primary care physician

## 2020-03-11 NOTE — H&P
History of Present Illness: The patient is a 71 y o  female who presents with complaints of low back and leg pain  Patient Active Problem List   Diagnosis    Lumbar radiculopathy    Chronic bilateral low back pain with right-sided sciatica    Sacroiliitis (HCC)    Lumbar spondylosis    Trochanteric bursitis of right hip    Spinal stenosis of lumbar region    Subacromial bursitis of right shoulder joint    Dysphonia    Pharyngoesophageal dysphagia    Reflux laryngitis    Gastroesophageal reflux disease    Paresis of right vocal fold    Laryngeal edema    Allergic rhinitis    Chronic rhinitis    Esophageal dysmotilities    Dyspepsia    Cough    Dyspnea on exertion    Essential hypertension    Nausea    Glottic insufficiency    Vocal fold paresis, right    Muscle tension dysphonia    Radial neuritis, right    Arthritis of right acromioclavicular joint    Lateral epicondylitis of right elbow    Colon polyp       Past Medical History:   Diagnosis Date    [facility]    age 62    [facility]     left     Chronic low back pain     Clostridium difficile colitis     DDD (degenerative disc disease), lumbar     GERD (gastroesophageal reflux disease)     History of radiation therapy 09/2008    for breast cancer    Hypertension     Laryngopharyngeal reflux (LPR)     Lumbar spinal stenosis     Osteoarthritis of spine with radiculopathy, lumbar region     Osteopenia     Piriformis syndrome of left side     Sciatica     Spondylosis of cervical region without myelopathy or radiculopathy        Past Surgical History:   Procedure Laterality Date    BREAST BIOPSY Left 07/30/2008    malignant    BREAST CYST ASPIRATION Left 1998    BREAST LUMPECTOMY Left 08/21/2008    BREAST SURGERY      COLONOSCOPY  2015    [place] ESOPHAGOGASTRODUODENOSCOPY TRANSORAL DIAGNOSTIC N/A 2/15/2017    Procedure: ESOPHAGOGASTRODUODENOSCOPY (EGD);   Surgeon: Jovanna Cameron MD;  [facility];  Service: Gastroenterology    [place] ESOPHAGOGASTRODUODENOSCOPY TRANSORAL DIAGNOSTIC N/A 2/1/2018    Procedure: ESOPHAGOGASTRODUODENOSCOPY (EGD); Surgeon: Belén Reynoso MD;  [facility]; Service: Gastroenterology    UPPER GASTROINTESTINAL ENDOSCOPY  01/2020    US GUIDED MSK PROCEDURE  2/6/2020         Current Outpatient Medications:     azelastine (ASTELIN) 0 1 % nasal spray, 1 spray into each nostril 2 (two) times a day Use in each nostril as directed, Disp: 1 Bottle, Rfl: 6    Cholecalciferol (VITAMIN D-3 PO), Take 1,000 mg by mouth daily  , Disp: , Rfl:     cyclobenzaprine (FLEXERIL) 10 mg tablet, Take 10 mg by mouth once as needed for muscle spasms (takes one per week on average), Disp: , Rfl:     gabapentin (NEURONTIN) 100 mg capsule, Take 1 PO HS x 5 days, then 2 PO HS x 5 days, then 3 PO HS (Patient not taking: Reported on 3/4/2020), Disp: 90 capsule, Rfl: 1    JUBLIA 10 % SOLN, APPLY TO NAILS DAILY, Disp: , Rfl:     lisinopril (ZESTRIL) 10 mg tablet, Take 10 mg by mouth daily, Disp: , Rfl:     montelukast (SINGULAIR) 10 mg tablet, TAKE 1 TABLET BY MOUTH EVERY DAY, Disp: 90 tablet, Rfl: 3    Multiple Vitamins-Minerals (CENTRUM ADULTS PO), Take 1 tablet by mouth daily, Disp: , Rfl:     Probiotic Product (PROBIOTIC PO), Take 1 tablet by mouth, Disp: , Rfl:     SUPREP BOWEL PREP KIT 17 5-3 13-1 6 GM/177ML SOLN, Take 1 kit by mouth once for 1 dose Please follow instructions as per the office  , Disp: 2 Bottle, Rfl: 0    Allergies   Allergen Reactions    Codeine     Morphine And Related      Note:  this allergy is not being included in drug screening  Please inactivate this item and re-enter it to enable screening   Morphine Sulfate [Morphine]     Naprosyn [Naproxen]     Oxycodone     Penicillins     Sulfa Antibiotics        Physical Exam: There were no vitals filed for this visit    General: Awake, Alert, Oriented x 3, Mood and affect appropriate  Respiratory: Respirations even and unlabored  Cardiovascular: Peripheral pulses intact; no edema  Musculoskeletal Exam:  Right lumbar paraspinals tender to palpation  ASA Score: 2         Assessment:   1   Lumbar radiculopathy        Plan: Right L4 and L5 TFESI

## 2020-03-12 ENCOUNTER — OFFICE VISIT (OUTPATIENT)
Dept: PHYSICAL THERAPY | Age: 70
End: 2020-03-12
Payer: COMMERCIAL

## 2020-03-12 ENCOUNTER — OFFICE VISIT (OUTPATIENT)
Dept: OCCUPATIONAL THERAPY | Age: 70
End: 2020-03-12
Payer: COMMERCIAL

## 2020-03-12 DIAGNOSIS — M25.511 CHRONIC RIGHT SHOULDER PAIN: Primary | ICD-10-CM

## 2020-03-12 DIAGNOSIS — G56.31 RADIAL NEURITIS, RIGHT: Primary | ICD-10-CM

## 2020-03-12 DIAGNOSIS — G89.29 CHRONIC RIGHT SHOULDER PAIN: Primary | ICD-10-CM

## 2020-03-12 DIAGNOSIS — M77.11 RIGHT LATERAL EPICONDYLITIS: ICD-10-CM

## 2020-03-12 PROCEDURE — 97110 THERAPEUTIC EXERCISES: CPT | Performed by: PHYSICAL THERAPIST

## 2020-03-12 PROCEDURE — L3906 WHO W/O JOINTS CF: HCPCS

## 2020-03-13 NOTE — PROGRESS NOTES
Daily Note     Today's date: 3/12/2020  Patient name: Antonia Mims  : 1950  MRN: [id number]  Referring provider: Koko Shelley  Dx:   Encounter Diagnosis     ICD-10-CM    1  Chronic right shoulder pain M25 511     G89 29                   Subjective: Feeling good  Objective: See treatment diary below    Manual  3/6 3/10 3/12          UBE 6 minutes 6 minutes 6 minutes          PROM to R shoulder completed completed completed          Graston and STM to R UT region 15 minutes 15 minutes 15 minutes                       Cervical SB stretch to L only completed completed completed              Exercise Diary                           Supine cane flexion x10 reps hold 10 sec x10 reps hold 10 sec x10 reps hold 10 sec                       Prone T 3x10 3x10 3x10          Lifefitness rows TB - rows and shld ext - 2x10 Gregory today Gregory today                                                                                                                                                                                                                 Modalities                                                                Assessment: Tolerated treatment well  Patient would benefit from continued PT      Plan: Continue per plan of care        Precautions: universal

## 2020-03-18 ENCOUNTER — TELEPHONE (OUTPATIENT)
Dept: PAIN MEDICINE | Facility: CLINIC | Age: 70
End: 2020-03-18

## 2020-03-25 NOTE — TELEPHONE ENCOUNTER
Patient states that she is still having a lot of pain  Patient states that she has some numbness in her right leg      Pain scale is 10/10 with 0% improvement

## 2020-04-08 ENCOUNTER — TELEMEDICINE (OUTPATIENT)
Dept: PAIN MEDICINE | Facility: CLINIC | Age: 70
End: 2020-04-08
Payer: COMMERCIAL

## 2020-04-08 DIAGNOSIS — M79.18 MYOFASCIAL PAIN SYNDROME: ICD-10-CM

## 2020-04-08 DIAGNOSIS — M48.061 SPINAL STENOSIS OF LUMBAR REGION, UNSPECIFIED WHETHER NEUROGENIC CLAUDICATION PRESENT: ICD-10-CM

## 2020-04-08 DIAGNOSIS — M54.41 CHRONIC BILATERAL LOW BACK PAIN WITH RIGHT-SIDED SCIATICA: ICD-10-CM

## 2020-04-08 DIAGNOSIS — G89.29 CHRONIC BILATERAL LOW BACK PAIN WITH RIGHT-SIDED SCIATICA: ICD-10-CM

## 2020-04-08 DIAGNOSIS — M54.16 LUMBAR RADICULOPATHY: Primary | ICD-10-CM

## 2020-04-08 PROCEDURE — G2012 BRIEF CHECK IN BY MD/QHP: HCPCS | Performed by: NURSE PRACTITIONER

## 2020-04-08 RX ORDER — TIZANIDINE 2 MG/1
2 TABLET ORAL EVERY 8 HOURS PRN
Qty: 90 TABLET | Refills: 0 | Status: SHIPPED | OUTPATIENT
Start: 2020-04-08 | End: 2021-05-28 | Stop reason: ALTCHOICE

## 2020-04-30 DIAGNOSIS — M79.18 MYOFASCIAL PAIN SYNDROME: ICD-10-CM

## 2020-05-01 RX ORDER — TIZANIDINE 2 MG/1
TABLET ORAL
Qty: 90 TABLET | Refills: 0 | OUTPATIENT
Start: 2020-05-01

## 2020-05-07 ENCOUNTER — TELEPHONE (OUTPATIENT)
Dept: PAIN MEDICINE | Facility: CLINIC | Age: 70
End: 2020-05-07

## 2020-05-18 PROBLEM — K62.89 RECTAL PAIN: Status: ACTIVE | Noted: 2020-05-18

## 2020-07-20 ENCOUNTER — HOSPITAL ENCOUNTER (OUTPATIENT)
Dept: RADIOLOGY | Facility: HOSPITAL | Age: 70
Discharge: HOME | End: 2020-07-20
Payer: COMMERCIAL

## 2020-07-20 DIAGNOSIS — M54.16 LUMBAR RADICULOPATHY: ICD-10-CM

## 2020-07-20 PROCEDURE — 72148 MRI LUMBAR SPINE W/O DYE: CPT

## 2020-07-21 ENCOUNTER — TELEPHONE (OUTPATIENT)
Dept: PAIN MEDICINE | Facility: CLINIC | Age: 70
End: 2020-07-21

## 2020-07-21 NOTE — TELEPHONE ENCOUNTER
Per radiologist report for updated MRI lumbar spine, disease and stenosis has progressed since last imaging in 2016  Since we've done epidurals without relief, would she want ar referral for a surgical evaluation at this time?

## 2020-07-21 NOTE — TELEPHONE ENCOUNTER
S/w the patient and reviewed  She stated she just wakes up in the am in pain  For the past 3 to 4 weeks the pain goes down the side of her leg and more into her hip  She has an ovs scheduled scheduled for next Tuesday  Just FYI

## 2020-07-28 ENCOUNTER — TRANSCRIBE ORDERS (OUTPATIENT)
Dept: ADMINISTRATIVE | Age: 70
End: 2020-07-28

## 2020-07-28 ENCOUNTER — OFFICE VISIT (OUTPATIENT)
Dept: PAIN MEDICINE | Facility: CLINIC | Age: 70
End: 2020-07-28
Payer: COMMERCIAL

## 2020-07-28 ENCOUNTER — APPOINTMENT (OUTPATIENT)
Dept: LAB | Age: 70
End: 2020-07-28
Payer: COMMERCIAL

## 2020-07-28 VITALS
HEIGHT: 67 IN | SYSTOLIC BLOOD PRESSURE: 138 MMHG | DIASTOLIC BLOOD PRESSURE: 92 MMHG | BODY MASS INDEX: 24.96 KG/M2 | WEIGHT: 159 LBS | TEMPERATURE: 97.4 F | HEART RATE: 77 BPM

## 2020-07-28 DIAGNOSIS — M48.061 SPINAL STENOSIS OF LUMBAR REGION, UNSPECIFIED WHETHER NEUROGENIC CLAUDICATION PRESENT: ICD-10-CM

## 2020-07-28 DIAGNOSIS — M70.61 TROCHANTERIC BURSITIS OF RIGHT HIP: ICD-10-CM

## 2020-07-28 DIAGNOSIS — R73.01 IMPAIRED FASTING GLUCOSE: ICD-10-CM

## 2020-07-28 DIAGNOSIS — A09 INFECTIOUS GASTROENTERITIS AND COLITIS: ICD-10-CM

## 2020-07-28 DIAGNOSIS — E78.5 HYPERLIPIDEMIA, UNSPECIFIED HYPERLIPIDEMIA TYPE: ICD-10-CM

## 2020-07-28 DIAGNOSIS — M54.41 CHRONIC BILATERAL LOW BACK PAIN WITH RIGHT-SIDED SCIATICA: ICD-10-CM

## 2020-07-28 DIAGNOSIS — M54.16 LUMBAR RADICULOPATHY: Primary | ICD-10-CM

## 2020-07-28 DIAGNOSIS — E78.5 HYPERLIPIDEMIA, UNSPECIFIED HYPERLIPIDEMIA TYPE: Primary | ICD-10-CM

## 2020-07-28 DIAGNOSIS — G89.29 CHRONIC BILATERAL LOW BACK PAIN WITH RIGHT-SIDED SCIATICA: ICD-10-CM

## 2020-07-28 LAB
ALBUMIN SERPL BCP-MCNC: 4 G/DL (ref 3.5–5)
ALP SERPL-CCNC: 69 U/L (ref 46–116)
ALT SERPL W P-5'-P-CCNC: 23 U/L (ref 12–78)
ANION GAP SERPL CALCULATED.3IONS-SCNC: 5 MMOL/L (ref 4–13)
AST SERPL W P-5'-P-CCNC: 12 U/L (ref 5–45)
BILIRUB SERPL-MCNC: 0.98 MG/DL (ref 0.2–1)
BUN SERPL-MCNC: 20 MG/DL (ref 5–25)
CALCIUM SERPL-MCNC: 9.6 MG/DL (ref 8.3–10.1)
CHLORIDE SERPL-SCNC: 108 MMOL/L (ref 100–108)
CHOLEST SERPL-MCNC: 229 MG/DL (ref 50–200)
CO2 SERPL-SCNC: 29 MMOL/L (ref 21–32)
CREAT SERPL-MCNC: 0.65 MG/DL (ref 0.6–1.3)
EST. AVERAGE GLUCOSE BLD GHB EST-MCNC: 114 MG/DL
GFR SERPL CREATININE-BSD FRML MDRD: 90 ML/MIN/1.73SQ M
GLUCOSE P FAST SERPL-MCNC: 113 MG/DL (ref 65–99)
HBA1C MFR BLD: 5.6 %
HDLC SERPL-MCNC: 69 MG/DL
LDLC SERPL CALC-MCNC: 119 MG/DL (ref 0–100)
NONHDLC SERPL-MCNC: 160 MG/DL
POTASSIUM SERPL-SCNC: 4.9 MMOL/L (ref 3.5–5.3)
PROT SERPL-MCNC: 7.2 G/DL (ref 6.4–8.2)
SODIUM SERPL-SCNC: 142 MMOL/L (ref 136–145)
TRIGL SERPL-MCNC: 206 MG/DL

## 2020-07-28 PROCEDURE — 83036 HEMOGLOBIN GLYCOSYLATED A1C: CPT

## 2020-07-28 PROCEDURE — 99214 OFFICE O/P EST MOD 30 MIN: CPT | Performed by: NURSE PRACTITIONER

## 2020-07-28 PROCEDURE — 36415 COLL VENOUS BLD VENIPUNCTURE: CPT

## 2020-07-28 PROCEDURE — 80053 COMPREHEN METABOLIC PANEL: CPT

## 2020-07-28 PROCEDURE — 80061 LIPID PANEL: CPT

## 2020-07-28 NOTE — PROGRESS NOTES
Assessment:  1  Lumbar radiculopathy    2  Chronic bilateral low back pain with right-sided sciatica    3  Trochanteric bursitis of right hip    4  Spinal stenosis of lumbar region, unspecified whether neurogenic claudication present        Plan:  1  I will order an EMG of the RLE  2  The patient never initiated gabapentin  She'd like to hold off on this at this time  3  Patient may continue ibuprofen p r n  and should not exceed more than 800 mg q 8 hours p r n   4  The patient will continue with her home exercise program  5  May refer the patient back to Dr Lisa Lawton if she fails conservative treatment  6  I will follow up with the patient after the EMG or sooner if needed  The patient was advised to contact the office should their symptoms worsen in the interim  The patient was agreeable and verbalized an understanding  M*[employer] software was used to dictate this note  It may contain errors with dictating incorrect words or incorrect spelling  Please contact the provider directly with any questions  History of Present Illness: The patient is a 79 y o  female last seen on 04/08/20 who presents for a follow up office visit in regards to chronic right lower extremity pain will radiate to the foot with associated numbness, tingling and subjective weakness secondary to lumbar degenerative disc disease, lumbar stenosis and chronic pain syndrome  The patient denies left lower extremity symptoms, bowel or bladder incontinence or saddle anesthesia  The patient is status post right L4 and L5 TFESI x2 without any significant relief from the procedure  She has not found relief with greater trochanter bursa injections in the past   She has been evaluated by Dr Lisa Lawton who did not feel surgical intervention was required at that time  She also states she never initiated on the gabapentin as her pain is only severe in the morning and dissipates throughout the day    She rates her pain a 9/10 on the numeric pain rating scale  She states her pain is intermittent in nature most bothersome the morning  She characterizes the pain as sharp and pressure like  I have personally reviewed and/or updated the patient's past medical history, past surgical history, family history, social history, current medications, allergies, and vital signs today  Review of Systems:    Review of Systems   Respiratory: Negative for shortness of breath  Cardiovascular: Negative for chest pain  Gastrointestinal: Negative for constipation, diarrhea, nausea and vomiting  Musculoskeletal: Positive for gait problem and joint swelling  Negative for arthralgias and myalgias  Pain in extremity   Skin: Negative for rash  Neurological: Negative for dizziness, seizures and weakness  All other systems reviewed and are negative  Past Medical History:   Diagnosis Date    Breast cancer ([facility] [address] ) 08/21/2008    age 62    Cancer ([facility] [address] )     left     Chronic low back pain     Clostridium difficile colitis     DDD (degenerative disc disease), lumbar     GERD (gastroesophageal reflux disease)     History of radiation therapy 09/2008    for breast cancer    Hypertension     Laryngopharyngeal reflux (LPR)     Lumbar spinal stenosis     Osteoarthritis of spine with radiculopathy, lumbar region     Osteopenia     Piriformis syndrome of left side     Sciatica     Spondylosis of cervical region without myelopathy or radiculopathy        Past Surgical History:   Procedure Laterality Date    BREAST BIOPSY Left 07/30/2008    malignant    BREAST CYST ASPIRATION Left 1998    BREAST LUMPECTOMY Left 08/21/2008    BREAST SURGERY      COLONOSCOPY  2015    CA ESOPHAGOGASTRODUODENOSCOPY TRANSORAL DIAGNOSTIC N/A 2/15/2017    Procedure: ESOPHAGOGASTRODUODENOSCOPY (EGD); Surgeon: Merle Storey MD;  [facility];   Service: Gastroenterology    CA ESOPHAGOGASTRODUODENOSCOPY TRANSORAL DIAGNOSTIC N/A 2/1/2018    Procedure: ESOPHAGOGASTRODUODENOSCOPY (EGD); Surgeon: Blanca Chaudhry MD;  [facility];   Service: Gastroenterology    UPPER GASTROINTESTINAL ENDOSCOPY  01/2020    US GUIDED MSK PROCEDURE  2/6/2020       Family History   Problem Relation Age of Onset    Osteoporosis Mother     Coronary artery disease Father     Diabetes Father     COPD Father     No Known Problems Maternal Aunt     Breast cancer Paternal Aunt 68    No Known Problems Paternal Aunt     No Known Problems Maternal Aunt     Colon cancer Neg Hx        Social History     Occupational History    Not on file   Tobacco Use    Smoking status: Former Smoker    Smokeless tobacco: Never Used   Substance and Sexual Activity    Alcohol use: Yes     Frequency: 2-3 times a week     Drinks per session: 1 or 2     Comment: social    Drug use: No    Sexual activity: Not Currently         Current Outpatient Medications:     azelastine (ASTELIN) 0 1 % nasal spray, 1 spray into each nostril 2 (two) times a day Use in each nostril as directed, Disp: 1 Bottle, Rfl: 6    Cholecalciferol (VITAMIN D-3 PO), Take 1,000 mg by mouth daily  , Disp: , Rfl:     fexofenadine (ALLEGRA) 180 MG tablet, Take 180 mg by mouth daily, Disp: , Rfl:     JUBLIA 10 % SOLN, APPLY TO NAILS DAILY, Disp: , Rfl:     lisinopril (ZESTRIL) 10 mg tablet, Take 10 mg by mouth daily, Disp: , Rfl:     Multiple Vitamins-Minerals (CENTRUM ADULTS PO), Take 1 tablet by mouth daily, Disp: , Rfl:     Probiotic Product (PROBIOTIC PO), Take 1 tablet by mouth, Disp: , Rfl:     gabapentin (NEURONTIN) 100 mg capsule, Take 1 PO HS x 5 days, then 2 PO HS x 5 days, then 3 PO HS (Patient not taking: Reported on 7/28/2020), Disp: 90 capsule, Rfl: 1    montelukast (SINGULAIR) 10 mg tablet, TAKE 1 TABLET BY MOUTH EVERY DAY (Patient not taking: Reported on 7/28/2020), Disp: 90 tablet, Rfl: 3    tiZANidine (ZANAFLEX) 2 mg tablet, Take 1 tablet (2 mg total) by mouth every 8 (eight) hours as needed for muscle spasms (Patient not taking: Reported on 7/28/2020), Disp: 90 tablet, Rfl: 0    Allergies   Allergen Reactions    Codeine     Morphine And Related      Note:  this allergy is not being included in drug screening  Please inactivate this item and re-enter it to enable screening   Morphine Sulfate [Morphine]     Naprosyn [Naproxen]     Oxycodone     Penicillins     Sulfa Antibiotics        Physical Exam:    /92   Pulse 77   Temp (!) 97 4 °F (36 3 °C) (Oral)   Ht 5' 7" (1 702 m)   Wt 72 1 kg (159 lb)   LMP  (LMP Unknown)   BMI 24 90 kg/m²     Constitutional:normal, well developed, well nourished, alert, in no distress and non-toxic and no overt pain behavior  Eyes:anicteric  HEENT:grossly intact  Neck:supple, symmetric, trachea midline and no masses   Pulmonary:even and unlabored  Cardiovascular:No edema or pitting edema present  Skin:Normal without rashes or lesions and well hydrated  Psychiatric:Mood and affect appropriate  Neurologic:Cranial Nerves II-XII grossly intact  Musculoskeletal:Slightly antalgic gait but steady without the use of assistive devices      Imaging  No orders to display     MRI LUMBAR SPINE WITHOUT CONTRAST     INDICATION: M54 [id number]: Radiculopathy, lumbar region      COMPARISON:  5/25/2016     TECHNIQUE:  Sagittal T1, sagittal T2, sagittal inversion recovery, axial T1 and axial T2, coronal T2     IMAGE QUALITY:  Diagnostic     FINDINGS:     VERTEBRAL BODIES:  There are 5 lumbar type vertebral bodies  Normal alignment of the lumbar spine  No spondylolysis or spondylolisthesis  No scoliosis  No compression fracture  Endplate marrow degenerative change from L2 through L5      SACRUM:  Normal signal within the sacrum  No evidence of insufficiency or stress fracture      DISTAL CORD AND CONUS:  Normal size and signal within the distal cord and conus    Incidentally noted within fatty filum terminale      PARASPINAL SOFT TISSUES:  Paraspinal soft tissues are unremarkable      LOWER THORACIC DISC SPACES:  Normal disc height and signal   No disc herniation, canal stenosis or foraminal narrowing      LUMBAR DISC SPACES:     L1-L2:  Normal      L2-L3:  There is disc desiccation and loss of disc height  Moderate diffuse annular bulging slightly asymmetric towards the right  Stable mild canal stenosis and foraminal narrowing without discrete nerve impingement      L3-L4:  Disc desiccation and loss of disc height with moderate diffuse annular bulging and a small broad-based left foraminal and lateral disc protrusion mild facet degenerative change is present  Mild canal stenosis with moderate left greater than   right foraminal narrowing similar to prior exam      L4-L5:  Disc desiccation and loss of disc height  There is moderate diffuse annular bulging with paramedian inferiorly extruded disc herniations bilaterally, right larger than left  Distortion of the anterior lateral aspect of the thecal sac, primarily   right-sided  There is also mild bilateral facet degenerative change  There is moderate canal stenosis with mild bilateral foraminal narrowing  Disease at this level has significantly progressed since the prior study      L5-S1:  Disc desiccation and loss of disc height  Annular bulging and endplate hypertrophic change with mild canal stenosis and moderate right greater than left foraminal narrowing, similar to the prior exam      IMPRESSION:     Multilevel lumbar spondylitic degenerative change  Mild canal stenosis and mild to moderate foraminal narrowing at L2-3, L3-4 and L5-S1 similar to the prior examination      Disease at the L4-5 level has progressed since the prior examination with bilateral paramedian disc extrusions, right larger than left  The right disc extrusion is new compared to the prior study and results in right-sided canal stenosis and lateral   recess stenosis  See series 3 image 10 and series 6 image 20    Correlate for right L5 radiculopathy        Orders Placed This Encounter   Procedures    EMG 1 Limb

## 2020-08-18 PROBLEM — J38.01 VOCAL FOLD PARESIS, RIGHT: Status: RESOLVED | Noted: 2019-09-30 | Resolved: 2020-08-18

## 2020-08-20 ENCOUNTER — HOSPITAL ENCOUNTER (OUTPATIENT)
Dept: RADIOLOGY | Age: 70
Discharge: HOME | End: 2020-08-20
Payer: COMMERCIAL

## 2020-08-20 ENCOUNTER — APPOINTMENT (OUTPATIENT)
Dept: LAB | Age: 70
End: 2020-08-20
Payer: COMMERCIAL

## 2020-08-20 VITALS — HEIGHT: 68 IN | BODY MASS INDEX: 23.79 KG/M2 | WEIGHT: 157 LBS

## 2020-08-20 DIAGNOSIS — A09 INFECTIOUS GASTROENTERITIS AND COLITIS: ICD-10-CM

## 2020-08-20 DIAGNOSIS — Z12.31 ENCOUNTER FOR SCREENING MAMMOGRAM FOR MALIGNANT NEOPLASM OF BREAST: ICD-10-CM

## 2020-08-20 DIAGNOSIS — Z12.39 SCREENING FOR BREAST CANCER: ICD-10-CM

## 2020-08-20 PROCEDURE — 77067 SCR MAMMO BI INCL CAD: CPT

## 2020-08-20 PROCEDURE — 77063 BREAST TOMOSYNTHESIS BI: CPT

## 2020-09-02 ENCOUNTER — ANNUAL EXAM (OUTPATIENT)
Dept: OBGYN CLINIC | Facility: CLINIC | Age: 70
End: 2020-09-02
Payer: COMMERCIAL

## 2020-09-02 VITALS
DIASTOLIC BLOOD PRESSURE: 80 MMHG | WEIGHT: 152 LBS | BODY MASS INDEX: 23.86 KG/M2 | SYSTOLIC BLOOD PRESSURE: 130 MMHG | HEIGHT: 67 IN

## 2020-09-02 DIAGNOSIS — Z12.31 ENCOUNTER FOR SCREENING MAMMOGRAM FOR BREAST CANCER: Primary | ICD-10-CM

## 2020-09-02 DIAGNOSIS — Z01.419 ENCOUNTER FOR ANNUAL ROUTINE GYNECOLOGICAL EXAMINATION: ICD-10-CM

## 2020-09-02 PROCEDURE — G0101 CA SCREEN;PELVIC/BREAST EXAM: HCPCS | Performed by: OBSTETRICS & GYNECOLOGY

## 2020-09-02 NOTE — PROGRESS NOTES
The patient is here for a yearly  She is not due for a pap smear  pap normal 4/18/16  No bleeding or cramping  No vaginal, bowel, bladder or breast problems

## 2020-09-02 NOTE — PROGRESS NOTES
Assessment/Plan:  Normal breast and gyn exam except for vaginal atrophy  Normal mammogram August 2020  One small polyp seen on colonoscopy August 2020  Due for repeat in 5 years   History of gastroesophageal reflux    Plan:  Rx mammogram for next year  Continue healthy diet and exercise  Continue multivitamins  Recommend flu vaccine  Has follow-up visit with GI in October 2020    Subjective:      Patient ID: Bro Mac is a 79 y  o G0 female presents for yearly exam with no gyn or breast complaints  Patient has history of gastric reflux and severe dysphagia  Patient followed by GI  Patient had a recent colonoscopy which showed of small polyp and is due to have her repeat 5 years  Patient denies any breast problems pelvic pain vaginal bleeding or bladder issues  No change in family history  Medications reviewed  Review of Systems   Constitutional: Negative  HENT: Negative  Eyes: Negative  Respiratory: Negative  Cardiovascular: Negative  Gastrointestinal: Positive for abdominal distention  Endocrine: Negative  Musculoskeletal: Negative  Skin: Negative  Allergic/Immunologic: Negative  Neurological: Negative  Hematological: Negative  Psychiatric/Behavioral: Negative  All other systems reviewed and are negative  Objective:      /80 (BP Location: Left arm, Patient Position: Sitting, Cuff Size: Standard)   Ht 5' 7 13" (1 705 m)   Wt 68 9 kg (152 lb)   LMP  (LMP Unknown)   BMI 23 72 kg/m²          Physical Exam  Constitutional:       Appearance: She is well-developed  HENT:      Head: Normocephalic and atraumatic  Neck:      Musculoskeletal: Normal range of motion and neck supple  Thyroid: No thyromegaly  Trachea: No tracheal deviation  Cardiovascular:      Rate and Rhythm: Normal rate and regular rhythm  Heart sounds: Normal heart sounds  Pulmonary:      Effort: Pulmonary effort is normal  No respiratory distress  Breath sounds: Normal breath sounds  No stridor  No wheezing or rales  Chest:      Chest wall: No tenderness  Breasts: Breasts are symmetrical          Right: No inverted nipple, mass, nipple discharge, skin change or tenderness  Left: No inverted nipple, mass, nipple discharge, skin change or tenderness  Abdominal:      General: Bowel sounds are normal  There is no distension  Palpations: Abdomen is soft  There is no mass  Tenderness: There is no abdominal tenderness  There is no guarding or rebound  Hernia: No hernia is present  There is no hernia in the left inguinal area  Genitourinary:     Labia:         Right: No rash, tenderness, lesion or injury  Left: No rash, tenderness, lesion or injury  Vagina: Normal  No signs of injury and foreign body  No vaginal discharge, erythema, tenderness or bleeding  Cervix: No cervical motion tenderness, discharge or friability  Uterus: Not deviated, not enlarged, not fixed and not tender  Adnexa:         Right: No mass, tenderness or fullness  Left: No mass, tenderness or fullness  Rectum: No mass, anal fissure, external hemorrhoid or internal hemorrhoid  Comments: Vaginal atrophy  Normal urethra  Normal [place]'s glands  No uterine prolapse  No cystocele or rectocele  Lymphadenopathy:      Lower Body: No right inguinal adenopathy  No left inguinal adenopathy  Skin:     General: Skin is warm and dry  Neurological:      Mental Status: She is alert and oriented to person, place, and time  Psychiatric:         Behavior: Behavior normal          Thought Content:  Thought content normal          Judgment: Judgment normal

## 2021-01-05 ENCOUNTER — LAB (OUTPATIENT)
Dept: LAB | Age: 71
End: 2021-01-05
Payer: COMMERCIAL

## 2021-01-05 ENCOUNTER — TRANSCRIBE ORDERS (OUTPATIENT)
Dept: ADMINISTRATIVE | Age: 71
End: 2021-01-05

## 2021-01-05 DIAGNOSIS — R73.01 IMPAIRED FASTING GLUCOSE: ICD-10-CM

## 2021-01-05 DIAGNOSIS — E78.2 MIXED HYPERLIPIDEMIA: ICD-10-CM

## 2021-01-05 DIAGNOSIS — E78.2 MIXED HYPERLIPIDEMIA: Primary | ICD-10-CM

## 2021-01-05 LAB
ALBUMIN SERPL BCP-MCNC: 4.2 G/DL (ref 3.5–5)
ALP SERPL-CCNC: 81 U/L (ref 46–116)
ALT SERPL W P-5'-P-CCNC: 28 U/L (ref 12–78)
ANION GAP SERPL CALCULATED.3IONS-SCNC: 3 MMOL/L (ref 4–13)
AST SERPL W P-5'-P-CCNC: 16 U/L (ref 5–45)
BILIRUB SERPL-MCNC: 0.8 MG/DL (ref 0.2–1)
BUN SERPL-MCNC: 21 MG/DL (ref 5–25)
CALCIUM SERPL-MCNC: 9.9 MG/DL (ref 8.3–10.1)
CHLORIDE SERPL-SCNC: 108 MMOL/L (ref 100–108)
CHOLEST SERPL-MCNC: 245 MG/DL (ref 50–200)
CO2 SERPL-SCNC: 29 MMOL/L (ref 21–32)
CREAT SERPL-MCNC: 0.74 MG/DL (ref 0.6–1.3)
EST. AVERAGE GLUCOSE BLD GHB EST-MCNC: 108 MG/DL
GFR SERPL CREATININE-BSD FRML MDRD: 82 ML/MIN/1.73SQ M
GLUCOSE P FAST SERPL-MCNC: 108 MG/DL (ref 65–99)
HBA1C MFR BLD: 5.4 %
HDLC SERPL-MCNC: 64 MG/DL
LDLC SERPL CALC-MCNC: 146 MG/DL (ref 0–100)
NONHDLC SERPL-MCNC: 181 MG/DL
POTASSIUM SERPL-SCNC: 3.9 MMOL/L (ref 3.5–5.3)
PROT SERPL-MCNC: 7.4 G/DL (ref 6.4–8.2)
SODIUM SERPL-SCNC: 140 MMOL/L (ref 136–145)
TRIGL SERPL-MCNC: 177 MG/DL

## 2021-01-05 PROCEDURE — 80061 LIPID PANEL: CPT

## 2021-01-05 PROCEDURE — 80053 COMPREHEN METABOLIC PANEL: CPT

## 2021-01-05 PROCEDURE — 83036 HEMOGLOBIN GLYCOSYLATED A1C: CPT

## 2021-01-05 PROCEDURE — 36415 COLL VENOUS BLD VENIPUNCTURE: CPT

## 2021-01-06 ENCOUNTER — OFFICE VISIT (OUTPATIENT)
Dept: GASTROENTEROLOGY | Facility: MEDICAL CENTER | Age: 71
End: 2021-01-06
Payer: COMMERCIAL

## 2021-01-06 VITALS
DIASTOLIC BLOOD PRESSURE: 90 MMHG | SYSTOLIC BLOOD PRESSURE: 150 MMHG | BODY MASS INDEX: 24.71 KG/M2 | HEIGHT: 68 IN | WEIGHT: 163 LBS | HEART RATE: 86 BPM

## 2021-01-06 DIAGNOSIS — J38.4 LARYNGEAL EDEMA: ICD-10-CM

## 2021-01-06 DIAGNOSIS — K21.9 GASTROESOPHAGEAL REFLUX DISEASE WITHOUT ESOPHAGITIS: Primary | ICD-10-CM

## 2021-01-06 PROCEDURE — 99214 OFFICE O/P EST MOD 30 MIN: CPT | Performed by: INTERNAL MEDICINE

## 2021-01-06 NOTE — PROGRESS NOTES
Outpatient Follow up  [facility] [address] [place] [id number]  Blanche Schreiber MD  Ph : [phone]  Fax : [phone]  Mobile : [phone]  Email : [email or web address]  Also available on [employer]    Mellisa Cleary 79 y o  female MRN: [id number]    PCP: Flaquita Gama,   Referring: No referring provider defined for this encounter  Mellisa Cleary presented for a follow up visit  My recommendations are included  Please do not hesitate to contact me with any questions you may have  ASSESSMENT AND PLAN:      No problem-specific Assessment & Plan notes found for this encounter  There are no diagnoses linked to this encounter  80-year-old lady with symptoms suggestive of gastroesophageal reflux disease/LPR but mostly of her symptoms include excessive mucus production  No significant improvement with PPI  She was scheduled to have the pH manometry study but this was not done  Reschedule the pH and manometry  This would be done off Pepcid  If positive would consider TIF or STRETTA  Recommended using Hilary pot  She has also been having some abdominal symptoms/constipation  Continue metamucil as this has helped her symptoms   She can repeat the colonoscopy in 5 years  ______________________________________________________________________    SUBJECTIVE:  78 y/o lady with h/o GERD and LPR symptoms but no esophagitis on EGD and no improvement with PPI  Her main symptom is mucous  She tried gluten free but didn't help  She was scheduled for pH and manometry but could not have it done  She had a colonoscopy done in 8/20 and had diverticulosis and one small polyp - recommended to repeat in 5 years  She also started metamucil  She was having some trouble having bowel movements  She stopped the metamucil and the problems started again  She started chewing gum which helps with the mucous         REVIEW OF SYSTEMS IS OTHERWISE NEGATIVE  Historical Information   Past Medical History:   Diagnosis Date    Breast cancer ([facility] [address] ) 08/21/2008    age 62    Cancer ([facility] [address] )     left     Chronic low back pain     Clostridium difficile colitis     DDD (degenerative disc disease), lumbar     GERD (gastroesophageal reflux disease)     History of radiation therapy 09/2008    for breast cancer    Hypertension     Laryngopharyngeal reflux (LPR)     Lumbar spinal stenosis     Osteoarthritis of spine with radiculopathy, lumbar region     Osteopenia     Piriformis syndrome of left side     Sciatica     Spondylosis of cervical region without myelopathy or radiculopathy      Past Surgical History:   Procedure Laterality Date    BREAST BIOPSY Left 07/30/2008    malignant    BREAST CYST ASPIRATION Left 1998    BREAST LUMPECTOMY Left 08/21/2008    BREAST SURGERY      COLONOSCOPY  08/31/2020    SD ESOPHAGOGASTRODUODENOSCOPY TRANSORAL DIAGNOSTIC N/A 2/15/2017    Procedure: ESOPHAGOGASTRODUODENOSCOPY (EGD); Surgeon: Efren Cranker, MD;  [facility]; Service: Gastroenterology    SD ESOPHAGOGASTRODUODENOSCOPY TRANSORAL DIAGNOSTIC N/A 2/1/2018    Procedure: ESOPHAGOGASTRODUODENOSCOPY (EGD); Surgeon: Efren Cranker, MD;  [facility];   Service: Gastroenterology    UPPER GASTROINTESTINAL ENDOSCOPY  01/2020    US GUIDED MSK PROCEDURE  2/6/2020     Social History   Social History     Substance and Sexual Activity   Alcohol Use Yes    Frequency: 2-3 times a week    Drinks per session: 1 or 2    Comment: social     Social History     Substance and Sexual Activity   Drug Use No     Social History     Tobacco Use   Smoking Status Former Smoker   Smokeless Tobacco Never Used     Family History   Problem Relation Age of Onset    Osteoporosis Mother     Coronary artery disease Father     Diabetes Father     COPD Father     No Known Problems Maternal Aunt     Breast cancer Paternal Aunt 68    No Known Problems Paternal Aunt     No Known Problems Maternal Aunt     Colon cancer Neg Hx        Meds/Allergies       Current Outpatient Medications:     azelastine (ASTELIN) 0 1 % nasal spray    Cholecalciferol (VITAMIN D-3 PO)    Cyclobenzaprine HCl (FLEXERIL PO)    fexofenadine (ALLEGRA) 180 MG tablet    JUBLIA 10 % SOLN    lisinopril (ZESTRIL) 10 mg tablet    Multiple Vitamins-Minerals (CENTRUM ADULTS PO)    Probiotic Product (PROBIOTIC PO)    gabapentin (NEURONTIN) 100 mg capsule    montelukast (SINGULAIR) 10 mg tablet    tiZANidine (ZANAFLEX) 2 mg tablet    Allergies   Allergen Reactions    Codeine     Morphine And Related      Note:  this allergy is not being included in drug screening  Please inactivate this item and re-enter it to enable screening   Morphine Sulfate [Morphine]     Naprosyn [Naproxen]     Oxycodone     Penicillins     Sulfa Antibiotics            Objective     Blood pressure 150/90, pulse 86, height 5' 7 5" (1 715 m), weight 73 9 kg (163 lb), not currently breastfeeding  Body mass index is 25 15 kg/m²  PHYSICAL EXAM:      Physical Exam  Constitutional:       Appearance: Normal appearance  She is well-developed  HENT:      Head: Normocephalic and atraumatic  Eyes:      General: No scleral icterus  Conjunctiva/sclera: Conjunctivae normal       Pupils: Pupils are equal, round, and reactive to light  Neck:      Musculoskeletal: Normal range of motion  Cardiovascular:      Rate and Rhythm: Normal rate and regular rhythm  Heart sounds: Normal heart sounds  Pulmonary:      Effort: Pulmonary effort is normal  No respiratory distress  Breath sounds: Normal breath sounds  Abdominal:      General: Bowel sounds are normal  There is no distension  Palpations: Abdomen is soft  There is no mass  Tenderness: There is no abdominal tenderness  Hernia: No hernia is present  Musculoskeletal: Normal range of motion     Lymphadenopathy:      Cervical: No cervical adenopathy  Skin:     General: Skin is warm  Neurological:      Mental Status: She is alert and oriented to person, place, and time  Psychiatric:         Behavior: Behavior normal          Thought Content: Thought content normal          Lab Results:   No visits with results within 1 Day(s) from this visit  Latest known visit with results is:   Lab on 01/05/2021   Component Date Value    Sodium 01/05/2021 140     Potassium 01/05/2021 3 9     Chloride 01/05/2021 108     CO2 01/05/2021 29     ANION GAP 01/05/2021 3*    BUN 01/05/2021 21     Creatinine 01/05/2021 0 74     Glucose, Fasting 01/05/2021 108*    Calcium 01/05/2021 9 9     AST 01/05/2021 16     ALT 01/05/2021 28     Alkaline Phosphatase 01/05/2021 81     Total Protein 01/05/2021 7 4     Albumin 01/05/2021 4 2     Total Bilirubin 01/05/2021 0 80     eGFR 01/05/2021 82     Cholesterol 01/05/2021 245*    Triglycerides 01/05/2021 177*    HDL, Direct 01/05/2021 64     LDL Calculated 01/05/2021 146*    Non-HDL-Chol (CHOL-HDL) 01/05/2021 181     Hemoglobin A1C 01/05/2021 5 4     EAG 01/05/2021 108          Radiology Results:   No results found  Answers for HPI/ROS submitted by the patient on 12/30/2020   Abdominal pain  Chronicity: recurrent  Onset: more than 1 year ago  Onset quality: gradual  Frequency: intermittently  Progression since onset: unchanged  Pain location: epigastric region  Pain - numeric: 5/10  Pain quality: cramping  Radiates to: does not radiate  anorexia: No  arthralgias: Yes  belching: Yes  constipation: Yes  diarrhea: Yes  dysuria: No  fever: No  flatus: No  frequency: No  headaches: No  hematochezia: No  hematuria: No  melena: No  myalgias: Yes  nausea:  No  weight loss: No  vomiting: No  Aggravated by: eating  Relieved by: nothing  Diagnostic workup: lower endoscopy

## 2021-01-21 ENCOUNTER — HOSPITAL ENCOUNTER (OUTPATIENT)
Dept: NEUROLOGY | Facility: CLINIC | Age: 71
Discharge: HOME | End: 2021-01-21
Payer: COMMERCIAL

## 2021-01-21 DIAGNOSIS — M54.16 LUMBAR RADICULOPATHY: ICD-10-CM

## 2021-01-21 PROCEDURE — 95909 NRV CNDJ TST 5-6 STUDIES: CPT | Performed by: PSYCHIATRY & NEUROLOGY

## 2021-01-21 PROCEDURE — 95886 MUSC TEST DONE W/N TEST COMP: CPT | Performed by: PSYCHIATRY & NEUROLOGY

## 2021-02-26 ENCOUNTER — TELEPHONE (OUTPATIENT)
Dept: GASTROENTEROLOGY | Facility: AMBULARY SURGERY CENTER | Age: 71
End: 2021-02-26

## 2021-02-26 NOTE — TELEPHONE ENCOUNTER
Patients GI provider:  Dr Kriss Ferguson to return call: (  [phone]    Reason for call: Pt calling TO SCHEDULE 2 WEEK FOLLOW UP W DR FIELDS , POST MANOMETRY WHICH IS 3-15-21    Scheduled procedure/appointment date if applicable: Apt/procedure   3-15-21

## 2021-03-02 ENCOUNTER — TELEPHONE (OUTPATIENT)
Dept: GASTROENTEROLOGY | Facility: CLINIC | Age: 71
End: 2021-03-02

## 2021-03-02 DIAGNOSIS — K21.9 GASTROESOPHAGEAL REFLUX DISEASE WITHOUT ESOPHAGITIS: Primary | ICD-10-CM

## 2021-03-02 NOTE — TELEPHONE ENCOUNTER
Patients GI provider:  Dr Rachelle Cannon    Number to return call: [phone]    Reason for call: Pt calling wanting to know if Dr Rachelle Cannon can put in an order for a barium swallow because she has not had one in a long time and feels she need it?     Scheduled procedure/appointment date if applicable: Appt - 75/35/00

## 2021-03-10 ENCOUNTER — TRANSCRIBE ORDERS (OUTPATIENT)
Dept: RADIOLOGY | Facility: HOSPITAL | Age: 71
End: 2021-03-10

## 2021-03-10 ENCOUNTER — HOSPITAL ENCOUNTER (OUTPATIENT)
Dept: RADIOLOGY | Facility: HOSPITAL | Age: 71
Discharge: HOME | End: 2021-03-10
Payer: COMMERCIAL

## 2021-03-10 DIAGNOSIS — Z23 ENCOUNTER FOR IMMUNIZATION: ICD-10-CM

## 2021-03-10 DIAGNOSIS — K21.9 GASTROESOPHAGEAL REFLUX DISEASE WITHOUT ESOPHAGITIS: ICD-10-CM

## 2021-03-10 PROCEDURE — 74220 X-RAY XM ESOPHAGUS 1CNTRST: CPT

## 2021-03-15 ENCOUNTER — HOSPITAL ENCOUNTER (OUTPATIENT)
Dept: GASTROENTEROLOGY | Facility: HOSPITAL | Age: 71
Discharge: HOME | End: 2021-03-15
Attending: INTERNAL MEDICINE
Payer: COMMERCIAL

## 2021-03-15 VITALS
DIASTOLIC BLOOD PRESSURE: 82 MMHG | RESPIRATION RATE: 16 BRPM | OXYGEN SATURATION: 97 % | TEMPERATURE: 97.9 F | SYSTOLIC BLOOD PRESSURE: 140 MMHG | HEART RATE: 84 BPM

## 2021-03-15 DIAGNOSIS — K21.9 GASTROESOPHAGEAL REFLUX DISEASE WITHOUT ESOPHAGITIS: ICD-10-CM

## 2021-03-15 DIAGNOSIS — J38.4 LARYNGEAL EDEMA: ICD-10-CM

## 2021-03-15 PROCEDURE — 91020 GASTRIC MOTILITY STUDIES: CPT

## 2021-03-15 PROCEDURE — 91038 ESOPH IMPED FUNCT TEST > 1HR: CPT

## 2021-03-15 NOTE — PERIOPERATIVE NURSING NOTE
Patient brought in the room and educated on procedure  Lidocaine 2% topical solution inserted via nostrils  Manometry catheter inserted via right nostril and secured  10 liquid swallows,1 Rapid drink challenge with 200 cc water and  10 viscous swallow and 1 rapid swallow performed  Patient Tolerated procedure  Catheter removed intact   Dual sensor PH probe inserted via right nostril and secured  Zephr recorder teachback performed and patient verbalized understanding  Patient instructed to return next day to have probe remove  Discharge instructions given and patient ambulated out of room in stable condition

## 2021-03-25 PROCEDURE — 91038 ESOPH IMPED FUNCT TEST > 1HR: CPT | Performed by: INTERNAL MEDICINE

## 2021-03-25 PROCEDURE — 91010 ESOPHAGUS MOTILITY STUDY: CPT | Performed by: INTERNAL MEDICINE

## 2021-04-09 ENCOUNTER — OFFICE VISIT (OUTPATIENT)
Dept: GASTROENTEROLOGY | Facility: MEDICAL CENTER | Age: 71
End: 2021-04-09

## 2021-04-09 VITALS
BODY MASS INDEX: 24.86 KG/M2 | TEMPERATURE: 98 F | HEART RATE: 78 BPM | DIASTOLIC BLOOD PRESSURE: 64 MMHG | WEIGHT: 164 LBS | SYSTOLIC BLOOD PRESSURE: 122 MMHG | HEIGHT: 68 IN

## 2021-04-09 DIAGNOSIS — K21.9 LARYNGOPHARYNGEAL REFLUX (LPR): ICD-10-CM

## 2021-04-09 DIAGNOSIS — K21.9 GASTROESOPHAGEAL REFLUX DISEASE WITHOUT ESOPHAGITIS: Primary | ICD-10-CM

## 2021-04-09 PROCEDURE — 99214 OFFICE O/P EST MOD 30 MIN: CPT | Performed by: INTERNAL MEDICINE

## 2021-04-09 NOTE — PROGRESS NOTES
Outpatient Follow up  Janell Batxer  [address] [place] [id number]  Brittany Elias MD  Ph : [phone]  Fax : [phone]  Mobile : [phone]  Email : [email or web address]  Also available on [employer]    Neville Garrett 79 y o  female MRN: [id number]    PCP: Donovan Pleitez DO  Referring: No referring provider defined for this encounter  Neville Garrett presented for a follow up visit  My recommendations are included  Please do not hesitate to contact me with any questions you may have  ASSESSMENT AND PLAN:      No problem-specific Assessment & Plan notes found for this encounter  There are no diagnoses linked to this encounter  70-year-old lady who presents to us for follow-up of her GERD/LPR symptoms  PH study is positive  Manometry showed a small hiatal hernia and a slight EGJ outflow obstruction  She does not have any symptoms of dysphagia  Due to her ongoing symptoms and not complete improvement with PPI therapy I would recommend that we discuss surgical options or endoscopic options  I discussed STRETTA, Transoral incisionless fundoplication (TIF) and surgery with her  With her EGJ outflow obstruction I would suggest consideration for repeating the endoscopy and if no significant stricture is seen then would recommend doing STRETTA  This would be the least likely to cause any symptoms of dysphagia  However the [address] does not work then the other options would include Transoral incisionless fundoplication (TIF) or surgery with partial fundoplication  I will refer her to surgery for an evaluation as well however I think [address] is probably her best bet at this time  I did discuss the 70% improvement rate with STRETTA and higher with other modalities but we can start off there and see how she does  She will continue to follow up with ENT        I have also asked her to discontinue the probiotics as this may be causing issues with the bowel movements, stop dairy and also continue to increase the fiber in her diet   ______________________________________________________________________    SUBJECTIVE:  80 y/o lady with h/o GERD who presents for follow up after her manometry and pH study  The manometry showed EGJ outflow obstruction with likely small hiatal hernia  It was thought that the EGJ outflow obstruction may be related to a possible Schatzki's ring or stricture  EGD in January 2020 showed nodular mucosa at the GE junction but no ring was seen  No hernia was cecum  She also had a manometry done which showed DeMeester score of 33  She also had LPR episodes  She is on probiotics  She has had some issues with irregular bowel movements  She has to go urgently in the morning  She had a anoscopy done  The following other results from the colorectal surgeons  ASSESSMENT:    -Rectal pain, discussed normal exam, hypertrophied anal papilla that may prolapse but not typically cause of pressure/pain sensation she has  -Colonoscopy current  -Normal anorectal exam/anoscopy today, discussed normal sphincter function, she has not had prior childbirth      Discussed dietary/lifestyle changes and followup      PLAN:  -High fiber diet/increased hydration, 20-30grams fiber per day, increased fruits/vegetables/psyllium(metamucil or konsyl 1 tbsp 1-2x/day), she will try boosting this from 2tsp daily  -We discussed possible pelvic PT but she would like to see how her symptoms change first  -To call for followup if no improvement, otherwise in for 2025 colonoscopy  REVIEW OF SYSTEMS IS OTHERWISE NEGATIVE        Historical Information   Past Medical History:   Diagnosis Date    Breast cancer ([facility] [address] ) 08/21/2008    age 62    Cancer ([facility] [address] )     left     Chronic low back pain     Clostridium difficile colitis     DDD (degenerative disc disease), lumbar     GERD (gastroesophageal reflux disease)     History of radiation therapy 09/2008    for breast cancer    Hypertension     Laryngopharyngeal reflux (LPR)     Lumbar spinal stenosis     Osteoarthritis of spine with radiculopathy, lumbar region     Osteopenia     Piriformis syndrome of left side     Sciatica     Spondylosis of cervical region without myelopathy or radiculopathy      Past Surgical History:   Procedure Laterality Date    BREAST BIOPSY Left 07/30/2008    malignant    BREAST CYST ASPIRATION Left 1998    BREAST LUMPECTOMY Left 08/21/2008    BREAST SURGERY      COLONOSCOPY  08/31/2020    [place] ESOPHAGOGASTRODUODENOSCOPY TRANSORAL DIAGNOSTIC N/A 2/15/2017    Procedure: ESOPHAGOGASTRODUODENOSCOPY (EGD); Surgeon: Thomas Cano MD;  [facility]; Service: Gastroenterology    [place] ESOPHAGOGASTRODUODENOSCOPY TRANSORAL DIAGNOSTIC N/A 2/1/2018    Procedure: ESOPHAGOGASTRODUODENOSCOPY (EGD); Surgeon: Thomas Cano MD;  [facility];   Service: Gastroenterology    UPPER GASTROINTESTINAL ENDOSCOPY  01/2020    US GUIDED MSK PROCEDURE  2/6/2020     Social History   Social History     Substance and Sexual Activity   Alcohol Use Yes    Frequency: 2-3 times a week    Drinks per session: 1 or 2    Comment: social     Social History     Substance and Sexual Activity   Drug Use No     Social History     Tobacco Use   Smoking Status Former Smoker   Smokeless Tobacco Never Used     Family History   Problem Relation Age of Onset    Osteoporosis Mother     Coronary artery disease Father     Diabetes Father     COPD Father     No Known Problems Maternal Aunt     Breast cancer Paternal Aunt 68    No Known Problems Paternal Aunt     No Known Problems Maternal Aunt     Colon cancer Neg Hx        Meds/Allergies       Current Outpatient Medications:     azelastine (ASTELIN) 0 1 % nasal spray    Cholecalciferol (VITAMIN D-3 PO)    Cyclobenzaprine HCl (FLEXERIL PO)    fexofenadine (ALLEGRA) 180 MG tablet    JUBLIA 10 % SOLN    lisinopril (ZESTRIL) 10 mg tablet    Multiple Vitamins-Minerals (CENTRUM ADULTS PO)    Probiotic Product (PROBIOTIC PO)    gabapentin (NEURONTIN) 100 mg capsule    montelukast (SINGULAIR) 10 mg tablet    tiZANidine (ZANAFLEX) 2 mg tablet    Allergies   Allergen Reactions    Codeine     Morphine And Related      Note:  this allergy is not being included in drug screening  Please inactivate this item and re-enter it to enable screening   Morphine Sulfate [Morphine]     Naprosyn [Naproxen]     Oxycodone     Penicillins     Sulfa Antibiotics            Objective     Blood pressure 122/64, pulse 78, temperature 98 °F (36 7 °C), temperature source Tympanic, height 5' 7 5" (1 715 m), weight 74 4 kg (164 lb), not currently breastfeeding  Body mass index is 25 31 kg/m²  PHYSICAL EXAM:      Physical Exam  Constitutional:       Appearance: Normal appearance  She is well-developed  HENT:      Head: Normocephalic and atraumatic  Eyes:      General: No scleral icterus  Conjunctiva/sclera: Conjunctivae normal       Pupils: Pupils are equal, round, and reactive to light  Neck:      Musculoskeletal: Normal range of motion  Cardiovascular:      Rate and Rhythm: Normal rate and regular rhythm  Heart sounds: Normal heart sounds  Pulmonary:      Effort: Pulmonary effort is normal  No respiratory distress  Breath sounds: Normal breath sounds  Abdominal:      General: Bowel sounds are normal  There is no distension  Palpations: Abdomen is soft  There is no mass  Tenderness: There is no abdominal tenderness  Hernia: No hernia is present  Musculoskeletal: Normal range of motion  Lymphadenopathy:      Cervical: No cervical adenopathy  Skin:     General: Skin is warm  Neurological:      Mental Status: She is alert and oriented to person, place, and time  Psychiatric:         Behavior: Behavior normal          Thought Content:  Thought content normal          Lab Results:   No visits with results within 1 Day(s) from this visit  Latest known visit with results is:   Lab on 01/05/2021   Component Date Value    Sodium 01/05/2021 140     Potassium 01/05/2021 3 9     Chloride 01/05/2021 108     CO2 01/05/2021 29     ANION GAP 01/05/2021 3*    BUN 01/05/2021 21     Creatinine 01/05/2021 0 74     Glucose, Fasting 01/05/2021 108*    Calcium 01/05/2021 9 9     AST 01/05/2021 16     ALT 01/05/2021 28     Alkaline Phosphatase 01/05/2021 81     Total Protein 01/05/2021 7 4     Albumin 01/05/2021 4 2     Total Bilirubin 01/05/2021 0 80     eGFR 01/05/2021 82     Cholesterol 01/05/2021 245*    Triglycerides 01/05/2021 177*    HDL, Direct 01/05/2021 64     LDL Calculated 01/05/2021 146*    Non-HDL-Chol (CHOL-HDL) 01/05/2021 181     Hemoglobin A1C 01/05/2021 5 4     EAG 01/05/2021 108          Radiology Results:   Fl Barium Swallow    Result Date: 3/10/2021  Narrative: BARIUM SWALLOW-ESOPHAGRAM INDICATION:   K21 9: Gastro-esophageal reflux disease without esophagitis  COMPARISON:  March 14, 2018 IMAGES:  79 fluoroscopic images recorded FLUOROSCOPY TIME:   1 minute 24 seconds  TECHNIQUE: The patient was given effervescent granules and barium by mouth and images of the esophagus were obtained  FINDINGS: The esophagus is normal in caliber  Mildly uncoordinated nonpropulsive tertiary wave contractions distal 3rd of the esophagus without redirection of barium column  Emptying of contrast from the esophagus is prompt  No mucosal lesion, ulceration or evidence of fold thickening is seen  Gastroesophageal reflux was not observed after 5 minutes of intermittent fluoroscopy  There is again a tiny sliding hiatal hernia which did not change significantly with Valsalva  Patient was able to swallow a 13 mm barium tablet without difficulty  Impression: Minimal presbyesophagus  No esophageal mucosal lesions  Stable tiny sliding hiatal hernia   No spontaneous reflux was observed under 5 minutes of intermittent fluoroscopy   Workstation performed: [id number]

## 2021-05-05 ENCOUNTER — OFFICE VISIT (OUTPATIENT)
Dept: GASTROENTEROLOGY | Facility: MEDICAL CENTER | Age: 71
End: 2021-05-05
Payer: COMMERCIAL

## 2021-05-05 VITALS
WEIGHT: 166 LBS | BODY MASS INDEX: 25.16 KG/M2 | HEIGHT: 68 IN | SYSTOLIC BLOOD PRESSURE: 118 MMHG | DIASTOLIC BLOOD PRESSURE: 70 MMHG | HEART RATE: 80 BPM | TEMPERATURE: 98 F

## 2021-05-05 DIAGNOSIS — K21.9 GASTROESOPHAGEAL REFLUX DISEASE WITHOUT ESOPHAGITIS: Primary | ICD-10-CM

## 2021-05-05 DIAGNOSIS — K22.4 ESOPHAGEAL DYSMOTILITIES: ICD-10-CM

## 2021-05-05 DIAGNOSIS — D12.6 ADENOMATOUS POLYP OF COLON, UNSPECIFIED PART OF COLON: ICD-10-CM

## 2021-05-05 DIAGNOSIS — R10.13 DYSPEPSIA: ICD-10-CM

## 2021-05-05 PROCEDURE — 99214 OFFICE O/P EST MOD 30 MIN: CPT | Performed by: INTERNAL MEDICINE

## 2021-05-05 NOTE — PROGRESS NOTES
Outpatient Follow up  [id number] [id number]  Pita Frazier MD  Ph : [phone]  Fax : [phone]  Mobile : [phone]  Email : [email or web address]  Also available on [employer]    Wiley Salomon 79 y o  female MRN: [id number]    PCP: Francisca Zuñiga DO  Referring: No referring provider defined for this encounter  Wiley Salomon presented for a follow up visit  My recommendations are included  Please do not hesitate to contact me with any questions you may have  ASSESSMENT AND PLAN:      No problem-specific Assessment & Plan notes found for this encounter  Diagnoses and all orders for this visit:    Gastroesophageal reflux disease without esophagitis    Esophageal dysmotilities    Adenomatous polyp of colon, unspecified part of colon    Dyspepsia       55-year-old lady who we have been seeing in regards to her GERD symptoms as well as increased mucus production  On manometry she has EGJ outflow obstruction and pH study is positive for reflux  Her symptoms are not well controlled on PPI therapy  We have discussed her case at our multidisciplinary meeting /foregut   Since she has no significant dysphagia the dilation for the EGJ obstruction does not seem feasible  We also discussed regarding doing STRETTA and this might be a feasible option to see if this may help with her reflux  We will proceed with that  We will schedule the patient for the procedure  I have discussed this with her at length as well     ______________________________________________________________________    SUBJECTIVE:  Neal Murguia is a 55-year-old lady who presents to us for evaluation of her gastroesophageal reflux symptoms  She has been doing well with AcipHex and Tagamet  However her symptoms are about the same  Mainly she has symptoms of mucus production and belching and burping    Her bowels are better of probiotics and Metamucil  She was having too much diarrhea with those  Doing well  Her weight is about the same  She did see Dr Victor Manuel Li with ENT  She has tried gluten free diet however did not help her  She has tried treatment for postnasal drip  She will be doing a gastric emptying study  Her manometry was reviewed and shows EGJ outflow obstruction  She does have significant reflux however  She has no dysphagia odynophagia  REVIEW OF SYSTEMS IS OTHERWISE NEGATIVE  Historical Information   Past Medical History:   Diagnosis Date    Breast cancer ([facility] [address] ) 08/21/2008    age 62    Cancer ([facility] [address] )     left     Chronic low back pain     Clostridium difficile colitis     DDD (degenerative disc disease), lumbar     GERD (gastroesophageal reflux disease)     History of radiation therapy 09/2008    for breast cancer    Hypertension     Laryngopharyngeal reflux (LPR)     Lumbar spinal stenosis     Osteoarthritis of spine with radiculopathy, lumbar region     Osteopenia     Piriformis syndrome of left side     Sciatica     Spondylosis of cervical region without myelopathy or radiculopathy      Past Surgical History:   Procedure Laterality Date    BREAST BIOPSY Left 07/30/2008    malignant    BREAST CYST ASPIRATION Left 1998    BREAST LUMPECTOMY Left 08/21/2008    BREAST SURGERY      COLONOSCOPY  08/31/2020    [place] ESOPHAGOGASTRODUODENOSCOPY TRANSORAL DIAGNOSTIC N/A 2/15/2017    Procedure: ESOPHAGOGASTRODUODENOSCOPY (EGD); Surgeon: Mookie Mcclellan MD;  [facility]; Service: Gastroenterology    [place] ESOPHAGOGASTRODUODENOSCOPY TRANSORAL DIAGNOSTIC N/A 2/1/2018    Procedure: ESOPHAGOGASTRODUODENOSCOPY (EGD); Surgeon: Mookie Mcclellan MD;  [facility];   Service: Gastroenterology    UPPER GASTROINTESTINAL ENDOSCOPY  01/2020    US GUIDED MSK PROCEDURE  2/6/2020     Social History   Social History     Substance and Sexual Activity   Alcohol Use Yes    Frequency: 2-3 times a week    Drinks per session: 1 or 2    Comment: social     Social History     Substance and Sexual Activity   Drug Use No     Social History     Tobacco Use   Smoking Status Former Smoker   Smokeless Tobacco Never Used     Family History   Problem Relation Age of Onset    Osteoporosis Mother     Coronary artery disease Father     Diabetes Father     COPD Father     No Known Problems Maternal Aunt     Breast cancer Paternal Aunt 68    No Known Problems Paternal Aunt     No Known Problems Maternal Aunt     Colon cancer Neg Hx        Meds/Allergies       Current Outpatient Medications:     azelastine (ASTELIN) 0 1 % nasal spray    Cholecalciferol (VITAMIN D-3 PO)    cimetidine (TAGAMET) 400 mg tablet    Cyclobenzaprine HCl (FLEXERIL PO)    fexofenadine (ALLEGRA) 180 MG tablet    JUBLIA 10 % SOLN    lisinopril (ZESTRIL) 10 mg tablet    Multiple Vitamins-Minerals (CENTRUM ADULTS PO)    Probiotic Product (PROBIOTIC PO)    RABEprazole (ACIPHEX) 20 MG tablet    gabapentin (NEURONTIN) 100 mg capsule    montelukast (SINGULAIR) 10 mg tablet    tiZANidine (ZANAFLEX) 2 mg tablet    Allergies   Allergen Reactions    Codeine     Morphine And Related      Note:  this allergy is not being included in drug screening  Please inactivate this item and re-enter it to enable screening   Morphine Sulfate [Morphine]     Naprosyn [Naproxen]     Oxycodone     Penicillins     Sulfa Antibiotics            Objective     Blood pressure 118/70, pulse 80, temperature 98 °F (36 7 °C), temperature source Tympanic, height 5' 7 5" (1 715 m), weight 75 3 kg (166 lb), not currently breastfeeding  Body mass index is 25 62 kg/m²  PHYSICAL EXAM:      Physical Exam  Constitutional:       Appearance: Normal appearance  She is well-developed  HENT:      Head: Normocephalic and atraumatic  Eyes:      General: No scleral icterus  Conjunctiva/sclera: Conjunctivae normal       Pupils: Pupils are equal, round, and reactive to light  Neck:      Musculoskeletal: Normal range of motion  Cardiovascular:      Rate and Rhythm: Normal rate and regular rhythm  Heart sounds: Normal heart sounds  Pulmonary:      Effort: Pulmonary effort is normal  No respiratory distress  Breath sounds: Normal breath sounds  Abdominal:      General: Bowel sounds are normal  There is no distension  Palpations: Abdomen is soft  There is no mass  Tenderness: There is no abdominal tenderness  Hernia: No hernia is present  Musculoskeletal: Normal range of motion  Lymphadenopathy:      Cervical: No cervical adenopathy  Skin:     General: Skin is warm  Neurological:      Mental Status: She is alert and oriented to person, place, and time  Psychiatric:         Behavior: Behavior normal          Thought Content: Thought content normal          Lab Results:   No visits with results within 1 Day(s) from this visit  Latest known visit with results is:   Lab on 01/05/2021   Component Date Value    Sodium 01/05/2021 140     Potassium 01/05/2021 3 9     Chloride 01/05/2021 108     CO2 01/05/2021 29     ANION GAP 01/05/2021 3*    BUN 01/05/2021 21     Creatinine 01/05/2021 0 74     Glucose, Fasting 01/05/2021 108*    Calcium 01/05/2021 9 9     AST 01/05/2021 16     ALT 01/05/2021 28     Alkaline Phosphatase 01/05/2021 81     Total Protein 01/05/2021 7 4     Albumin 01/05/2021 4 2     Total Bilirubin 01/05/2021 0 80     eGFR 01/05/2021 82     Cholesterol 01/05/2021 245*    Triglycerides 01/05/2021 177*    HDL, Direct 01/05/2021 64     LDL Calculated 01/05/2021 146*    Non-HDL-Chol (CHOL-HDL) 01/05/2021 181     Hemoglobin A1C 01/05/2021 5 4     EAG 01/05/2021 108          Radiology Results:   No results found      Answers for HPI/ROS submitted by the patient on 4/29/2021   Abdominal pain  Chronicity: recurrent  Onset: more than 1 month ago  Onset quality: gradual  Frequency: intermittently  Episode duration: 1 hours  Progression since onset: gradually worsening  Pain location: suprapubic region  Pain - numeric: 3/10  Pain quality: cramping  Radiates to: does not radiate  anorexia: No  arthralgias: Yes  belching: Yes  constipation: Yes  diarrhea: No  dysuria: No  fever: No  flatus: Yes  frequency: No  headaches: No  hematochezia: No  hematuria: No  melena: Yes  myalgias: No  nausea:  No  weight loss: No  vomiting: No  Aggravated by: nothing  Relieved by: bowel movements  Diagnostic workup: lower endoscopy, upper endoscopy

## 2021-05-11 ENCOUNTER — TELEPHONE (OUTPATIENT)
Dept: GASTROENTEROLOGY | Facility: CLINIC | Age: 71
End: 2021-05-11

## 2021-05-11 NOTE — TELEPHONE ENCOUNTER
----- Message from Daniel Campbell MD sent at 5/10/2021  5:15 PM EDT -----  Please schedule patient for [place]  Please let her know that we discussed her case at our meeting and that was the consensus  Thank you

## 2021-05-13 ENCOUNTER — TRANSCRIBE ORDERS (OUTPATIENT)
Dept: ADMINISTRATIVE | Age: 71
End: 2021-05-13

## 2021-05-13 ENCOUNTER — APPOINTMENT (OUTPATIENT)
Dept: RADIOLOGY | Age: 71
End: 2021-05-13
Payer: COMMERCIAL

## 2021-05-13 DIAGNOSIS — M54.40 LOW BACK PAIN WITH SCIATICA, SCIATICA LATERALITY UNSPECIFIED, UNSPECIFIED BACK PAIN LATERALITY, UNSPECIFIED CHRONICITY: Primary | ICD-10-CM

## 2021-05-13 DIAGNOSIS — M54.40 LOW BACK PAIN WITH SCIATICA, SCIATICA LATERALITY UNSPECIFIED, UNSPECIFIED BACK PAIN LATERALITY, UNSPECIFIED CHRONICITY: ICD-10-CM

## 2021-05-13 PROCEDURE — 72100 X-RAY EXAM L-S SPINE 2/3 VWS: CPT

## 2021-05-28 ENCOUNTER — CONSULT (OUTPATIENT)
Dept: BARIATRICS | Facility: CLINIC | Age: 71
End: 2021-05-28
Payer: COMMERCIAL

## 2021-05-28 VITALS
TEMPERATURE: 98.9 F | BODY MASS INDEX: 25.08 KG/M2 | SYSTOLIC BLOOD PRESSURE: 138 MMHG | HEART RATE: 90 BPM | DIASTOLIC BLOOD PRESSURE: 80 MMHG | HEIGHT: 68 IN | WEIGHT: 165.5 LBS

## 2021-05-28 DIAGNOSIS — K21.9 GASTROESOPHAGEAL REFLUX DISEASE WITHOUT ESOPHAGITIS: ICD-10-CM

## 2021-05-28 DIAGNOSIS — K21.9 LARYNGOPHARYNGEAL REFLUX (LPR): ICD-10-CM

## 2021-05-28 PROCEDURE — 99213 OFFICE O/P EST LOW 20 MIN: CPT | Performed by: SURGERY

## 2021-05-28 NOTE — PROGRESS NOTES
OFFICE VISIT - BARIATRIC SURGERY  Bro Mac 79 y o  female MRN: [id number]  Unit/Bed#:  Encounter: [id number]      HPI:  Bro Mac is a 79 y o  female presents to us with complaints of mucous production and globus sensation for the past 8 years  She denies dysphagia, regurgitation or heartburn  On manometry she has EGJ outflow obstruction and pH study is positive for reflux  Her symptoms are not controlled on PPI therapy  Review of Systems   Constitutional: Negative for activity change, appetite change, chills and fever  HENT:        Mucous production  Eyes: Negative  Respiratory: Negative for apnea, cough, choking and shortness of breath  Cardiovascular: Negative for chest pain and palpitations  Gastrointestinal: Negative for abdominal distention, abdominal pain, nausea and vomiting  Genitourinary: Negative for difficulty urinating, dysuria and frequency  Neurological: Negative  Negative for dizziness, seizures and syncope         Historical Information   Past Medical History:   Diagnosis Date    Breast cancer ([facility] [address] ) 08/21/2008    age 62    Cancer ([facility] [address] )     left     Chronic low back pain     Clostridium difficile colitis     DDD (degenerative disc disease), lumbar     GERD (gastroesophageal reflux disease)     History of radiation therapy 09/2008    for breast cancer    Hypertension     Laryngopharyngeal reflux (LPR)     Lumbar spinal stenosis     Osteoarthritis of spine with radiculopathy, lumbar region     Osteopenia     Piriformis syndrome of left side     Sciatica     Spondylosis of cervical region without myelopathy or radiculopathy      Past Surgical History:   Procedure Laterality Date    BREAST BIOPSY Left 07/30/2008    malignant    BREAST CYST ASPIRATION Left 1998    BREAST LUMPECTOMY Left 08/21/2008    BREAST SURGERY      COLONOSCOPY  08/31/2020    [facility] ESOPHAGOGASTRODUODENOSCOPY TRANSORAL DIAGNOSTIC N/A 2/15/2017    Procedure: ESOPHAGOGASTRODUODENOSCOPY (EGD); Surgeon: Jing Menjivar MD;  Location: [facility]; Service: Gastroenterology    [place] ESOPHAGOGASTRODUODENOSCOPY TRANSORAL DIAGNOSTIC N/A 2018    Procedure: ESOPHAGOGASTRODUODENOSCOPY (EGD); Surgeon: Jing Menjivar MD;  [facility]; Service: Gastroenterology    UPPER GASTROINTESTINAL ENDOSCOPY  2020    US GUIDED MSK PROCEDURE  2020     Social History   Social History     Substance and Sexual Activity   Alcohol Use Yes    Frequency: 2-3 times a week    Drinks per session: 1 or 2    Comment: social     Social History     Substance and Sexual Activity   Drug Use No     Social History     Tobacco Use   Smoking Status Former Smoker    Quit date:     Years since quittin 4   Smokeless Tobacco Never Used       Objective       Current Vitals:   Blood Pressure: 138/80 (21 1252)  Pulse: 90 (21 1252)  Temperature: 98 9 °F (37 2 °C) (21 1252)  Temp Source: Tympanic (21 1252)  Height: 5' 7 75" (172 1 cm) (21 1252)  Weight - Scale: 75 1 kg (165 lb 8 oz) (21 1252)    Invasive Devices     None                 Physical Exam  Constitutional:       Appearance: Normal appearance  HENT:      Head: Normocephalic and atraumatic  Neck:      Musculoskeletal: Normal range of motion  Cardiovascular:      Rate and Rhythm: Normal rate and regular rhythm  Heart sounds: Normal heart sounds  Pulmonary:      Effort: Pulmonary effort is normal       Breath sounds: Normal breath sounds  Abdominal:      General: Bowel sounds are normal  There is no distension  Palpations: Abdomen is soft  Tenderness: There is no abdominal tenderness  There is no guarding or rebound  Skin:     General: Skin is warm  Neurological:      Mental Status: She is alert and oriented to person, place, and time  Pathology, and Other Studies: I have personally reviewed pertinent reports          Assessment/PLAN:    Marybelle Homans is a 79 y o  female presents to us with complaints of mucous production and globus sensation for the past 8 years  She denies dysphagia, regurgitation or heartburn  On manometry she has EGJ outflow obstruction and pH study is positive for reflux  Her symptoms are not controlled on PPI therapy      ---------------------------------------------------    The patient's workup thus far was reviewed, and includes:    UGI    Minimal presbyesophagus  No esophageal mucosal lesions  Stable tiny sliding hiatal hernia  No spontaneous reflux was observed under 5 minutes of intermittent fluoroscopy  EGD    1  Nodular mucosa at the GE junction - biopsies done  2  No hernia  3  History of intestinal metaplasia - biopsies done for mapping  Pathology  A  Stomach, Antrum, Biopsy:  - Chronic inactive antral gastritis with foveolar hyperplasia  - Negative for intestinal metaplasia, dysplasia or carcinoma  - No Helicobacter pylori is identified on H&E stained slides      B  Stomach, Incisura, Biopsy:  - Chronic inactive antral gastritis  - Negative for intestinal metaplasia, dysplasia or carcinoma  - No Helicobacter pylori is identified on H&E stained slides      C  Stomach, Gastric body greater curvature, Biopsy:  - Chronic inactive oxyntic gastritis  - Negative for intestinal metaplasia, dysplasia or carcinoma  - No Helicobacter pylori is identified on H&E stained slides       D  Stomach, Gastric body lesser curvature, Biopsy:  - Chronic inactive oxyntic gastritis  - Negative for intestinal metaplasia, dysplasia or carcinoma  - No Helicobacter pylori is identified on H&E stained slides        E  Stomach, Fundus, Biopsy:  - Chronic inactive oxyntic gastritis  - Negative for intestinal metaplasia, dysplasia or carcinoma  - No Helicobacter pylori is identified on H&E stained slides        F   Esophagogastric junction, Biopsy:  - Benign cardio-oxyntic type mucosa with mild chronic inflammation   - No squamous mucosa present   - Negative for intestinal metaplasia, epithelial dysplasia or malignancy  MANOMETRY/pH Study  Esophageal manometry  Esophageal motility- 10 out of the 10 swallows had normal esophageal contractibility with mean DCI of 4876 mmHg  s cm  LES- median IRP is elevated to 26 mmHg  Impedance- 90% complete clearance of liquid bolus swallows     Double pressure zone likely representing small hiatal hernia    Rapid swallow index less than 1     Horton classification- EGJ outflow obstruction with likely small hiatal hernia  Patient has previous history of inflammation at the gastroesophageal junction  EGJ maybe related to possible Schatzki's ring  of note impedance is normal     24 hours pH study- study off PPI therapy     Acid exposure time is 10 9% overall  Acid exposure time in upright position 17 7%  DeMeester score is 33     78 episodes of reflux with 67 episodes in the upright position and 11 episodes in the recumbent position  66 episodes of acid reflux in upright position and 1 episode of weakly alkaline episode in upright position     Symptom correlation with significant with symptoms of birth see related to acid reflux with symptom index of 53% and symptoms associated probably of 100%     2 episodes of LPR reflux     Positive study for acid reflux disease        --------------------------------------------------------------------    The patient has atypical symptoms of GERD  After a long discussion with the patient and with Dr Santos Ruffin, the best option for her to start would be Stretta especially that she does not have a hiatal hernia  This would not burn other bridges, and if it fails we can offer her other surgical options                Rosendo Lou MD  Bariatric Surgery Fellow  5/28/2021  12:57 PM

## 2021-06-01 NOTE — TELEPHONE ENCOUNTER
Dulce Durham on 8/12/21 with Dr Jessica Gabriel at [facility]  Prep instructions gone over verbally and mailed to the patient

## 2021-06-08 PROBLEM — M62.89 PELVIC FLOOR DYSFUNCTION IN FEMALE: Status: ACTIVE | Noted: 2021-06-08

## 2021-07-09 NOTE — PROGRESS NOTES
PT Evaluation     Today's date: 2021  Patient name: Eulalio Shirley  : 1950  MRN: [id number]  Referring provider: Otilio Vargas MD  Dx:   Encounter Diagnosis     ICD-10-CM    1  Pelvic floor dysfunction in female  M62 89 Ambulatory referral to Physical Therapy   2  Bowel dysfunction  K59 9                   Assessment  Assessment details: Eulalio Shirley is a 79 y o  female who presents with concerns of change in bowel symptoms over the past few years  Patient presents with the below outlined deficits and is appropriate for skilled physical therapy in order to address deficits and ultimately meet goal of independent self management of condition  Therapeutic activities performed upon examination included education regarding pelvic floor anatomy, explanation of exam technique, explanation of exam findings and discussion of treatment plan as well as expectations of the patient to emphasize the importance of compliance and adherence to physical therapy visits  Impairments: abnormal muscle tone, activity intolerance, lacks appropriate home exercise program and poor posture   Understanding of Dx/Px/POC: good   Prognosis: good    Goals  STGs to be met in 4 weeks:  * Patient will be compliant with introductory HEP as prescribed  LTGs to be met by discharge:  * Normalize findings on sEMG to indicate PFM strength average of at least 12uV and resting average < 2 5uV  * Implements relaxation strategies on a daily basis  * Patient will report [place] type 3-4 stools on a daily basis  * Patient will report that she is able to fully evacuate stool with no sensation of incomplete emptying  * Patient will be compliant with comprehensive home exercise program for self management of condition         Plan  Patient would benefit from: skilled physical therapy  Referral necessary: No  Planned modality interventions: biofeedback  Planned therapy interventions: manual therapy, neuromuscular re-education, patient education, strengthening, therapeutic activities, therapeutic exercise and home exercise program  Frequency: 1x week  Duration in weeks: 12  Plan of Care beginning date: 7/12/2021  Plan of Care expiration date: 10/4/2021  Treatment plan discussed with: patient        PT Pelvic Floor Subjective:   History of Present Illness:   Patient reports 1 1/2 years of change in bowel symptoms  She used to be very regular with morning BMs but this has changed so that it is now variable in both timing and consistency (fluxes between [facility] 2 through 6)  She feels doesn't empty all of the way  Using Metamucil every morning  Tried 3-4 weeks of Konsyl with no change     Colonscopy wnl in August 2020    Reports R hip pain/sciatic pain - "I awaken in pain and cry for the first hour due my hip pain "    August 12, 2021 - scheduled for sphincteric procedure between esophagus and stomach             Recurrent probem    Quality of life: good    Social Support:     Lives with:  Spouse    Relationship status: /committed    Work status: employed part time    Life stress severity: moderate  Diet and Exercise:      Exercise type: no activity    Walks when the weather is favorable     Not exercising due to: pain  Co-morbidities:    DDD  H/o sciatic pain bilaterally (bothers her on the right side currently)    OB/ gyn History    Gestational History:     Prior Pregnancy: No      Menstrual History:      Menopausal: menopause  no hormone replacement therapy  Bladder Function:     Voiding Difficulties comments:     Voiding frequency: every 1-2 hours    Urinary leakage: urine leakage (mild )    Urinary leakage aggravated by: coughing and sneezing    Nocturia (episodes per night): 2 and 3    Painful urination: No      Intake (ounces): Water: 32, Juice intake (oz): crystal light, lemonade   Coffee: 8, Alcohol intake (oz): social     Intake (ounces) comment: Drinks alkaline water due to GERD  Incontinence Management:     Patient has attempted at least 4 weeks of independent pelvic floor strengthening exercises without a resolution of symptoms  Bowel Function:     Voiding DIfficulties: painful defecating and unfinished feeling after defecating      Voiding DIfficulties comment: Occ gas pain,      Bowel frequency: daily (morning )    [place] Stool Scale: type 2, type 3, type 4, type 5 and type 6    Stool softener use: no stool softeners    Uses "squatty potty": no Squatty Potty  Sexual Function:     Sexually Active:  Not sexually active (Not active but not due to current PT symptoms)    Patient wishes to return to having intercourse: currently able to have intercourse or doesn't want to  Pain:     No pain reported by patient  Location:  No pelvic pain  Diagnostic Tests:     None    Treatments:   Upcoming doctor's appointment: yes  Date of next appointment: 8/12/2021  Patient Goals:     Patient goals for therapy:  Improved quality of life, improved comfort, improved bladder or bowel function and fully empty bladder or bowels      Objective     Static Posture     Head  Forward  Shoulders  Rounded  Rib Cage  Pectus carinatum  Pelvis   Posterior pelvic tilt    PFM exam deferred due to time restraints  She provided written and verbal consent for examination next visit            Precautions: standard      Manuals 7/12            PFM assessment nv                                                   Neuro Re-Ed                                                                                                        Ther Ex                                                                                                                     Ther Activity             education anatoamy and POC x10'                         Gait Training                                       Modalities

## 2021-07-12 ENCOUNTER — EVALUATION (OUTPATIENT)
Dept: PHYSICAL THERAPY | Facility: REHABILITATION | Age: 71
End: 2021-07-12
Payer: COMMERCIAL

## 2021-07-12 DIAGNOSIS — K59.9 BOWEL DYSFUNCTION: ICD-10-CM

## 2021-07-12 DIAGNOSIS — M62.89 PELVIC FLOOR DYSFUNCTION IN FEMALE: Primary | ICD-10-CM

## 2021-07-12 PROCEDURE — 97162 PT EVAL MOD COMPLEX 30 MIN: CPT | Performed by: PHYSICAL THERAPIST

## 2021-07-12 PROCEDURE — 97530 THERAPEUTIC ACTIVITIES: CPT | Performed by: PHYSICAL THERAPIST

## 2021-07-15 ENCOUNTER — APPOINTMENT (OUTPATIENT)
Dept: LAB | Age: 71
End: 2021-07-15
Payer: COMMERCIAL

## 2021-07-15 DIAGNOSIS — E78.2 MIXED HYPERLIPIDEMIA: ICD-10-CM

## 2021-07-15 DIAGNOSIS — R73.01 IMPAIRED FASTING GLUCOSE: ICD-10-CM

## 2021-07-15 DIAGNOSIS — E03.9 MYXEDEMA HEART DISEASE: ICD-10-CM

## 2021-07-15 DIAGNOSIS — I51.9 MYXEDEMA HEART DISEASE: ICD-10-CM

## 2021-07-15 LAB
ALBUMIN SERPL BCP-MCNC: 3.6 G/DL (ref 3.5–5)
ALP SERPL-CCNC: 72 U/L (ref 46–116)
ALT SERPL W P-5'-P-CCNC: 25 U/L (ref 12–78)
ANION GAP SERPL CALCULATED.3IONS-SCNC: 7 MMOL/L (ref 4–13)
AST SERPL W P-5'-P-CCNC: 19 U/L (ref 5–45)
BASOPHILS # BLD AUTO: 0.03 THOUSANDS/ΜL (ref 0–0.1)
BASOPHILS NFR BLD AUTO: 1 % (ref 0–1)
BILIRUB SERPL-MCNC: 0.83 MG/DL (ref 0.2–1)
BUN SERPL-MCNC: 10 MG/DL (ref 5–25)
CALCIUM SERPL-MCNC: 9.5 MG/DL (ref 8.3–10.1)
CHLORIDE SERPL-SCNC: 108 MMOL/L (ref 100–108)
CHOLEST SERPL-MCNC: 224 MG/DL (ref 50–200)
CO2 SERPL-SCNC: 25 MMOL/L (ref 21–32)
CREAT SERPL-MCNC: 0.7 MG/DL (ref 0.6–1.3)
EOSINOPHIL # BLD AUTO: 0.26 THOUSAND/ΜL (ref 0–0.61)
EOSINOPHIL NFR BLD AUTO: 6 % (ref 0–6)
ERYTHROCYTE [DISTWIDTH] IN BLOOD BY AUTOMATED COUNT: 13.2 % (ref 11.6–15.1)
GFR SERPL CREATININE-BSD FRML MDRD: 88 ML/MIN/1.73SQ M
GLUCOSE P FAST SERPL-MCNC: 102 MG/DL (ref 65–99)
HCT VFR BLD AUTO: 41.9 % (ref 34.8–46.1)
HDLC SERPL-MCNC: 67 MG/DL
HGB BLD-MCNC: 12.9 G/DL (ref 11.5–15.4)
IMM GRANULOCYTES # BLD AUTO: 0.02 THOUSAND/UL (ref 0–0.2)
IMM GRANULOCYTES NFR BLD AUTO: 0 % (ref 0–2)
LDLC SERPL CALC-MCNC: 129 MG/DL (ref 0–100)
LYMPHOCYTES # BLD AUTO: 1.76 THOUSANDS/ΜL (ref 0.6–4.47)
LYMPHOCYTES NFR BLD AUTO: 38 % (ref 14–44)
MCH RBC QN AUTO: 29.7 PG (ref 26.8–34.3)
MCHC RBC AUTO-ENTMCNC: 30.8 G/DL (ref 31.4–37.4)
MCV RBC AUTO: 96 FL (ref 82–98)
MONOCYTES # BLD AUTO: 0.41 THOUSAND/ΜL (ref 0.17–1.22)
MONOCYTES NFR BLD AUTO: 9 % (ref 4–12)
NEUTROPHILS # BLD AUTO: 2.15 THOUSANDS/ΜL (ref 1.85–7.62)
NEUTS SEG NFR BLD AUTO: 46 % (ref 43–75)
NONHDLC SERPL-MCNC: 157 MG/DL
NRBC BLD AUTO-RTO: 0 /100 WBCS
PLATELET # BLD AUTO: 206 THOUSANDS/UL (ref 149–390)
PMV BLD AUTO: 10.7 FL (ref 8.9–12.7)
POTASSIUM SERPL-SCNC: 4 MMOL/L (ref 3.5–5.3)
PROT SERPL-MCNC: 7.2 G/DL (ref 6.4–8.2)
RBC # BLD AUTO: 4.35 MILLION/UL (ref 3.81–5.12)
SODIUM SERPL-SCNC: 140 MMOL/L (ref 136–145)
TRIGL SERPL-MCNC: 141 MG/DL
TSH SERPL DL<=0.05 MIU/L-ACNC: 1.46 UIU/ML (ref 0.36–3.74)
WBC # BLD AUTO: 4.63 THOUSAND/UL (ref 4.31–10.16)

## 2021-07-15 PROCEDURE — 85025 COMPLETE CBC W/AUTO DIFF WBC: CPT

## 2021-07-15 PROCEDURE — 84443 ASSAY THYROID STIM HORMONE: CPT

## 2021-07-15 PROCEDURE — 80053 COMPREHEN METABOLIC PANEL: CPT

## 2021-07-15 PROCEDURE — 36415 COLL VENOUS BLD VENIPUNCTURE: CPT

## 2021-07-15 PROCEDURE — 80061 LIPID PANEL: CPT

## 2021-07-20 ENCOUNTER — OFFICE VISIT (OUTPATIENT)
Dept: PHYSICAL THERAPY | Facility: REHABILITATION | Age: 71
End: 2021-07-20
Payer: COMMERCIAL

## 2021-07-20 DIAGNOSIS — K59.9 BOWEL DYSFUNCTION: ICD-10-CM

## 2021-07-20 DIAGNOSIS — M62.89 PELVIC FLOOR DYSFUNCTION IN FEMALE: Primary | ICD-10-CM

## 2021-07-20 PROCEDURE — 97530 THERAPEUTIC ACTIVITIES: CPT | Performed by: PHYSICAL THERAPIST

## 2021-07-20 PROCEDURE — 97110 THERAPEUTIC EXERCISES: CPT | Performed by: PHYSICAL THERAPIST

## 2021-07-20 PROCEDURE — 97140 MANUAL THERAPY 1/> REGIONS: CPT | Performed by: PHYSICAL THERAPIST

## 2021-07-20 NOTE — PROGRESS NOTES
Daily Note     Today's date: 2021  Patient name: Radha Wesley  : 1950  MRN: [id number]  Referring provider: Kat Mendiola MD  Dx:   Encounter Diagnosis     ICD-10-CM    1  Pelvic floor dysfunction in female  M62 89    2  Bowel dysfunction  K59 9                   Subjective: Patient reports that she hasn't been feeling too bad the past few days  [place] type 6 stool  Mild RAMY  Objective: See treatment diary below    Pelvic floor strength (rectal assessment in right sidelying)  Power: 3/5  Endurance: 5 seconds  Reps @ 5 seconds: 6  Fast holds in 10 seconds: 7    Sensation intake R and L, anal wink intact, able to appropriately eccentrically elongate PFM against palpating finger    Assessment: Tolerated treatment well  Patient would benefit from continued PT  Discussed pathophysiology of urinary system and trying to avoid defensive voiding  Patient expressed good understanding  Also discussed reducing Metamucil so in order to try to restore her stool consistency to PATIENTS [facility] Type 4  Issued HEP of slow holds with adduction  Plan: Continue per plan of care    Progress TE nv       Precautions: standard      Manuals            PFM assessment nv 15'           Rectal cuing                                       Neuro Re-Ed                                                                                                        Ther Ex             Slow holds  5'           Hip adduction + PFM  5'                                                                                         Ther Activity             education anatoamy and POC x10' bladder pathophys x25'                        Gait Training                                       Modalities

## 2021-07-26 ENCOUNTER — APPOINTMENT (OUTPATIENT)
Dept: PHYSICAL THERAPY | Facility: REHABILITATION | Age: 71
End: 2021-07-26
Payer: COMMERCIAL

## 2021-07-29 ENCOUNTER — APPOINTMENT (OUTPATIENT)
Dept: PHYSICAL THERAPY | Facility: REHABILITATION | Age: 71
End: 2021-07-29
Payer: COMMERCIAL

## 2021-07-29 ENCOUNTER — OFFICE VISIT (OUTPATIENT)
Dept: URGENT CARE | Age: 71
End: 2021-07-29
Payer: COMMERCIAL

## 2021-07-29 VITALS — RESPIRATION RATE: 18 BRPM | TEMPERATURE: 98.1 F | OXYGEN SATURATION: 99 % | HEART RATE: 94 BPM

## 2021-07-29 DIAGNOSIS — J30.9 ALLERGIC RHINITIS, UNSPECIFIED SEASONALITY, UNSPECIFIED TRIGGER: ICD-10-CM

## 2021-07-29 DIAGNOSIS — H66.91 RIGHT OTITIS MEDIA, UNSPECIFIED OTITIS MEDIA TYPE: Primary | ICD-10-CM

## 2021-07-29 PROCEDURE — 99213 OFFICE O/P EST LOW 20 MIN: CPT | Performed by: NURSE PRACTITIONER

## 2021-07-29 PROCEDURE — S9083 URGENT CARE CENTER GLOBAL: HCPCS | Performed by: NURSE PRACTITIONER

## 2021-07-29 RX ORDER — CLINDAMYCIN HYDROCHLORIDE 300 MG/1
300 CAPSULE ORAL 3 TIMES DAILY
Qty: 21 CAPSULE | Refills: 0 | Status: SHIPPED | OUTPATIENT
Start: 2021-07-29 | End: 2021-08-05

## 2021-07-29 RX ORDER — PREDNISONE 20 MG/1
TABLET ORAL
Qty: 12 TABLET | Refills: 0 | Status: SHIPPED | OUTPATIENT
Start: 2021-07-29

## 2021-07-29 RX ORDER — PREDNISONE 20 MG/1
20 TABLET ORAL 2 TIMES DAILY WITH MEALS
Qty: 10 TABLET | Refills: 0 | Status: SHIPPED | OUTPATIENT
Start: 2021-07-29 | End: 2021-07-29

## 2021-07-29 NOTE — PATIENT INSTRUCTIONS
Allergic Rhinitis   WHAT YOU NEED TO KNOW:   Allergic rhinitis, or hay fever, is swelling of the inside of your nose  The swelling is a reaction to allergens in the air  An allergen can be anything that causes an allergic reaction  Allergies to weeds, grass, trees, or mold often cause seasonal allergic rhinitis  Indoor dust mites, cockroaches, pet dander, or mold can also cause allergic rhinitis  DISCHARGE INSTRUCTIONS:   Call 911 for the following:   · You have chest pain or shortness of breath  Return to the emergency department if:   · You have severe pain  · You cough up blood  Contact your healthcare provider if:   · You have a fever  · You have ear or sinus pain, or a headache  · Your symptoms get worse, even after treatment  · You have yellow, green, brown, or bloody mucus coming from your nose  · Your nose is bleeding or you have pain inside your nose  · You have trouble sleeping because of your symptoms  · You have questions or concerns about your condition or care  Medicines:   · Medicines  help decrease your symptoms and clear your stuffy nose  · Take your medicine as directed  Contact your healthcare provider if you think your medicine is not helping or if you have side effects  Tell him of her if you are allergic to any medicine  Keep a list of the medicines, vitamins, and herbs you take  Include the amounts, and when and why you take them  Bring the list or the pill bottles to follow-up visits  Carry your medicine list with you in case of an emergency  How to manage allergic rhinitis:  The best way to manage allergic rhinitis is to avoid allergens that can trigger your symptoms  Any of the following may help decrease your symptoms:  · Rinse your nose and sinuses  with a salt water solution or use a salt water nasal spray  This will help thin the mucus in your nose and rinse away pollen and dirt  It will also help reduce swelling so you can breathe normally  Ask your healthcare provider how often to rinse your nose  · Reduce exposure to dust mites  Wash sheets and towels in hot water every week  Cover your pillows and mattresses with allergen-free covers  Limit the number of stuffed animals and soft toys your child has  Wash your child's toys in hot water regularly  Vacuum weekly and use a vacuum  with an air filter  If possible, get rid of carpets and curtains  These collect dust and dust mites  · Reduce exposure to pollen  Keep windows and doors closed in your house and car  Stay inside when air pollution or the pollen count is high  Run your air conditioner on recycle, and change air filters often  Shower and wash your hair before bed every night to rinse away pollen  · Reduce exposure to pet dander  If possible, do not keep cats, dogs, birds, or other pets  If you do keep pets in your home, keep them out of bedrooms and carpeted rooms  Bathe them often  · Reduce exposure to mold  Do not spend time in basements  Choose artificial plants instead of live plants  Keep your home's humidity at less than 45%  Do not have ponds or standing water in your home or yard  · Do not smoke  Avoid others who smoke  Ask your healthcare provider for information if you currently smoke and need help to quit  Follow up with your healthcare provider as directed: You may need to see an allergist often to control your symptoms  Write down your questions so you remember to ask them during your visits  © [employer] 2021 Information is for End User's use only and may not be sold, redistributed or otherwise used for commercial purposes  All illustrations and images included in CareNotes® are the copyrighted property of A D A M , Inc  or [facility] [place] [employer]   The above information is an  only  It is not intended as medical advice for individual conditions or treatments   Talk to your doctor, nurse or pharmacist before following any medical regimen to see if it is safe and effective for you

## 2021-07-29 NOTE — PROGRESS NOTES
[facility] [employer] [facility]        NAME: Karine Dickerson is a 70 y o  female  : 1950    MRN: [id number]  DATE: 2021  TIME: 9:22 AM    Assessment and Plan   Right otitis media, unspecified otitis media type [H66 91]  1  Right otitis media, unspecified otitis media type  clindamycin (CLEOCIN) 300 MG capsule   2  Allergic rhinitis, unspecified seasonality, unspecified trigger  predniSONE 20 mg tablet    DISCONTINUED: predniSONE 20 mg tablet         Patient Instructions     Stop azithromycin  start clindamycin  probiotics to prevent C diff  Follow up with PCP in 3-5 days  Proceed to  ER if symptoms worsen  Chief Complaint     Chief Complaint   Patient presents with    Earache    Sore Throat    Cough         History of Present Illness       HPI    reports cold symptoms for about 1 week  States she was started on azithromycin 4 days ago by her PCP  Has been taking it and is not helping symptoms continue  Includes pain in the ears, sore throat, cough, chills and congestion  Also some sneezing  Currently taking Allegra  States previous history of C diff    Review of Systems   Review of Systems   Constitutional: Positive for chills  Negative for fever  HENT: Positive for congestion, ear pain, postnasal drip, sinus pressure, sneezing and sore throat  Negative for hearing loss  Eyes: Positive for itching  Respiratory: Positive for cough  Negative for chest tightness, shortness of breath and wheezing  Cardiovascular: Negative for chest pain  Gastrointestinal: Negative for nausea and vomiting  Neurological: Negative for headaches           Current Medications       Current Outpatient Medications:     azelastine (ASTELIN) 0 1 % nasal spray, 1 spray into each nostril 2 (two) times a day, Disp: 1 Bottle, Rfl: 11    Cholecalciferol (VITAMIN D-3 PO), Take 1,000 mg by mouth daily  , Disp: , Rfl:     cimetidine (TAGAMET) 400 mg tablet, Take 1 tablet (400 mg total) by mouth daily at bedtime, Disp: 30 tablet, Rfl: 11    clindamycin (CLEOCIN) 300 MG capsule, Take 1 capsule (300 mg total) by mouth 3 (three) times a day for 7 days, Disp: 21 capsule, Rfl: 0    Cyclobenzaprine HCl (FLEXERIL PO), Take 1 tablet by mouth daily, Disp: , Rfl:     fexofenadine (ALLEGRA) 180 MG tablet, Take 180 mg by mouth daily, Disp: , Rfl:     lisinopril (ZESTRIL) 10 mg tablet, Take 10 mg by mouth daily, Disp: , Rfl:     Multiple Vitamins-Minerals (CENTRUM ADULTS PO), Take 1 tablet by mouth daily, Disp: , Rfl:     predniSONE 20 mg tablet, 1 tab TID x 2 days, then BID x 2 days, then daily x 2 days, Disp: 12 tablet, Rfl: 0    Probiotic Product (PROBIOTIC PO), Take 1 tablet by mouth, Disp: , Rfl:     RABEprazole (ACIPHEX) 20 MG tablet, Take 1 tablet (20 mg total) by mouth every morning, Disp: 30 tablet, Rfl: 11    Current Allergies     Allergies as of 07/29/2021 - Reviewed 07/29/2021   Allergen Reaction Noted    Codeine  02/14/2017    Morphine and related  11/22/2019    Morphine sulfate [morphine]  02/14/2017    Naprosyn [naproxen]  02/14/2017    Oxycodone  02/14/2017    Penicillins  02/14/2017    Sulfa antibiotics  02/14/2017            The following portions of the patient's history were reviewed and updated as appropriate: allergies, current medications, past family history, past medical history, past social history, past surgical history and problem list      Past Medical History:   Diagnosis Date    Breast cancer ([facility] [address] ) 08/21/2008    age 62    Cancer ([facility] [address] )     left     Chronic low back pain     Clostridium difficile colitis     DDD (degenerative disc disease), lumbar     GERD (gastroesophageal reflux disease)     History of radiation therapy 09/2008    for breast cancer    Hypertension     Laryngopharyngeal reflux (LPR)     Lumbar spinal stenosis     Osteoarthritis of spine with radiculopathy, lumbar region     Osteopenia     Piriformis syndrome of left side     Sciatica     Spondylosis of cervical region without myelopathy or radiculopathy        Past Surgical History:   Procedure Laterality Date    BREAST BIOPSY Left 07/30/2008    malignant    BREAST CYST ASPIRATION Left 1998    BREAST LUMPECTOMY Left 08/21/2008    BREAST SURGERY      COLONOSCOPY  08/31/2020    NH ESOPHAGOGASTRODUODENOSCOPY TRANSORAL DIAGNOSTIC N/A 2/15/2017    Procedure: ESOPHAGOGASTRODUODENOSCOPY (EGD); Surgeon: Francesca Núñez MD;  [facility]; Service: Gastroenterology    NH ESOPHAGOGASTRODUODENOSCOPY TRANSORAL DIAGNOSTIC N/A 2/1/2018    Procedure: ESOPHAGOGASTRODUODENOSCOPY (EGD); Surgeon: Francesca Núñez MD;  [facility]; Service: Gastroenterology    UPPER GASTROINTESTINAL ENDOSCOPY  01/2020    US GUIDED MSK PROCEDURE  2/6/2020       Family History   Problem Relation Age of Onset    Osteoporosis Mother     Coronary artery disease Father    Mollie Sizer Diabetes Father     COPD Father     No Known Problems Maternal Aunt     Breast cancer Paternal Aunt 68    No Known Problems Paternal Aunt     No Known Problems Maternal Aunt     Colon cancer Neg Hx          Medications have been verified  Objective   Pulse 94   Temp 98 1 °F (36 7 °C)   Resp 18   LMP  (LMP Unknown)   SpO2 99%   No LMP recorded (lmp unknown)  Patient is postmenopausal        Physical Exam     Physical Exam  Constitutional:       Appearance: She is not ill-appearing  HENT:      Right Ear: A middle ear effusion is present  Tympanic membrane is erythematous  Left Ear: Tympanic membrane and ear canal normal       Nose: Congestion and rhinorrhea present  Mouth/Throat:      Mouth: Mucous membranes are moist       Pharynx: No pharyngeal swelling or posterior oropharyngeal erythema  Tonsils: 0 on the right  0 on the left  Comments: Post nasal drip  Cardiovascular:      Rate and Rhythm: Regular rhythm  Pulmonary:      Effort: Pulmonary effort is normal       Breath sounds: Normal breath sounds     Neurological:      Mental Status: She is alert

## 2021-08-09 ENCOUNTER — TELEPHONE (OUTPATIENT)
Dept: OTHER | Facility: OTHER | Age: 71
End: 2021-08-09

## 2021-08-09 NOTE — TELEPHONE ENCOUNTER
Patient continues with post nasal drip post sinus infection  Patient informed that a sinus infection is not a contraindication to the procedure  Patient would like to see how she feels tomorrow before she cancels  Patient was advised to call back tomorrow to cancel if needed

## 2021-08-09 NOTE — TELEPHONE ENCOUNTER
From our end a sinus infection is not a contraindication to her procedure so it would be up to her if she does not feel well enough to proceed with this as planned and she would prefer to delay this until feeling better

## 2021-08-09 NOTE — TELEPHONE ENCOUNTER
Patient has an EGD scheduled on Thursday 8/12  Patient has a sinus and ear infection  Patient finished her antibiotics on Wednesday  Patient tested negative for COVID on 8/2/21  Patient states she's still not feeling well  Patient would like to know if she should reschedule?

## 2021-08-10 ENCOUNTER — TELEPHONE (OUTPATIENT)
Dept: GASTROENTEROLOGY | Facility: CLINIC | Age: 71
End: 2021-08-10

## 2021-08-19 ENCOUNTER — APPOINTMENT (OUTPATIENT)
Dept: LAB | Age: 71
End: 2021-08-19
Payer: COMMERCIAL

## 2021-08-19 DIAGNOSIS — I51.9 MYXEDEMA HEART DISEASE: ICD-10-CM

## 2021-08-19 DIAGNOSIS — R73.01 IMPAIRED FASTING GLUCOSE: ICD-10-CM

## 2021-08-19 DIAGNOSIS — E78.2 MIXED HYPERLIPIDEMIA: ICD-10-CM

## 2021-08-19 DIAGNOSIS — E03.9 MYXEDEMA HEART DISEASE: ICD-10-CM

## 2021-08-19 DIAGNOSIS — R30.0 DYSURIA: ICD-10-CM

## 2021-08-19 PROCEDURE — 87086 URINE CULTURE/COLONY COUNT: CPT

## 2021-08-20 LAB — BACTERIA UR CULT: NORMAL

## 2021-08-30 ENCOUNTER — HOSPITAL ENCOUNTER (OUTPATIENT)
Dept: RADIOLOGY | Age: 71
Discharge: HOME | End: 2021-08-30
Payer: COMMERCIAL

## 2021-08-30 VITALS — WEIGHT: 160 LBS | BODY MASS INDEX: 24.25 KG/M2 | HEIGHT: 68 IN

## 2021-08-30 DIAGNOSIS — Z12.31 ENCOUNTER FOR SCREENING MAMMOGRAM FOR BREAST CANCER: ICD-10-CM

## 2021-08-30 PROCEDURE — 77067 SCR MAMMO BI INCL CAD: CPT

## 2021-08-30 PROCEDURE — 77063 BREAST TOMOSYNTHESIS BI: CPT

## 2021-09-01 NOTE — PROGRESS NOTES
Daily Note     Today's date: 2021  Patient name: Raford Kawasaki  : 1950  MRN: [id number]  Referring provider: Anuel Mosquera MD  Dx:   Encounter Diagnosis     ICD-10-CM    1  Lumbar radiculopathy  M54 16    2  Chronic bilateral low back pain with right-sided sciatica  M54 41     G89 29    3  Sacroiliitis ([address] )  M46 1                   Subjective: Patient is pleased with her progress since last session which was about 6 weeks ago  She resumed taking probiotics and reports that her stool is formed and she is not having any episodes of FI  Her urinary urgency has improved as well  Objective: See treatment diary below    Access Code: [email or web address]  URL: [email or web address]  Date: 2021  Prepared by: Ginny Muse    Exercises  Hooklying Clamshell with Resistance - 1 x daily - 7 x weekly  Supine Bridge with Pelvic Floor Contraction - 1 x daily - 7 x weekly      Assessment: Tolerated treatment well  Patient would benefit from continued PT  Good tolerance to session with TE focus this session  Plan: Continue per plan of care  May consider wrap up nv if she continues to do well         Precautions: standard      Manuals           PFM assessment nv 15'           Rectal cuing                                       Neuro Re-Ed             Breath + PFM   10'                                                                                        Ther Ex             Slow holds  5'           Hip adduction + PFM  5' pilates ring x2'          nustep   Seat x9 x10'          Hip abduction   GTB x2'          Bridges    x20          Reformer - hooklying   (1 R) first position troy morgan x2' each                                    Ther Activity             education anatoamy and POC x10' bladder pathophys x25'                        Gait Training                                       Modalities

## 2021-09-02 ENCOUNTER — OFFICE VISIT (OUTPATIENT)
Dept: PHYSICAL THERAPY | Facility: REHABILITATION | Age: 71
End: 2021-09-02
Payer: COMMERCIAL

## 2021-09-02 DIAGNOSIS — G89.29 CHRONIC BILATERAL LOW BACK PAIN WITH RIGHT-SIDED SCIATICA: ICD-10-CM

## 2021-09-02 DIAGNOSIS — M54.41 CHRONIC BILATERAL LOW BACK PAIN WITH RIGHT-SIDED SCIATICA: ICD-10-CM

## 2021-09-02 DIAGNOSIS — M46.1 SACROILIITIS (HCC): ICD-10-CM

## 2021-09-02 DIAGNOSIS — M54.16 LUMBAR RADICULOPATHY: Primary | ICD-10-CM

## 2021-09-02 PROCEDURE — 97110 THERAPEUTIC EXERCISES: CPT | Performed by: PHYSICAL THERAPIST

## 2021-09-02 PROCEDURE — 97112 NEUROMUSCULAR REEDUCATION: CPT | Performed by: PHYSICAL THERAPIST

## 2021-09-08 ENCOUNTER — OFFICE VISIT (OUTPATIENT)
Dept: OBGYN CLINIC | Facility: CLINIC | Age: 71
End: 2021-09-08
Payer: COMMERCIAL

## 2021-09-08 VITALS
SYSTOLIC BLOOD PRESSURE: 130 MMHG | DIASTOLIC BLOOD PRESSURE: 80 MMHG | HEIGHT: 67 IN | BODY MASS INDEX: 25.58 KG/M2 | WEIGHT: 163 LBS

## 2021-09-08 DIAGNOSIS — N89.8 VAGINAL DISCHARGE: ICD-10-CM

## 2021-09-08 DIAGNOSIS — N72 CERVICITIS: ICD-10-CM

## 2021-09-08 DIAGNOSIS — Z11.3 SCREENING FOR STDS (SEXUALLY TRANSMITTED DISEASES): ICD-10-CM

## 2021-09-08 DIAGNOSIS — Z12.31 ENCOUNTER FOR SCREENING MAMMOGRAM FOR BREAST CANCER: Primary | ICD-10-CM

## 2021-09-08 DIAGNOSIS — N76.0 ACUTE VAGINITIS: ICD-10-CM

## 2021-09-08 DIAGNOSIS — B37.49 GENITAL CANDIDIASIS: ICD-10-CM

## 2021-09-08 PROCEDURE — 99213 OFFICE O/P EST LOW 20 MIN: CPT | Performed by: OBSTETRICS & GYNECOLOGY

## 2021-09-08 NOTE — PROGRESS NOTES
Assessment    Normal breast and gyn exam except for vaginal discharge  Pelvic floor dysfunction  Colonoscopy with endometrial polyp August 2020 due at 3 years for repeat  Normal mammogram August 2021  History of C difficile  Received COVID-19 vaccine March 2020      Plan:  Rx mammogram   Check vaginal culture before treatment  Continue healthy diet exercise  Continue vitamin D3 and multivitamin containing vitamin-C and zinc     Subjective: G0     Patient ID: Maurizio Pappas is a 70 y o  female presents for yearly exam with no complaints  Patient was treated for sinusitis 4 weeks ago with clindamycin  Patient has a history of C diff  Patient did not develop any diarrhea  She was also treated with Macrobid on August 21st for UTI  Patient denied any vaginal bleeding, pelvic pain or vaginal discharge  Denies any breast bowel or bladder issues  Patient has been doing much better on a healthy diet and adding probiotics  She states her bowels have been much better  She is finishing up physical therapy for pelvic floor dysfunction  No change in family history  Medications reviewed  Review of Systems   Constitutional: Negative  Negative for fatigue, fever and unexpected weight change  HENT: Negative  Eyes: Negative  Respiratory: Negative  Negative for chest tightness, shortness of breath, wheezing and stridor  Cardiovascular: Negative  Negative for chest pain, palpitations and leg swelling  Gastrointestinal: Negative  Negative for abdominal pain, blood in stool, diarrhea, nausea, rectal pain and vomiting  Endocrine: Negative  Genitourinary: Negative for dysuria, frequency, vaginal bleeding, vaginal discharge and vaginal pain  Musculoskeletal: Negative  Skin: Negative  Allergic/Immunologic: Negative  Neurological: Negative  Hematological: Negative  Psychiatric/Behavioral: Negative  All other systems reviewed and are negative          Objective:      /80   Ht 5' 6 54" (1 69 m)   Wt 73 9 kg (163 lb)   LMP  (LMP Unknown)   BMI 25 89 kg/m²          Physical Exam  Constitutional:       Appearance: She is well-developed  HENT:      Head: Normocephalic and atraumatic  Neck:      Thyroid: No thyromegaly  Trachea: No tracheal deviation  Cardiovascular:      Rate and Rhythm: Normal rate and regular rhythm  Heart sounds: Normal heart sounds  Pulmonary:      Effort: Pulmonary effort is normal  No respiratory distress  Breath sounds: Normal breath sounds  No stridor  No wheezing or rales  Chest:      Chest wall: No tenderness  Breasts: Breasts are symmetrical          Right: No inverted nipple, mass, nipple discharge, skin change or tenderness  Left: No inverted nipple, mass, nipple discharge, skin change or tenderness  Abdominal:      General: Bowel sounds are normal  There is no distension  Palpations: Abdomen is soft  There is no mass  Tenderness: There is no abdominal tenderness  There is no guarding or rebound  Hernia: No hernia is present  There is no hernia in the left inguinal area  Genitourinary:     Labia:         Right: No rash, tenderness, lesion or injury  Left: No rash, tenderness, lesion or injury  Vagina: No signs of injury and foreign body  Vaginal discharge present  No erythema, tenderness or bleeding  Cervix: No cervical motion tenderness, discharge or friability  Uterus: Not deviated, not enlarged, not fixed and not tender  Adnexa:         Right: No mass, tenderness or fullness  Left: No mass, tenderness or fullness  Rectum: No mass, anal fissure, external hemorrhoid or internal hemorrhoid  Comments: Vaginal atrophy  Normal Drayton's glands and urethra  Copious vaginal green discharge  No uterine prolapse cystocele or rectocele noted  Musculoskeletal:      Cervical back: Normal range of motion and neck supple     Lymphadenopathy:      Lower Body: No right inguinal adenopathy  No left inguinal adenopathy  Skin:     General: Skin is warm and dry  Neurological:      Mental Status: She is alert and oriented to person, place, and time  Psychiatric:         Behavior: Behavior normal          Thought Content:  Thought content normal          Judgment: Judgment normal

## 2021-09-08 NOTE — PROGRESS NOTES
The patient is here for a yearly  pap normal 4/18/16  No urinary or vaginal issues  No bleeding or pelvic cramping

## 2021-09-12 NOTE — PROGRESS NOTES
Daily Note/ Discharge     Today's date: 2021  Patient name: Jen Patterson  : 1950  MRN: [id number]  Referring provider: Antionette Tom MD  Dx:   Encounter Diagnosis     ICD-10-CM    1  Lumbar radiculopathy  M54 16    2  Chronic bilateral low back pain with right-sided sciatica  M54 41     G89 29    3  Sacroiliitis ([address] )  M46 1                   Subjective: Patient continues to be feeling well  She is well pleased that her fecal incontinence has resolved  Objective: See treatment diary below    Goals  STGs to be met in 4 weeks:  * Patient will be compliant with introductory HEP as prescribed  MET    LTGs to be met by discharge:  * Normalize findings on sEMG to indicate PFM strength average of at least 12uV and resting average < 2 5uV  NA  * Implements relaxation strategies on a daily basis  MET  * Patient will report [place] type 3-4 stools on a daily basis  NA  * Patient will report that she is able to fully evacuate stool with no sensation of incomplete emptying  MET  * Patient will be compliant with comprehensive home exercise program for self management of condition  MET    Assessment: Tolerated treatment well  Patient is appropriate for discharge at this time  She has met all goals and has been encouraged to continue with her exercises and lifestyle changes  Plan: Discharge PT           Precautions: standard      Manuals          PFM assessment nv 15'           Rectal cuing             Abdominal release    10'                      Neuro Re-Ed             Breath + PFM   10'                                                                                        Ther Ex             Slow holds  5'           Hip adduction + PFM  5' pilates ring x2'          nustep   Seat x9 x10' 10'         Hip abduction   GTB x2'          Bridges    x20          Reformer - hooklying   (1 R) first position troy morgan x2' each                                    Ther Activity education anatoamy and POC x10' bladder pathophys x25'  DC insturctions x25'                      Gait Training                                       Modalities

## 2021-09-13 ENCOUNTER — OFFICE VISIT (OUTPATIENT)
Dept: PHYSICAL THERAPY | Facility: REHABILITATION | Age: 71
End: 2021-09-13
Payer: COMMERCIAL

## 2021-09-13 DIAGNOSIS — M54.16 LUMBAR RADICULOPATHY: Primary | ICD-10-CM

## 2021-09-13 DIAGNOSIS — G89.29 CHRONIC BILATERAL LOW BACK PAIN WITH RIGHT-SIDED SCIATICA: ICD-10-CM

## 2021-09-13 DIAGNOSIS — M46.1 SACROILIITIS (HCC): ICD-10-CM

## 2021-09-13 DIAGNOSIS — M54.41 CHRONIC BILATERAL LOW BACK PAIN WITH RIGHT-SIDED SCIATICA: ICD-10-CM

## 2021-09-13 PROCEDURE — 97530 THERAPEUTIC ACTIVITIES: CPT | Performed by: PHYSICAL THERAPIST

## 2021-09-13 PROCEDURE — 97140 MANUAL THERAPY 1/> REGIONS: CPT | Performed by: PHYSICAL THERAPIST

## 2021-09-13 PROCEDURE — 97110 THERAPEUTIC EXERCISES: CPT | Performed by: PHYSICAL THERAPIST

## 2021-09-14 LAB
A VAGINAE DNA VAG NAA+PROBE-LOG#: <3.25
A VAGINAE DNA VAG QL NAA+PROBE: NOT DETECTED
BVAB2 DNA VAG QL NAA+PROBE: NOT DETECTED
G VAGINALIS DNA SPEC QL NAA+PROBE: NOT DETECTED
G VAGINALIS DNA VAG NAA+PROBE-LOG#: <3.25
LACTOBACILLUS DNA VAG NAA+PROBE-LOG#: <3.25
LACTOBACILLUS DNA VAG NAA+PROBE-LOG#: NOT DETECTED
MEGA1 DNA VAG QL NAA+PROBE: NOT DETECTED
SL AMB C.ALBICANS BY PCR: NOT DETECTED
SL AMB CANDIDA GENUS: NOT DETECTED
T VAGINALIS RRNA SPEC QL NAA+PROBE: NOT DETECTED

## 2021-09-16 NOTE — TELEPHONE ENCOUNTER
Pt called regarding Chynaa reschedule  Gave her new date  Also cancelled f/u with Dr Martha Soliman p[er pt and put in recall for 2 weeks after procedure on 12/02/21

## 2021-09-20 ENCOUNTER — APPOINTMENT (OUTPATIENT)
Dept: PHYSICAL THERAPY | Facility: REHABILITATION | Age: 71
End: 2021-09-20
Payer: COMMERCIAL

## 2021-09-21 ENCOUNTER — ESTABLISHED COMPREHENSIVE EXAM (OUTPATIENT)
Dept: URBAN - METROPOLITAN AREA CLINIC 6 | Facility: CLINIC | Age: 71
End: 2021-09-21

## 2021-09-21 DIAGNOSIS — H35.3130: ICD-10-CM

## 2021-09-21 DIAGNOSIS — Z96.1: ICD-10-CM

## 2021-09-21 PROCEDURE — 2019F DILATED MACUL EXAM DONE: CPT

## 2021-09-21 PROCEDURE — 92014 COMPRE OPH EXAM EST PT 1/>: CPT

## 2021-09-21 PROCEDURE — 92134 CPTRZ OPH DX IMG PST SGM RTA: CPT

## 2021-09-21 PROCEDURE — G8427 DOCREV CUR MEDS BY ELIG CLIN: HCPCS

## 2021-09-21 PROCEDURE — 1036F TOBACCO NON-USER: CPT

## 2021-09-21 ASSESSMENT — TONOMETRY
OD_IOP_MMHG: 16
OS_IOP_MMHG: 16

## 2021-09-21 ASSESSMENT — VISUAL ACUITY
OS_CC: 20/20-1
OD_CC: 20/30+3

## 2021-09-27 ENCOUNTER — APPOINTMENT (OUTPATIENT)
Dept: PHYSICAL THERAPY | Facility: REHABILITATION | Age: 71
End: 2021-09-27
Payer: COMMERCIAL

## 2021-10-22 ENCOUNTER — APPOINTMENT (OUTPATIENT)
Dept: LAB | Age: 71
End: 2021-10-22
Payer: COMMERCIAL

## 2021-10-22 DIAGNOSIS — R19.7 DIARRHEA OF PRESUMED INFECTIOUS ORIGIN: ICD-10-CM

## 2021-10-22 PROCEDURE — 87493 C DIFF AMPLIFIED PROBE: CPT

## 2021-10-23 LAB
C DIFF TOX A+B STL QL IA: POSITIVE
C DIFF TOX GENS STL QL NAA+PROBE: POSITIVE

## 2021-12-01 ENCOUNTER — TELEPHONE (OUTPATIENT)
Dept: GASTROENTEROLOGY | Facility: HOSPITAL | Age: 71
End: 2021-12-01

## 2021-12-02 ENCOUNTER — ANESTHESIA EVENT (OUTPATIENT)
Dept: GASTROENTEROLOGY | Facility: HOSPITAL | Age: 71
End: 2021-12-02

## 2021-12-02 ENCOUNTER — HOSPITAL ENCOUNTER (OUTPATIENT)
Dept: GASTROENTEROLOGY | Facility: HOSPITAL | Age: 71
Setting detail: OUTPATIENT SURGERY
Discharge: HOME | End: 2021-12-02
Attending: INTERNAL MEDICINE | Admitting: INTERNAL MEDICINE
Payer: COMMERCIAL

## 2021-12-02 ENCOUNTER — ANESTHESIA (OUTPATIENT)
Dept: GASTROENTEROLOGY | Facility: HOSPITAL | Age: 71
End: 2021-12-02

## 2021-12-02 VITALS
HEIGHT: 67 IN | TEMPERATURE: 98.1 F | HEART RATE: 83 BPM | OXYGEN SATURATION: 98 % | RESPIRATION RATE: 20 BRPM | SYSTOLIC BLOOD PRESSURE: 151 MMHG | BODY MASS INDEX: 25.58 KG/M2 | WEIGHT: 163 LBS | DIASTOLIC BLOOD PRESSURE: 91 MMHG

## 2021-12-02 DIAGNOSIS — K21.9 GASTROESOPHAGEAL REFLUX DISEASE WITHOUT ESOPHAGITIS: ICD-10-CM

## 2021-12-02 PROBLEM — R11.2 PONV (POSTOPERATIVE NAUSEA AND VOMITING): Status: ACTIVE | Noted: 2021-12-02

## 2021-12-02 PROBLEM — Z98.890 PONV (POSTOPERATIVE NAUSEA AND VOMITING): Status: ACTIVE | Noted: 2021-12-02

## 2021-12-02 PROCEDURE — 43257 EGD W/THRML TXMNT GERD: CPT | Performed by: INTERNAL MEDICINE

## 2021-12-02 RX ORDER — DEXAMETHASONE SODIUM PHOSPHATE 4 MG/ML
INJECTION, SOLUTION INTRA-ARTICULAR; INTRALESIONAL; INTRAMUSCULAR; INTRAVENOUS; SOFT TISSUE AS NEEDED
Status: DISCONTINUED | OUTPATIENT
Start: 2021-12-02 | End: 2021-12-02

## 2021-12-02 RX ORDER — MIDAZOLAM HYDROCHLORIDE 2 MG/2ML
INJECTION, SOLUTION INTRAMUSCULAR; INTRAVENOUS AS NEEDED
Status: DISCONTINUED | OUTPATIENT
Start: 2021-12-02 | End: 2021-12-02

## 2021-12-02 RX ORDER — LIDOCAINE HYDROCHLORIDE 20 MG/ML
INJECTION, SOLUTION EPIDURAL; INFILTRATION; INTRACAUDAL; PERINEURAL AS NEEDED
Status: DISCONTINUED | OUTPATIENT
Start: 2021-12-02 | End: 2021-12-02

## 2021-12-02 RX ORDER — SUCRALFATE ORAL 1 G/10ML
1 SUSPENSION ORAL 4 TIMES DAILY
Qty: 280 ML | Refills: 0 | Status: SHIPPED | OUTPATIENT
Start: 2021-12-02 | End: 2021-12-10

## 2021-12-02 RX ORDER — SUCCINYLCHOLINE/SOD CL,ISO/PF 100 MG/5ML
SYRINGE (ML) INTRAVENOUS AS NEEDED
Status: DISCONTINUED | OUTPATIENT
Start: 2021-12-02 | End: 2021-12-02

## 2021-12-02 RX ORDER — ONDANSETRON 2 MG/ML
INJECTION INTRAMUSCULAR; INTRAVENOUS AS NEEDED
Status: DISCONTINUED | OUTPATIENT
Start: 2021-12-02 | End: 2021-12-02

## 2021-12-02 RX ORDER — PROPOFOL 10 MG/ML
INJECTION, EMULSION INTRAVENOUS CONTINUOUS PRN
Status: DISCONTINUED | OUTPATIENT
Start: 2021-12-02 | End: 2021-12-02

## 2021-12-02 RX ORDER — SCOLOPAMINE TRANSDERMAL SYSTEM 1 MG/1
1 PATCH, EXTENDED RELEASE TRANSDERMAL
Status: DISCONTINUED | OUTPATIENT
Start: 2021-12-02 | End: 2021-12-06 | Stop reason: HOSPADM

## 2021-12-02 RX ORDER — PROPOFOL 10 MG/ML
INJECTION, EMULSION INTRAVENOUS AS NEEDED
Status: DISCONTINUED | OUTPATIENT
Start: 2021-12-02 | End: 2021-12-02

## 2021-12-02 RX ORDER — SODIUM CHLORIDE 9 MG/ML
125 INJECTION, SOLUTION INTRAVENOUS CONTINUOUS
Status: DISCONTINUED | OUTPATIENT
Start: 2021-12-02 | End: 2021-12-06 | Stop reason: HOSPADM

## 2021-12-02 RX ORDER — LEVOCETIRIZINE DIHYDROCHLORIDE 5 MG/1
5 TABLET, FILM COATED ORAL EVERY EVENING
COMMUNITY

## 2021-12-02 RX ADMIN — ONDANSETRON 4 MG: 2 INJECTION INTRAMUSCULAR; INTRAVENOUS at 14:23

## 2021-12-02 RX ADMIN — DEXAMETHASONE SODIUM PHOSPHATE 4 MG: 4 INJECTION INTRA-ARTICULAR; INTRALESIONAL; INTRAMUSCULAR; INTRAVENOUS; SOFT TISSUE at 14:23

## 2021-12-02 RX ADMIN — SCOPALAMINE 1 PATCH: 1 PATCH, EXTENDED RELEASE TRANSDERMAL at 13:53

## 2021-12-02 RX ADMIN — Medication 10 ML: at 15:37

## 2021-12-02 RX ADMIN — Medication 100 MG: at 14:23

## 2021-12-02 RX ADMIN — LIDOCAINE HYDROCHLORIDE 100 MG: 20 INJECTION, SOLUTION EPIDURAL; INFILTRATION; INTRACAUDAL; PERINEURAL at 14:23

## 2021-12-02 RX ADMIN — MIDAZOLAM 2 MG: 1 INJECTION INTRAMUSCULAR; INTRAVENOUS at 14:20

## 2021-12-02 RX ADMIN — SODIUM CHLORIDE 125 ML/HR: 0.9 INJECTION, SOLUTION INTRAVENOUS at 13:49

## 2021-12-02 RX ADMIN — PROPOFOL 150 MG: 10 INJECTION, EMULSION INTRAVENOUS at 14:23

## 2021-12-02 RX ADMIN — PROPOFOL 40 MCG/KG/MIN: 10 INJECTION, EMULSION INTRAVENOUS at 14:33

## 2021-12-07 ENCOUNTER — TELEPHONE (OUTPATIENT)
Dept: GASTROENTEROLOGY | Facility: CLINIC | Age: 71
End: 2021-12-07

## 2021-12-10 ENCOUNTER — OFFICE VISIT (OUTPATIENT)
Dept: OBGYN CLINIC | Facility: CLINIC | Age: 71
End: 2021-12-10
Payer: COMMERCIAL

## 2021-12-10 ENCOUNTER — APPOINTMENT (OUTPATIENT)
Dept: RADIOLOGY | Facility: AMBULARY SURGERY CENTER | Age: 71
End: 2021-12-10
Attending: ORTHOPAEDIC SURGERY
Payer: COMMERCIAL

## 2021-12-10 VITALS
SYSTOLIC BLOOD PRESSURE: 129 MMHG | HEART RATE: 80 BPM | WEIGHT: 161.8 LBS | BODY MASS INDEX: 25.39 KG/M2 | HEIGHT: 67 IN | DIASTOLIC BLOOD PRESSURE: 84 MMHG

## 2021-12-10 DIAGNOSIS — M25.561 PAIN IN BOTH KNEES, UNSPECIFIED CHRONICITY: ICD-10-CM

## 2021-12-10 DIAGNOSIS — M25.562 PAIN IN BOTH KNEES, UNSPECIFIED CHRONICITY: ICD-10-CM

## 2021-12-10 DIAGNOSIS — M17.12 PATELLOFEMORAL ARTHRITIS OF LEFT KNEE: ICD-10-CM

## 2021-12-10 DIAGNOSIS — M17.11 PATELLOFEMORAL ARTHRITIS OF RIGHT KNEE: Primary | ICD-10-CM

## 2021-12-10 PROCEDURE — 99213 OFFICE O/P EST LOW 20 MIN: CPT | Performed by: ORTHOPAEDIC SURGERY

## 2021-12-10 PROCEDURE — 73562 X-RAY EXAM OF KNEE 3: CPT

## 2022-02-10 ENCOUNTER — OFFICE VISIT (OUTPATIENT)
Dept: GASTROENTEROLOGY | Facility: MEDICAL CENTER | Age: 72
End: 2022-02-10
Payer: COMMERCIAL

## 2022-02-10 VITALS
HEART RATE: 79 BPM | DIASTOLIC BLOOD PRESSURE: 91 MMHG | WEIGHT: 164.2 LBS | TEMPERATURE: 99.1 F | SYSTOLIC BLOOD PRESSURE: 149 MMHG | BODY MASS INDEX: 26.11 KG/M2

## 2022-02-10 DIAGNOSIS — K21.9 GASTROESOPHAGEAL REFLUX DISEASE WITHOUT ESOPHAGITIS: Primary | ICD-10-CM

## 2022-02-10 DIAGNOSIS — K21.9 LARYNGOPHARYNGEAL REFLUX (LPR): ICD-10-CM

## 2022-02-10 DIAGNOSIS — K22.4 ESOPHAGEAL DYSMOTILITIES: ICD-10-CM

## 2022-02-10 PROCEDURE — 99214 OFFICE O/P EST MOD 30 MIN: CPT | Performed by: INTERNAL MEDICINE

## 2022-02-10 NOTE — PROGRESS NOTES
Outpatient Follow up  [address]  [address]  [place]  [facility] [address]  Shreya Hagen MD  Ph : [phone]  Fax : [phone]  Mobile : [phone]  Email : [email or web address]  Also available on [employer]    Ivan Rogers 70 y o  female MRN: [id number]    PCP: Gurmeet Rowell DO  Referring: No referring provider defined for this encounter  Ivan Rogers presented for a follow up visit  My recommendations are included  Please do not hesitate to contact me with any questions you may have  ASSESSMENT AND PLAN:      No problem-specific Assessment & Plan notes found for this encounter  Diagnoses and all orders for this visit:    Gastroesophageal reflux disease without esophagitis    Esophageal dysmotilities    Laryngopharyngeal reflux (LPR)       70year-old with GERD and LPR  She is status post STRETTA procedure  She feels slightly improved with there have been improvement in symptoms of especially waking up in the middle of the night with symptoms or regurgitation  1  Stop the tagamet now and observe symptoms  If symptoms are not changed then continue with the Aciphex only  If symptoms get worse then restart the Tagamet  2  If you stop the tagamet then in 4 - 6 weeks you can start tapering down the Aciphex by taking it every other day for one month  3  If you do well with that then you can discontinue the Aciphex as well after the month of taking it every other day  4  Follow up in 3 months  5  However, let me know by message through [employer] in about a month to let me know how you are doing  6  Colonoscopy in 3 years with Dr Milton Hobbs    ______________________________________________________________________    SUBJECTIVE:  69 y/o with GERD/ LPR and had STRETTA on 12/2/21  Now the past few weeks she has been experiencing some mucous  No dysphagia  No odynophagia     She has not been waking up in the middle of the night with symptoms  This is an improvement from prior  She has to raise her head of the bed up but that's usually due to back pain  She has no cough  Occasionally has a tickle in the back of the throat  Answers for HPI/ROS submitted by the patient on 2/3/2022  Chronicity: recurrent  Onset: more than 1 year ago  Onset quality: undetermined  Frequency: rarely  Episode duration: 1 hours  Progression since onset: resolved  Pain location: epigastric region  Pain - numeric: 0/10  Pain quality: a sensation of fullness  Radiates to: does not radiate  anorexia: No  arthralgias: No  belching: Yes  constipation: No  diarrhea: No  dysuria: No  fever: No  flatus: No  frequency: No  headaches: No  hematochezia: No  hematuria: No  melena: No  myalgias: No  nausea: No  weight loss: No  vomiting: No  Aggravated by: nothing  Relieved by: nothing        REVIEW OF SYSTEMS IS OTHERWISE NEGATIVE        Historical Information   Past Medical History:   Diagnosis Date    [facility] 08/21/2008    age 62    Cancer ([facility] [address] )     left breast, tx with rx    Chronic low back pain     Chronic pain disorder     Clostridium difficile colitis     Colon polyp     DDD (degenerative disc disease), lumbar     GERD (gastroesophageal reflux disease)     History of radiation therapy 09/2008    for breast cancer    Hypertension     Laryngopharyngeal reflux (LPR)     Lumbar spinal stenosis     Osteoarthritis of spine with radiculopathy, lumbar region     Osteopenia     Pelvic floor dysfunction     Piriformis syndrome of left side     Sciatica     Spondylosis of cervical region without myelopathy or radiculopathy      Past Surgical History:   Procedure Laterality Date    BREAST BIOPSY Left 07/30/2008    malignant    BREAST CYST ASPIRATION Left 1998    BREAST LUMPECTOMY Left 08/21/2008    BREAST SURGERY      COLONOSCOPY  08/31/2020    [place] ESOPHAGOGASTRODUODENOSCOPY TRANSORAL DIAGNOSTIC N/A 2/15/2017 Procedure: ESOPHAGOGASTRODUODENOSCOPY (EGD); Surgeon: Jing Menjivar MD;  [facility]; Service: Gastroenterology    [place] ESOPHAGOGASTRODUODENOSCOPY TRANSORAL DIAGNOSTIC N/A 2018    Procedure: ESOPHAGOGASTRODUODENOSCOPY (EGD); Surgeon: Jing Menjivar MD;  [facility]; Service: Gastroenterology    UPPER GASTROINTESTINAL ENDOSCOPY  2020    US GUIDED MSK PROCEDURE  2020     Social History   Social History     Substance and Sexual Activity   Alcohol Use Yes    Comment: social     Social History     Substance and Sexual Activity   Drug Use No     Social History     Tobacco Use   Smoking Status Former Smoker    Quit date:     Years since quittin 1   Smokeless Tobacco Never Used     Family History   Problem Relation Age of Onset    Osteoporosis Mother     Coronary artery disease Father     Diabetes Father     COPD Father     No Known Problems Maternal Aunt     Breast cancer Paternal Aunt 68    No Known Problems Paternal Aunt     No Known Problems Maternal Aunt     Colon cancer Neg Hx        Meds/Allergies       Current Outpatient Medications:     Cholecalciferol (VITAMIN D-3 PO)    cimetidine (TAGAMET) 400 mg tablet    levocetirizine (XYZAL) 5 MG tablet    lisinopril (ZESTRIL) 10 mg tablet    Multiple Vitamins-Minerals (CENTRUM ADULTS PO)    Probiotic Product (PROBIOTIC PO)    RABEprazole (ACIPHEX) 20 MG tablet    azelastine (ASTELIN) 0 1 % nasal spray    azithromycin (ZITHROMAX) 250 mg tablet    Cyclobenzaprine HCl (FLEXERIL PO)    nitrofurantoin (MACROBID) 100 mg capsule    predniSONE 20 mg tablet    sucralfate (CARAFATE) 1 g/10 mL suspension    Allergies   Allergen Reactions    Fentanyl Vomiting    Codeine     Morphine And Related      Note:  this allergy is not being included in drug screening  Please inactivate this item and re-enter it to enable screening      Morphine Sulfate [Morphine]     Naprosyn [Naproxen]     Oxycodone     Penicillins     Sulfa Antibiotics            Objective     Blood pressure 149/91, pulse 79, temperature 99 1 °F (37 3 °C), weight 74 5 kg (164 lb 3 2 oz), not currently breastfeeding  Body mass index is 26 11 kg/m²  PHYSICAL EXAM:      Physical Exam  Constitutional:       Appearance: Normal appearance  She is well-developed  HENT:      Head: Normocephalic and atraumatic  Eyes:      General: No scleral icterus  Conjunctiva/sclera: Conjunctivae normal       Pupils: Pupils are equal, round, and reactive to light  Cardiovascular:      Rate and Rhythm: Normal rate and regular rhythm  Heart sounds: Normal heart sounds  Pulmonary:      Effort: Pulmonary effort is normal  No respiratory distress  Breath sounds: Normal breath sounds  Abdominal:      General: Bowel sounds are normal  There is no distension  Palpations: Abdomen is soft  There is no mass  Tenderness: There is no abdominal tenderness  Hernia: No hernia is present  Musculoskeletal:         General: Normal range of motion  Cervical back: Normal range of motion  Lymphadenopathy:      Cervical: No cervical adenopathy  Skin:     General: Skin is warm  Neurological:      Mental Status: She is alert and oriented to person, place, and time  Psychiatric:         Behavior: Behavior normal          Thought Content: Thought content normal          Lab Results:   No visits with results within 1 Day(s) from this visit  Latest known visit with results is:   Appointment on 10/22/2021   Component Date Value    C difficile toxin by PCR 10/22/2021 Positive*    C difficile Toxins A+B, * 10/22/2021 Positive*         Radiology Results:   No results found

## 2022-02-10 NOTE — PATIENT INSTRUCTIONS
1  Stop the tagamet now and observe symptoms  If symptoms are not changed then continue with the Aciphex  If symptoms get worse then restart the Tagamet  2  If you stop the tagamet then in 4 - 6 weeks you can start tapering down the Aciphex by taking it every other day for one month  3  If you do well with that then you can discontinue the Aciphex as well after the month of taking it every other day  4  Follow up in 3 months  5  However, let me know by message through [employer] in about a month to let me know how you are doing

## 2022-03-16 ENCOUNTER — HOSPITAL ENCOUNTER (OUTPATIENT)
Dept: RADIOLOGY | Age: 72
Discharge: HOME | End: 2022-03-16
Payer: COMMERCIAL

## 2022-03-16 ENCOUNTER — APPOINTMENT (OUTPATIENT)
Dept: LAB | Age: 72
End: 2022-03-16
Payer: COMMERCIAL

## 2022-03-16 DIAGNOSIS — R30.0 DYSURIA: ICD-10-CM

## 2022-03-16 PROCEDURE — 76770 US EXAM ABDO BACK WALL COMP: CPT

## 2022-03-16 PROCEDURE — 87086 URINE CULTURE/COLONY COUNT: CPT

## 2022-03-17 LAB — BACTERIA UR CULT: NORMAL

## 2022-04-08 ENCOUNTER — OFFICE VISIT (OUTPATIENT)
Dept: OBGYN CLINIC | Facility: CLINIC | Age: 72
End: 2022-04-08
Payer: COMMERCIAL

## 2022-04-08 ENCOUNTER — APPOINTMENT (OUTPATIENT)
Dept: RADIOLOGY | Facility: AMBULARY SURGERY CENTER | Age: 72
End: 2022-04-08
Attending: ORTHOPAEDIC SURGERY
Payer: COMMERCIAL

## 2022-04-08 VITALS
HEIGHT: 67 IN | BODY MASS INDEX: 25.62 KG/M2 | SYSTOLIC BLOOD PRESSURE: 133 MMHG | WEIGHT: 163.2 LBS | HEART RATE: 73 BPM | DIASTOLIC BLOOD PRESSURE: 85 MMHG

## 2022-04-08 DIAGNOSIS — M25.561 PAIN IN BOTH KNEES, UNSPECIFIED CHRONICITY: ICD-10-CM

## 2022-04-08 DIAGNOSIS — M76.899 QUADRICEPS TENDONITIS: ICD-10-CM

## 2022-04-08 DIAGNOSIS — M25.572 PAIN, JOINT, ANKLE AND FOOT, LEFT: ICD-10-CM

## 2022-04-08 DIAGNOSIS — M25.562 PAIN IN BOTH KNEES, UNSPECIFIED CHRONICITY: ICD-10-CM

## 2022-04-08 DIAGNOSIS — M22.2X1 PATELLOFEMORAL SYNDROME OF BOTH KNEES: Primary | ICD-10-CM

## 2022-04-08 DIAGNOSIS — M22.2X2 PATELLOFEMORAL SYNDROME OF BOTH KNEES: Primary | ICD-10-CM

## 2022-04-08 PROCEDURE — 73562 X-RAY EXAM OF KNEE 3: CPT

## 2022-04-08 PROCEDURE — 99213 OFFICE O/P EST LOW 20 MIN: CPT | Performed by: ORTHOPAEDIC SURGERY

## 2022-04-08 RX ORDER — FEXOFENADINE HCL 180 MG/1
TABLET ORAL
COMMUNITY
Start: 2022-03-01

## 2022-04-08 NOTE — PROGRESS NOTES
Assessment:   Diagnosis ICD-10-CM Associated Orders   1  Pain in both knees, unspecified chronicity  M25 561 XR knee 3 vw right non injury    M25 562 XR knee 3 vw left non injury   2  Quadriceps tendonitis, Right  M76 899 Ambulatory Referral to Physical Therapy   3  Patellofemoral syndrome of both knees  M22 2X1 Ambulatory Referral to Physical Therapy    M22 2X2    4  Pain, joint, ankle and foot, left  M25 572 Ambulatory Referral to Physical Therapy       Plan:    - On exam, most of the patient's pain is within the right quadriceps tendon along with bilateral syndrome   - at this time is recommended to try a course of physical therapy for 4 weeks to address the quadriceps tendinitis along hamstring strengthening  They can also address her left ankle pain  - continue the use of Voltaren gel    To do next visit:  Return for will call patient in 1 month to check in  The above stated was discussed in layman's terms and the patient expressed understanding  All questions were answered to the patient's satisfaction  Scribe Attestation    I,:  Alcides Kraus am acting as a scribe while in the presence of the attending physician :       I,:  Dasha Her MD personally performed the services described in this documentation    as scribed in my presence :             Subjective:   Francisco Javier Morgan is a 70 y o  female who presents today for a follow up for her bilateral knee pain due to patellofemoral syndrome  She has been strengthening the VMO and quadriceps  She feels that the right knee swells on her and has trouble with kneeling and getting up from a kneeling position  She has treated in the past with periodic cortisone injections and has had Monovisc the right knee with no significant relief  Pain is worse with going down steps but going down is worse in kneeling  She has been taking Advil as needed for pain with relief  She also is using Voltaren gel with relief    Denies numbness and tingling into the lower extremities  She has done her VMO/quadriceps exercises  Review of systems negative unless otherwise specified in HPI  Review of Systems   Constitutional: Negative for chills, fever and unexpected weight change  HENT: Negative for hearing loss, nosebleeds and sore throat  Eyes: Negative for pain, redness and visual disturbance  Respiratory: Negative for cough, shortness of breath and wheezing  Cardiovascular: Negative for chest pain, palpitations and leg swelling  Gastrointestinal: Negative for abdominal pain and nausea  Genitourinary: Negative for dyspareunia, dysuria and frequency  Musculoskeletal: Positive for arthralgias  Skin: Negative for rash and wound  Neurological: Negative for dizziness, numbness and headaches  Psychiatric/Behavioral: Negative for decreased concentration and suicidal ideas  The patient is not nervous/anxious          Past Medical History:   Diagnosis Date    Breast cancer ([facility] [address] ) 08/21/2008    age 62    Cancer ([facility] [address] )     left breast, tx with rx    Chronic low back pain     Chronic pain disorder     Clostridium difficile colitis     Colon polyp     DDD (degenerative disc disease), lumbar     GERD (gastroesophageal reflux disease)     History of radiation therapy 09/2008    for breast cancer    Hypertension     Laryngopharyngeal reflux (LPR)     Lumbar spinal stenosis     Osteoarthritis of spine with radiculopathy, lumbar region     Osteopenia     Pelvic floor dysfunction     Piriformis syndrome of left side     Sciatica     Spondylosis of cervical region without myelopathy or radiculopathy        Past Surgical History:   Procedure Laterality Date    BREAST BIOPSY Left 07/30/2008    malignant    BREAST CYST ASPIRATION Left 1998    BREAST LUMPECTOMY Left 08/21/2008    BREAST SURGERY      COLONOSCOPY  08/31/2020    ME ESOPHAGOGASTRODUODENOSCOPY TRANSORAL DIAGNOSTIC N/A 2/15/2017    Procedure: ESOPHAGOGASTRODUODENOSCOPY (EGD); Surgeon: Efren Cranker, MD;  [facility]; Service: Gastroenterology    [place] ESOPHAGOGASTRODUODENOSCOPY TRANSORAL DIAGNOSTIC N/A 2018    Procedure: ESOPHAGOGASTRODUODENOSCOPY (EGD); Surgeon: Efren Cranker, MD;  [facility];   Service: Gastroenterology    UPPER GASTROINTESTINAL ENDOSCOPY  2020    US GUIDED MSK PROCEDURE  2020       Family History   Problem Relation Age of Onset    Osteoporosis Mother     Coronary artery disease Father     Diabetes Father     COPD Father     No Known Problems Maternal Aunt     Breast cancer Paternal Aunt 68    No Known Problems Paternal Aunt     No Known Problems Maternal Aunt     Colon cancer Neg Hx        Social History     Occupational History    Not on file   Tobacco Use    Smoking status: Former Smoker     Quit date:      Years since quittin 2    Smokeless tobacco: Never Used   Vaping Use    Vaping Use: Never used   Substance and Sexual Activity    Alcohol use: Yes     Comment: social    Drug use: No    Sexual activity: Not Currently         Current Outpatient Medications:     azelastine (ASTELIN) 0 1 % nasal spray, 1 spray into each nostril 2 (two) times a day, Disp: 1 Bottle, Rfl: 11    Cholecalciferol (VITAMIN D-3 PO), Take 1,000 mg by mouth daily  , Disp: , Rfl:     cimetidine (TAGAMET) 400 mg tablet, Take 1 tablet (400 mg total) by mouth daily at bedtime, Disp: 30 tablet, Rfl: 11    fexofenadine (Allegra Allergy) 180 MG tablet, , Disp: , Rfl:     lisinopril (ZESTRIL) 10 mg tablet, Take 10 mg by mouth daily, Disp: , Rfl:     Multiple Vitamins-Minerals (CENTRUM ADULTS PO), Take 1 tablet by mouth daily, Disp: , Rfl:     Probiotic Product (PROBIOTIC PO), Take 1 tablet by mouth, Disp: , Rfl:     RABEprazole (ACIPHEX) 20 MG tablet, TAKE 1 TABLET BY MOUTH EVERY DAY IN THE MORNING, Disp: 90 tablet, Rfl: 3    azithromycin (ZITHROMAX) 250 mg tablet, TAKE 2 TABLETS BY MOUTH TODAY, THEN TAKE 1 TABLET DAILY FOR 4 DAYS, Disp: , Rfl:     Cyclobenzaprine HCl (FLEXERIL PO), Take 1 tablet by mouth daily, Disp: , Rfl:     levocetirizine (XYZAL) 5 MG tablet, Take 5 mg by mouth every evening, Disp: , Rfl:     nitrofurantoin (MACROBID) 100 mg capsule, TAKE 1 CAPSULE BY MOUTH EVERY 12 HOURS WITH FOOD (Patient not taking: Reported on 9/8/2021), Disp: , Rfl:     predniSONE 20 mg tablet, 1 tab TID x 2 days, then BID x 2 days, then daily x 2 days (Patient not taking: Reported on 9/8/2021), Disp: 12 tablet, Rfl: 0    sucralfate (CARAFATE) 1 g/10 mL suspension, Take 10 mL (1 g total) by mouth 4 (four) times a day for 7 days (Patient not taking: Reported on 12/10/2021 ), Disp: 280 mL, Rfl: 0    Allergies   Allergen Reactions    Fentanyl Vomiting    Codeine     Morphine And Related      Note:  this allergy is not being included in drug screening  Please inactivate this item and re-enter it to enable screening   Morphine Sulfate [Morphine]     Naprosyn [Naproxen]     Oxycodone     Penicillins     Sulfa Antibiotics             Vitals:    04/08/22 1201   BP: 133/85   Pulse: 73       Objective:                    Right Knee Exam     Tenderness   The patient is experiencing tenderness in the patella  Range of Motion   Extension: 0   Flexion: 120     Tests   Varus: negative Valgus: negative    Other   Erythema: absent  Sensation: normal  Pulse: present  Swelling: none  Effusion: no effusion present    Comments:  Calf is soft and non tender      Left Knee Exam     Tenderness   The patient is experiencing tenderness in the patella      Range of Motion   Extension: 0   Flexion: 120     Tests   Varus: negative Valgus: negative    Other   Erythema: absent  Sensation: normal  Pulse: present  Swelling: none  Effusion: no effusion present    Comments:  Calf is soft and non tender            Diagnostics, reviewed and taken today if performed as documented:    None performed      The attending physician has personally reviewed the pertinent films in PACS and interpretation is as follows:      Procedures, if performed today:    Procedures    None performed      Portions of the record may have been created with voice recognition software  Occasional wrong word or "sound a like" substitutions may have occurred due to the inherent limitations of voice recognition software  Read the chart carefully and recognize, using context, where substitutions have occurred

## 2022-04-09 ENCOUNTER — HOSPITAL ENCOUNTER (EMERGENCY)
Facility: HOSPITAL | Age: 72
Discharge: HOME | End: 2022-04-09
Attending: EMERGENCY MEDICINE | Admitting: EMERGENCY MEDICINE
Payer: COMMERCIAL

## 2022-04-09 ENCOUNTER — APPOINTMENT (EMERGENCY)
Dept: RADIOLOGY | Facility: HOSPITAL | Age: 72
End: 2022-04-09
Payer: COMMERCIAL

## 2022-04-09 VITALS
BODY MASS INDEX: 24.89 KG/M2 | HEART RATE: 74 BPM | DIASTOLIC BLOOD PRESSURE: 84 MMHG | RESPIRATION RATE: 18 BRPM | OXYGEN SATURATION: 98 % | SYSTOLIC BLOOD PRESSURE: 135 MMHG | HEIGHT: 68 IN | TEMPERATURE: 98.2 F | WEIGHT: 164.2 LBS

## 2022-04-09 DIAGNOSIS — R07.89 CHEST WALL PAIN: Primary | ICD-10-CM

## 2022-04-09 LAB
ALBUMIN SERPL BCP-MCNC: 3.9 G/DL (ref 3.5–5)
ALP SERPL-CCNC: 100 U/L (ref 46–116)
ALT SERPL W P-5'-P-CCNC: 28 U/L (ref 12–78)
ANION GAP SERPL CALCULATED.3IONS-SCNC: 5 MMOL/L (ref 4–13)
AST SERPL W P-5'-P-CCNC: 23 U/L (ref 5–45)
BASOPHILS # BLD AUTO: 0.04 THOUSANDS/ΜL (ref 0–0.1)
BASOPHILS NFR BLD AUTO: 1 % (ref 0–1)
BILIRUB SERPL-MCNC: 0.39 MG/DL (ref 0.2–1)
BUN SERPL-MCNC: 15 MG/DL (ref 5–25)
CALCIUM SERPL-MCNC: 10 MG/DL (ref 8.3–10.1)
CARDIAC TROPONIN I PNL SERPL HS: <2 NG/L
CHLORIDE SERPL-SCNC: 108 MMOL/L (ref 100–108)
CO2 SERPL-SCNC: 26 MMOL/L (ref 21–32)
CREAT SERPL-MCNC: 0.73 MG/DL (ref 0.6–1.3)
EOSINOPHIL # BLD AUTO: 0.25 THOUSAND/ΜL (ref 0–0.61)
EOSINOPHIL NFR BLD AUTO: 4 % (ref 0–6)
ERYTHROCYTE [DISTWIDTH] IN BLOOD BY AUTOMATED COUNT: 13.2 % (ref 11.6–15.1)
GFR SERPL CREATININE-BSD FRML MDRD: 83 ML/MIN/1.73SQ M
GLUCOSE SERPL-MCNC: 110 MG/DL (ref 65–140)
HCT VFR BLD AUTO: 42.2 % (ref 34.8–46.1)
HGB BLD-MCNC: 13.2 G/DL (ref 11.5–15.4)
IMM GRANULOCYTES # BLD AUTO: 0.01 THOUSAND/UL (ref 0–0.2)
IMM GRANULOCYTES NFR BLD AUTO: 0 % (ref 0–2)
LIPASE SERPL-CCNC: 212 U/L (ref 73–393)
LYMPHOCYTES # BLD AUTO: 2.3 THOUSANDS/ΜL (ref 0.6–4.47)
LYMPHOCYTES NFR BLD AUTO: 38 % (ref 14–44)
MCH RBC QN AUTO: 28.6 PG (ref 26.8–34.3)
MCHC RBC AUTO-ENTMCNC: 31.3 G/DL (ref 31.4–37.4)
MCV RBC AUTO: 91 FL (ref 82–98)
MONOCYTES # BLD AUTO: 0.51 THOUSAND/ΜL (ref 0.17–1.22)
MONOCYTES NFR BLD AUTO: 8 % (ref 4–12)
NEUTROPHILS # BLD AUTO: 3.01 THOUSANDS/ΜL (ref 1.85–7.62)
NEUTS SEG NFR BLD AUTO: 49 % (ref 43–75)
NRBC BLD AUTO-RTO: 0 /100 WBCS
PLATELET # BLD AUTO: 190 THOUSANDS/UL (ref 149–390)
PMV BLD AUTO: 10.1 FL (ref 8.9–12.7)
POTASSIUM SERPL-SCNC: 3.9 MMOL/L (ref 3.5–5.3)
PROT SERPL-MCNC: 7.2 G/DL (ref 6.4–8.2)
RBC # BLD AUTO: 4.62 MILLION/UL (ref 3.81–5.12)
SODIUM SERPL-SCNC: 139 MMOL/L (ref 136–145)
WBC # BLD AUTO: 6.12 THOUSAND/UL (ref 4.31–10.16)

## 2022-04-09 PROCEDURE — 99285 EMERGENCY DEPT VISIT HI MDM: CPT | Performed by: EMERGENCY MEDICINE

## 2022-04-09 PROCEDURE — 99284 EMERGENCY DEPT VISIT MOD MDM: CPT

## 2022-04-09 PROCEDURE — 83690 ASSAY OF LIPASE: CPT | Performed by: EMERGENCY MEDICINE

## 2022-04-09 PROCEDURE — 96374 THER/PROPH/DIAG INJ IV PUSH: CPT

## 2022-04-09 PROCEDURE — 71046 X-RAY EXAM CHEST 2 VIEWS: CPT

## 2022-04-09 PROCEDURE — 84484 ASSAY OF TROPONIN QUANT: CPT | Performed by: EMERGENCY MEDICINE

## 2022-04-09 PROCEDURE — 93005 ELECTROCARDIOGRAM TRACING: CPT

## 2022-04-09 PROCEDURE — 80053 COMPREHEN METABOLIC PANEL: CPT | Performed by: EMERGENCY MEDICINE

## 2022-04-09 PROCEDURE — 85025 COMPLETE CBC W/AUTO DIFF WBC: CPT | Performed by: EMERGENCY MEDICINE

## 2022-04-09 PROCEDURE — 36415 COLL VENOUS BLD VENIPUNCTURE: CPT | Performed by: EMERGENCY MEDICINE

## 2022-04-09 RX ORDER — MAGNESIUM HYDROXIDE/ALUMINUM HYDROXICE/SIMETHICONE 120; 1200; 1200 MG/30ML; MG/30ML; MG/30ML
30 SUSPENSION ORAL ONCE
Status: COMPLETED | OUTPATIENT
Start: 2022-04-09 | End: 2022-04-09

## 2022-04-09 RX ADMIN — ALUMINA, MAGNESIA, AND SIMETHICONE ORAL SUSPENSION REGULAR STRENGTH 30 ML: 1200; 1200; 120 SUSPENSION ORAL at 14:33

## 2022-04-09 RX ADMIN — FAMOTIDINE 20 MG: 10 INJECTION INTRAVENOUS at 14:58

## 2022-04-09 NOTE — ED PROVIDER NOTES
History  Chief Complaint   Patient presents with    Breast Pain     reports "sticker pains" under left breast since tuesday  Reports "burping all the time," left arm pain, pain to left shoulder  Reports having reflux disease "but I never burp" reports symptoms subsided yesterday but returned today  HPI     69 yo F with past medical history of GERD, breast cancer s/p lumpectomy, and hypertension who presents for evaluation of left sided chest pain  Patient states she has been having pain under her left breast for the past for 5 days  She states the pain has been intermittent  She describes the pain as feeling like a "sticker" pain  She states pain radiates into her left shoulder and left arm  She is not sure what brings on the pain  Pain is not worse with exertion but she does endorse dyspnea with exertion over the past few days  She also endorses belching  Denies nausea or vomiting  Denies abdominal pain, diarrhea, fevers, chills, cough, or leg pain/swelling  Patient states she has never had pain like this past  Denies hx of DVT or PE  Denies active malignancy  Denies any recent hospitalizations  Denies tobacco use  She endorses occasional alcohol use  Prior to Admission Medications   Prescriptions Last Dose Informant Patient Reported? Taking?    Cholecalciferol (VITAMIN D-3 PO)  Self Yes No   Sig: Take 1,000 mg by mouth daily     Cyclobenzaprine HCl (FLEXERIL PO)  Self Yes No   Sig: Take 1 tablet by mouth daily   Multiple Vitamins-Minerals (CENTRUM ADULTS PO)  Self Yes No   Sig: Take 1 tablet by mouth daily   Probiotic Product (PROBIOTIC PO)  Self Yes No   Sig: Take 1 tablet by mouth   RABEprazole (ACIPHEX) 20 MG tablet   No No   Sig: TAKE 1 TABLET BY MOUTH EVERY DAY IN THE MORNING   azelastine (ASTELIN) 0 1 % nasal spray   No No   Si spray into each nostril 2 (two) times a day   azithromycin (ZITHROMAX) 250 mg tablet   Yes No   Sig: TAKE 2 TABLETS BY MOUTH TODAY, THEN TAKE 1 TABLET DAILY FOR 4 DAYS   cimetidine (TAGAMET) 400 mg tablet   No No   Sig: Take 1 tablet (400 mg total) by mouth daily at bedtime   fexofenadine (Allegra Allergy) 180 MG tablet   Yes No   levocetirizine (XYZAL) 5 MG tablet   Yes No   Sig: Take 5 mg by mouth every evening   lisinopril (ZESTRIL) 10 mg tablet  Self Yes No   Sig: Take 10 mg by mouth daily   nitrofurantoin (MACROBID) 100 mg capsule   Yes No   Sig: TAKE 1 CAPSULE BY MOUTH EVERY 12 HOURS WITH FOOD   Patient not taking: Reported on 2021   predniSONE 20 mg tablet   No No   Si tab TID x 2 days, then BID x 2 days, then daily x 2 days   Patient not taking: Reported on 2021   sucralfate (CARAFATE) 1 g/10 mL suspension   No No   Sig: Take 10 mL (1 g total) by mouth 4 (four) times a day for 7 days   Patient not taking: Reported on 12/10/2021       Facility-Administered Medications: None       Past Medical History:   Diagnosis Date    Breast cancer ([facility] [address] ) 2008    age 62    Cancer ([facility] [address] )     left breast, tx with rx    Chronic low back pain     Chronic pain disorder     Clostridium difficile colitis     Colon polyp     DDD (degenerative disc disease), lumbar     GERD (gastroesophageal reflux disease)     History of radiation therapy 2008    for breast cancer    Hypertension     Laryngopharyngeal reflux (LPR)     Lumbar spinal stenosis     Osteoarthritis of spine with radiculopathy, lumbar region     Osteopenia     Pelvic floor dysfunction     Piriformis syndrome of left side     Sciatica     Spondylosis of cervical region without myelopathy or radiculopathy        Past Surgical History:   Procedure Laterality Date    BREAST BIOPSY Left 2008    malignant    BREAST CYST ASPIRATION Left     BREAST LUMPECTOMY Left 2008    BREAST SURGERY      COLONOSCOPY  2020    MD ESOPHAGOGASTRODUODENOSCOPY TRANSORAL DIAGNOSTIC N/A 2/15/2017    Procedure: ESOPHAGOGASTRODUODENOSCOPY (EGD);   Surgeon: Mireille Garcia MD;  [facility]; Service: Gastroenterology    [place] ESOPHAGOGASTRODUODENOSCOPY TRANSORAL DIAGNOSTIC N/A 2018    Procedure: ESOPHAGOGASTRODUODENOSCOPY (EGD); Surgeon: Belén Ortega MD;  [facility]; Service: Gastroenterology    UPPER GASTROINTESTINAL ENDOSCOPY  2020    US GUIDED MSK PROCEDURE  2020       Family History   Problem Relation Age of Onset    Osteoporosis Mother     Coronary artery disease Father    Martínez Dailey Diabetes Father     COPD Father     No Known Problems Maternal Aunt     Breast cancer Paternal Aunt 68    No Known Problems Paternal Aunt     No Known Problems Maternal Aunt     Colon cancer Neg Hx      I have reviewed and agree with the history as documented  E-Cigarette/Vaping    E-Cigarette Use Never User      E-Cigarette/Vaping Substances    Nicotine No     THC No     CBD No      Social History     Tobacco Use    Smoking status: Former Smoker     Quit date:      Years since quittin 2    Smokeless tobacco: Never Used   Vaping Use    Vaping Use: Never used   Substance Use Topics    Alcohol use: Yes     Comment: social    Drug use: No        Review of Systems   Constitutional: Negative for appetite change, chills and fever  HENT: Negative for congestion, rhinorrhea and sore throat  Respiratory: Positive for shortness of breath  Negative for cough  Cardiovascular: Positive for chest pain  Negative for palpitations and leg swelling  Gastrointestinal: Negative for abdominal pain, diarrhea, nausea and vomiting  Genitourinary: Negative for dysuria, frequency, hematuria and urgency  Musculoskeletal: Negative for arthralgias and myalgias  Skin: Negative for rash  Neurological: Negative for dizziness, weakness, light-headedness, numbness and headaches  All other systems reviewed and are negative        Physical Exam  ED Triage Vitals   Temperature Pulse Respirations Blood Pressure SpO2   22 1403 22 1403 22 1403 22 1406 22 1403 98 2 °F (36 8 °C) 92 (!) 24 169/97 98 %      Temp src Heart Rate Source Patient Position - Orthostatic VS BP Location FiO2 (%)   -- 04/09/22 1542 -- -- --    Monitor         Pain Score       04/09/22 1406       7             Orthostatic Vital Signs  Vitals:    04/09/22 1406 04/09/22 1445 04/09/22 1542 04/09/22 1600   BP: 169/97 149/88 141/89 135/84   Pulse:  86 76 74       Physical Exam  Vitals and nursing note reviewed  Constitutional:       General: She is not in acute distress  Appearance: Normal appearance  She is well-developed and normal weight  She is not ill-appearing, toxic-appearing or diaphoretic  HENT:      Head: Normocephalic and atraumatic  Right Ear: External ear normal       Left Ear: External ear normal       Nose: Nose normal       Mouth/Throat:      Mouth: Mucous membranes are moist       Pharynx: Oropharynx is clear  Eyes:      Extraocular Movements: Extraocular movements intact  Conjunctiva/sclera: Conjunctivae normal    Cardiovascular:      Rate and Rhythm: Normal rate and regular rhythm  Pulses: Normal pulses  Heart sounds: Normal heart sounds  No murmur heard  No friction rub  No gallop  Pulmonary:      Effort: Pulmonary effort is normal  No respiratory distress  Breath sounds: Normal breath sounds  No wheezing or rales  Comments: Mild tenderness under the left breast    Abdominal:      General: There is no distension  Palpations: Abdomen is soft  Tenderness: There is no abdominal tenderness  There is no guarding or rebound  Musculoskeletal:         General: No tenderness  Cervical back: Neck supple  Right lower leg: No edema  Left lower leg: No edema  Skin:     General: Skin is warm and dry  Coloration: Skin is not pale  Findings: No erythema or rash  Comments: No rash under left breast or on left chest wall  Neurological:      General: No focal deficit present        Mental Status: She is alert and oriented to person, place, and time  Cranial Nerves: No cranial nerve deficit  Sensory: No sensory deficit  Motor: No weakness     Psychiatric:         Mood and Affect: Mood normal          Behavior: Behavior normal          ED Medications  Medications   aluminum-magnesium hydroxide-simethicone (MYLANTA) oral suspension 30 mL (30 mL Oral Given 4/9/22 1433)   famotidine (PEPCID) injection 20 mg (20 mg Intravenous Given 4/9/22 1458)       Diagnostic Studies  Results Reviewed     Procedure Component Value Units Date/Time    HS Troponin 0hr (reflex protocol) [[id number]]  (Normal) Collected: 04/09/22 1435    Lab Status: Final result Specimen: Blood from Arm, Right Updated: 04/09/22 1521     hs TnI 0hr <2 ng/L     Comprehensive metabolic panel [[id number]] Collected: 04/09/22 1435    Lab Status: Final result Specimen: Blood from Arm, Right Updated: 04/09/22 1521     Sodium 139 mmol/L      Potassium 3 9 mmol/L      Chloride 108 mmol/L      CO2 26 mmol/L      ANION GAP 5 mmol/L      BUN 15 mg/dL      Creatinine 0 73 mg/dL      Glucose 110 mg/dL      Calcium 10 0 mg/dL      AST 23 U/L      ALT 28 U/L      Alkaline Phosphatase 100 U/L      Total Protein 7 2 g/dL      Albumin 3 9 g/dL      Total Bilirubin 0 39 mg/dL      eGFR 83 ml/min/1 73sq m     Narrative:      Florentino guidelines for Chronic Kidney Disease (CKD):     Stage 1 with normal or high GFR (GFR > 90 mL/min/1 73 square meters)    Stage 2 Mild CKD (GFR = 60-89 mL/min/1 73 square meters)    Stage 3A Moderate CKD (GFR = 45-59 mL/min/1 73 square meters)    Stage 3B Moderate CKD (GFR = 30-44 mL/min/1 73 square meters)    Stage 4 Severe CKD (GFR = 15-29 mL/min/1 73 square meters)    Stage 5 End Stage CKD (GFR <15 mL/min/1 73 square meters)  Note: GFR calculation is accurate only with a steady state creatinine    Lipase [[id number]]  (Normal) Collected: 04/09/22 1435    Lab Status: Final result Specimen: Blood from Arm, Right Updated: 04/09/22 1521     Lipase 212 u/L     CBC and differential [[id number]]  (Abnormal) Collected: 04/09/22 1435    Lab Status: Final result Specimen: Blood from Arm, Right Updated: 04/09/22 1443     WBC 6 12 Thousand/uL      RBC 4 62 Million/uL      Hemoglobin 13 2 g/dL      Hematocrit 42 2 %      MCV 91 fL      MCH 28 6 pg      MCHC 31 3 g/dL      RDW 13 2 %      MPV 10 1 fL      Platelets 288 Thousands/uL      nRBC 0 /100 WBCs      Neutrophils Relative 49 %      Immat GRANS % 0 %      Lymphocytes Relative 38 %      Monocytes Relative 8 %      Eosinophils Relative 4 %      Basophils Relative 1 %      Neutrophils Absolute 3 01 Thousands/µL      Immature Grans Absolute 0 01 Thousand/uL      Lymphocytes Absolute 2 30 Thousands/µL      Monocytes Absolute 0 51 Thousand/µL      Eosinophils Absolute 0 25 Thousand/µL      Basophils Absolute 0 04 Thousands/µL                  XR chest 2 views    (Results Pending)         Procedures  ECG 12 Lead Documentation Only    Date/Time: 4/9/2022 2:39 PM  Performed by: Travis Sevilla MD  Authorized by: Travis Sevilla MD     Indications / Diagnosis:  Chest pain  ECG reviewed by me, the ED Provider: yes    Patient location:  ED  Previous ECG:     Previous ECG:  Compared to current    Similarity:  No change    Comparison to cardiac monitor: Yes    Interpretation:     Interpretation: normal    Rate:     ECG rate:  85    ECG rate assessment: normal    Rhythm:     Rhythm: sinus rhythm    Ectopy:     Ectopy: none    QRS:     QRS axis:  Normal    QRS intervals:  Normal  Conduction:     Conduction: normal    ST segments:     ST segments:  Normal  T waves:     T waves: inverted      Inverted:  III          ED Course               Identification of Seniors at Risk      Most Recent Value   (ISAR) Identification of Seniors at Risk    Before the illness or injury that brought you to the Emergency, did you need someone to help you on a regular basis?  0 Filed at: 04/09/2022 1406   In the last 24 hours, have you needed more help than usual? 0 Filed at: 04/09/2022 1406   Have you been hospitalized for one or more nights during the past 6 months? 0 Filed at: 04/09/2022 1406   In general, do you see well? 0 Filed at: 04/09/2022 1406   In general, do you have serious problems with your memory? 0 Filed at: 04/09/2022 1406   Do you take more than three different medications every day? 1 Filed at: 04/09/2022 1406   ISAR Score 1 Filed at: 04/09/2022 1406                              MDM     69 yo F with past medical history of GERD, breast cancer s/p lumpectomy, and hypertension who presents for evaluation of left sided chest pain for the past 5 days  Differential diagnosis includes: GI, musculoskeletal, atypical ACS, pneumothorax  Doubt PE or dissection  Will obtain labs including CBC, CMP, trop, lipase, EKG, CXR  Will treat symptomatically with GI cocktail  Reviewed labs, no marked abnormalities  CXR negative for acute cardiopulmonary disease  EKG shows normal sinus rhythm without acute ischemic changes  Discussed with patient, will have patient follow up with PCP  Discussed with patient strict return precautions  Patient expressed understanding and was agreeable for discharge  Disposition  Final diagnoses:   Chest wall pain     Time reflects when diagnosis was documented in both MDM as applicable and the Disposition within this note     Time User Action Codes Description Comment    4/9/2022  3:41 PM Richard Middleton Add [R07 89] Chest wall pain       ED Disposition     ED Disposition Condition Date/Time Comment    Discharge Stable Sat Apr 9, 2022  3:41 PM Viky Greene discharge to home/self care              Follow-up Information     Follow up With Specialties Details Why Contact Info    Robinson Bell DO Family Medicine Schedule an appointment as soon as possible for a visit   [address] [place] [address] [place] [address]  [phone]            Discharge Medication List as of 4/9/2022  3:41 PM      CONTINUE these medications which have NOT CHANGED    Details   azelastine (ASTELIN) 0 1 % nasal spray 1 spray into each nostril 2 (two) times a day, Starting Thu 4/15/2021, Normal      azithromycin (ZITHROMAX) 250 mg tablet TAKE 2 TABLETS BY MOUTH TODAY, THEN TAKE 1 TABLET DAILY FOR 4 DAYS, Historical Med      Cholecalciferol (VITAMIN D-3 PO) Take 1,000 mg by mouth daily  , Historical Med      cimetidine (TAGAMET) 400 mg tablet Take 1 tablet (400 mg total) by mouth daily at bedtime, Starting Thu 4/15/2021, Normal      Cyclobenzaprine HCl (FLEXERIL PO) Take 1 tablet by mouth daily, Historical Med      fexofenadine (Allegra Allergy) 180 MG tablet Starting Tue 3/1/2022, Historical Med      levocetirizine (XYZAL) 5 MG tablet Take 5 mg by mouth every evening, Historical Med      lisinopril (ZESTRIL) 10 mg tablet Take 10 mg by mouth daily, Historical Med      Multiple Vitamins-Minerals (CENTRUM ADULTS PO) Take 1 tablet by mouth daily, Historical Med      nitrofurantoin (MACROBID) 100 mg capsule TAKE 1 CAPSULE BY MOUTH EVERY 12 HOURS WITH FOOD, Historical Med      predniSONE 20 mg tablet 1 tab TID x 2 days, then BID x 2 days, then daily x 2 days, Normal      Probiotic Product (PROBIOTIC PO) Take 1 tablet by mouth, Historical Med      RABEprazole (ACIPHEX) 20 MG tablet TAKE 1 TABLET BY MOUTH EVERY DAY IN THE MORNING, Normal      sucralfate (CARAFATE) 1 g/10 mL suspension Take 10 mL (1 g total) by mouth 4 (four) times a day for 7 days, Starting Thu 12/2/2021, Until Fri 12/10/2021, Normal           No discharge procedures on file  PDMP Review     None           ED Provider  Attending physically available and evaluated Marcela Menard  SIRIA managed the patient along with the ED Attending      Electronically Signed by         Hayde Vera MD  04/09/22 3041

## 2022-04-09 NOTE — ED ATTENDING ATTESTATION
Final Diagnosis:  1  Chest wall pain      ED Course as of 04/10/22 1858   Sat Apr 09, 2022   1423 71 y/o F here for LEFT breast / chest pain since Tuesday  She felt fine yesterday but today, the pain came back  Describes the pain as a "sticker" going to the LEFT shoulder and LEFT arm  Unclear what brings it on  Non-exertional    +SOBOE though when going up-stairs  No LH  No f/ch/n/v  Denies any upper respiratory tract infection symptoms (cough, congestion, rhinorrhea, sore throat)  +"belching" a lot which is new  No belly pain  No nausea/vomiting/diarrhea  No leg pain/swelling  She mention sshe had a GB ultrasound recently that showed stones  1424 General: VSS, NAD, awake, alert  Well-nourished, well-developed  Appears stated age  Head: Normocephalic, atraumatic, nontender  Eyes: PERRL, EOM-I  No diplopia  No hyphema  No subconjunctival hemorrhages  Symmetrical lids  ENTAtraumatic external nose and ears  MMM  No stridor  Normal phonation  No drooling  Base of mouth is soft  No mastoid tenderness  Neck: Symmetric, trachea midline  No JVD  CV: Peripheral pulses +2 throughout  LEFT chest wall tenderness below the rib  Lungs:   Unlabored   No retractions  No crepitus  No tachypnea  Not breathing 24  LEFT breast s/p mastectomy  No paradoxical motion  Abd: +BS, soft, tender in the LEFT upper quadrant when I press UNDER the ribs  ND  Nothing tender on RIGHT  Lower belly completely non-tender no zoster  MSK:   FROM   No lower extremity edema  Back:   No CVAT  Skin: Dry, intact  Neuro: AAOx3, GCS 15, CN II-XII grossly intact  Motor grossly intact  Psychiatric/Behavioral: Appropriate mood and affect   Exam: deferred     1426 A/P:  - ACS r/o  - belly workup    - dispo       I, Tracie Hodge MD, saw and evaluated the patient  All available labs and X-rays were ordered by me or the resident and have been reviewed by myself   I discussed the patient with the resident / non-physician and agree with the resident's / non-physician practitioner's findings and plan as documented in the resident's / non-physician practicitioner's note, except where noted  At this point, I agree with the current assessment done in the ED  I was present during key portions of all procedures performed unless otherwise stated  Chief Complaint   Patient presents with    Breast Pain     reports "sticker pains" under left breast since tuesday  Reports "burping all the time," left arm pain, pain to left shoulder  Reports having reflux disease "but I never burp" reports symptoms subsided yesterday but returned today  PMH:   has a past medical history of Breast cancer ([facility] [address] ) (2008), Cancer [facility]), Chronic low back pain, Chronic pain disorder, Clostridium difficile colitis, Colon polyp, DDD (degenerative disc disease), lumbar, GERD (gastroesophageal reflux disease), History of radiation therapy (2008), Hypertension, Laryngopharyngeal reflux (LPR), Lumbar spinal stenosis, Osteoarthritis of spine with radiculopathy, lumbar region, Osteopenia, Pelvic floor dysfunction, Piriformis syndrome of left side, Sciatica, and Spondylosis of cervical region without myelopathy or radiculopathy  PSH:   has a past surgical history that includes Breast surgery; pr esophagogastroduodenoscopy transoral diagnostic (N/A, 2/15/2017); pr esophagogastroduodenoscopy transoral diagnostic (N/A, 2018); Breast biopsy (Left, 2008); Breast lumpectomy (Left, 2008); Breast cyst aspiration (Left, ); US guided msk procedure (2020); Colonoscopy (2020); and Upper gastrointestinal endoscopy (2020)      Social:  Social History     Substance and Sexual Activity   Alcohol Use Yes    Comment: social     Social History     Tobacco Use   Smoking Status Former Smoker    Quit date:     Years since quittin 2   Smokeless Tobacco Never Used     Social History     Substance and Sexual Activity   Drug Use No     PE:  Vitals:    04/09/22 1406 04/09/22 1445 04/09/22 1542 04/09/22 1600   BP: 169/97 149/88 141/89 135/84   Pulse:  86 76 74   Resp:  22 20 18   Temp:       SpO2:  97% 99% 98%   Weight:       Height:         - 13 point ROS was performed and all are normal unless stated in the history above  - Nursing note reviewed  Vitals reviewed  - Orders placed by myself and/or advanced practitioner / resident     - Previous chart was reviewed  - No language barrier    - History obtained from patient  - There are no limitations to the history obtained  - Critical care time: Not applicable for this patient  Code Status: No Order  Advance Directive and Living Will:      Power of :    POLST:      Medications   aluminum-magnesium hydroxide-simethicone (MYLANTA) oral suspension 30 mL (30 mL Oral Given 4/9/22 1433)   famotidine (PEPCID) injection 20 mg (20 mg Intravenous Given 4/9/22 1458)     XR chest 2 views   Final Result      No acute cardiopulmonary disease                    Workstation performed: [id number]           Orders Placed This Encounter   Procedures    ED ECG Documentation Only    XR chest 2 views    CBC and differential    Comprehensive metabolic panel    Lipase    HS Troponin 0hr (reflex protocol)    ECG 12 lead     Labs Reviewed   CBC AND DIFFERENTIAL - Abnormal       Result Value Ref Range Status    WBC 6 12  4 31 - 10 16 Thousand/uL Final    RBC 4 62  3 81 - 5 12 Million/uL Final    Hemoglobin 13 2  11 5 - 15 4 g/dL Final    Hematocrit 42 2  34 8 - 46 1 % Final    MCV 91  82 - 98 fL Final    MCH 28 6  26 8 - 34 3 pg Final    MCHC 31 3 (*) 31 4 - 37 4 g/dL Final    RDW 13 2  11 6 - 15 1 % Final    MPV 10 1  8 9 - 12 7 fL Final    Platelets 478  749 - 390 Thousands/uL Final    nRBC 0  /100 WBCs Final    Neutrophils Relative 49  43 - 75 % Final    Immat GRANS % 0  0 - 2 % Final    Lymphocytes Relative 38  14 - 44 % Final    Monocytes Relative 8  4 - 12 % Final Eosinophils Relative 4  0 - 6 % Final    Basophils Relative 1  0 - 1 % Final    Neutrophils Absolute 3 01  1 85 - 7 62 Thousands/µL Final    Immature Grans Absolute 0 01  0 00 - 0 20 Thousand/uL Final    Lymphocytes Absolute 2 30  0 60 - 4 47 Thousands/µL Final    Monocytes Absolute 0 51  0 17 - 1 22 Thousand/µL Final    Eosinophils Absolute 0 25  0 00 - 0 61 Thousand/µL Final    Basophils Absolute 0 04  0 00 - 0 10 Thousands/µL Final   LIPASE - Normal    Lipase 212  73 - 393 u/L Final   HS TROPONIN I 0HR - Normal    hs TnI 0hr <2  "Refer to ACS Flowchart"- see link ng/L Final    Comment:                                              Initial (time 0) result  If >=50 ng/L, Myocardial injury suggested ;  Type of myocardial injury and treatment strategy  to be determined  If 5-49 ng/L, a delta result at 2 hours and or 4 hours will be needed to further evaluate  If <4 ng/L, and chest pain has been >3 hours since onset, patient may qualify for discharge based on the HEART score in the ED  If <5 ng/L and <3hours since onset of chest pain, a delta result at 2 hours will be needed to further evaluate  HS Troponin 99th Percentile URL of a Health Population=12 ng/L with a 95% Confidence Interval of 8-18 ng/L  Second Troponin (time 2 hours)  If calculated delta >= 20 ng/L,  Myocardial injury suggested ; Type of myocardial injury and treatment strategy to be determined  If 5-49 ng/L and the calculated delta is 5-19 ng/L, consult medical service for evaluation  Continue evaluation for ischemia on ecg and other possible etiology and repeat hs troponin at 4 hours  If delta is <5 ng/L at 2 hours, consider discharge based on risk stratification via the HEART score (if in ED), or CORNELIO risk score in IP/Observation  HS Troponin 99th Percentile URL of a Health Population=12 ng/L with a 95% Confidence Interval of 8-18 ng/L     COMPREHENSIVE METABOLIC PANEL    Sodium 610  136 - 145 mmol/L Final    Potassium 3 9  3 5 - 5 3 mmol/L Final    Chloride 108  100 - 108 mmol/L Final    CO2 26  21 - 32 mmol/L Final    ANION GAP 5  4 - 13 mmol/L Final    BUN 15  5 - 25 mg/dL Final    Creatinine 0 73  0 60 - 1 30 mg/dL Final    Comment: Standardized to IDMS reference method    Glucose 110  65 - 140 mg/dL Final    Comment: If the patient is fasting, the ADA then defines impaired fasting glucose as > 100 mg/dL and diabetes as > or equal to 123 mg/dL  Specimen collection should occur prior to Sulfasalazine administration due to the potential for falsely depressed results  Specimen collection should occur prior to Sulfapyridine administration due to the potential for falsely elevated results  Calcium 10 0  8 3 - 10 1 mg/dL Final    AST 23  5 - 45 U/L Final    Comment: Specimen collection should occur prior to Sulfasalazine administration due to the potential for falsely depressed results  ALT 28  12 - 78 U/L Final    Comment: Specimen collection should occur prior to Sulfasalazine and/or Sulfapyridine administration due to the potential for falsely depressed results  Alkaline Phosphatase 100  46 - 116 U/L Final    Total Protein 7 2  6 4 - 8 2 g/dL Final    Albumin 3 9  3 5 - 5 0 g/dL Final    Total Bilirubin 0 39  0 20 - 1 00 mg/dL Final    Comment: Use of this assay is not recommended for patients undergoing treatment with eltrombopag due to the potential for falsely elevated results      eGFR 83  ml/min/1 73sq m Final    Narrative:     [place] guidelines for Chronic Kidney Disease (CKD):     Stage 1 with normal or high GFR (GFR > 90 mL/min/1 73 square meters)    Stage 2 Mild CKD (GFR = 60-89 mL/min/1 73 square meters)    Stage 3A Moderate CKD (GFR = 45-59 mL/min/1 73 square meters)    Stage 3B Moderate CKD (GFR = 30-44 mL/min/1 73 square meters)    Stage 4 Severe CKD (GFR = 15-29 mL/min/1 73 square meters)    Stage 5 End Stage CKD (GFR <15 mL/min/1 73 square meters)  Note: GFR calculation is accurate only with a steady state creatinine     Time reflects when diagnosis was documented in both MDM as applicable and the Disposition within this note       Time User Action Codes Description Comment    4/9/2022  3:41 PM Juan Danieltimothy Vlale Add [R07 [id number]] Chest wall pain           ED Disposition       ED Disposition Condition Date/Time Comment    Discharge Stable Sat Apr 9, 2022  3:41 PM Renuka Metzger discharge to home/self care                  Follow-up Information       Follow up With Specialties Details Why Contact Info    Jaquelin Damico DO Family Medicine Schedule an appointment as soon as possible for a visit   [address]  [phone]            Discharge Medication List as of 4/9/2022  3:41 PM        CONTINUE these medications which have NOT CHANGED    Details   azelastine (ASTELIN) 0 1 % nasal spray 1 spray into each nostril 2 (two) times a day, Starting Thu 4/15/2021, Normal      azithromycin (ZITHROMAX) 250 mg tablet TAKE 2 TABLETS BY MOUTH TODAY, THEN TAKE 1 TABLET DAILY FOR 4 DAYS, Historical Med      Cholecalciferol (VITAMIN D-3 PO) Take 1,000 mg by mouth daily  , Historical Med      cimetidine (TAGAMET) 400 mg tablet Take 1 tablet (400 mg total) by mouth daily at bedtime, Starting Thu 4/15/2021, Normal      Cyclobenzaprine HCl (FLEXERIL PO) Take 1 tablet by mouth daily, Historical Med      fexofenadine (Allegra Allergy) 180 MG tablet Starting Tue 3/1/2022, Historical Med      levocetirizine (XYZAL) 5 MG tablet Take 5 mg by mouth every evening, Historical Med      lisinopril (ZESTRIL) 10 mg tablet Take 10 mg by mouth daily, Historical Med      Multiple Vitamins-Minerals (CENTRUM ADULTS PO) Take 1 tablet by mouth daily, Historical Med      nitrofurantoin (MACROBID) 100 mg capsule TAKE 1 CAPSULE BY MOUTH EVERY 12 HOURS WITH FOOD, Historical Med      predniSONE 20 mg tablet 1 tab TID x 2 days, then BID x 2 days, then daily x 2 days, Normal      Probiotic Product (PROBIOTIC PO) Take 1 tablet by mouth, Historical Med      RABEprazole (ACIPHEX) 20 MG tablet TAKE 1 TABLET BY MOUTH EVERY DAY IN THE MORNING, Normal      sucralfate (CARAFATE) 1 g/10 mL suspension Take 10 mL (1 g total) by mouth 4 (four) times a day for 7 days, Starting Thu 2021, Until Fri 12/10/2021, Normal           No discharge procedures on file  Prior to Admission Medications   Prescriptions Last Dose Informant Patient Reported? Taking?    Cholecalciferol (VITAMIN D-3 PO)  Self Yes No   Sig: Take 1,000 mg by mouth daily     Cyclobenzaprine HCl (FLEXERIL PO)  Self Yes No   Sig: Take 1 tablet by mouth daily   Multiple Vitamins-Minerals (CENTRUM ADULTS PO)  Self Yes No   Sig: Take 1 tablet by mouth daily   Probiotic Product (PROBIOTIC PO)  Self Yes No   Sig: Take 1 tablet by mouth   RABEprazole (ACIPHEX) 20 MG tablet   No No   Sig: TAKE 1 TABLET BY MOUTH EVERY DAY IN THE MORNING   azelastine (ASTELIN) 0 1 % nasal spray   No No   Si spray into each nostril 2 (two) times a day   azithromycin (ZITHROMAX) 250 mg tablet   Yes No   Sig: TAKE 2 TABLETS BY MOUTH TODAY, THEN TAKE 1 TABLET DAILY FOR 4 DAYS   cimetidine (TAGAMET) 400 mg tablet   No No   Sig: Take 1 tablet (400 mg total) by mouth daily at bedtime   fexofenadine (Allegra Allergy) 180 MG tablet   Yes No   levocetirizine (XYZAL) 5 MG tablet   Yes No   Sig: Take 5 mg by mouth every evening   lisinopril (ZESTRIL) 10 mg tablet  Self Yes No   Sig: Take 10 mg by mouth daily   nitrofurantoin (MACROBID) 100 mg capsule   Yes No   Sig: TAKE 1 CAPSULE BY MOUTH EVERY 12 HOURS WITH FOOD   Patient not taking: Reported on 2021   predniSONE 20 mg tablet   No No   Si tab TID x 2 days, then BID x 2 days, then daily x 2 days   Patient not taking: Reported on 2021   sucralfate (CARAFATE) 1 g/10 mL suspension   No No   Sig: Take 10 mL (1 g total) by mouth 4 (four) times a day for 7 days   Patient not taking: Reported on 12/10/2021       Facility-Administered Medications: None       Portions of the record may have been created with voice recognition software  Occasional wrong word or "sound a like" substitutions may have occurred due to the inherent limitations of voice recognition software  Read the chart carefully and recognize, using context, where substitutions have occurred      Electronically signed by:  Trini Pak

## 2022-04-09 NOTE — ED NOTES
Patient transported to [facility] [address], RN  04/09/22 4994

## 2022-04-11 LAB
ATRIAL RATE: 85 BPM
P AXIS: 61 DEGREES
PR INTERVAL: 172 MS
QRS AXIS: 61 DEGREES
QRSD INTERVAL: 88 MS
QT INTERVAL: 364 MS
QTC INTERVAL: 433 MS
T WAVE AXIS: 35 DEGREES
VENTRICULAR RATE: 85 BPM

## 2022-04-11 PROCEDURE — 93010 ELECTROCARDIOGRAM REPORT: CPT | Performed by: INTERNAL MEDICINE

## 2022-04-20 ENCOUNTER — EVALUATION (OUTPATIENT)
Dept: PHYSICAL THERAPY | Age: 72
End: 2022-04-20
Payer: COMMERCIAL

## 2022-04-20 DIAGNOSIS — M76.899 QUADRICEPS TENDONITIS: ICD-10-CM

## 2022-04-20 DIAGNOSIS — M22.2X2 PATELLOFEMORAL SYNDROME OF BOTH KNEES: ICD-10-CM

## 2022-04-20 DIAGNOSIS — G89.29 CHRONIC PAIN OF RIGHT KNEE: Primary | ICD-10-CM

## 2022-04-20 DIAGNOSIS — M25.572 PAIN, JOINT, ANKLE AND FOOT, LEFT: ICD-10-CM

## 2022-04-20 DIAGNOSIS — M22.2X1 PATELLOFEMORAL SYNDROME OF BOTH KNEES: ICD-10-CM

## 2022-04-20 DIAGNOSIS — M25.561 CHRONIC PAIN OF RIGHT KNEE: Primary | ICD-10-CM

## 2022-04-20 PROCEDURE — 97110 THERAPEUTIC EXERCISES: CPT | Performed by: PHYSICAL THERAPIST

## 2022-04-20 PROCEDURE — 97161 PT EVAL LOW COMPLEX 20 MIN: CPT | Performed by: PHYSICAL THERAPIST

## 2022-04-20 NOTE — PROGRESS NOTES
PT Evaluation     Today's date: 2022  Patient name: Kim Nolasco  : 1950  MRN: [id number]  Referring provider: Yi Tan MD  Dx:   Encounter Diagnosis     ICD-10-CM    1  Chronic pain of right knee  M25 561     G89 29    2  Quadriceps tendonitis, Right  M76 899 Ambulatory Referral to Physical Therapy   3  Patellofemoral syndrome of both knees  M22 2X1 Ambulatory Referral to Physical Therapy    M22 2X2    4  Pain, joint, ankle and foot, left  M25 572 Ambulatory Referral to Physical Therapy                  Assessment  Assessment details: Patient with PF syndrome - decreased knee PROM/AROM, swelling, decreased flexibility, decreased strength, and pain with functional activites  Impairments: abnormal or restricted ROM, impaired physical strength, lacks appropriate home exercise program and pain with function  Understanding of Dx/Px/POC: good   Prognosis: good    Goals  Short Term goals - 4 weeks  1  Patient will be independent HEP  2   Patient will report a 50% decrease in pain complaints  3   Increase strength 1/2 grade  4   Increase ROM 5-10 degrees  Long Term goals - 8 weeks  1  Patient will report elimination of pain complaints  2   Patient will return to all work related activities without restriction  3   Patient will return to all recreational activities without restriction  4   ROM WFL  5   Strength 5/5  Plan  Planned therapy interventions: manual therapy, strengthening, stretching and therapeutic exercise  Frequency: 2x week  Duration in weeks: 4        Subjective Evaluation    History of Present Illness  Mechanism of injury: Patient reports a long history of R knee pain which got progressively worse over the past 1-2 months  Sx's are anterior/medial and are aggravated by kneeling, squatting, bending, and stairs  Patient was injected approx 4 months ago - minimal long term relief noted  Patient with c/o R knee swelling for a couple of years    Neg sleeping difficulty  MEDS: none  Ambulation is painfree  Quality of life: excellent    Pain  Current pain rating: 3  At best pain ratin  At worst pain ratin  Progression: worsening    Patient Goals  Patient goals for therapy: decreased pain, increased motion, increased strength and independence with ADLs/IADLs          Objective     Tenderness     Right Knee   Tenderness in the medial patella and medial retinaculum  No tenderness in the patellar tendon, pes anserinus, plica tenderness and quadriceps tendon  Passive Range of Motion     Right Knee   Flexion: 136 degrees   Extension: -5 degrees     Strength/Myotome Testing     Right Knee   Flexion: 4  Extension: 4  Quadriceps contraction: fair    Tests     Right Knee   Positive patella-femoral grind  Negative anterior drawer, anterior Lachman, valgus stress test at 30 degrees and varus stress test at 30 degrees  General Comments:      Knee Comments  Mild R knee swelling noted               Precautions: None      Manuals             Medial patellar mobs             Prone quad stretch                                       Neuro Re-Ed                                                                                                        Ther Ex             TKE             SLR flexion                          cybex knee flexion                          cybex hip abd                                                                                                        Ther Activity                                       Gait Training                                       Modalities

## 2022-04-27 ENCOUNTER — OFFICE VISIT (OUTPATIENT)
Dept: PHYSICAL THERAPY | Age: 72
End: 2022-04-27
Payer: COMMERCIAL

## 2022-04-27 DIAGNOSIS — M25.561 CHRONIC PAIN OF RIGHT KNEE: Primary | ICD-10-CM

## 2022-04-27 DIAGNOSIS — G89.29 CHRONIC PAIN OF RIGHT KNEE: Primary | ICD-10-CM

## 2022-04-27 PROCEDURE — 97110 THERAPEUTIC EXERCISES: CPT | Performed by: PHYSICAL THERAPIST

## 2022-04-27 PROCEDURE — 97140 MANUAL THERAPY 1/> REGIONS: CPT | Performed by: PHYSICAL THERAPIST

## 2022-04-27 NOTE — PROGRESS NOTES
Daily Note     Today's date: 2022  Patient name: Willem Mae  : 1950  MRN: [id number]  Referring provider: Apoorva Chang MD  Dx:   Encounter Diagnosis     ICD-10-CM    1  Chronic pain of right knee  M25 561     G89 29                   Subjective: Patient notes compliance with HEP  Objective: See treatment diary below      Assessment: Tolerated treatment well  Patient would benefit from continued PT      Plan: Continue per plan of care  Precautions: None      Manuals             Medial patellar mobs Grade III/IV            Prone quad stretch X5 reps hold 30 sec                                        Neuro Re-Ed                                                                                                        Ther Ex             TKE 2# - 3x10            SLR flexion 3x10                         cybex knee flexion 5# - 2x10                         cybex hip abd 3 plates - 2Y65            cybex leg press 30# - 2x10                                                                                          Ther Activity                                       Gait Training                                       Modalities

## 2022-04-28 ENCOUNTER — OFFICE VISIT (OUTPATIENT)
Dept: PHYSICAL THERAPY | Age: 72
End: 2022-04-28
Payer: COMMERCIAL

## 2022-04-28 DIAGNOSIS — M25.561 CHRONIC PAIN OF RIGHT KNEE: Primary | ICD-10-CM

## 2022-04-28 DIAGNOSIS — G89.29 CHRONIC PAIN OF RIGHT KNEE: Primary | ICD-10-CM

## 2022-04-28 PROCEDURE — 97110 THERAPEUTIC EXERCISES: CPT | Performed by: PHYSICAL THERAPIST

## 2022-04-28 PROCEDURE — 97140 MANUAL THERAPY 1/> REGIONS: CPT | Performed by: PHYSICAL THERAPIST

## 2022-04-29 NOTE — PROGRESS NOTES
Daily Note     Today's date: 2022  Patient name: Viky Greene  : 1950  MRN: [id number]  Referring provider: Tc Benton MD  Dx:   Encounter Diagnosis     ICD-10-CM    1  Chronic pain of right knee  M25 561     G89 29                   Subjective: Patient feeling better  Objective: See treatment diary below  Improved patellar mobility  Assessment: Tolerated treatment well  Patient would benefit from continued PT      Plan: Continue per plan of care        Precautions: None      Manuals            Medial patellar mobs Grade III/IV completed           Prone quad stretch X5 reps hold 30 sec  completed                                     Neuro Re-Ed                                                                                                        Ther Ex             TKE 2# - 3x10 2# - 3x10           SLR flexion 3x10 3x10                        cybex knee flexion 5# - 2x10 5# - 3x10                        cybex hip abd 3 plates - 0P05 6A99           cybex leg press 30# - 2x10 3x10                                                                                         Ther Activity                                       Gait Training                                       Modalities

## 2022-05-02 ENCOUNTER — OFFICE VISIT (OUTPATIENT)
Dept: PHYSICAL THERAPY | Age: 72
End: 2022-05-02
Payer: COMMERCIAL

## 2022-05-02 DIAGNOSIS — M25.561 CHRONIC PAIN OF RIGHT KNEE: Primary | ICD-10-CM

## 2022-05-02 DIAGNOSIS — G89.29 CHRONIC PAIN OF RIGHT KNEE: Primary | ICD-10-CM

## 2022-05-02 PROCEDURE — 97140 MANUAL THERAPY 1/> REGIONS: CPT | Performed by: PHYSICAL THERAPIST

## 2022-05-02 PROCEDURE — 97110 THERAPEUTIC EXERCISES: CPT | Performed by: PHYSICAL THERAPIST

## 2022-05-03 NOTE — PROGRESS NOTES
Daily Note     Today's date: 2022  Patient name: Francisco Javier Morgan  : 1950  MRN: [id number]  Referring provider: Antwan Simpson MD  Dx:   Encounter Diagnosis     ICD-10-CM    1  Chronic pain of right knee  M25 561     G89 29                   Subjective: No changed in sx's noted  Objective: See treatment diary below      Assessment: Tolerated treatment well  Patient would benefit from continued PT      Plan: Continue per plan of care  Precautions: None      Manuals           Medial patellar mobs Grade III/IV completed completed          Prone quad stretch X5 reps hold 30 sec  completed completed                                    Neuro Re-Ed                                                                                                        Ther Ex             TKE 2# - 3x10 2# - 3x10 4# - 3x10          SLR flexion 3x10 3x10 2# - 3x10                       cybex knee flexion 5# - 2x10 5# - 3x10 5#                       cybex hip abd 3 plates - [id number] 3 plates          cybex leg press 30# - 2x10 3x10 35#          SLS on blue foam   x10 reps hold 10 sec                                                                             Ther Activity                                       Gait Training                                       Modalities

## 2022-05-04 ENCOUNTER — OFFICE VISIT (OUTPATIENT)
Dept: PHYSICAL THERAPY | Age: 72
End: 2022-05-04
Payer: COMMERCIAL

## 2022-05-04 DIAGNOSIS — M25.561 CHRONIC PAIN OF RIGHT KNEE: Primary | ICD-10-CM

## 2022-05-04 DIAGNOSIS — G89.29 CHRONIC PAIN OF RIGHT KNEE: Primary | ICD-10-CM

## 2022-05-04 PROCEDURE — 97110 THERAPEUTIC EXERCISES: CPT | Performed by: PHYSICAL THERAPIST

## 2022-05-04 PROCEDURE — 97140 MANUAL THERAPY 1/> REGIONS: CPT | Performed by: PHYSICAL THERAPIST

## 2022-05-05 NOTE — PROGRESS NOTES
Daily Note     Today's date: 2022  Patient name: Rosie Gold  : 1950  MRN: [id number]  Referring provider: Martin Myles MD  Dx:   Encounter Diagnosis     ICD-10-CM    1  Chronic pain of right knee  M25 561     G89 29                   Subjective: Patient reports a slight increase in sx's  Objective: See treatment diary below      Assessment: Tolerated treatment well  Patient would benefit from continued PT      Plan: Continue per plan of care  Precautions: None      Manuals          Medial patellar mobs Grade III/IV completed completed completed         Prone quad stretch X5 reps hold 30 sec  completed completed completed                                   Neuro Re-Ed                                                                                                        Ther Ex             TKE 2# - 3x10 2# - 3x10 4# - 3x10 4# - 3x10         SLR flexion 3x10 3x10 2# - 3x10 2# - 3x10                      cybex knee flexion 5# - 2x10 5# - 3x10 5# 5# - 3x10                      cybex hip abd 3 plates - [id number] 3 plates 3 plates         cybex leg press 30# - 2x10 3x10 35# 35#         SLS on blue foam   x10 reps hold 10 sec  x10 reps hold 10 sec                                                                            Ther Activity                                       Gait Training                                       Modalities

## 2022-05-09 ENCOUNTER — OFFICE VISIT (OUTPATIENT)
Dept: PHYSICAL THERAPY | Age: 72
End: 2022-05-09
Payer: COMMERCIAL

## 2022-05-09 DIAGNOSIS — M25.561 CHRONIC PAIN OF RIGHT KNEE: Primary | ICD-10-CM

## 2022-05-09 DIAGNOSIS — G89.29 CHRONIC PAIN OF RIGHT KNEE: Primary | ICD-10-CM

## 2022-05-09 PROCEDURE — 97110 THERAPEUTIC EXERCISES: CPT | Performed by: PHYSICAL THERAPIST

## 2022-05-09 PROCEDURE — 97140 MANUAL THERAPY 1/> REGIONS: CPT | Performed by: PHYSICAL THERAPIST

## 2022-05-09 NOTE — PROGRESS NOTES
Daily Note     Today's date: 2022  Patient name: Silva Gilbert  : 1950  MRN: [id number]  Referring provider: Marta Aguilar MD  Dx:   Encounter Diagnosis     ICD-10-CM    1  Chronic pain of right knee  M25 561     G89 29                   Subjective: Overall, some modest gains noted  Objective: See treatment diary below  Patellar mobility still limited  Assessment: Tolerated treatment well  Patient would benefit from continued PT      Plan: Continue per plan of care  Precautions: None      Manuals         Medial patellar mobs Grade III/IV completed completed completed completed        Prone quad stretch X5 reps hold 30 sec  completed completed completed completed                                  Neuro Re-Ed                                                                                                        Ther Ex             TKE 2# - 3x10 2# - 3x10 4# - 3x10 4# - 3x10 5#        SLR flexion 3x10 3x10 2# - 3x10 2# - 3x10 2#                     cybex knee flexion 5# - 2x10 5# - 3x10 5# 5# - 3x10 3x10                     cybex hip abd 3 plates - 0I70 0Z34 3 plates 3 plates 3 5 plates        cybex leg press 30# - 2x10 3x10 35# 35# 35#        SLS on blue foam   x10 reps hold 10 sec  x10 reps hold 10 sec  x10 reps hold 10 sec                                                                           Ther Activity                                       Gait Training                                       Modalities

## 2022-05-11 ENCOUNTER — OFFICE VISIT (OUTPATIENT)
Dept: PHYSICAL THERAPY | Age: 72
End: 2022-05-11
Payer: COMMERCIAL

## 2022-05-11 DIAGNOSIS — G89.29 CHRONIC PAIN OF RIGHT KNEE: Primary | ICD-10-CM

## 2022-05-11 DIAGNOSIS — M25.561 CHRONIC PAIN OF RIGHT KNEE: Primary | ICD-10-CM

## 2022-05-11 PROCEDURE — 97140 MANUAL THERAPY 1/> REGIONS: CPT | Performed by: PHYSICAL THERAPIST

## 2022-05-11 PROCEDURE — 97110 THERAPEUTIC EXERCISES: CPT | Performed by: PHYSICAL THERAPIST

## 2022-05-11 NOTE — PROGRESS NOTES
Daily Note     Today's date: 2022  Patient name: Kim Nolasco  : 1950  MRN: [id number]  Referring provider: Yi Tan MD  Dx: No diagnosis found  Subjective: Patient without new complaints  Objective: See treatment diary below      Assessment: Tolerated treatment well  Patient would benefit from continued PT      Plan: Continue per plan of care  Precautions: None      Manuals        Medial patellar mobs Grade III/IV completed completed completed completed completed       Prone quad stretch X5 reps hold 30 sec  completed completed completed completed completed                                 Neuro Re-Ed                                                                                                        Ther Ex             TKE 2# - 3x10 2# - 3x10 4# - 3x10 4# - 3x10 5# 5#       SLR flexion 3x10 3x10 2# - 3x10 2# - 3x10 2# 2 5#                    cybex knee flexion 5# - 2x10 5# - 3x10 5# 5# - 3x10 3x10 10# - 3x10                    cybex hip abd 3 plates - 8T59 6G60 3 plates 3 plates 3 5 plates 0B27       cybex leg press 30# - 2x10 3x10 35# 35# 35# 3x10 - 40#       SLS on blue foam   x10 reps hold 10 sec  x10 reps hold 10 sec  x10 reps hold 10 sec   x10 reps                                                                        Ther Activity                                       Gait Training                                       Modalities

## 2022-05-16 ENCOUNTER — OFFICE VISIT (OUTPATIENT)
Dept: PHYSICAL THERAPY | Age: 72
End: 2022-05-16
Payer: COMMERCIAL

## 2022-05-16 DIAGNOSIS — M25.561 CHRONIC PAIN OF RIGHT KNEE: Primary | ICD-10-CM

## 2022-05-16 DIAGNOSIS — G89.29 CHRONIC PAIN OF RIGHT KNEE: Primary | ICD-10-CM

## 2022-05-16 PROCEDURE — 97110 THERAPEUTIC EXERCISES: CPT | Performed by: PHYSICAL THERAPIST

## 2022-05-16 PROCEDURE — 97140 MANUAL THERAPY 1/> REGIONS: CPT | Performed by: PHYSICAL THERAPIST

## 2022-05-17 ENCOUNTER — OFFICE VISIT (OUTPATIENT)
Dept: PHYSICAL THERAPY | Age: 72
End: 2022-05-17
Payer: COMMERCIAL

## 2022-05-17 DIAGNOSIS — M25.561 CHRONIC PAIN OF RIGHT KNEE: Primary | ICD-10-CM

## 2022-05-17 DIAGNOSIS — G89.29 CHRONIC PAIN OF RIGHT KNEE: Primary | ICD-10-CM

## 2022-05-17 PROCEDURE — 97140 MANUAL THERAPY 1/> REGIONS: CPT | Performed by: PHYSICAL THERAPIST

## 2022-05-17 PROCEDURE — 97110 THERAPEUTIC EXERCISES: CPT | Performed by: PHYSICAL THERAPIST

## 2022-05-17 NOTE — PROGRESS NOTES
Daily Note     Today's date: 2022  Patient name: Peggy Sevilla  : 1950  MRN: [id number]  Referring provider: Erika Clemente MD  Dx:   Encounter Diagnosis     ICD-10-CM    1  Chronic pain of right knee  M25 561     G89 29                   Subjective: Difficulty with loaded max flexion - catchers position  Objective: See treatment diary below       Assessment: Tolerated treatment well  Plan:  D/C to HEP - goals achieved  Precautions: None      Manuals      Medial patellar mobs Grade III/IV completed completed completed completed completed completed completed     Prone quad stretch X5 reps hold 30 sec  completed completed completed completed completed completed completed                               Neuro Re-Ed                                                                                                        Ther Ex             TKE 2# - 3x10 2# - 3x10 4# - 3x10 4# - 3x10 5# 5# 5# 5#     SLR flexion 3x10 3x10 2# - 3x10 2# - 3x10 2# 2 5# 2 5# 2 5#                  cybex knee flexion 5# - 2x10 5# - 3x10 5# 5# - 3x10 3x10 10# - 3x10 10# - 3x10 10#                  cybex hip abd 3 plates - 7R51 3R44 3 plates 3 plates 3 5 plates 3T32 3 5 plates - 0F61 3 5 plates - 6G38     cybex leg press 30# - 2x10 3x10 35# 35# 35# 3x10 - 40# completed completed     SLS on blue foam   x10 reps hold 10 sec  x10 reps hold 10 sec  x10 reps hold 10 sec   x10 reps completed completed                                                                      Ther Activity                                       Gait Training                                       Modalities

## 2022-05-17 NOTE — PROGRESS NOTES
Daily Note     Today's date: 2022  Patient name: Deanna Nguyen  : 1950  MRN: [id number]  Referring provider: Malvin Jay MD  Dx:   Encounter Diagnosis     ICD-10-CM    1  Chronic pain of right knee  M25 561     G89 29                   Subjective: Patient without new complaints related to knee      Objective: See treatment diary below      Assessment: Tolerated treatment well  Patient would benefit from continued PT      Plan: Continue per plan of care  Precautions: None      Manuals       Medial patellar mobs Grade III/IV completed completed completed completed completed completed      Prone quad stretch X5 reps hold 30 sec  completed completed completed completed completed completed                                Neuro Re-Ed                                                                                                        Ther Ex             TKE 2# - 3x10 2# - 3x10 4# - 3x10 4# - 3x10 5# 5# 5#      SLR flexion 3x10 3x10 2# - 3x10 2# - 3x10 2# 2 5# 2 5#                   cybex knee flexion 5# - 2x10 5# - 3x10 5# 5# - 3x10 3x10 10# - 3x10 10# - 3x10                   cybex hip abd 3 plates - 9U18 9O59 3 plates 3 plates 3 5 plates 8A65 3 5 plates - 5X05      cybex leg press 30# - 2x10 3x10 35# 35# 35# 3x10 - 40# completed      SLS on blue foam   x10 reps hold 10 sec  x10 reps hold 10 sec  x10 reps hold 10 sec   x10 reps completed                                                                       Ther Activity                                       Gait Training                                       Modalities

## 2022-05-19 ENCOUNTER — APPOINTMENT (OUTPATIENT)
Dept: LAB | Age: 72
End: 2022-05-19
Payer: COMMERCIAL

## 2022-05-19 DIAGNOSIS — R30.0 DYSURIA: Primary | ICD-10-CM

## 2022-05-19 LAB
BACTERIA UR QL AUTO: NORMAL /HPF
BILIRUB UR QL STRIP: NEGATIVE
CLARITY UR: CLEAR
COLOR UR: NORMAL
GLUCOSE UR STRIP-MCNC: NEGATIVE MG/DL
HGB UR QL STRIP.AUTO: NEGATIVE
KETONES UR STRIP-MCNC: NEGATIVE MG/DL
LEUKOCYTE ESTERASE UR QL STRIP: NEGATIVE
NITRITE UR QL STRIP: NEGATIVE
NON-SQ EPI CELLS URNS QL MICRO: NORMAL /HPF
PH UR STRIP.AUTO: 6 [PH]
PROT UR STRIP-MCNC: NEGATIVE MG/DL
RBC #/AREA URNS AUTO: NORMAL /HPF
SP GR UR STRIP.AUTO: 1.01 (ref 1–1.03)
UROBILINOGEN UR STRIP-ACNC: <2 MG/DL
WBC #/AREA URNS AUTO: NORMAL /HPF

## 2022-05-19 PROCEDURE — 87086 URINE CULTURE/COLONY COUNT: CPT

## 2022-05-19 PROCEDURE — 81001 URINALYSIS AUTO W/SCOPE: CPT

## 2022-05-21 LAB — BACTERIA UR CULT: NORMAL

## 2022-06-02 ENCOUNTER — APPOINTMENT (OUTPATIENT)
Dept: LAB | Age: 72
End: 2022-06-02
Payer: COMMERCIAL

## 2022-06-02 DIAGNOSIS — E78.5 HYPERLIPIDEMIA, UNSPECIFIED HYPERLIPIDEMIA TYPE: ICD-10-CM

## 2022-06-02 DIAGNOSIS — R30.0 DYSURIA: ICD-10-CM

## 2022-06-02 DIAGNOSIS — R73.01 IMPAIRED FASTING GLUCOSE: ICD-10-CM

## 2022-06-02 LAB
ALBUMIN SERPL BCP-MCNC: 4.1 G/DL (ref 3.5–5)
ALP SERPL-CCNC: 86 U/L (ref 46–116)
ALT SERPL W P-5'-P-CCNC: 25 U/L (ref 12–78)
ANION GAP SERPL CALCULATED.3IONS-SCNC: 6 MMOL/L (ref 4–13)
AST SERPL W P-5'-P-CCNC: 14 U/L (ref 5–45)
BASOPHILS # BLD AUTO: 0.04 THOUSANDS/ΜL (ref 0–0.1)
BASOPHILS NFR BLD AUTO: 1 % (ref 0–1)
BILIRUB SERPL-MCNC: 0.79 MG/DL (ref 0.2–1)
BUN SERPL-MCNC: 18 MG/DL (ref 5–25)
CALCIUM SERPL-MCNC: 9.9 MG/DL (ref 8.3–10.1)
CHLORIDE SERPL-SCNC: 107 MMOL/L (ref 100–108)
CHOLEST SERPL-MCNC: 223 MG/DL
CO2 SERPL-SCNC: 27 MMOL/L (ref 21–32)
CREAT SERPL-MCNC: 0.66 MG/DL (ref 0.6–1.3)
EOSINOPHIL # BLD AUTO: 0.23 THOUSAND/ΜL (ref 0–0.61)
EOSINOPHIL NFR BLD AUTO: 4 % (ref 0–6)
ERYTHROCYTE [DISTWIDTH] IN BLOOD BY AUTOMATED COUNT: 14 % (ref 11.6–15.1)
EST. AVERAGE GLUCOSE BLD GHB EST-MCNC: 105 MG/DL
GFR SERPL CREATININE-BSD FRML MDRD: 89 ML/MIN/1.73SQ M
GLUCOSE P FAST SERPL-MCNC: 107 MG/DL (ref 65–99)
HBA1C MFR BLD: 5.3 %
HCT VFR BLD AUTO: 42.1 % (ref 34.8–46.1)
HDLC SERPL-MCNC: 57 MG/DL
HGB BLD-MCNC: 13.1 G/DL (ref 11.5–15.4)
IMM GRANULOCYTES # BLD AUTO: 0.01 THOUSAND/UL (ref 0–0.2)
IMM GRANULOCYTES NFR BLD AUTO: 0 % (ref 0–2)
LDLC SERPL CALC-MCNC: 126 MG/DL (ref 0–100)
LYMPHOCYTES # BLD AUTO: 1.89 THOUSANDS/ΜL (ref 0.6–4.47)
LYMPHOCYTES NFR BLD AUTO: 34 % (ref 14–44)
MCH RBC QN AUTO: 29 PG (ref 26.8–34.3)
MCHC RBC AUTO-ENTMCNC: 31.1 G/DL (ref 31.4–37.4)
MCV RBC AUTO: 93 FL (ref 82–98)
MONOCYTES # BLD AUTO: 0.45 THOUSAND/ΜL (ref 0.17–1.22)
MONOCYTES NFR BLD AUTO: 8 % (ref 4–12)
NEUTROPHILS # BLD AUTO: 2.98 THOUSANDS/ΜL (ref 1.85–7.62)
NEUTS SEG NFR BLD AUTO: 53 % (ref 43–75)
NONHDLC SERPL-MCNC: 166 MG/DL
NRBC BLD AUTO-RTO: 0 /100 WBCS
PLATELET # BLD AUTO: 217 THOUSANDS/UL (ref 149–390)
PMV BLD AUTO: 10.8 FL (ref 8.9–12.7)
POTASSIUM SERPL-SCNC: 4.2 MMOL/L (ref 3.5–5.3)
PROT SERPL-MCNC: 7.2 G/DL (ref 6.4–8.2)
RBC # BLD AUTO: 4.52 MILLION/UL (ref 3.81–5.12)
SODIUM SERPL-SCNC: 140 MMOL/L (ref 136–145)
TRIGL SERPL-MCNC: 201 MG/DL
WBC # BLD AUTO: 5.6 THOUSAND/UL (ref 4.31–10.16)

## 2022-06-02 PROCEDURE — 87086 URINE CULTURE/COLONY COUNT: CPT

## 2022-06-02 PROCEDURE — 80053 COMPREHEN METABOLIC PANEL: CPT

## 2022-06-02 PROCEDURE — 85025 COMPLETE CBC W/AUTO DIFF WBC: CPT

## 2022-06-02 PROCEDURE — 36415 COLL VENOUS BLD VENIPUNCTURE: CPT

## 2022-06-02 PROCEDURE — 80061 LIPID PANEL: CPT

## 2022-06-02 PROCEDURE — 83036 HEMOGLOBIN GLYCOSYLATED A1C: CPT

## 2022-06-02 PROCEDURE — 87077 CULTURE AEROBIC IDENTIFY: CPT

## 2022-06-02 PROCEDURE — 87186 SC STD MICRODIL/AGAR DIL: CPT

## 2022-06-04 LAB
BACTERIA UR CULT: ABNORMAL
BACTERIA UR CULT: ABNORMAL

## 2022-07-05 ENCOUNTER — OFFICE VISIT (OUTPATIENT)
Dept: OBGYN CLINIC | Facility: CLINIC | Age: 72
End: 2022-07-05
Payer: COMMERCIAL

## 2022-07-05 VITALS
WEIGHT: 163.8 LBS | DIASTOLIC BLOOD PRESSURE: 70 MMHG | HEIGHT: 67 IN | SYSTOLIC BLOOD PRESSURE: 118 MMHG | HEART RATE: 92 BPM | BODY MASS INDEX: 25.71 KG/M2

## 2022-07-05 DIAGNOSIS — M22.2X1 PATELLOFEMORAL SYNDROME OF RIGHT KNEE: Primary | ICD-10-CM

## 2022-07-05 PROCEDURE — 20610 DRAIN/INJ JOINT/BURSA W/O US: CPT | Performed by: ORTHOPAEDIC SURGERY

## 2022-07-05 PROCEDURE — 99214 OFFICE O/P EST MOD 30 MIN: CPT | Performed by: ORTHOPAEDIC SURGERY

## 2022-07-05 RX ORDER — BUPIVACAINE HYDROCHLORIDE 2.5 MG/ML
2 INJECTION, SOLUTION INFILTRATION; PERINEURAL
Status: COMPLETED | OUTPATIENT
Start: 2022-07-05 | End: 2022-07-05

## 2022-07-05 RX ORDER — METHYLPREDNISOLONE ACETATE 80 MG/ML
1 INJECTION, SUSPENSION INTRA-ARTICULAR; INTRALESIONAL; INTRAMUSCULAR; SOFT TISSUE
Status: COMPLETED | OUTPATIENT
Start: 2022-07-05 | End: 2022-07-05

## 2022-07-05 RX ORDER — KETOROLAC TROMETHAMINE 30 MG/ML
60 INJECTION, SOLUTION INTRAMUSCULAR; INTRAVENOUS
Status: COMPLETED | OUTPATIENT
Start: 2022-07-05 | End: 2022-07-05

## 2022-07-05 RX ADMIN — BUPIVACAINE HYDROCHLORIDE 2 ML: 2.5 INJECTION, SOLUTION INFILTRATION; PERINEURAL at 14:58

## 2022-07-05 RX ADMIN — METHYLPREDNISOLONE ACETATE 1 ML: 80 INJECTION, SUSPENSION INTRA-ARTICULAR; INTRALESIONAL; INTRAMUSCULAR; SOFT TISSUE at 14:58

## 2022-07-05 RX ADMIN — KETOROLAC TROMETHAMINE 60 MG: 30 INJECTION, SOLUTION INTRAMUSCULAR; INTRAVENOUS at 14:58

## 2022-07-05 NOTE — PROGRESS NOTES
Assessment:  1  Patellofemoral syndrome of right knee  Large joint arthrocentesis: R knee       Plan:      Patient has right patellofemoral syndrome    WBAT R LE    Patient received a right knee steroid injection in the office today   Continue with physical therapy   Provided patient with VMO strengthening exercises    Follow up in 3 months         The above stated was discussed in layman's terms and the patient expressed understanding  All questions were answered to the patient's satisfaction  Subjective:   Patricia Ferrer is a 70 y o  female who presents chronic  Bilateral knee pain due to patellofemoral syndrome  She states about 3 weeks she went to for walk and only walked for 2 blocks and flared up the right knee  She is having pain with kneeling, squatting and transitionals  She denies instability She also notes pain in the anterior right knee and radiating down to the shin region  She denies instability  She is using Voltaren gel and taking Advil as needed for pain relief         Review of systems negative unless otherwise specified in HPI    Past Medical History:   Diagnosis Date    Breast cancer ([facility] ) 08/21/2008    age 62    Cancer ([facility] )     left breast, tx with rx    Chronic low back pain     Chronic pain disorder     Clostridium difficile colitis     Colon polyp     DDD (degenerative disc disease), lumbar     GERD (gastroesophageal reflux disease)     History of radiation therapy 09/2008    for breast cancer    Hypertension     Laryngopharyngeal reflux (LPR)     Lumbar spinal stenosis     Osteoarthritis of spine with radiculopathy, lumbar region     Osteopenia     Pelvic floor dysfunction     Piriformis syndrome of left side     Sciatica     Spondylosis of cervical region without myelopathy or radiculopathy        Past Surgical History:   Procedure Laterality Date    BREAST BIOPSY Left 07/30/2008    malignant    BREAST CYST ASPIRATION Left 1998    BREAST LUMPECTOMY Left 2008    BREAST SURGERY      COLONOSCOPY  2020    [place] ESOPHAGOGASTRODUODENOSCOPY TRANSORAL DIAGNOSTIC N/A 2/15/2017    Procedure: ESOPHAGOGASTRODUODENOSCOPY (EGD); Surgeon: Kyle Cain MD;  [facility]; Service: Gastroenterology    [place] ESOPHAGOGASTRODUODENOSCOPY TRANSORAL DIAGNOSTIC N/A 2018    Procedure: ESOPHAGOGASTRODUODENOSCOPY (EGD); Surgeon: Kyle Cain MD;  [facility];   Service: Gastroenterology    UPPER GASTROINTESTINAL ENDOSCOPY  2020    US GUIDED MSK PROCEDURE  2020       Family History   Problem Relation Age of Onset    Osteoporosis Mother     Coronary artery disease Father     Diabetes Father     COPD Father     No Known Problems Maternal Aunt     Breast cancer Paternal Aunt 68    No Known Problems Paternal Aunt     No Known Problems Maternal Aunt     Colon cancer Neg Hx        Social History     Occupational History    Not on file   Tobacco Use    Smoking status: Former Smoker     Quit date:      Years since quittin 5    Smokeless tobacco: Never Used   Vaping Use    Vaping Use: Never used   Substance and Sexual Activity    Alcohol use: Yes     Comment: social    Drug use: No    Sexual activity: Not Currently         Current Outpatient Medications:     azelastine (ASTELIN) 0 1 % nasal spray, 1 spray into each nostril 2 (two) times a day, Disp: 1 Bottle, Rfl: 11    Cholecalciferol (VITAMIN D-3 PO), Take 1,000 mg by mouth daily  , Disp: , Rfl:     cimetidine (TAGAMET) 400 mg tablet, TAKE 1 TABLET (400 MG TOTAL) BY MOUTH DAILY AT BEDTIME, Disp: 90 tablet, Rfl: 3    lisinopril (ZESTRIL) 10 mg tablet, Take 10 mg by mouth daily, Disp: , Rfl:     Multiple Vitamins-Minerals (CENTRUM ADULTS PO), Take 1 tablet by mouth daily, Disp: , Rfl:     Probiotic Product (PROBIOTIC PO), Take 1 tablet by mouth, Disp: , Rfl:     RABEprazole (ACIPHEX) 20 MG tablet, TAKE 1 TABLET BY MOUTH EVERY DAY IN THE MORNING, Disp: 90 tablet, Rfl: 3   azithromycin (ZITHROMAX) 250 mg tablet, TAKE 2 TABLETS BY MOUTH TODAY, THEN TAKE 1 TABLET DAILY FOR 4 DAYS, Disp: , Rfl:     Cyclobenzaprine HCl (FLEXERIL PO), Take 1 tablet by mouth daily, Disp: , Rfl:     fexofenadine (Allegra Allergy) 180 MG tablet, , Disp: , Rfl:     levocetirizine (XYZAL) 5 MG tablet, Take 5 mg by mouth every evening, Disp: , Rfl:     nitrofurantoin (MACROBID) 100 mg capsule, TAKE 1 CAPSULE BY MOUTH EVERY 12 HOURS WITH FOOD (Patient not taking: Reported on 9/8/2021), Disp: , Rfl:     predniSONE 20 mg tablet, 1 tab TID x 2 days, then BID x 2 days, then daily x 2 days (Patient not taking: Reported on 9/8/2021), Disp: 12 tablet, Rfl: 0    sucralfate (CARAFATE) 1 g/10 mL suspension, Take 10 mL (1 g total) by mouth 4 (four) times a day for 7 days (Patient not taking: Reported on 12/10/2021 ), Disp: 280 mL, Rfl: 0    Allergies   Allergen Reactions    Fentanyl Vomiting    Codeine     Morphine And Related      Note:  this allergy is not being included in drug screening  Please inactivate this item and re-enter it to enable screening      Morphine Sulfate [Morphine]     Naprosyn [Naproxen]     Oxycodone     Penicillins     Sulfa Antibiotics             Vitals:    07/05/22 1408   BP: 118/70   Pulse: 92       Objective:            Physical Exam  Physical Exam:      General Appearance:    Alert, cooperative, no distress, appears stated age   Head:    Normocephalic, without obvious abnormality, atraumatic   Eyes:    conjunctiva/corneas clear, both eyes         Nose:   Nares normal, septum midline, no drainage    Throat:   Lips normal; teeth and gums normal   Neck:    symmetrical, trachea midline, ;     thyroid:  no enlargement/   Back:     Symmetric, no curvature, ROM normal   Lungs:   No audible wheezing or labored breathing   Chest Wall:    No tenderness or deformity    Heart:    Regular rate and rhythm                         Pulses:   2+ and symmetric all extremities   Skin:   Skin color, texture, turgor normal, no rashes or lesions   Neurologic:   normal strength, sensation and reflexes     throughout                       Ortho Exam   Right knee  Skin intact  No erythema   No TTP   Full ROM   Negative patella grind test  Neurovascularly Intact Distally     Diagnostics, reviewed and taken today if performed as documented:    None performed      Procedures, if performed today:    Large joint arthrocentesis: R knee  Universal Protocol:  Consent: Verbal consent obtained  Risks and benefits: risks, benefits and alternatives were discussed  Consent given by: patient  Time out: Immediately prior to procedure a "time out" was called to verify the correct patient, procedure, equipment, support staff and site/side marked as required  Timeout called at: 7/5/2022 2:58 PM   Patient understanding: patient states understanding of the procedure being performed  Site marked: the operative site was marked  Supporting Documentation  Indications: pain   Procedure Details  Location: knee - R knee  Preparation: Patient was prepped and draped in the usual sterile fashion  Needle size: 22 G  Approach: anterolateral  Medications administered: 2 mL bupivacaine 0 25 %; 60 mg ketorolac 60 mg/2 mL; 1 mL methylPREDNISolone acetate 80 mg/mL    Patient tolerance: patient tolerated the procedure well with no immediate complications  Dressing:  Sterile dressing applied              Scribe Attestation    I,:  Victoria Liu am acting as a scribe while in the presence of the attending physician :       I,:  Rimma Small MD personally performed the services described in this documentation    as scribed in my presence :             Portions of the record may have been created with voice recognition software  Occasional wrong word or "sound a like" substitutions may have occurred due to the inherent limitations of voice recognition software    Read the chart carefully and recognize, using context, where substitutions have occurred

## 2022-07-13 ENCOUNTER — OFFICE VISIT (OUTPATIENT)
Dept: GASTROENTEROLOGY | Facility: MEDICAL CENTER | Age: 72
End: 2022-07-13

## 2022-07-13 VITALS
SYSTOLIC BLOOD PRESSURE: 114 MMHG | WEIGHT: 160.4 LBS | DIASTOLIC BLOOD PRESSURE: 58 MMHG | HEART RATE: 80 BPM | BODY MASS INDEX: 25.12 KG/M2 | TEMPERATURE: 98.8 F

## 2022-07-13 DIAGNOSIS — K21.9 GASTROESOPHAGEAL REFLUX DISEASE WITHOUT ESOPHAGITIS: Primary | ICD-10-CM

## 2022-07-13 DIAGNOSIS — K21.9 LARYNGOPHARYNGEAL REFLUX (LPR): ICD-10-CM

## 2022-07-13 DIAGNOSIS — K21.9 REFLUX LARYNGITIS: ICD-10-CM

## 2022-07-13 DIAGNOSIS — J04.0 REFLUX LARYNGITIS: ICD-10-CM

## 2022-07-13 DIAGNOSIS — K22.4 ESOPHAGEAL DYSMOTILITIES: ICD-10-CM

## 2022-07-13 PROCEDURE — 99214 OFFICE O/P EST MOD 30 MIN: CPT | Performed by: INTERNAL MEDICINE

## 2022-07-13 NOTE — LETTER
2022     Karel Adamson DO  [address]    Patient: Modesta Davis   YOB: 1950   Date of Visit: 2022       Dear Dr Zuniga [place]: Thank you for referring Jada Riley to me for evaluation  Below are my notes for this consultation  If you have questions, please do not hesitate to call me  I look forward to following your patient along with you  Sincerely,        Calderon Mata MD        CC: MD Calderon Hernandez MD  2022  3:25 PM  Incomplete  Outpatient Follow up  [address]  [address]  [address] [place] [address]  Calderon Mata MD  Ph : [phone]  Fax : [phone]  Mobile : [phone]  Email : [email or web address]  Also available on [employer]    Modesta Davis 70 y o  female MRN: [id number]    PCP: Karel Adamson DO  Referring: Karel Adamson DO  [facility] [address]  [address]    Modesta Davis presented for a follow up visit  My recommendations are included  Please do not hesitate to contact me with any questions you may have  ASSESSMENT AND PLAN:      No problem-specific Assessment & Plan notes found for this encounter  Diagnoses and all orders for this visit:    Gastroesophageal reflux disease without esophagitis  -     EGD Barrx/RFA; Future  -     Bravo pH Monitoring; Future    Esophageal dysmotilities  -     EGD Barrx/RFA; Future  -     Bravo pH Monitoring; Future    Laryngopharyngeal reflux (LPR)  -     EGD Barrx/RFA; Future  -     Bravo pH Monitoring; Future    Reflux laryngitis    Other orders  -     Diet NPO; Sips with meds; Standing  -     Void on call to OR; Standing  -     Insert peripheral IV; Standing         70year-old with GERD and LPR  She is status post STRETTA procedure    She feels slightly improved with there have been improvement in symptoms of especially waking up in the middle of the night with symptoms or regurgitation  1  She will be having an evaluation by Dr Myke Kirkland with a   [address] in September 2  We will add an EGD with BRAVO after that  We will also evaluate for inlet patch and ablate if needed  We will do BRAVO for 96 hours  3  Continue Aciphex in the mean time  She will need to be off the Aciphex and Tagamet for one week  4  Follow up in October  ______________________________________________________________________    SUBJECTIVE:  69 y/o with GERD/ LPR and had STRETTA on 12/2/21  She still has some mucous in the back of her throat  She feels he STRETTA may have helped about 25%  It doesn't affect her activities  She did stop the Tagamet but felt like she needed to be on it  She is not sure what foods would cause it  No dysphagia  No diarrhea/ constipation/ bleeding  She does have a hard time sleeping at night  Answers for HPI/ROS submitted by the patient on 7/13/2022  Chronicity: recurrent  Onset: more than 1 year ago  Onset quality: gradual  Frequency: intermittently  Episode duration: 1 hours  Progression since onset: unchanged  Pain location: epigastric region  Pain - numeric: 6/10  Pain quality: aching  Radiates to: does not radiate  anorexia: No  arthralgias: No  belching: Yes  constipation: No  diarrhea: No  dysuria: No  fever: No  flatus: No  frequency: No  headaches: No  hematochezia: No  hematuria: No  melena: No  myalgias: No  nausea: No  weight loss: No  vomiting: No  Relieved by: belching        REVIEW OF SYSTEMS IS OTHERWISE NEGATIVE        Historical Information   Past Medical History:   Diagnosis Date    Breast cancer ([facility] ) 08/21/2008    age 62    Cancer ([facility] )     left breast, tx with rx    Chronic low back pain     Chronic pain disorder     Clostridium difficile colitis     Colon polyp     DDD (degenerative disc disease), lumbar     GERD (gastroesophageal reflux disease)     History of radiation therapy 09/2008    for breast cancer    Hypertension     Laryngopharyngeal reflux (LPR)     Lumbar spinal stenosis     Osteoarthritis of spine with radiculopathy, lumbar region     Osteopenia     Pelvic floor dysfunction     Piriformis syndrome of left side     Right knee pain     Sciatica     Spondylosis of cervical region without myelopathy or radiculopathy      Past Surgical History:   Procedure Laterality Date    BREAST BIOPSY Left 2008    malignant    BREAST CYST ASPIRATION Left 1998    BREAST LUMPECTOMY Left 2008    BREAST SURGERY      COLONOSCOPY  2020    [place] ESOPHAGOGASTRODUODENOSCOPY TRANSORAL DIAGNOSTIC N/A 2/15/2017    Procedure: ESOPHAGOGASTRODUODENOSCOPY (EGD); Surgeon: Lamont Knox MD;  [facility]; Service: Gastroenterology    [place] ESOPHAGOGASTRODUODENOSCOPY TRANSORAL DIAGNOSTIC N/A 2018    Procedure: ESOPHAGOGASTRODUODENOSCOPY (EGD); Surgeon: Lamont Knox MD;  [facility];   Service: Gastroenterology    UPPER GASTROINTESTINAL ENDOSCOPY  2020    US GUIDED MSK PROCEDURE  2020     Social History   Social History     Substance and Sexual Activity   Alcohol Use Yes    Comment: social     Social History     Substance and Sexual Activity   Drug Use No     Social History     Tobacco Use   Smoking Status Former Smoker    Quit date:     Years since quittin 5   Smokeless Tobacco Never Used     Family History   Problem Relation Age of Onset    Osteoporosis Mother     Coronary artery disease Father     Diabetes Father     COPD Father     No Known Problems Maternal Aunt     Breast cancer Paternal Aunt 68    No Known Problems Paternal Aunt     No Known Problems Maternal Aunt     Colon cancer Neg Hx        Meds/Allergies       Current Outpatient Medications:     azelastine (ASTELIN) 0 1 % nasal spray    Cholecalciferol (VITAMIN D-3 PO)    lisinopril (ZESTRIL) 10 mg tablet    Multiple Vitamins-Minerals (CENTRUM ADULTS PO)    Probiotic Product (PROBIOTIC PO)    RABEprazole (ACIPHEX) 20 MG tablet    azithromycin (ZITHROMAX) 250 mg tablet    cimetidine (TAGAMET) 400 mg tablet    Cyclobenzaprine HCl (FLEXERIL PO)    fexofenadine (Allegra Allergy) 180 MG tablet    levocetirizine (XYZAL) 5 MG tablet    nitrofurantoin (MACROBID) 100 mg capsule    predniSONE 20 mg tablet    sucralfate (CARAFATE) 1 g/10 mL suspension    Allergies   Allergen Reactions    Fentanyl Vomiting    Codeine     Morphine And Related      Note:  this allergy is not being included in drug screening  Please inactivate this item and re-enter it to enable screening   Morphine Sulfate [Morphine]     Naprosyn [Naproxen]     Oxycodone     Penicillins     Sulfa Antibiotics            Objective     Blood pressure 114/58, pulse 80, temperature 98 8 °F (37 1 °C), temperature source Tympanic, weight 72 8 kg (160 lb 6 4 oz), not currently breastfeeding  Body mass index is 25 12 kg/m²  PHYSICAL EXAM:      Physical Exam  Constitutional:       Appearance: Normal appearance  She is well-developed  HENT:      Head: Normocephalic and atraumatic  Eyes:      General: No scleral icterus  Conjunctiva/sclera: Conjunctivae normal       Pupils: Pupils are equal, round, and reactive to light  Cardiovascular:      Rate and Rhythm: Normal rate and regular rhythm  Heart sounds: Normal heart sounds  Pulmonary:      Effort: Pulmonary effort is normal  No respiratory distress  Breath sounds: Normal breath sounds  Abdominal:      General: Bowel sounds are normal  There is no distension  Palpations: Abdomen is soft  There is no mass  Tenderness: There is no abdominal tenderness  Hernia: No hernia is present  Musculoskeletal:         General: Normal range of motion  Cervical back: Normal range of motion  Lymphadenopathy:      Cervical: No cervical adenopathy  Skin:     General: Skin is warm  Neurological:      Mental Status: She is alert and oriented to person, place, and time  Psychiatric:         Behavior: Behavior normal          Thought Content: Thought content normal          Lab Results:   No visits with results within 1 Day(s) from this visit  Latest known visit with results is:   Appointment on 06/02/2022   Component Date Value    WBC 06/02/2022 5 60     RBC 06/02/2022 4 52     Hemoglobin 06/02/2022 13 1     Hematocrit 06/02/2022 42 1     MCV 06/02/2022 93     MCH 06/02/2022 29 0     MCHC 06/02/2022 31 1 (A)    RDW 06/02/2022 14 0     MPV 06/02/2022 10 8     Platelets 62/77/9361 217     nRBC 06/02/2022 0     Neutrophils Relative 06/02/2022 53     Immat GRANS % 06/02/2022 0     Lymphocytes Relative 06/02/2022 34     Monocytes Relative 06/02/2022 8     Eosinophils Relative 06/02/2022 4     Basophils Relative 06/02/2022 1     Neutrophils Absolute 06/02/2022 2 98     Immature Grans Absolute 06/02/2022 0 01     Lymphocytes Absolute 06/02/2022 1 89     Monocytes Absolute 06/02/2022 0 45     Eosinophils Absolute 06/02/2022 0 23     Basophils Absolute 06/02/2022 0 04     Sodium 06/02/2022 140     Potassium 06/02/2022 4 2     Chloride 06/02/2022 107     CO2 06/02/2022 27     ANION GAP 06/02/2022 6     BUN 06/02/2022 18     Creatinine 06/02/2022 0 66     Glucose, Fasting 06/02/2022 107 (A)    Calcium 06/02/2022 9 9     AST 06/02/2022 14     ALT 06/02/2022 25     Alkaline Phosphatase 06/02/2022 86     Total Protein 06/02/2022 7 2     Albumin 06/02/2022 4 1     Total Bilirubin 06/02/2022 0 79     eGFR 06/02/2022 89     Hemoglobin A1C 06/02/2022 5 3     EAG 06/02/2022 105     Cholesterol 06/02/2022 223 (A)    Triglycerides 06/02/2022 201 (A)    HDL, Direct 06/02/2022 57     LDL Calculated 06/02/2022 126 (A)    Non-HDL-Chol (CHOL-HDL) 06/02/2022 166     Urine Culture 06/02/2022 >100,000 cfu/ml Escherichia coli (A)    Urine Culture 06/02/2022 <10,000 cfu/ml           Radiology Results:   No results found

## 2022-07-13 NOTE — PROGRESS NOTES
Outpatient Follow up  [address]  [facility] [address]  [address] [address] [address]  Lanette Monson MD  Ph : [phone]  Fax : [phone]  Mobile : [phone]  Email : [email or web address]  Also available on [employer]    Roseanna Baumann 70 y o  female MRN: [id number]    PCP: Vanda Santizo DO  Referring: Vanda Santizo DO  [facility] [address]  [facility],  [address] [place] [address]    Roseanna Baumann presented for a follow up visit  My recommendations are included  Please do not hesitate to contact me with any questions you may have  ASSESSMENT AND PLAN:      No problem-specific Assessment & Plan notes found for this encounter  Diagnoses and all orders for this visit:    Gastroesophageal reflux disease without esophagitis  -     EGD Barrx/RFA; Future  -     Bravo pH Monitoring; Future    Esophageal dysmotilities  -     EGD Barrx/RFA; Future  -     Bravo pH Monitoring; Future    Laryngopharyngeal reflux (LPR)  -     EGD Barrx/RFA; Future  -     Bravo pH Monitoring; Future    Reflux laryngitis    Other orders  -     Diet NPO; Sips with meds; Standing  -     Void on call to OR; Standing  -     Insert peripheral IV; Standing         70year-old with GERD and LPR  She is status post STRETTA procedure  She feels slightly improved with there have been improvement in symptoms of especially waking up in the middle of the night with symptoms or regurgitation  1  She will be having an evaluation by Dr Je Thurston with a   [facility] [address] in September 2  We will add an EGD with BRAVO after that  We will also evaluate for inlet patch and ablate if needed  We will do BRAVO for 96 hours  3  Continue Aciphex in the mean time  She will need to be off the Aciphex and Tagamet for one week  4  Follow up in October  ______________________________________________________________________    SUBJECTIVE:  71 y/o with GERD/ LPR and had STRETTA on 12/2/21     She still has some mucous in the back of her throat  She feels he STRETTA may have helped about 25%  It doesn't affect her activities  She did stop the Tagamet but felt like she needed to be on it  She is not sure what foods would cause it  No dysphagia  No diarrhea/ constipation/ bleeding  She does have a hard time sleeping at night  Answers for HPI/ROS submitted by the patient on 7/13/2022  Chronicity: recurrent  Onset: more than 1 year ago  Onset quality: gradual  Frequency: intermittently  Episode duration: 1 hours  Progression since onset: unchanged  Pain location: epigastric region  Pain - numeric: 6/10  Pain quality: aching  Radiates to: does not radiate  anorexia: No  arthralgias: No  belching: Yes  constipation: No  diarrhea: No  dysuria: No  fever: No  flatus: No  frequency: No  headaches: No  hematochezia: No  hematuria: No  melena: No  myalgias: No  nausea: No  weight loss: No  vomiting: No  Relieved by: belching        REVIEW OF SYSTEMS IS OTHERWISE NEGATIVE        Historical Information   Past Medical History:   Diagnosis Date    Breast cancer ([facility] ) 08/21/2008    age 62    Cancer ([facility] )     left breast, tx with rx    Chronic low back pain     Chronic pain disorder     Clostridium difficile colitis     Colon polyp     DDD (degenerative disc disease), lumbar     GERD (gastroesophageal reflux disease)     History of radiation therapy 09/2008    for breast cancer    Hypertension     Laryngopharyngeal reflux (LPR)     Lumbar spinal stenosis     Osteoarthritis of spine with radiculopathy, lumbar region     Osteopenia     Pelvic floor dysfunction     Piriformis syndrome of left side     Right knee pain     Sciatica     Spondylosis of cervical region without myelopathy or radiculopathy      Past Surgical History:   Procedure Laterality Date    BREAST BIOPSY Left 07/30/2008    malignant    BREAST CYST ASPIRATION Left 1998    BREAST LUMPECTOMY Left 08/21/2008    BREAST SURGERY      COLONOSCOPY 2020    ME ESOPHAGOGASTRODUODENOSCOPY TRANSORAL DIAGNOSTIC N/A 2/15/2017    Procedure: ESOPHAGOGASTRODUODENOSCOPY (EGD); Surgeon: Mala Espinoza MD;  [facility]; Service: Gastroenterology    ME ESOPHAGOGASTRODUODENOSCOPY TRANSORAL DIAGNOSTIC N/A 2018    Procedure: ESOPHAGOGASTRODUODENOSCOPY (EGD); Surgeon: Mala Espinoza MD;  [facility]; Service: Gastroenterology    UPPER GASTROINTESTINAL ENDOSCOPY  2020    US GUIDED MSK PROCEDURE  2020     Social History   Social History     Substance and Sexual Activity   Alcohol Use Yes    Comment: social     Social History     Substance and Sexual Activity   Drug Use No     Social History     Tobacco Use   Smoking Status Former Smoker    Quit date:     Years since quittin 5   Smokeless Tobacco Never Used     Family History   Problem Relation Age of Onset    Osteoporosis Mother     Coronary artery disease Father     Diabetes Father     COPD Father     No Known Problems Maternal Aunt     Breast cancer Paternal Aunt 68    No Known Problems Paternal Aunt     No Known Problems Maternal Aunt     Colon cancer Neg Hx        Meds/Allergies       Current Outpatient Medications:     azelastine (ASTELIN) 0 1 % nasal spray    Cholecalciferol (VITAMIN D-3 PO)    lisinopril (ZESTRIL) 10 mg tablet    Multiple Vitamins-Minerals (CENTRUM ADULTS PO)    Probiotic Product (PROBIOTIC PO)    RABEprazole (ACIPHEX) 20 MG tablet    azithromycin (ZITHROMAX) 250 mg tablet    cimetidine (TAGAMET) 400 mg tablet    Cyclobenzaprine HCl (FLEXERIL PO)    fexofenadine (Allegra Allergy) 180 MG tablet    levocetirizine (XYZAL) 5 MG tablet    nitrofurantoin (MACROBID) 100 mg capsule    predniSONE 20 mg tablet    sucralfate (CARAFATE) 1 g/10 mL suspension    Allergies   Allergen Reactions    Fentanyl Vomiting    Codeine     Morphine And Related      Note:  this allergy is not being included in drug screening    Please inactivate this item and re-enter it to enable screening   Morphine Sulfate [Morphine]     Naprosyn [Naproxen]     Oxycodone     Penicillins     Sulfa Antibiotics            Objective     Blood pressure 114/58, pulse 80, temperature 98 8 °F (37 1 °C), temperature source Tympanic, weight 72 8 kg (160 lb 6 4 oz), not currently breastfeeding  Body mass index is 25 12 kg/m²  PHYSICAL EXAM:      Physical Exam  Constitutional:       Appearance: Normal appearance  She is well-developed  HENT:      Head: Normocephalic and atraumatic  Eyes:      General: No scleral icterus  Conjunctiva/sclera: Conjunctivae normal       Pupils: Pupils are equal, round, and reactive to light  Cardiovascular:      Rate and Rhythm: Normal rate and regular rhythm  Heart sounds: Normal heart sounds  Pulmonary:      Effort: Pulmonary effort is normal  No respiratory distress  Breath sounds: Normal breath sounds  Abdominal:      General: Bowel sounds are normal  There is no distension  Palpations: Abdomen is soft  There is no mass  Tenderness: There is no abdominal tenderness  Hernia: No hernia is present  Musculoskeletal:         General: Normal range of motion  Cervical back: Normal range of motion  Lymphadenopathy:      Cervical: No cervical adenopathy  Skin:     General: Skin is warm  Neurological:      Mental Status: She is alert and oriented to person, place, and time  Psychiatric:         Behavior: Behavior normal          Thought Content: Thought content normal          Lab Results:   No visits with results within 1 Day(s) from this visit     Latest known visit with results is:   Appointment on 06/02/2022   Component Date Value    WBC 06/02/2022 5 60     RBC 06/02/2022 4 52     Hemoglobin 06/02/2022 13 1     Hematocrit 06/02/2022 42 1     MCV 06/02/2022 93     [address] 06/02/2022 29 0     MCHC 06/02/2022 31 1 (A)    RDW 06/02/2022 14 0     MPV 06/02/2022 10 8     Platelets 87/46/2552 217     nRBC 06/02/2022 0     Neutrophils Relative 06/02/2022 53     Immat GRANS % 06/02/2022 0     Lymphocytes Relative 06/02/2022 34     Monocytes Relative 06/02/2022 8     Eosinophils Relative 06/02/2022 4     Basophils Relative 06/02/2022 1     Neutrophils Absolute 06/02/2022 2 98     Immature Grans Absolute 06/02/2022 0 01     Lymphocytes Absolute 06/02/2022 1 89     Monocytes Absolute 06/02/2022 0 45     Eosinophils Absolute 06/02/2022 0 23     Basophils Absolute 06/02/2022 0 04     Sodium 06/02/2022 140     Potassium 06/02/2022 4 2     Chloride 06/02/2022 107     CO2 06/02/2022 27     ANION GAP 06/02/2022 6     BUN 06/02/2022 18     Creatinine 06/02/2022 0 66     Glucose, Fasting 06/02/2022 107 (A)    Calcium 06/02/2022 9 9     AST 06/02/2022 14     ALT 06/02/2022 25     Alkaline Phosphatase 06/02/2022 86     Total Protein 06/02/2022 7 2     Albumin 06/02/2022 4 1     Total Bilirubin 06/02/2022 0 79     eGFR 06/02/2022 89     Hemoglobin A1C 06/02/2022 5 3     EAG 06/02/2022 105     Cholesterol 06/02/2022 223 (A)    Triglycerides 06/02/2022 201 (A)    HDL, Direct 06/02/2022 57     LDL Calculated 06/02/2022 126 (A)    Non-HDL-Chol (CHOL-HDL) 06/02/2022 166     Urine Culture 06/02/2022 >100,000 cfu/ml Escherichia coli (A)    Urine Culture 06/02/2022 <10,000 cfu/ml           Radiology Results:   No results found

## 2022-07-15 ENCOUNTER — TELEPHONE (OUTPATIENT)
Dept: GASTROENTEROLOGY | Facility: MEDICAL CENTER | Age: 72
End: 2022-07-15

## 2022-07-15 NOTE — TELEPHONE ENCOUNTER
Scheduled date of EGD barrx w/rfa and bravo (as of today) 10/11/22   Physician performing : Yaneth Anderson  Location of procedure : [place]   Bowel prep reviewed with patient: EGD  Instructions reviewed with patient by: mailed   Clearances: na

## 2022-09-06 ENCOUNTER — HOSPITAL ENCOUNTER (OUTPATIENT)
Dept: RADIOLOGY | Age: 72
Discharge: HOME | End: 2022-09-06
Payer: COMMERCIAL

## 2022-09-06 VITALS — BODY MASS INDEX: 25.11 KG/M2 | WEIGHT: 160 LBS | HEIGHT: 67 IN

## 2022-09-06 DIAGNOSIS — Z12.31 ENCOUNTER FOR SCREENING MAMMOGRAM FOR BREAST CANCER: ICD-10-CM

## 2022-09-06 PROCEDURE — 77063 BREAST TOMOSYNTHESIS BI: CPT

## 2022-09-06 PROCEDURE — 77067 SCR MAMMO BI INCL CAD: CPT

## 2022-09-12 ENCOUNTER — ANNUAL EXAM (OUTPATIENT)
Dept: GYNECOLOGY | Facility: CLINIC | Age: 72
End: 2022-09-12
Payer: COMMERCIAL

## 2022-09-12 VITALS
HEIGHT: 68 IN | WEIGHT: 163 LBS | DIASTOLIC BLOOD PRESSURE: 80 MMHG | SYSTOLIC BLOOD PRESSURE: 122 MMHG | BODY MASS INDEX: 24.71 KG/M2

## 2022-09-12 DIAGNOSIS — Z12.31 SCREENING MAMMOGRAM FOR BREAST CANCER: Primary | ICD-10-CM

## 2022-09-12 DIAGNOSIS — N95.2 VAGINAL ATROPHY: ICD-10-CM

## 2022-09-12 DIAGNOSIS — Z01.411 ENCOUNTER FOR GYNECOLOGICAL EXAMINATION WITH ABNORMAL FINDING: ICD-10-CM

## 2022-09-12 PROCEDURE — 0503F POSTPARTUM CARE VISIT: CPT | Performed by: OBSTETRICS & GYNECOLOGY

## 2022-09-12 PROCEDURE — 99397 PER PM REEVAL EST PAT 65+ YR: CPT | Performed by: OBSTETRICS & GYNECOLOGY

## 2022-09-12 NOTE — PROGRESS NOTES
Assessment/Plan:    Breast exam  History of breast cancer (DCIS)  Vaginal atrophy  Normal mammogram September 2022  Colonoscopy September 2020 with polyps due for repeat at 5 years    Plan:  Rx mammogram   Recommend healthy diet exercise  Subjective: G0     Patient ID: Patricia Ferrer is a 67 y o  female presents for annual exam with no complaints  Patient denies any pelvic pain vaginal bleeding breast bowel or bladder issues  No change in family history  Paternal aunt (breast cancer)  History of DCIS  Normal mammogram last year  Medications reviewed  Patient states she is walking daily  Review of Systems   Constitutional: Negative  Negative for fatigue, fever and unexpected weight change  HENT: Negative  Eyes: Negative  Respiratory: Negative  Negative for chest tightness, shortness of breath, wheezing and stridor  Cardiovascular: Negative  Negative for chest pain, palpitations and leg swelling  Gastrointestinal: Negative  Negative for abdominal pain, blood in stool, diarrhea, nausea, rectal pain and vomiting  Endocrine: Negative  Genitourinary: Negative for dysuria, frequency, vaginal bleeding, vaginal discharge and vaginal pain  Musculoskeletal: Negative  Skin: Negative  Allergic/Immunologic: Negative  Neurological: Negative  Hematological: Negative  Psychiatric/Behavioral: Negative  All other systems reviewed and are negative  Objective:      /80   Ht 5' 7 5" (1 715 m)   Wt 73 9 kg (163 lb)   LMP  (LMP Unknown)   BMI 25 15 kg/m²          Physical Exam  Constitutional:       Appearance: She is well-developed  HENT:      Head: Normocephalic and atraumatic  Neck:      Thyroid: No thyromegaly  Trachea: No tracheal deviation  Cardiovascular:      Rate and Rhythm: Normal rate and regular rhythm  Heart sounds: Normal heart sounds  Pulmonary:      Effort: Pulmonary effort is normal  No respiratory distress        Breath sounds: Normal breath sounds  No stridor  No wheezing or rales  Chest:      Chest wall: No tenderness  Breasts: Breasts are symmetrical       Right: No inverted nipple, mass, nipple discharge, skin change or tenderness  Left: No inverted nipple, mass, nipple discharge, skin change or tenderness  Abdominal:      General: Bowel sounds are normal  There is no distension  Palpations: Abdomen is soft  There is no mass  Tenderness: There is no abdominal tenderness  There is no guarding or rebound  Hernia: No hernia is present  There is no hernia in the left inguinal area  Genitourinary:     Labia:         Right: No rash, tenderness, lesion or injury  Left: No rash, tenderness, lesion or injury  Vagina: Normal  No signs of injury and foreign body  No vaginal discharge, erythema, tenderness or bleeding  Cervix: No cervical motion tenderness, discharge or friability  Uterus: Not deviated, not enlarged, not fixed and not tender  Adnexa:         Right: No mass, tenderness or fullness  Left: No mass, tenderness or fullness  Rectum: No mass, anal fissure, external hemorrhoid or internal hemorrhoid  Comments: Vaginal atrophy  Normal urethra and Florala's glands  No uterine prolapse  No cystocele rectocele  Musculoskeletal:      Cervical back: Normal range of motion and neck supple  Lymphadenopathy:      Lower Body: No right inguinal adenopathy  No left inguinal adenopathy  Skin:     General: Skin is warm and dry  Neurological:      Mental Status: She is alert and oriented to person, place, and time  Psychiatric:         Behavior: Behavior normal          Thought Content:  Thought content normal          Judgment: Judgment normal

## 2022-09-18 PROBLEM — M26.629 TMJPDS (TEMPOROMANDIBULAR JOINT PAIN DYSFUNCTION SYNDROME): Status: ACTIVE | Noted: 2022-09-18

## 2022-10-03 ENCOUNTER — ESTABLISHED COMPREHENSIVE EXAM (OUTPATIENT)
Dept: URBAN - METROPOLITAN AREA CLINIC 6 | Facility: CLINIC | Age: 72
End: 2022-10-03

## 2022-10-03 DIAGNOSIS — Z96.1: ICD-10-CM

## 2022-10-03 DIAGNOSIS — H04.123: ICD-10-CM

## 2022-10-03 DIAGNOSIS — H35.3130: ICD-10-CM

## 2022-10-03 PROCEDURE — 92015 DETERMINE REFRACTIVE STATE: CPT

## 2022-10-03 PROCEDURE — 92134 CPTRZ OPH DX IMG PST SGM RTA: CPT

## 2022-10-03 PROCEDURE — 92014 COMPRE OPH EXAM EST PT 1/>: CPT

## 2022-10-03 ASSESSMENT — VISUAL ACUITY
OU_CC: J1
OS_CC: 20/25-2
OD_CC: 20/25-2

## 2022-10-03 ASSESSMENT — TONOMETRY
OS_IOP_MMHG: 17
OD_IOP_MMHG: 17

## 2022-10-05 ENCOUNTER — APPOINTMENT (OUTPATIENT)
Dept: RADIOLOGY | Age: 72
End: 2022-10-05
Payer: COMMERCIAL

## 2022-10-05 ENCOUNTER — TELEPHONE (OUTPATIENT)
Dept: GASTROENTEROLOGY | Facility: CLINIC | Age: 72
End: 2022-10-05

## 2022-10-05 DIAGNOSIS — M79.671 RIGHT FOOT PAIN: ICD-10-CM

## 2022-10-05 PROCEDURE — 73650 X-RAY EXAM OF HEEL: CPT

## 2022-10-05 NOTE — TELEPHONE ENCOUNTER
Patients GI provider:  Dr Chet Tapia    Number to return call: [phone]    Reason for call: Pt calling stating Dr Jennifer Boone does not think pt should EGD w/ BRAVO right now  She is requesting that Dr Chet Tapia speak w/ him regarding this  Dr Jennifer Boone can be reached at [phone]  Pt has not stopped her medication and feels it would be best to reschedule procedure at this time  Please follow up with pt regarding this matter      Scheduled procedure/appointment date if applicable: Procedure: 71/05/7462

## 2022-10-07 NOTE — TELEPHONE ENCOUNTER
Discussed with Dr Bel Mora  Will stay with the plan for EGD with BRAVO (5 - 7 days off prior to the procedure) and we can do it in the new year  Thank you     Anjelica Ang

## 2022-10-07 NOTE — TELEPHONE ENCOUNTER
Patient is calling to follow up on her procedure scheduled for 10-11-22  She would like to know if the procedure is necessary so soon after her Ph test   If she needs procedure next Tuesday, what medication should she stop taking? Please contact Karla Back @ [phone]

## 2022-10-10 NOTE — TELEPHONE ENCOUNTER
Patient called and asked to cancel her EGD w/Bravo  Patient states that she will reschedule in January

## 2022-10-25 ENCOUNTER — OFFICE VISIT (OUTPATIENT)
Dept: URGENT CARE | Age: 72
End: 2022-10-25
Payer: COMMERCIAL

## 2022-10-25 ENCOUNTER — APPOINTMENT (OUTPATIENT)
Dept: RADIOLOGY | Age: 72
End: 2022-10-25
Payer: COMMERCIAL

## 2022-10-25 VITALS
DIASTOLIC BLOOD PRESSURE: 76 MMHG | TEMPERATURE: 97.7 F | WEIGHT: 160 LBS | HEART RATE: 74 BPM | OXYGEN SATURATION: 97 % | HEIGHT: 67 IN | SYSTOLIC BLOOD PRESSURE: 135 MMHG | RESPIRATION RATE: 16 BRPM | BODY MASS INDEX: 25.11 KG/M2

## 2022-10-25 DIAGNOSIS — R52 PAIN: ICD-10-CM

## 2022-10-25 DIAGNOSIS — M25.521 RIGHT ELBOW PAIN: Primary | ICD-10-CM

## 2022-10-25 PROCEDURE — 73080 X-RAY EXAM OF ELBOW: CPT

## 2022-10-25 PROCEDURE — 99213 OFFICE O/P EST LOW 20 MIN: CPT

## 2022-10-25 PROCEDURE — S9083 URGENT CARE CENTER GLOBAL: HCPCS

## 2022-10-25 NOTE — PROGRESS NOTES
[facility]        NAME: Greta Adam is a 67 y o  female  : 1950    MRN: [id number]  DATE: 2022  TIME: 1:11 PM    Assessment and Plan   Right elbow pain [M25 521]  1  Right elbow pain  XR elbow 3+ vw right      X-ray of right elbow no acute fracture appreciated  Awaiting final read per radiology  Discussed with patient referral to Ortho given the duration of this problem over the past month  Patient may apply ice to affected area  May alternate ibuprofen and Tylenol as needed for pain  Referral placed to orthopedics for further workup and evaluation  Patient agrees with this plan of care, all questions and concerns were addressed during this visit  Patient Instructions       Follow up with PCP in 3-5 days  Proceed to  ER if symptoms worsen  Chief Complaint     Chief Complaint   Patient presents with   • Elbow Injury     Struck outter part of right elbow about a month ago    has inadvertantly repeated hit the same spot a few more times    now having pain radiating to wrist         History of Present Illness       Patient presenting for evaluation of right-sided elbow pain that has been persisting over the past month  Patient states that she accidentally the lateral aspect of her right elbow on a shelf while opening a Fridge  She states that she sustained this injury multiple times  She states since the injury she is been having pain in her right elbow that radiates into her wrist   She rates it a 6/10, and describes it as an “arthritis pain”  She states that she is been taking Advil for the pain, it is providing minimal relief  She denies any numbness or tingling at this time, and denies any previous right elbow injuries  Review of Systems   Review of Systems   Constitutional: Negative for chills and fever  HENT: Negative for ear pain and sore throat  Eyes: Negative for pain and visual disturbance     Respiratory: Negative for cough and shortness of breath  Cardiovascular: Negative for chest pain and palpitations  Gastrointestinal: Negative for abdominal pain and vomiting  Genitourinary: Negative for dysuria and hematuria  Musculoskeletal: Positive for arthralgias  Negative for back pain  Skin: Negative for color change and rash  Neurological: Negative for seizures and syncope  All other systems reviewed and are negative          Current Medications       Current Outpatient Medications:   •  azelastine (ASTELIN) 0 1 % nasal spray, 1 spray into each nostril 2 (two) times a day, Disp: 1 Bottle, Rfl: 11  •  Cholecalciferol (VITAMIN D-3 PO), Take 1,000 mg by mouth daily  , Disp: , Rfl:   •  Cyclobenzaprine HCl (FLEXERIL PO), Take 1 tablet by mouth daily, Disp: , Rfl:   •  lisinopril (ZESTRIL) 10 mg tablet, Take 10 mg by mouth daily, Disp: , Rfl:   •  montelukast (SINGULAIR) 10 mg tablet, Take 1 tablet (10 mg total) by mouth daily at bedtime, Disp: 30 tablet, Rfl: 11  •  Multiple Vitamins-Minerals (CENTRUM ADULTS PO), Take 1 tablet by mouth daily, Disp: , Rfl:   •  Omeprazole-Sodium Bicarbonate (Zegerid OTC)  MG CAPS, Take 1 capsule by mouth daily at bedtime, Disp: 30 capsule, Rfl: 11  •  Probiotic Product (PROBIOTIC PO), Take 1 tablet by mouth, Disp: , Rfl:   •  RABEprazole (ACIPHEX) 20 MG tablet, TAKE 1 TABLET BY MOUTH EVERY DAY IN THE MORNING, Disp: 90 tablet, Rfl: 3  •  azithromycin (ZITHROMAX) 250 mg tablet, TAKE 2 TABLETS BY MOUTH TODAY, THEN TAKE 1 TABLET DAILY FOR 4 DAYS, Disp: , Rfl:   •  fexofenadine (ALLEGRA) 180 MG tablet, , Disp: , Rfl:   •  levocetirizine (XYZAL) 5 MG tablet, Take 5 mg by mouth every evening, Disp: , Rfl:   •  nitrofurantoin (MACROBID) 100 mg capsule, TAKE 1 CAPSULE BY MOUTH EVERY 12 HOURS WITH FOOD, Disp: , Rfl:   •  predniSONE 20 mg tablet, 1 tab TID x 2 days, then BID x 2 days, then daily x 2 days, Disp: 12 tablet, Rfl: 0  •  sucralfate (CARAFATE) 1 g/10 mL suspension, Take 10 mL (1 g total) by mouth 4 (four) times a day for 7 days, Disp: 280 mL, Rfl: 0    Current Allergies     Allergies as of 10/25/2022 - Reviewed 10/25/2022   Allergen Reaction Noted   • Fentanyl Vomiting 12/02/2021   • Codeine  02/14/2017   • Morphine and related  11/22/2019   • Morphine sulfate [morphine]  02/14/2017   • Naprosyn [naproxen]  02/14/2017   • Oxycodone  02/14/2017   • Penicillins  02/14/2017   • Sulfa antibiotics  02/14/2017            The following portions of the patient's history were reviewed and updated as appropriate: allergies, current medications, past family history, past medical history, past social history, past surgical history and problem list      Past Medical History:   Diagnosis Date   • Breast cancer ([facility] ) 08/21/2008    age 62   • Cancer ([facility] )     left breast, tx with rx   • Chronic low back pain    • Chronic pain disorder    • Clostridium difficile colitis    • Colon polyp    • DDD (degenerative disc disease), lumbar    • GERD (gastroesophageal reflux disease)    • History of radiation therapy 09/2008    for breast cancer   • Hypertension    • Laryngopharyngeal reflux (LPR)    • Lumbar spinal stenosis    • Osteoarthritis of spine with radiculopathy, lumbar region    • Osteopenia    • Pelvic floor dysfunction    • Piriformis syndrome of left side    • Plantar fasciitis    • Right knee pain    • Sciatica    • Spondylosis of cervical region without myelopathy or radiculopathy        Past Surgical History:   Procedure Laterality Date   • BREAST BIOPSY Left 07/30/2008    malignant   • BREAST CYST ASPIRATION Left 1998   • BREAST LUMPECTOMY Left 08/21/2008   • BREAST SURGERY     • COLONOSCOPY  08/31/2020   • CT ESOPHAGOGASTRODUODENOSCOPY TRANSORAL DIAGNOSTIC N/A 2/15/2017    Procedure: ESOPHAGOGASTRODUODENOSCOPY (EGD); Surgeon: Alma Rosa Olea MD;  Location: [facility]; Service: Gastroenterology   • CT ESOPHAGOGASTRODUODENOSCOPY TRANSORAL DIAGNOSTIC N/A 2/1/2018    Procedure: ESOPHAGOGASTRODUODENOSCOPY (EGD);   Surgeon: Bruce Huggins Eneida Escobar MD;  [facility]; Service: Gastroenterology   • UPPER GASTROINTESTINAL ENDOSCOPY  01/2020   • US GUIDED MSK PROCEDURE  2/6/2020       Family History   Problem Relation Age of Onset   • Osteoporosis Mother    • Coronary artery disease Father    • Diabetes Father    • COPD Father    • No Known Problems Maternal Aunt    • Breast cancer Paternal Aunt 72   • No Known Problems Paternal Aunt    • No Known Problems Maternal Aunt    • Colon cancer Neg Hx          Medications have been verified  Objective   /76   Pulse 74   Temp 97 7 °F (36 5 °C)   Resp 16   Ht 5' 7" (1 702 m)   Wt 72 6 kg (160 lb)   LMP  (LMP Unknown)   SpO2 97%   BMI 25 06 kg/m²        Physical Exam     Physical Exam  Vitals and nursing note reviewed  Constitutional:       General: She is not in acute distress  Appearance: Normal appearance  She is normal weight  She is not ill-appearing, toxic-appearing or diaphoretic  HENT:      Head: Normocephalic and atraumatic  Right Ear: Tympanic membrane normal       Left Ear: Tympanic membrane normal       Nose: Nose normal  No congestion or rhinorrhea  Mouth/Throat:      Mouth: Mucous membranes are moist       Pharynx: Oropharynx is clear  No oropharyngeal exudate or posterior oropharyngeal erythema  Eyes:      General:         Right eye: No discharge  Left eye: No discharge  Extraocular Movements: Extraocular movements intact  Conjunctiva/sclera: Conjunctivae normal       Pupils: Pupils are equal, round, and reactive to light  Cardiovascular:      Rate and Rhythm: Normal rate and regular rhythm  Pulses: Normal pulses  Heart sounds: Normal heart sounds  No murmur heard  No friction rub  No gallop  Pulmonary:      Effort: Pulmonary effort is normal  No respiratory distress  Breath sounds: Normal breath sounds  No stridor  No wheezing, rhonchi or rales  Chest:      Chest wall: No tenderness     Abdominal:      General: Abdomen is flat  Bowel sounds are normal       Palpations: Abdomen is soft  Tenderness: There is no abdominal tenderness  There is no guarding or rebound  Musculoskeletal:         General: Tenderness (Tenderness to palpation along right elbow joint, normal range of motion, no ecchymosis or erythema present  Normal sensation, capillary refill pulse to right upper extremity ) present  Normal range of motion  Cervical back: Normal range of motion and neck supple  No tenderness  Skin:     General: Skin is warm and dry  Capillary Refill: Capillary refill takes less than 2 seconds  Neurological:      General: No focal deficit present  Mental Status: She is alert and oriented to person, place, and time     Psychiatric:         Mood and Affect: Mood normal          Behavior: Behavior normal

## 2022-10-25 NOTE — PATIENT INSTRUCTIONS
Rest, ice, elevation  Ibuprofen or Tylenol as needed for pain  Referral to orthopedics for further evaluation

## 2022-10-26 ENCOUNTER — TELEPHONE (OUTPATIENT)
Dept: OBGYN CLINIC | Facility: HOSPITAL | Age: 72
End: 2022-10-26

## 2022-10-26 NOTE — TELEPHONE ENCOUNTER
Caller: Cookie Robbins    Doctor: Lis Moeller    Reason for call: Patient requesting appt for her elbow  Xrays in Epic  Former pt of Dr Lis Moeller  Offered [facility] but pt wants [facility]      Call back#: [phone]

## 2022-11-07 ENCOUNTER — OFFICE VISIT (OUTPATIENT)
Dept: OBGYN CLINIC | Facility: HOSPITAL | Age: 72
End: 2022-11-07

## 2022-11-07 VITALS
WEIGHT: 164 LBS | HEIGHT: 67 IN | HEART RATE: 76 BPM | SYSTOLIC BLOOD PRESSURE: 135 MMHG | BODY MASS INDEX: 25.74 KG/M2 | DIASTOLIC BLOOD PRESSURE: 83 MMHG

## 2022-11-07 DIAGNOSIS — M25.521 RIGHT ELBOW PAIN: ICD-10-CM

## 2022-11-07 DIAGNOSIS — S50.01XA CONTUSION OF RIGHT ELBOW, INITIAL ENCOUNTER: Primary | ICD-10-CM

## 2022-11-07 NOTE — PROGRESS NOTES
ASSESSMENT/PLAN:      51-year-old female here for her right elbow contusion  X-rays were reviewed from 10/25/2022 noting no fracture or dislocation  With her history it is most likely a right elbow contusion with irritation to the common extensor origin  At this time is recommended that she revisit the stretches/exercises that she learned in occupational therapy, use of topical anti-inflammatory like Voltaren gel, over-the-counter NSAIDs and Tylenol as needed for pain and discomfort  The use of heat versus ice is recommended  We will follow up with her as needed    The patient verbalized understanding of exam findings and treatment plan  We engaged in the shared decision-making process and treatment options were discussed at length with the patient  Surgical and conservative management discussed today along with risks and benefits  Diagnoses and all orders for this visit:    Contusion of right elbow, initial encounter    Right elbow pain  -     Ambulatory Referral to Orthopedic Surgery    Other orders  -     hydrocortisone 2 5 % cream; APPLY TO RASH ON BREAST TWICE DAILY X1-2 WEEKS AS NEEDED FOR RASH          Follow Up:  Return if symptoms worsen or fail to improve  To Do Next Visit:  Re-evaluation of current issue    ____________________________________________________________________________________________________________________________________________      CHIEF COMPLAINT:  Chief Complaint   Patient presents with   • Right Elbow - Pain       SUBJECTIVE:  Rachel Yates is a 67y o  year old RHD female who presents today for her right elbow  She states hat about 1 month ago she bumped the elbow 2x and it didn't go away  [facility]  She notes that it slightly decrease, it was a dull tooth ache pain  Lifting anything heavy bothers her  Viri Frausto underwent an ultrasound guided radial tunnel CSI on 02/06/2020  She as been attending OT   Viri Frausto states that overall her pain has improved       I have personally reviewed all the relevant PMH, PSH, SH, FH, Medications and allergies  PAST MEDICAL HISTORY:  Past Medical History:   Diagnosis Date   • Breast cancer ([facility] [address] ) 08/21/2008    age 62   • Cancer ([facility] [address] )     left breast, tx with rx   • Chronic low back pain    • Chronic pain disorder    • Clostridium difficile colitis    • Colon polyp    • DDD (degenerative disc disease), lumbar    • GERD (gastroesophageal reflux disease)    • History of radiation therapy 09/2008    for breast cancer   • Hypertension    • Laryngopharyngeal reflux (LPR)    • Lumbar spinal stenosis    • Osteoarthritis of spine with radiculopathy, lumbar region    • Osteopenia    • Pelvic floor dysfunction    • Piriformis syndrome of left side    • Plantar fasciitis    • Right knee pain    • Sciatica    • Spondylosis of cervical region without myelopathy or radiculopathy        PAST SURGICAL HISTORY:  Past Surgical History:   Procedure Laterality Date   • BREAST BIOPSY Left 07/30/2008    malignant   • BREAST CYST ASPIRATION Left 1998   • BREAST LUMPECTOMY Left 08/21/2008   • BREAST SURGERY     • COLONOSCOPY  08/31/2020   • MT ESOPHAGOGASTRODUODENOSCOPY TRANSORAL DIAGNOSTIC N/A 2/15/2017    Procedure: ESOPHAGOGASTRODUODENOSCOPY (EGD); Surgeon: Luis Butler MD;  [facility]; Service: Gastroenterology   • MT ESOPHAGOGASTRODUODENOSCOPY TRANSORAL DIAGNOSTIC N/A 2/1/2018    Procedure: ESOPHAGOGASTRODUODENOSCOPY (EGD); Surgeon: Luis Butler MD;  [facility];   Service: Gastroenterology   • UPPER GASTROINTESTINAL ENDOSCOPY  01/2020   • US GUIDED MSK PROCEDURE  2/6/2020       FAMILY HISTORY:  Family History   Problem Relation Age of Onset   • Osteoporosis Mother    • Coronary artery disease Father    • Diabetes Father    • COPD Father    • No Known Problems Maternal Aunt    • Breast cancer Paternal Aunt 72   • No Known Problems Paternal Aunt    • No Known Problems Maternal Aunt    • Colon cancer Neg Hx SOCIAL HISTORY:  Social History     Tobacco Use   • Smoking status: Former Smoker     Quit date:      Years since quittin 8   • Smokeless tobacco: Never Used   Vaping Use   • Vaping Use: Never used   Substance Use Topics   • Alcohol use: Yes     Alcohol/week: 7 0 standard drinks     Types: 7 Glasses of wine per week     Comment: once a night   • Drug use: No       MEDICATIONS:    Current Outpatient Medications:   •  azelastine (ASTELIN) 0 1 % nasal spray, 1 spray into each nostril 2 (two) times a day, Disp: 1 Bottle, Rfl: 11  •  Cholecalciferol (VITAMIN D-3 PO), Take 1,000 mg by mouth daily  , Disp: , Rfl:   •  Cyclobenzaprine HCl (FLEXERIL PO), Take 1 tablet by mouth daily, Disp: , Rfl:   •  hydrocortisone 2 5 % cream, APPLY TO RASH ON BREAST TWICE DAILY X1-2 WEEKS AS NEEDED FOR RASH, Disp: , Rfl:   •  lisinopril (ZESTRIL) 10 mg tablet, Take 10 mg by mouth daily, Disp: , Rfl:   •  montelukast (SINGULAIR) 10 mg tablet, Take 1 tablet (10 mg total) by mouth daily at bedtime, Disp: 30 tablet, Rfl: 11  •  Multiple Vitamins-Minerals (CENTRUM ADULTS PO), Take 1 tablet by mouth daily, Disp: , Rfl:   •  Omeprazole-Sodium Bicarbonate (Zegerid OTC)  MG CAPS, Take 1 capsule by mouth daily at bedtime, Disp: 30 capsule, Rfl: 11  •  Probiotic Product (PROBIOTIC PO), Take 1 tablet by mouth, Disp: , Rfl:   •  RABEprazole (ACIPHEX) 20 MG tablet, TAKE 1 TABLET BY MOUTH EVERY DAY IN THE MORNING, Disp: 90 tablet, Rfl: 3    ALLERGIES:  Allergies   Allergen Reactions   • Fentanyl Vomiting   • Codeine    • Morphine And Related      Note:  this allergy is not being included in drug screening  Please inactivate this item and re-enter it to enable screening     • Morphine Sulfate [Morphine]    • Naprosyn [Naproxen]    • Oxycodone    • Penicillins    • Sulfa Antibiotics        REVIEW OF SYSTEMS:  Review of Systems    VITALS:  Vitals:    22 1040   BP: 135/83   Pulse: 76       LABS:  HgA1c:   Lab Results   Component Value Date    HGBA1C 5 3 06/02/2022     BMP:   Lab Results   Component Value Date    GLUCOSE 109 10/22/2015    CALCIUM 9 9 06/02/2022     10/22/2015    K 4 2 06/02/2022    CO2 27 06/02/2022     06/02/2022    BUN 18 06/02/2022    CREATININE 0 66 06/02/2022       _____________________________________________________  PHYSICAL EXAMINATION:  General: well developed and well nourished, alert, oriented times 3 and appears comfortable  Psychiatric: Normal  HEENT: Normocephalic, Atraumatic Trachea Midline, No torticollis  Pulmonary: No audible wheezing or respiratory distress   Cardiovascular: No pitting edema, 2+ radial pulse   Abdominal/GI: abdomen non tender, non distended   Skin: No masses, erythema, lacerations, fluctation, ulcerations  Neurovascular: Sensation Intact to the Median, Ulnar, Radial Nerve, Motor Intact to the Median, Ulnar, Radial Nerve and Pulses Intact  Musculoskeletal: Normal, except as noted in detailed exam and in HPI        MUSCULOSKELETAL EXAMINATION:    Right elbow:  Full elbow range of motion  Full forearm range of motion  Exquisitely point tender over the lateral epicondyle   Mild pain discomfort with resisted wrist extension  No pain with resisted long finger extension  Sensation intact to light touch  Brisk capillary refill  ___________________________________________________  STUDIES REVIEWED:  I have personally reviewed AP lateral and oblique radiographs of right elbow three views reviewed from 10/25/2022  which demonstrate no osseous abnormalities      PROCEDURES PERFORMED:  Procedures  No Procedures performed today    _____________________________________________________      Del Bias    I,:  Waylon Shirley am acting as a scribe while in the presence of the attending physician :       I,:  Pierce Gilbert MD personally performed the services described in this documentation    as scribed in my presence :

## 2022-11-07 NOTE — PATIENT INSTRUCTIONS
At this time use heat vs ice  Revisit her stretches and exercises that she learned in OT int the past  Use OTC ibuprofen and Tylenol as needed for pain discomfort  The use of topical Voltaren gel 1% over the bony prominence can help with pain as well

## 2022-12-02 ENCOUNTER — HOSPITAL ENCOUNTER (OUTPATIENT)
Dept: RADIOLOGY | Facility: IMAGING CENTER | Age: 72
End: 2022-12-02

## 2022-12-02 DIAGNOSIS — M72.2 PLANTAR FASCIAL FIBROMATOSIS OF RIGHT FOOT: ICD-10-CM

## 2022-12-09 ENCOUNTER — OFFICE VISIT (OUTPATIENT)
Dept: OBGYN CLINIC | Facility: CLINIC | Age: 72
End: 2022-12-09

## 2022-12-09 ENCOUNTER — APPOINTMENT (OUTPATIENT)
Dept: RADIOLOGY | Facility: AMBULARY SURGERY CENTER | Age: 72
End: 2022-12-09
Attending: ORTHOPAEDIC SURGERY

## 2022-12-09 VITALS
SYSTOLIC BLOOD PRESSURE: 132 MMHG | BODY MASS INDEX: 24.93 KG/M2 | HEART RATE: 82 BPM | WEIGHT: 164.5 LBS | HEIGHT: 68 IN | DIASTOLIC BLOOD PRESSURE: 84 MMHG

## 2022-12-09 DIAGNOSIS — M25.561 RIGHT KNEE PAIN, UNSPECIFIED CHRONICITY: ICD-10-CM

## 2022-12-09 DIAGNOSIS — M25.562 LEFT KNEE PAIN, UNSPECIFIED CHRONICITY: ICD-10-CM

## 2022-12-09 DIAGNOSIS — M22.2X1 PATELLOFEMORAL SYNDROME OF RIGHT KNEE: Primary | ICD-10-CM

## 2022-12-09 RX ORDER — METHYLPREDNISOLONE ACETATE 40 MG/ML
2 INJECTION, SUSPENSION INTRA-ARTICULAR; INTRALESIONAL; INTRAMUSCULAR; SOFT TISSUE
Status: COMPLETED | OUTPATIENT
Start: 2022-12-09 | End: 2022-12-09

## 2022-12-09 RX ORDER — BUPIVACAINE HYDROCHLORIDE 2.5 MG/ML
2 INJECTION, SOLUTION INFILTRATION; PERINEURAL
Status: COMPLETED | OUTPATIENT
Start: 2022-12-09 | End: 2022-12-09

## 2022-12-09 RX ORDER — KETOROLAC TROMETHAMINE 30 MG/ML
30 INJECTION, SOLUTION INTRAMUSCULAR; INTRAVENOUS
Status: COMPLETED | OUTPATIENT
Start: 2022-12-09 | End: 2022-12-09

## 2022-12-09 RX ADMIN — KETOROLAC TROMETHAMINE 30 MG: 30 INJECTION, SOLUTION INTRAMUSCULAR; INTRAVENOUS at 12:44

## 2022-12-09 RX ADMIN — BUPIVACAINE HYDROCHLORIDE 2 ML: 2.5 INJECTION, SOLUTION INFILTRATION; PERINEURAL at 12:44

## 2022-12-09 RX ADMIN — METHYLPREDNISOLONE ACETATE 2 ML: 40 INJECTION, SUSPENSION INTRA-ARTICULAR; INTRALESIONAL; INTRAMUSCULAR; SOFT TISSUE at 12:44

## 2022-12-09 NOTE — PROGRESS NOTES
Assessment:   Diagnosis ICD-10-CM Associated Orders   1  Right knee pain, unspecified chronicity  M25 561 XR knee 3 vw right non injury      2  Left knee pain, unspecified chronicity  M25 562 XR knee 3 vw left non injury      3  Right patellofemoral syndrome  4  Right greater trochanteric bursitis    Plan:  · New x-rays of bilateral knees were obtained today and reviewed noting patient has most of her wear on the patellofemoral  · Non operative treatments were discussed with the patient that included a cortisone injection into the right knee today  Quadriceps/VMO/Trochanteric bursitis stretches were provided to the patient  · Recommended obtaining  Night splint for her plantar fasciitis  To do next visit:  Return in about 4 months (around 4/9/2023) for right knee  The above stated was discussed in layman's terms and the patient expressed understanding  All questions were answered to the patient's satisfaction  Scribe Attestation    I,:  Andres Herrera am acting as a scribe while in the presence of the attending physician :       I,:  Margarita Maguire MD personally performed the services described in this documentation    as scribed in my presence :             Subjective:   Neftaly Krause is a 67 y o  female who presents today for bilateral knee pain due to patellofemoral syndrome; right is worse than left  At her last visit on 07/05/2022, patient had a right knee injection  She is having pain with kneeling, squatting and transitionals  She denies instability She also notes pain in the anterior right knee and radiating down to the shin region  She denies instability  She is using Voltaren gel and taking Advil as needed for pain relief  Review of systems negative unless otherwise specified in HPI  Review of Systems   Constitutional: Negative for chills, fever and unexpected weight change  HENT: Negative for hearing loss, nosebleeds and sore throat      Eyes: Negative for pain, redness and visual disturbance  Respiratory: Negative for cough, shortness of breath and wheezing  Cardiovascular: Negative for chest pain, palpitations and leg swelling  Gastrointestinal: Negative for abdominal pain and nausea  Genitourinary: Negative for dyspareunia, dysuria and frequency  Skin: Negative for rash and wound  Neurological: Negative for dizziness, numbness and headaches  Psychiatric/Behavioral: Negative for decreased concentration and suicidal ideas  The patient is not nervous/anxious  Past Medical History:   Diagnosis Date   • [facility] ) 08/21/2008    age 62   • Cancer ([facility] )     left breast, tx with rx   • Chronic low back pain    • Chronic pain disorder    • Clostridium difficile colitis    • Colon polyp    • DDD (degenerative disc disease), lumbar    • GERD (gastroesophageal reflux disease)    • History of radiation therapy 09/2008    for breast cancer   • Hypertension    • Laryngopharyngeal reflux (LPR)    • Lumbar spinal stenosis    • Osteoarthritis of spine with radiculopathy, lumbar region    • Osteopenia    • Pelvic floor dysfunction    • Piriformis syndrome of left side    • Plantar fasciitis    • Right knee pain    • Sciatica    • Spondylosis of cervical region without myelopathy or radiculopathy        Past Surgical History:   Procedure Laterality Date   • BREAST BIOPSY Left 07/30/2008    malignant   • BREAST CYST ASPIRATION Left 1998   • BREAST LUMPECTOMY Left 08/21/2008   • BREAST SURGERY     • COLONOSCOPY  08/31/2020   • ND ESOPHAGOGASTRODUODENOSCOPY TRANSORAL DIAGNOSTIC N/A 2/15/2017    Procedure: ESOPHAGOGASTRODUODENOSCOPY (EGD); Surgeon: Stacy Reis MD;  [facility]; Service: Gastroenterology   • ND ESOPHAGOGASTRODUODENOSCOPY TRANSORAL DIAGNOSTIC N/A 2/1/2018    Procedure: ESOPHAGOGASTRODUODENOSCOPY (EGD); Surgeon: Stacy Reis MD;  [facility];   Service: Gastroenterology   • UPPER GASTROINTESTINAL ENDOSCOPY  01/2020   • US GUIDED MSK PROCEDURE  2020       Family History   Problem Relation Age of Onset   • Osteoporosis Mother    • Coronary artery disease Father    • Diabetes Father    • COPD Father    • No Known Problems Maternal Aunt    • Breast cancer Paternal Aunt 68   • No Known Problems Paternal Aunt    • No Known Problems Maternal Aunt    • Colon cancer Neg Hx        Social History     Occupational History   • Not on file   Tobacco Use   • Smoking status: Former     Types: Cigarettes     Quit date:      Years since quittin 9   • Smokeless tobacco: Never   Vaping Use   • Vaping Use: Never used   Substance and Sexual Activity   • Alcohol use:  Yes     Alcohol/week: 7 0 standard drinks     Types: 7 Glasses of wine per week     Comment: once a night   • Drug use: No   • Sexual activity: Not Currently         Current Outpatient Medications:   •  azelastine (ASTELIN) 0 1 % nasal spray, 1 spray into each nostril 2 (two) times a day, Disp: 1 Bottle, Rfl: 11  •  Cholecalciferol (VITAMIN D-3 PO), Take 1,000 mg by mouth daily  , Disp: , Rfl:   •  Cyclobenzaprine HCl (FLEXERIL PO), Take 1 tablet by mouth daily, Disp: , Rfl:   •  hydrocortisone 2 5 % cream, APPLY TO RASH ON BREAST TWICE DAILY X1-2 WEEKS AS NEEDED FOR RASH, Disp: , Rfl:   •  lisinopril (ZESTRIL) 10 mg tablet, Take 10 mg by mouth daily, Disp: , Rfl:   •  montelukast (SINGULAIR) 10 mg tablet, Take 1 tablet (10 mg total) by mouth daily at bedtime, Disp: 30 tablet, Rfl: 11  •  Multiple Vitamins-Minerals (CENTRUM ADULTS PO), Take 1 tablet by mouth daily, Disp: , Rfl:   •  Omeprazole-Sodium Bicarbonate (Zegerid OTC)  MG CAPS, Take 1 capsule by mouth daily at bedtime, Disp: 30 capsule, Rfl: 11  •  Probiotic Product (PROBIOTIC PO), Take 1 tablet by mouth, Disp: , Rfl:   •  RABEprazole (ACIPHEX) 20 MG tablet, TAKE 1 TABLET BY MOUTH EVERY DAY IN THE MORNING, Disp: 90 tablet, Rfl: 3    Allergies   Allergen Reactions   • Fentanyl Vomiting   • Codeine    • Morphine And Related Nausea Only, GI Intolerance and Vomiting     Note:  this allergy is not being included in drug screening  Please inactivate this item and re-enter it to enable screening  • Morphine Sulfate [Morphine] Nausea Only, GI Intolerance and Vomiting   • Naprosyn [Naproxen] Nausea Only, GI Intolerance and Vomiting   • Oxycodone GI Intolerance and Vomiting   • Penicillins Other (See Comments)     Unknown reaction  • Sulfa Antibiotics Rash and Fever            Vitals:    12/09/22 1135   BP: 132/84   Pulse: 82       Objective:    General: No apparent distress  CV: S1-S2  Chest: No audible wheezing  Abdomen: Soft                    Right Knee Exam     Tenderness   The patient is experiencing tenderness in the medial joint line (medial femoral condyle)  Range of Motion   Extension: 0   Flexion: 120     Tests   Varus: negative Valgus: negative    Other   Erythema: absent  Sensation: normal  Pulse: present  Swelling: none  Effusion: no effusion present    Comments:  Calf is soft and nontender      Left Knee Exam     Tenderness   The patient is experiencing tenderness in the patella  Range of Motion   Extension: 0   Flexion: 120     Tests   Varus: negative Valgus: negative  Drawer:  Anterior - negative         Other   Erythema: absent  Sensation: normal  Pulse: present  Swelling: none  Effusion: no effusion present    Comments:  Calf is soft and nontender        Right hip:  No abrasions or open wounds  Tenderness palpation of the greater troches region  Negative Stinchfield test      Diagnostics, reviewed and taken today if performed as documented: The attending physician has personally reviewed the pertinent films in PACS and interpretation is as follows:  Xrays of the bilateral knees:  Mild arthritis noted     Procedures, if performed today:    Large joint arthrocentesis: R knee  Universal Protocol:  Consent: Verbal consent obtained    Risks and benefits: risks, benefits and alternatives were discussed  Consent given by: patient  Time out: Immediately prior to procedure a "time out" was called to verify the correct patient, procedure, equipment, support staff and site/side marked as required  Timeout called at: 12/9/2022 12:43 PM   Patient understanding: patient states understanding of the procedure being performed  Site marked: the operative site was marked  Patient identity confirmed: verbally with patient    Supporting Documentation  Indications: pain   Procedure Details  Location: knee - R knee  Preparation: Patient was prepped and draped in the usual sterile fashion  Needle size: 22 G  Ultrasound guidance: no  Approach: anterolateral  Medications administered: 2 mL bupivacaine 0 25 %; 2 mL methylPREDNISolone acetate 40 mg/mL; 30 mg ketorolac 30 mg/mL    Patient tolerance: patient tolerated the procedure well with no immediate complications  Dressing:  Sterile dressing applied          Portions of the record may have been created with voice recognition software  Occasional wrong word or "sound a like" substitutions may have occurred due to the inherent limitations of voice recognition software  Read the chart carefully and recognize, using context, where substitutions have occurred

## 2022-12-09 NOTE — PATIENT INSTRUCTIONS
Perform effective stretching exercises:   3 sets of 10 repetitions (rep)  Hold each rep for 20-30 seconds              Perform effective stretching exercises:   3 sets of 10 repetitions (rep)  Hold each rep for 20-30 seconds                                                       PLANTAR Carolina Ortiz Surgeon Dr Dalila Alarcon at our [facility]

## 2022-12-13 ENCOUNTER — APPOINTMENT (OUTPATIENT)
Dept: LAB | Facility: IMAGING CENTER | Age: 72
End: 2022-12-13

## 2022-12-13 DIAGNOSIS — D64.9 ANEMIA, UNSPECIFIED TYPE: ICD-10-CM

## 2022-12-13 DIAGNOSIS — E78.2 MIXED HYPERLIPIDEMIA: ICD-10-CM

## 2022-12-13 DIAGNOSIS — R73.01 IMPAIRED FASTING GLUCOSE: ICD-10-CM

## 2022-12-13 LAB
ALBUMIN SERPL BCP-MCNC: 4.4 G/DL (ref 3.5–5)
ALP SERPL-CCNC: 101 U/L (ref 46–116)
ALT SERPL W P-5'-P-CCNC: 30 U/L (ref 12–78)
ANION GAP SERPL CALCULATED.3IONS-SCNC: 5 MMOL/L (ref 4–13)
AST SERPL W P-5'-P-CCNC: 15 U/L (ref 5–45)
BASOPHILS # BLD AUTO: 0.03 THOUSANDS/ÂΜL (ref 0–0.1)
BASOPHILS NFR BLD AUTO: 0 % (ref 0–1)
BILIRUB SERPL-MCNC: 0.85 MG/DL (ref 0.2–1)
BUN SERPL-MCNC: 18 MG/DL (ref 5–25)
CALCIUM SERPL-MCNC: 10.7 MG/DL (ref 8.3–10.1)
CHLORIDE SERPL-SCNC: 105 MMOL/L (ref 96–108)
CHOLEST SERPL-MCNC: 220 MG/DL
CO2 SERPL-SCNC: 30 MMOL/L (ref 21–32)
CREAT SERPL-MCNC: 0.68 MG/DL (ref 0.6–1.3)
EOSINOPHIL # BLD AUTO: 0.18 THOUSAND/ÂΜL (ref 0–0.61)
EOSINOPHIL NFR BLD AUTO: 3 % (ref 0–6)
ERYTHROCYTE [DISTWIDTH] IN BLOOD BY AUTOMATED COUNT: 13.2 % (ref 11.6–15.1)
EST. AVERAGE GLUCOSE BLD GHB EST-MCNC: 108 MG/DL
GFR SERPL CREATININE-BSD FRML MDRD: 87 ML/MIN/1.73SQ M
GLUCOSE P FAST SERPL-MCNC: 108 MG/DL (ref 65–99)
HBA1C MFR BLD: 5.4 %
HCT VFR BLD AUTO: 45.6 % (ref 34.8–46.1)
HDLC SERPL-MCNC: 67 MG/DL
HGB BLD-MCNC: 13.6 G/DL (ref 11.5–15.4)
IMM GRANULOCYTES # BLD AUTO: 0.02 THOUSAND/UL (ref 0–0.2)
IMM GRANULOCYTES NFR BLD AUTO: 0 % (ref 0–2)
LDLC SERPL CALC-MCNC: 118 MG/DL (ref 0–100)
LYMPHOCYTES # BLD AUTO: 2.78 THOUSANDS/ÂΜL (ref 0.6–4.47)
LYMPHOCYTES NFR BLD AUTO: 40 % (ref 14–44)
MCH RBC QN AUTO: 28.4 PG (ref 26.8–34.3)
MCHC RBC AUTO-ENTMCNC: 29.8 G/DL (ref 31.4–37.4)
MCV RBC AUTO: 95 FL (ref 82–98)
MONOCYTES # BLD AUTO: 0.53 THOUSAND/ÂΜL (ref 0.17–1.22)
MONOCYTES NFR BLD AUTO: 8 % (ref 4–12)
NEUTROPHILS # BLD AUTO: 3.46 THOUSANDS/ÂΜL (ref 1.85–7.62)
NEUTS SEG NFR BLD AUTO: 49 % (ref 43–75)
NONHDLC SERPL-MCNC: 153 MG/DL
NRBC BLD AUTO-RTO: 0 /100 WBCS
PLATELET # BLD AUTO: 261 THOUSANDS/UL (ref 149–390)
PMV BLD AUTO: 10.7 FL (ref 8.9–12.7)
POTASSIUM SERPL-SCNC: 4.7 MMOL/L (ref 3.5–5.3)
PROT SERPL-MCNC: 7.8 G/DL (ref 6.4–8.4)
RBC # BLD AUTO: 4.79 MILLION/UL (ref 3.81–5.12)
SODIUM SERPL-SCNC: 140 MMOL/L (ref 135–147)
TRIGL SERPL-MCNC: 174 MG/DL
WBC # BLD AUTO: 7 THOUSAND/UL (ref 4.31–10.16)

## 2023-01-10 ENCOUNTER — APPOINTMENT (OUTPATIENT)
Dept: LAB | Facility: IMAGING CENTER | Age: 73
End: 2023-01-10

## 2023-01-10 DIAGNOSIS — E83.52 HYPERCALCEMIA: ICD-10-CM

## 2023-01-10 LAB
ANION GAP SERPL CALCULATED.3IONS-SCNC: 7 MMOL/L (ref 4–13)
BUN SERPL-MCNC: 20 MG/DL (ref 5–25)
CA-I BLD-SCNC: 1.32 MMOL/L (ref 1.12–1.32)
CALCIUM SERPL-MCNC: 10 MG/DL (ref 8.3–10.1)
CHLORIDE SERPL-SCNC: 104 MMOL/L (ref 96–108)
CO2 SERPL-SCNC: 28 MMOL/L (ref 21–32)
CREAT SERPL-MCNC: 0.67 MG/DL (ref 0.6–1.3)
GFR SERPL CREATININE-BSD FRML MDRD: 88 ML/MIN/1.73SQ M
GLUCOSE SERPL-MCNC: 117 MG/DL (ref 65–140)
POTASSIUM SERPL-SCNC: 4.5 MMOL/L (ref 3.5–5.3)
PTH-INTACT SERPL-MCNC: 91.3 PG/ML (ref 18.4–80.1)
SODIUM SERPL-SCNC: 139 MMOL/L (ref 135–147)

## 2023-02-12 PROBLEM — R09.89 THROAT CLEARING: Status: ACTIVE | Noted: 2023-02-12

## 2023-03-06 ENCOUNTER — OFFICE VISIT (OUTPATIENT)
Dept: GASTROENTEROLOGY | Facility: MEDICAL CENTER | Age: 73
End: 2023-03-06

## 2023-03-06 VITALS
SYSTOLIC BLOOD PRESSURE: 138 MMHG | HEART RATE: 83 BPM | TEMPERATURE: 99.2 F | WEIGHT: 158.4 LBS | BODY MASS INDEX: 24.44 KG/M2 | DIASTOLIC BLOOD PRESSURE: 88 MMHG

## 2023-03-06 DIAGNOSIS — G47.9 SLEEP DISORDER: ICD-10-CM

## 2023-03-06 DIAGNOSIS — K21.9 GASTROESOPHAGEAL REFLUX DISEASE WITHOUT ESOPHAGITIS: Primary | ICD-10-CM

## 2023-03-06 DIAGNOSIS — K22.4 ESOPHAGEAL DYSMOTILITIES: ICD-10-CM

## 2023-03-06 RX ORDER — CHLORPHENIRAMINE MALEATE 4 MG/1
4 TABLET ORAL EVERY 6 HOURS PRN
COMMUNITY

## 2023-03-06 NOTE — PROGRESS NOTES
Outpatient Follow up  [address]  [address]  [facility] [address]  Kiah Willard MD  Ph : [phone]  Fax : [phone]  Mobile : [phone]  Email : [email or web address]  Also available on [employer]    Nicola Mcghee 67 y o  female MRN: [id number]    PCP: Fela Azevedo DO  Referring: No referring provider defined for this encounter  Nicola Mcghee presented for a follow up visit  My recommendations are included  Please do not hesitate to contact me with any questions you may have  ASSESSMENT AND PLAN:      No problem-specific Assessment & Plan notes found for this encounter  Diagnoses and all orders for this visit:    Gastroesophageal reflux disease without esophagitis  -     Ambulatory Referral to Pulmonology; Future    Esophageal dysmotilities    Sleep disorder  -     Ambulatory Referral to Pulmonology; Future  -     Ambulatory Referral to Sleep Medicine; Future    Other orders  -     chlorpheniramine (CHLOR-TRIMETON) 4 MG tablet; Take 4 mg by mouth every 6 (six) hours as needed for allergies        1  Sleep :   - recommend sleep medicine    - Elavil  2  Reflux/GERD :   - consider     3  Mucous :   - consider pulmonology    4  Anxiety :   - Elavil / amitriptyline  (discuss with Dr Lilian Benitez)    5  Discuss at foregut meeting  Review prior motility studies  ______________________________________________________________________    SUBJECTIVE:  59-year-old with GERD and LPR   She is status post STRETTA procedure   She feels slightly improved with there have been improvement in symptoms of especially waking up in the middle of the night with symptoms or regurgitation  She is on Aciphex in the morning and the Zegerid before bedtime  EGD with STRETTA : 12/2021       Restech: Restech on rabeprazole 20 qam (no cimetidine) 9/22/22  85 events, 21 w pH 5 5-6, 5 w pH 5-5 5  Mostly nocturnal events  2/3 cough  3/8 TC  1/1 hoarse  5/11 globus  *less severe/no symptoms when restech in place (this has occurred 3 times now)    Clearing the throat  She has a globus sensation as well  Her main symtpom is mucous production  Not sleeping very well  Anxiety  REVIEW OF SYSTEMS IS OTHERWISE NEGATIVE  Historical Information   Past Medical History:   Diagnosis Date   • Breast cancer ([facility] ) 08/21/2008    age 62   • Cancer ([facility] )     left breast, tx with rx   • Chronic low back pain    • Chronic pain disorder    • Clostridium difficile colitis    • Colon polyp    • DDD (degenerative disc disease), lumbar    • GERD (gastroesophageal reflux disease)    • History of radiation therapy 09/2008    for breast cancer   • Hypertension    • Laryngopharyngeal reflux (LPR)    • Lumbar spinal stenosis    • Osteoarthritis of spine with radiculopathy, lumbar region    • Osteopenia    • Pelvic floor dysfunction    • Piriformis syndrome of left side    • Plantar fasciitis    • Right knee pain    • Sciatica    • Spondylosis of cervical region without myelopathy or radiculopathy      Past Surgical History:   Procedure Laterality Date   • BREAST BIOPSY Left 07/30/2008    malignant   • BREAST CYST ASPIRATION Left 1998   • BREAST LUMPECTOMY Left 08/21/2008   • BREAST SURGERY     • COLONOSCOPY  08/31/2020   • PA ESOPHAGOGASTRODUODENOSCOPY TRANSORAL DIAGNOSTIC N/A 2/15/2017    Procedure: ESOPHAGOGASTRODUODENOSCOPY (EGD); Surgeon: Fidencio Alba MD;  Location:  GI LAB; Service: Gastroenterology   • PA ESOPHAGOGASTRODUODENOSCOPY TRANSORAL DIAGNOSTIC N/A 2/1/2018    Procedure: ESOPHAGOGASTRODUODENOSCOPY (EGD); Surgeon: Fidencio Alba MD;  Location: [facility];   Service: Gastroenterology   • UPPER GASTROINTESTINAL ENDOSCOPY  01/2020   • US GUIDED MSK PROCEDURE  2/6/2020     Social History   Social History     Substance and Sexual Activity   Alcohol Use Yes   • Alcohol/week: 7 0 standard drinks   • Types: 7 Glasses of wine per week    Comment: once a night     Social History     Substance and Sexual Activity   Drug Use No     Social History     Tobacco Use   Smoking Status Former   • Types: Cigarettes   • Quit date:    • Years since quittin 2   Smokeless Tobacco Never     Family History   Problem Relation Age of Onset   • Osteoporosis Mother    • Coronary artery disease Father    • Diabetes Father    • COPD Father    • No Known Problems Maternal Aunt    • Breast cancer Paternal Aunt 68   • No Known Problems Paternal Aunt    • No Known Problems Maternal Aunt    • Colon cancer Neg Hx        Meds/Allergies       Current Outpatient Medications:   •  azelastine (ASTELIN) 0 1 % nasal spray  •  chlorpheniramine (CHLOR-TRIMETON) 4 MG tablet  •  Cholecalciferol (VITAMIN D-3 PO)  •  lisinopril (ZESTRIL) 10 mg tablet  •  montelukast (SINGULAIR) 10 mg tablet  •  Multiple Vitamins-Minerals (CENTRUM ADULTS PO)  •  Omeprazole-Sodium Bicarbonate (Zegerid OTC)  MG CAPS  •  Probiotic Product (PROBIOTIC PO)  •  Cyclobenzaprine HCl (FLEXERIL PO)  •  hydrocortisone 2 5 % cream  •  RABEprazole (ACIPHEX) 20 MG tablet    Allergies   Allergen Reactions   • Fentanyl Vomiting   • Codeine    • Morphine And Related Nausea Only, GI Intolerance and Vomiting     Note:  this allergy is not being included in drug screening  Please inactivate this item and re-enter it to enable screening  • Morphine Sulfate [Morphine] Nausea Only, GI Intolerance and Vomiting   • Naprosyn [Naproxen] Nausea Only, GI Intolerance and Vomiting   • Oxycodone GI Intolerance and Vomiting   • Penicillins Other (See Comments)     Unknown reaction  • Sulfa Antibiotics Rash and Fever           Objective     Blood pressure 138/88, pulse 83, temperature 99 2 °F (37 3 °C), temperature source Tympanic, weight 71 8 kg (158 lb 6 4 oz), not currently breastfeeding  Body mass index is 24 44 kg/m²  PHYSICAL EXAM:      Physical Exam  Constitutional:       Appearance: Normal appearance   She is well-developed  HENT:      Head: Normocephalic and atraumatic  Eyes:      General: No scleral icterus  Conjunctiva/sclera: Conjunctivae normal       Pupils: Pupils are equal, round, and reactive to light  Cardiovascular:      Rate and Rhythm: Normal rate and regular rhythm  Heart sounds: Normal heart sounds  Pulmonary:      Effort: Pulmonary effort is normal  No respiratory distress  Breath sounds: Normal breath sounds  Abdominal:      General: Bowel sounds are normal  There is no distension  Palpations: Abdomen is soft  There is no mass  Tenderness: There is no abdominal tenderness  Hernia: No hernia is present  Musculoskeletal:         General: Normal range of motion  Cervical back: Normal range of motion  Lymphadenopathy:      Cervical: No cervical adenopathy  Skin:     General: Skin is warm  Neurological:      Mental Status: She is alert and oriented to person, place, and time  Psychiatric:         Behavior: Behavior normal          Thought Content: Thought content normal          Lab Results:   No visits with results within 1 Day(s) from this visit  Latest known visit with results is:   Appointment on 01/10/2023   Component Date Value   • Sodium 01/10/2023 139    • Potassium 01/10/2023 4 5    • Chloride 01/10/2023 104    • CO2 01/10/2023 28    • ANION GAP 01/10/2023 7    • BUN 01/10/2023 20    • Creatinine 01/10/2023 0 67    • Glucose 01/10/2023 117    • Calcium 01/10/2023 10 0    • eGFR 01/10/2023 88    • Calcium, Ionized 01/10/2023 1 32    • PTH 01/10/2023 91 3 (H)          Radiology Results:   No results found      Answers for HPI/ROS submitted by the patient on 2/27/2023  Chronicity: recurrent  Onset: more than 1 year ago  Onset quality: undetermined  Frequency: rarely  Episode duration: 1 Hours  Progression since onset: resolved  Pain location: epigastric region  Pain - numeric: 1/10  Pain quality: a sensation of fullness  Radiates to: does not radiate  anorexia: No  arthralgias: No  belching: Yes  constipation: Yes  diarrhea: No  dysuria: No  fever: No  flatus: No  frequency: No  headaches: No  hematochezia: No  hematuria: No  melena: No  myalgias: No  nausea:  No  weight loss: No  vomiting: No  Aggravated by: belching  Relieved by: belching

## 2023-03-06 NOTE — PATIENT INSTRUCTIONS
1  Sleep :   - recommend sleep medicine    - Elavil  2  Reflux/GERD :   - consider     3  Mucous :   - consider pulmonology    4  Anxiety :   - Elavil / amitriptyline  (discuss with Dr Sarah Beth Mejia)    5  Discuss at foregut meeting  Review prior motility studies

## 2023-03-13 ENCOUNTER — APPOINTMENT (OUTPATIENT)
Dept: LAB | Facility: IMAGING CENTER | Age: 73
End: 2023-03-13

## 2023-03-13 DIAGNOSIS — E83.52 HYPERCALCEMIA: ICD-10-CM

## 2023-03-13 LAB
ALBUMIN SERPL BCP-MCNC: 4.3 G/DL (ref 3.5–5)
ALP SERPL-CCNC: 86 U/L (ref 46–116)
ALT SERPL W P-5'-P-CCNC: 25 U/L (ref 12–78)
ANION GAP SERPL CALCULATED.3IONS-SCNC: 2 MMOL/L (ref 4–13)
AST SERPL W P-5'-P-CCNC: 21 U/L (ref 5–45)
BILIRUB SERPL-MCNC: 0.68 MG/DL (ref 0.2–1)
BUN SERPL-MCNC: 18 MG/DL (ref 5–25)
CA-I BLD-SCNC: 1.26 MMOL/L (ref 1.12–1.32)
CALCIUM SERPL-MCNC: 10.3 MG/DL (ref 8.3–10.1)
CHLORIDE SERPL-SCNC: 106 MMOL/L (ref 96–108)
CO2 SERPL-SCNC: 28 MMOL/L (ref 21–32)
CREAT SERPL-MCNC: 0.66 MG/DL (ref 0.6–1.3)
GFR SERPL CREATININE-BSD FRML MDRD: 88 ML/MIN/1.73SQ M
GLUCOSE SERPL-MCNC: 118 MG/DL (ref 65–140)
POTASSIUM SERPL-SCNC: 4.9 MMOL/L (ref 3.5–5.3)
PROT SERPL-MCNC: 7.4 G/DL (ref 6.4–8.4)
PTH-INTACT SERPL-MCNC: 84.7 PG/ML (ref 18.4–80.1)
SODIUM SERPL-SCNC: 136 MMOL/L (ref 135–147)

## 2023-03-14 ENCOUNTER — TELEPHONE (OUTPATIENT)
Dept: UROLOGY | Facility: AMBULATORY SURGERY CENTER | Age: 73
End: 2023-03-14

## 2023-03-14 NOTE — TELEPHONE ENCOUNTER
New Patient    What is the reason for the patient’s appointment? Frequent UTI     What office location does the patient prefer? [place]     Imaging/Lab Results:    Do we accept the patient's insurance or is the patient Self-Pay? Yes     Insurance Provider: Osito Franklin Type/Number: [id number]  Member ID#: [id number]    Has the patient had any previous Urologist(s)? No     Have patient records been requested? If not are records showing in [address] [place] [address] Rd: records in [facility]     Has the patient had any outside testing done? No     Does the patient have a personal history of cancer?  Breast cancer 2008

## 2023-04-13 PROBLEM — R09.89 THROAT CLEARING: Status: RESOLVED | Noted: 2023-02-12 | Resolved: 2023-04-13

## 2023-04-13 PROBLEM — E21.3 HYPERPARATHYROIDISM (HCC): Status: ACTIVE | Noted: 2023-04-13

## 2023-04-13 PROBLEM — E83.52 HYPERCALCEMIA: Status: ACTIVE | Noted: 2023-04-13

## 2023-04-19 ENCOUNTER — APPOINTMENT (OUTPATIENT)
Dept: LAB | Facility: IMAGING CENTER | Age: 73
End: 2023-04-19

## 2023-04-19 DIAGNOSIS — E55.9 VITAMIN D DEFICIENCY: ICD-10-CM

## 2023-04-19 DIAGNOSIS — E21.3 HYPERPARATHYROIDISM (HCC): ICD-10-CM

## 2023-04-19 DIAGNOSIS — E83.52 HYPERCALCEMIA: ICD-10-CM

## 2023-04-19 LAB
25(OH)D3 SERPL-MCNC: 21.7 NG/ML (ref 30–100)
MAGNESIUM SERPL-MCNC: 2.4 MG/DL (ref 1.6–2.6)
PHOSPHATE SERPL-MCNC: 3.4 MG/DL (ref 2.3–4.1)

## 2023-04-20 LAB
CALCIUM 24H UR-MCNC: 130.5 MG/24 HRS (ref 42–353)
CREAT 24H UR-MRATE: 1 G/24HR (ref 0.6–1.8)
SPECIMEN VOL UR: 1500 ML
SPECIMEN VOL UR: 1500 ML

## 2023-06-12 ENCOUNTER — OFFICE VISIT (OUTPATIENT)
Dept: URGENT CARE | Age: 73
End: 2023-06-12
Payer: COMMERCIAL

## 2023-06-12 VITALS
TEMPERATURE: 97.6 F | OXYGEN SATURATION: 98 % | DIASTOLIC BLOOD PRESSURE: 83 MMHG | RESPIRATION RATE: 18 BRPM | SYSTOLIC BLOOD PRESSURE: 142 MMHG | HEART RATE: 78 BPM

## 2023-06-12 DIAGNOSIS — R39.9 UTI SYMPTOMS: Primary | ICD-10-CM

## 2023-06-12 LAB
SL AMB  POCT GLUCOSE, UA: NORMAL
SL AMB LEUKOCYTE ESTERASE,UA: NORMAL
SL AMB POCT BILIRUBIN,UA: NORMAL
SL AMB POCT BLOOD,UA: NORMAL
SL AMB POCT CLARITY,UA: CLEAR
SL AMB POCT COLOR,UA: YELLOW
SL AMB POCT KETONES,UA: NORMAL
SL AMB POCT NITRITE,UA: NORMAL
SL AMB POCT PH,UA: 7.5
SL AMB POCT SPECIFIC GRAVITY,UA: 1
SL AMB POCT URINE PROTEIN: NORMAL
SL AMB POCT UROBILINOGEN: 0.2

## 2023-06-12 PROCEDURE — 87077 CULTURE AEROBIC IDENTIFY: CPT | Performed by: NURSE PRACTITIONER

## 2023-06-12 PROCEDURE — 81002 URINALYSIS NONAUTO W/O SCOPE: CPT | Performed by: NURSE PRACTITIONER

## 2023-06-12 PROCEDURE — 87086 URINE CULTURE/COLONY COUNT: CPT | Performed by: NURSE PRACTITIONER

## 2023-06-12 PROCEDURE — 87186 SC STD MICRODIL/AGAR DIL: CPT | Performed by: NURSE PRACTITIONER

## 2023-06-12 RX ORDER — NITROFURANTOIN 25; 75 MG/1; MG/1
100 CAPSULE ORAL 2 TIMES DAILY
Qty: 6 CAPSULE | Refills: 0 | Status: SHIPPED | OUTPATIENT
Start: 2023-06-12

## 2023-06-12 NOTE — PROGRESS NOTES
[facility]        NAME: Dixie Santos is a 67 y o  female  : 1950    MRN: [id number]  DATE: 2023  TIME: 11:34 AM    Assessment and Plan   UTI symptoms [R39 9]  1  UTI symptoms  Urine culture    POCT urine dip    nitrofurantoin (MACROBID) 100 mg capsule            Patient Instructions     Urine dip is negative; will send for culture  Start antibiotics; prescribed based on symptoms   Follow up with PCP in 3-5 days  Proceed to  ER if symptoms worsen  Chief Complaint     Chief Complaint   Patient presents with   • Possible UTI     Patient states that she started to have Lower abdominal pain with frequent urination on Saturday  She also notes a history of recurrent UTI's  She denies burning with urination  History of Present Illness       HPI   Presents to clinic with complaint of urinary frequency and pressure in the urinary bladder  Ongoing for 2 days  States when she has the symptoms is usually a UTI  Last UTI was about 1 year ago  States she has had C  difficile and PCP advised that when she gets a UTI to use Macrobid  Review of Systems   Review of Systems   Constitutional: Negative for fever  Gastrointestinal: Positive for abdominal pain (lower abd)  Genitourinary: Positive for dysuria (pressure) and frequency  Negative for urgency           Current Medications       Current Outpatient Medications:   •  azelastine (ASTELIN) 0 1 % nasal spray, 1 spray into each nostril 2 (two) times a day, Disp: 90 mL, Rfl: 3  •  Cholecalciferol (VITAMIN D-3 PO), Take 1,000 mg by mouth daily  , Disp: , Rfl:   •  Cyclobenzaprine HCl (FLEXERIL PO), Take 1 tablet by mouth daily, Disp: , Rfl:   •  lisinopril (ZESTRIL) 10 mg tablet, Take 10 mg by mouth daily, Disp: , Rfl:   •  montelukast (SINGULAIR) 10 mg tablet, Take 1 tablet (10 mg total) by mouth daily at bedtime, Disp: 30 tablet, Rfl: 11  •  Multiple Vitamins-Minerals (CENTRUM ADULTS PO), Take 1 tablet by mouth daily, Disp: , Rfl: •  nitrofurantoin (MACROBID) 100 mg capsule, Take 1 capsule (100 mg total) by mouth 2 (two) times a day, Disp: 6 capsule, Rfl: 0  •  Omeprazole-Sodium Bicarbonate (Zegerid OTC)  MG CAPS, Take 1 capsule by mouth daily at bedtime, Disp: 30 capsule, Rfl: 11  •  Probiotic Product (PROBIOTIC PO), Take 1 tablet by mouth, Disp: , Rfl:   •  RABEprazole (ACIPHEX) 20 MG tablet, TAKE 1 TABLET BY MOUTH EVERY DAY IN THE MORNING, Disp: 90 tablet, Rfl: 3  •  chlorpheniramine (CHLOR-TRIMETON) 4 MG tablet, Take 4 mg by mouth every 6 (six) hours as needed for allergies (Patient not taking: Reported on 4/13/2023), Disp: , Rfl:     Current Allergies     Allergies as of 06/12/2023 - Reviewed 06/12/2023   Allergen Reaction Noted   • Fentanyl Vomiting 12/02/2021   • Codeine  02/14/2017   • Morphine and related Nausea Only, GI Intolerance, and Vomiting 11/22/2019   • Morphine sulfate [morphine] Nausea Only, GI Intolerance, and Vomiting 02/14/2017   • Naprosyn [naproxen] Nausea Only, GI Intolerance, and Vomiting 02/14/2017   • Oxycodone GI Intolerance and Vomiting 02/14/2017   • Penicillins Other (See Comments) 02/14/2017   • Sulfa antibiotics Rash and Fever 02/14/2017            The following portions of the patient's history were reviewed and updated as appropriate: allergies, current medications, past family history, past medical history, past social history, past surgical history and problem list      Past Medical History:   Diagnosis Date   • Breast cancer ([facility] ) 08/21/2008    age 62   • Cancer ([facility] )     left breast, tx with rx   • Chronic low back pain    • Chronic pain disorder    • Clostridium difficile colitis    • Colon polyp    • DDD (degenerative disc disease), lumbar    • GERD (gastroesophageal reflux disease)    • History of radiation therapy 09/2008    for breast cancer   • Hypertension    • Laryngopharyngeal reflux (LPR)    • Lumbar spinal stenosis    • Osteoarthritis of spine with radiculopathy, lumbar region    • Osteopenia    • Pelvic floor dysfunction    • Piriformis syndrome of left side    • Plantar fasciitis    • Right knee pain    • Sciatica    • Spondylosis of cervical region without myelopathy or radiculopathy        Past Surgical History:   Procedure Laterality Date   • BREAST BIOPSY Left 07/30/2008    malignant   • BREAST CYST ASPIRATION Left 1998   • BREAST LUMPECTOMY Left 08/21/2008   • BREAST SURGERY     • COLONOSCOPY  08/31/2020   • OK ESOPHAGOGASTRODUODENOSCOPY TRANSORAL DIAGNOSTIC N/A 2/15/2017    Procedure: ESOPHAGOGASTRODUODENOSCOPY (EGD); Surgeon: Bashir Renteria MD;  Location: [facility]; Service: Gastroenterology   • OK ESOPHAGOGASTRODUODENOSCOPY TRANSORAL DIAGNOSTIC N/A 2/1/2018    Procedure: ESOPHAGOGASTRODUODENOSCOPY (EGD); Surgeon: Bashir Renteria MD;  Location: [facility]; Service: Gastroenterology   • UPPER GASTROINTESTINAL ENDOSCOPY  01/2020   • US GUIDED MSK PROCEDURE  2/6/2020       Family History   Problem Relation Age of Onset   • Osteoporosis Mother    • Hypertension Mother    • Coronary artery disease Father    • Diabetes Father    • COPD Father    • Hypertension Father    • No Known Problems Maternal Aunt    • Breast cancer Paternal Aunt 72   • No Known Problems Paternal Aunt    • No Known Problems Maternal Aunt    • Colon cancer Neg Hx          Medications have been verified  Objective   /83   Pulse 78   Temp 97 6 °F (36 4 °C)   Resp 18   LMP  (LMP Unknown)   SpO2 98%   No LMP recorded (lmp unknown)  Patient is postmenopausal        Physical Exam     Physical Exam  Cardiovascular:      Rate and Rhythm: Regular rhythm  Heart sounds: Normal heart sounds  Pulmonary:      Effort: Pulmonary effort is normal       Breath sounds: Normal breath sounds  Abdominal:      General: Abdomen is flat  Tenderness: There is abdominal tenderness (suprapubic)  There is no guarding or rebound

## 2023-06-14 LAB
BACTERIA UR CULT: ABNORMAL
BACTERIA UR CULT: ABNORMAL

## 2023-06-16 ENCOUNTER — APPOINTMENT (OUTPATIENT)
Dept: LAB | Facility: IMAGING CENTER | Age: 73
End: 2023-06-16
Payer: COMMERCIAL

## 2023-06-16 DIAGNOSIS — E78.2 MIXED HYPERLIPIDEMIA: ICD-10-CM

## 2023-06-16 DIAGNOSIS — R23.2 FLUSHING: ICD-10-CM

## 2023-06-16 DIAGNOSIS — R73.01 IMPAIRED FASTING GLUCOSE: ICD-10-CM

## 2023-06-16 LAB
ANION GAP SERPL CALCULATED.3IONS-SCNC: 4 MMOL/L (ref 4–13)
BASOPHILS # BLD AUTO: 0.04 THOUSANDS/ÂΜL (ref 0–0.1)
BASOPHILS NFR BLD AUTO: 1 % (ref 0–1)
BUN SERPL-MCNC: 22 MG/DL (ref 5–25)
CALCIUM SERPL-MCNC: 10.1 MG/DL (ref 8.3–10.1)
CHLORIDE SERPL-SCNC: 109 MMOL/L (ref 96–108)
CHOLEST SERPL-MCNC: 237 MG/DL
CO2 SERPL-SCNC: 26 MMOL/L (ref 21–32)
CREAT SERPL-MCNC: 0.69 MG/DL (ref 0.6–1.3)
EOSINOPHIL # BLD AUTO: 0.27 THOUSAND/ÂΜL (ref 0–0.61)
EOSINOPHIL NFR BLD AUTO: 4 % (ref 0–6)
ERYTHROCYTE [DISTWIDTH] IN BLOOD BY AUTOMATED COUNT: 13.9 % (ref 11.6–15.1)
GFR SERPL CREATININE-BSD FRML MDRD: 87 ML/MIN/1.73SQ M
GLUCOSE P FAST SERPL-MCNC: 106 MG/DL (ref 65–99)
HCT VFR BLD AUTO: 43.8 % (ref 34.8–46.1)
HDLC SERPL-MCNC: 62 MG/DL
HGB BLD-MCNC: 13.2 G/DL (ref 11.5–15.4)
IMM GRANULOCYTES # BLD AUTO: 0.02 THOUSAND/UL (ref 0–0.2)
IMM GRANULOCYTES NFR BLD AUTO: 0 % (ref 0–2)
LDLC SERPL CALC-MCNC: 141 MG/DL (ref 0–100)
LYMPHOCYTES # BLD AUTO: 2.22 THOUSANDS/ÂΜL (ref 0.6–4.47)
LYMPHOCYTES NFR BLD AUTO: 32 % (ref 14–44)
MCH RBC QN AUTO: 28.5 PG (ref 26.8–34.3)
MCHC RBC AUTO-ENTMCNC: 30.1 G/DL (ref 31.4–37.4)
MCV RBC AUTO: 95 FL (ref 82–98)
MONOCYTES # BLD AUTO: 0.63 THOUSAND/ÂΜL (ref 0.17–1.22)
MONOCYTES NFR BLD AUTO: 9 % (ref 4–12)
NEUTROPHILS # BLD AUTO: 3.67 THOUSANDS/ÂΜL (ref 1.85–7.62)
NEUTS SEG NFR BLD AUTO: 54 % (ref 43–75)
NONHDLC SERPL-MCNC: 175 MG/DL
NRBC BLD AUTO-RTO: 0 /100 WBCS
PLATELET # BLD AUTO: 242 THOUSANDS/UL (ref 149–390)
PMV BLD AUTO: 10.7 FL (ref 8.9–12.7)
POTASSIUM SERPL-SCNC: 5.1 MMOL/L (ref 3.5–5.3)
RBC # BLD AUTO: 4.63 MILLION/UL (ref 3.81–5.12)
SODIUM SERPL-SCNC: 139 MMOL/L (ref 135–147)
TRIGL SERPL-MCNC: 170 MG/DL
WBC # BLD AUTO: 6.85 THOUSAND/UL (ref 4.31–10.16)

## 2023-06-16 PROCEDURE — 85025 COMPLETE CBC W/AUTO DIFF WBC: CPT

## 2023-06-16 PROCEDURE — 80048 BASIC METABOLIC PNL TOTAL CA: CPT

## 2023-06-16 PROCEDURE — 80061 LIPID PANEL: CPT

## 2023-06-16 PROCEDURE — 36415 COLL VENOUS BLD VENIPUNCTURE: CPT

## 2023-06-16 PROCEDURE — 83036 HEMOGLOBIN GLYCOSYLATED A1C: CPT

## 2023-06-17 LAB
EST. AVERAGE GLUCOSE BLD GHB EST-MCNC: 114 MG/DL
HBA1C MFR BLD: 5.6 %

## 2023-07-11 ENCOUNTER — TELEPHONE (OUTPATIENT)
Age: 73
End: 2023-07-11

## 2023-07-11 NOTE — TELEPHONE ENCOUNTER
Patients GI provider:  Dr. Azalia Soliman    Number to return call: ([phone]    Reason for call: Pt calling because she would like to know when she is due for her next colonoscopy. There is no recall set and the notes from her 2018 procedure  say "same recall" and she is not sure what that should be.    Please call Pt at above number to advise    Scheduled procedure/appointment date if applicable: NA

## 2023-07-14 NOTE — TELEPHONE ENCOUNTER
Patient returned call  2020 colonoscopy in chart  Recall for 5 years  She reports bowel issues  Office visit made

## 2023-08-01 ENCOUNTER — OFFICE VISIT (OUTPATIENT)
Dept: URGENT CARE | Age: 73
End: 2023-08-01
Payer: COMMERCIAL

## 2023-08-01 VITALS
HEIGHT: 67 IN | RESPIRATION RATE: 16 BRPM | DIASTOLIC BLOOD PRESSURE: 82 MMHG | SYSTOLIC BLOOD PRESSURE: 120 MMHG | BODY MASS INDEX: 24.8 KG/M2 | WEIGHT: 158 LBS | OXYGEN SATURATION: 97 % | HEART RATE: 99 BPM

## 2023-08-01 DIAGNOSIS — N30.01 ACUTE CYSTITIS WITH HEMATURIA: Primary | ICD-10-CM

## 2023-08-01 LAB
SL AMB  POCT GLUCOSE, UA: NEGATIVE
SL AMB LEUKOCYTE ESTERASE,UA: ABNORMAL
SL AMB POCT BILIRUBIN,UA: NEGATIVE
SL AMB POCT BLOOD,UA: 1.01
SL AMB POCT CLARITY,UA: CLEAR
SL AMB POCT COLOR,UA: YELLOW
SL AMB POCT KETONES,UA: NEGATIVE
SL AMB POCT NITRITE,UA: NEGATIVE
SL AMB POCT PH,UA: ABNORMAL
SL AMB POCT SPECIFIC GRAVITY,UA: NEGATIVE
SL AMB POCT URINE PROTEIN: 7
SL AMB POCT UROBILINOGEN: 0.2

## 2023-08-01 PROCEDURE — 99213 OFFICE O/P EST LOW 20 MIN: CPT | Performed by: PHYSICIAN ASSISTANT

## 2023-08-01 PROCEDURE — 87186 SC STD MICRODIL/AGAR DIL: CPT | Performed by: PHYSICIAN ASSISTANT

## 2023-08-01 PROCEDURE — S9083 URGENT CARE CENTER GLOBAL: HCPCS | Performed by: PHYSICIAN ASSISTANT

## 2023-08-01 PROCEDURE — 87077 CULTURE AEROBIC IDENTIFY: CPT | Performed by: PHYSICIAN ASSISTANT

## 2023-08-01 PROCEDURE — 87086 URINE CULTURE/COLONY COUNT: CPT | Performed by: PHYSICIAN ASSISTANT

## 2023-08-01 PROCEDURE — 81002 URINALYSIS NONAUTO W/O SCOPE: CPT | Performed by: PHYSICIAN ASSISTANT

## 2023-08-01 RX ORDER — NITROFURANTOIN 25; 75 MG/1; MG/1
100 CAPSULE ORAL 2 TIMES DAILY
Qty: 14 CAPSULE | Refills: 0 | Status: SHIPPED | OUTPATIENT
Start: 2023-08-01 | End: 2023-08-08

## 2023-08-01 NOTE — PROGRESS NOTES
[facility]        NAME: Cindy Barajas is a 68 y.o. female  : 1950    MRN: [id number]  DATE: 2023  TIME: 1:50 PM    Assessment and Plan   Acute cystitis with hematuria [N30.01]  1. Acute cystitis with hematuria  POCT urine dip    nitrofurantoin (MACROBID) 100 mg capsule    Urine culture      Pt presents with symptoms consistent with possible UTI. UA results are consistent with urinary tract infection. Pt will be started on Macrobid empirically to treat. Will send urine for culture and notify her if there is any resistance. Pt will follow-up with her PCP in 2-3 days if symptoms are not improved. We discussed symptoms of pyelonephritis and urosepsis and when to report to the emergency department. Patient Instructions     Patient Instructions   • Take antibiotics as prescribed. Complete the entire course. If you do not complete the entire course this may result in bacterial resistance making future infections very difficult or impossible to treat. You should never have leftover antibiotics unless your antibiotic is switched. Note that if you are taking birth control medications, antibiotics can decrease their effectiveness. Recommend abstinence or back up method while on antibiotics and for 3-5 days after completing the antibiotic course. • We send all positive urine for culture, we will call you if your antibiotic needs to be changed based on culture results. • If symptoms do not improve in 2-3 days, follow-up with your primary care provider. • If you develop fever, chills, or worsening low back pain report to the emergency room. These are symptoms of a more serious condition or possible spread of the infection into your bloodstream.       Follow up with PCP in 3-5 days. Proceed to  ER if symptoms worsen.     Chief Complaint     Chief Complaint   Patient presents with   • Possible UTI     Patient states since  she has had a frequent urge to urinate as well as a burning and pressure. She has not used any medication. History of Present Illness       68year old female presents with complaints of painful urination, urgency, and frequency for 2 days duration, notes some blood after urination with wiping this morning. Pt denies abdominal pain, nausea, vomiting, diarrhea, fevers, chills, and back pain. Pt denies recent changes in sexual partners and is not concerned for STI at this time. She has no other concerns of complaints today. Reports Macrobid usually works well for her, hesitant to take anything else due to history of C. Diff. Review of Systems   Review of Systems   Constitutional: Negative for chills and fever. Gastrointestinal: Negative for abdominal distention, abdominal pain, diarrhea, nausea and vomiting. Genitourinary: Positive for dysuria, frequency and urgency. Negative for flank pain. Musculoskeletal: Negative for back pain.          Current Medications       Current Outpatient Medications:   •  azelastine (ASTELIN) 0.1 % nasal spray, 1 spray into each nostril 2 (two) times a day, Disp: 90 mL, Rfl: 3  •  Cholecalciferol (VITAMIN D-3 PO), Take 1,000 mg by mouth daily  , Disp: , Rfl:   •  Cyclobenzaprine HCl (FLEXERIL PO), Take 1 tablet by mouth daily, Disp: , Rfl:   •  lisinopril (ZESTRIL) 10 mg tablet, Take 10 mg by mouth daily, Disp: , Rfl:   •  montelukast (SINGULAIR) 10 mg tablet, Take 1 tablet (10 mg total) by mouth daily at bedtime, Disp: 30 tablet, Rfl: 11  •  Multiple Vitamins-Minerals (CENTRUM ADULTS PO), Take 1 tablet by mouth daily, Disp: , Rfl:   •  nitrofurantoin (MACROBID) 100 mg capsule, Take 1 capsule (100 mg total) by mouth 2 (two) times a day for 7 days, Disp: 14 capsule, Rfl: 0  •  Omeprazole-Sodium Bicarbonate (Zegerid OTC)  MG CAPS, Take 1 capsule by mouth daily at bedtime, Disp: 30 capsule, Rfl: 11  •  Probiotic Product (PROBIOTIC PO), Take 1 tablet by mouth, Disp: , Rfl:   •  RABEprazole (ACIPHEX) 20 MG tablet, TAKE 1 TABLET BY MOUTH EVERY DAY IN THE MORNING, Disp: 90 tablet, Rfl: 3  •  chlorpheniramine (CHLOR-TRIMETON) 4 MG tablet, Take 4 mg by mouth every 6 (six) hours as needed for allergies (Patient not taking: Reported on 4/13/2023), Disp: , Rfl:     Current Allergies     Allergies as of 08/01/2023 - Reviewed 08/01/2023   Allergen Reaction Noted   • Fentanyl Vomiting 12/02/2021   • Codeine  02/14/2017   • Morphine and related Nausea Only, GI Intolerance, and Vomiting 11/22/2019   • Morphine sulfate [morphine] Nausea Only, GI Intolerance, and Vomiting 02/14/2017   • Naprosyn [naproxen] Nausea Only, GI Intolerance, and Vomiting 02/14/2017   • Oxycodone GI Intolerance and Vomiting 02/14/2017   • Penicillins Other (See Comments) 02/14/2017   • Sulfa antibiotics Rash and Fever 02/14/2017            The following portions of the patient's history were reviewed and updated as appropriate: allergies, current medications, past family history, past medical history, past social history, past surgical history and problem list.     Past Medical History:   Diagnosis Date   • Breast cancer ([address]) 08/21/2008    age 62   • Cancer ([address])     left breast, tx with rx   • Chronic low back pain    • Chronic pain disorder    • Clostridium difficile colitis    • Colon polyp    • DDD (degenerative disc disease), lumbar    • GERD (gastroesophageal reflux disease)    • History of radiation therapy 09/2008    for breast cancer   • Hypertension    • Laryngopharyngeal reflux (LPR)    • Lumbar spinal stenosis    • Osteoarthritis of spine with radiculopathy, lumbar region    • Osteopenia    • Pelvic floor dysfunction    • Piriformis syndrome of left side    • Plantar fasciitis    • Right knee pain    • Sciatica    • Spondylosis of cervical region without myelopathy or radiculopathy        Past Surgical History:   Procedure Laterality Date   • BREAST BIOPSY Left 07/30/2008    malignant   • BREAST CYST ASPIRATION Left 1998   • BREAST LUMPECTOMY Left 08/21/2008   • BREAST SURGERY     • COLONOSCOPY  08/31/2020   • MO ESOPHAGOGASTRODUODENOSCOPY TRANSORAL DIAGNOSTIC N/A 2/15/2017    Procedure: ESOPHAGOGASTRODUODENOSCOPY (EGD); Surgeon: Fernando Tyson MD;  Location: [facility]; Service: Gastroenterology   • MO ESOPHAGOGASTRODUODENOSCOPY TRANSORAL DIAGNOSTIC N/A 2/1/2018    Procedure: ESOPHAGOGASTRODUODENOSCOPY (EGD); Surgeon: Fernando Tyson MD;  [facility]: [facility]; Service: Gastroenterology   • UPPER GASTROINTESTINAL ENDOSCOPY  01/2020   • US GUIDED MSK PROCEDURE  2/6/2020       Family History   Problem Relation Age of Onset   • Osteoporosis Mother    • Hypertension Mother    • Coronary artery disease Father    • Diabetes Father    • COPD Father    • Hypertension Father    • No Known Problems Maternal Aunt    • Breast cancer Paternal Aunt 72   • No Known Problems Paternal Aunt    • No Known Problems Maternal Aunt    • Colon cancer Neg Hx          Medications have been verified. Objective   /82   Pulse 99   Resp 16   Ht 5' 7" (1.702 m)   Wt 71.7 kg (158 lb)   LMP  (LMP Unknown)   SpO2 97%   BMI 24.75 kg/m²   No LMP recorded (lmp unknown). Patient is postmenopausal.       Physical Exam     Physical Exam  Vitals and nursing note reviewed. Constitutional:       General: She is awake. She is not in acute distress. Appearance: Normal appearance. She is well-developed and well-groomed. She is not ill-appearing, toxic-appearing or diaphoretic. HENT:      Head: Normocephalic and atraumatic. Right Ear: Hearing and external ear normal.      Left Ear: Hearing and external ear normal.   Eyes:      General: Lids are normal. Vision grossly intact. Gaze aligned appropriately. Cardiovascular:      Rate and Rhythm: Normal rate. Pulmonary:      Effort: Pulmonary effort is normal.   Abdominal:      General: Abdomen is flat. Bowel sounds are normal.      Palpations: Abdomen is soft. Tenderness: There is abdominal tenderness in the suprapubic area.  There is no right CVA tenderness or left CVA tenderness. Musculoskeletal:      Cervical back: Normal range of motion. Skin:     General: Skin is warm and dry. Neurological:      Mental Status: She is alert and oriented to person, place, and time. Coordination: Coordination is intact. Gait: Gait is intact. Psychiatric:         Attention and Perception: Attention and perception normal.         Mood and Affect: Mood and affect normal.         Speech: Speech normal.         Behavior: Behavior normal. Behavior is cooperative. Note: Portions of this record may have been created with voice recognition software. Occasional wrong word or "sound a like" substitutions may have occurred due to the inherent limitations of voice recognition software. Please read the chart carefully and recognize, using context, where substitutions have occurred. *

## 2023-08-03 LAB — BACTERIA UR CULT: ABNORMAL

## 2023-09-11 ENCOUNTER — HOSPITAL ENCOUNTER (OUTPATIENT)
Dept: RADIOLOGY | Age: 73
Discharge: HOME | End: 2023-09-11
Payer: COMMERCIAL

## 2023-09-11 VITALS — WEIGHT: 157 LBS | BODY MASS INDEX: 24.64 KG/M2 | HEIGHT: 67 IN

## 2023-09-11 DIAGNOSIS — Z12.31 SCREENING MAMMOGRAM FOR BREAST CANCER: ICD-10-CM

## 2023-09-11 PROCEDURE — 77063 BREAST TOMOSYNTHESIS BI: CPT

## 2023-09-11 PROCEDURE — 77067 SCR MAMMO BI INCL CAD: CPT

## 2023-09-18 ENCOUNTER — ANNUAL EXAM (OUTPATIENT)
Dept: GYNECOLOGY | Facility: CLINIC | Age: 73
End: 2023-09-18
Payer: COMMERCIAL

## 2023-09-18 VITALS
DIASTOLIC BLOOD PRESSURE: 80 MMHG | SYSTOLIC BLOOD PRESSURE: 120 MMHG | BODY MASS INDEX: 24.8 KG/M2 | HEIGHT: 67 IN | WEIGHT: 158 LBS

## 2023-09-18 DIAGNOSIS — M85.80 OSTEOPENIA AFTER MENOPAUSE: ICD-10-CM

## 2023-09-18 DIAGNOSIS — Z13.820 SCREENING FOR OSTEOPOROSIS: ICD-10-CM

## 2023-09-18 DIAGNOSIS — Z12.31 SCREENING MAMMOGRAM FOR BREAST CANCER: Primary | ICD-10-CM

## 2023-09-18 DIAGNOSIS — Z78.0 OSTEOPENIA AFTER MENOPAUSE: ICD-10-CM

## 2023-09-18 DIAGNOSIS — N95.2 VAGINAL ATROPHY: ICD-10-CM

## 2023-09-18 PROCEDURE — G0101 CA SCREEN;PELVIC/BREAST EXAM: HCPCS | Performed by: OBSTETRICS & GYNECOLOGY

## 2023-09-18 NOTE — PROGRESS NOTES
Assessment/Plan:    Normal breast exam  Vaginal atrophy  Left breast DCIS 2008  Normal mammogram September 2023  Colonoscopy August 2020 with polyp. Due for repeat at 5 years. Reflux disease (pharyngeal esophageal dysphagia). Plan: Rx mammogram and DEXA scan. Continue healthy diet and weightbearing exercise    Subjective: G0     Patient ID: Angeles Choudhury is a 68 y.o. female presents for annual exam with no complaints. Denies any pelvic pain or vaginal bleeding. Presently not sexually active. Denies any breast bowel or bladder problems. No change in family history. Paternal aunt (breast cancer). Followed up with GI for reflux disease. Produces less amounts of mucus allowing eating or drinking. Review of Systems   Constitutional: Negative. Negative for fatigue, fever and unexpected weight change. HENT: Negative. Eyes: Negative. Respiratory: Negative. Negative for chest tightness, shortness of breath, wheezing and stridor. Cardiovascular: Negative. Negative for chest pain, palpitations and leg swelling. Gastrointestinal: Negative. Negative for abdominal pain, blood in stool, diarrhea, nausea, rectal pain and vomiting. Endocrine: Negative. Genitourinary: Negative for dysuria, frequency, vaginal bleeding, vaginal discharge and vaginal pain. Musculoskeletal: Negative. Skin: Negative. Allergic/Immunologic: Negative. Neurological: Negative. Hematological: Negative. Psychiatric/Behavioral: Negative. All other systems reviewed and are negative. Objective:      /80   Ht 5' 7" (1.702 m)   Wt 71.7 kg (158 lb)   LMP  (LMP Unknown)   BMI 24.75 kg/m²          Physical Exam  Constitutional:       Appearance: She is well-developed. HENT:      Head: Normocephalic and atraumatic. Neck:      Thyroid: No thyromegaly. Trachea: No tracheal deviation. Cardiovascular:      Rate and Rhythm: Normal rate and regular rhythm.       Heart sounds: Normal heart sounds. Pulmonary:      Effort: Pulmonary effort is normal. No respiratory distress. Breath sounds: Normal breath sounds. No stridor. No wheezing or rales. Chest:      Chest wall: No tenderness. Breasts:     Breasts are symmetrical.      Right: No inverted nipple, mass, nipple discharge, skin change or tenderness. Left: No inverted nipple, mass, nipple discharge, skin change or tenderness. Abdominal:      General: Bowel sounds are normal. There is no distension. Palpations: Abdomen is soft. There is no mass. Tenderness: There is no abdominal tenderness. There is no guarding or rebound. Hernia: No hernia is present. There is no hernia in the left inguinal area. Genitourinary:     Labia:         Right: No rash, tenderness, lesion or injury. Left: No rash, tenderness, lesion or injury. Vagina: Normal. No signs of injury and foreign body. No vaginal discharge, erythema, tenderness or bleeding. Cervix: No cervical motion tenderness, discharge or friability. Uterus: Not deviated, not enlarged, not fixed and not tender. Adnexa:         Right: No mass, tenderness or fullness. Left: No mass, tenderness or fullness. Rectum: No mass, anal fissure, external hemorrhoid or internal hemorrhoid. Comments: Vaginal atrophy. Normal urethra and [place]'s glands. No uterine prolapse, cystocele or rectocele  Musculoskeletal:      Cervical back: Normal range of motion and neck supple. Lymphadenopathy:      Lower Body: No right inguinal adenopathy. No left inguinal adenopathy. Skin:     General: Skin is warm and dry. Neurological:      Mental Status: She is alert and oriented to person, place, and time. Psychiatric:         Behavior: Behavior normal.         Thought Content:  Thought content normal.         Judgment: Judgment normal.

## 2023-10-05 ENCOUNTER — OFFICE VISIT (OUTPATIENT)
Dept: ENDOCRINOLOGY | Facility: CLINIC | Age: 73
End: 2023-10-05
Payer: COMMERCIAL

## 2023-10-05 VITALS
WEIGHT: 159.2 LBS | SYSTOLIC BLOOD PRESSURE: 118 MMHG | DIASTOLIC BLOOD PRESSURE: 78 MMHG | HEART RATE: 90 BPM | BODY MASS INDEX: 24.99 KG/M2 | OXYGEN SATURATION: 98 % | HEIGHT: 67 IN

## 2023-10-05 DIAGNOSIS — E55.9 VITAMIN D DEFICIENCY: ICD-10-CM

## 2023-10-05 DIAGNOSIS — E21.3 HYPERPARATHYROIDISM (HCC): Primary | ICD-10-CM

## 2023-10-05 PROCEDURE — 99213 OFFICE O/P EST LOW 20 MIN: CPT | Performed by: INTERNAL MEDICINE

## 2023-10-05 NOTE — PROGRESS NOTES
Chief Complaint   Patient presents with   • Abnormal Calcium      Referring Provider  Sapphire Potter Do  [address]      History of Present Illness:   Angeles Choudhury is a 68 y.o. female with hypercalcemia seen in f/u. Last seen in the office in April 2023 for elevated PTH. PTH was checked due to higher total calcium levels. Her corrected calciums are ~10, and in March 2023 an iCa was 1.26. PTH was 84.7 (3/23) and 90 (1/23). Ionized calcium in Jan and March 2023 are both normal.     Evaluation in April 2023 included a normal urine calcium of 130, and normal mag and phos. Vit D was low at 21 and 2000units of D was recommended    This is the first time. She recalls having any PTH checked. She has a hx of one broken ankle ~30yrs ago. She had a dx of breast CA stage 0 with surgery and radiation seeding. No chemo and she is cancer free since 2008. Denies hx of renal stones but has had GERD for 10yrs. Denies increased thirst, but has frequent urination. Had a significant UTI this summer  1-2 episodes nocturia which is unchanged. Denies fatigue      Dietary Calcium intake: eats cheese, skim milk in cereal, yogurt.  Will eat spinach, broccoli, or zucchini  Calcium/Vitamin D supplementation (including MVI) : 2000units daily, MVI  Weight bearing exercises: walking during the day, but does have at least 30mins per day    Fhx: mother with OP     Patient Active Problem List   Diagnosis   • Lumbar radiculopathy   • Chronic bilateral low back pain with right-sided sciatica   • Sacroiliitis (HCC)   • Lumbar spondylosis   • Trochanteric bursitis of right hip   • Spinal stenosis of lumbar region   • Subacromial bursitis of right shoulder joint   • Dysphonia   • Pharyngoesophageal dysphagia   • Reflux laryngitis   • Gastroesophageal reflux disease   • Paresis of right vocal fold   • Laryngeal edema   • Allergic rhinitis   • Chronic rhinitis   • Esophageal dysmotilities   • Dyspepsia   • Cough   • Dyspnea on exertion   • Essential hypertension   • Nausea   • Glottic insufficiency   • Muscle tension dysphonia   • Radial neuritis, right   • Arthritis of right acromioclavicular joint   • Lateral epicondylitis of right elbow   • Colon polyp   • Rectal pain   • Laryngopharyngeal reflux (LPR)   • Pelvic floor dysfunction in female   • PONV (postoperative nausea and vomiting)   • TMJPDS (temporomandibular joint pain dysfunction syndrome)   • Sleep disorder   • Hypercalcemia   • Hyperparathyroidism ([address])   • Vitamin D deficiency      Past Medical History:   Diagnosis Date   • Breast cancer ([address]) 08/21/2008    age 62   • Cancer ([address])     left breast, tx with rx   • Chronic low back pain    • Chronic pain disorder    • Clostridium difficile colitis    • Colon polyp    • DDD (degenerative disc disease), lumbar    • GERD (gastroesophageal reflux disease)    • History of radiation therapy 09/2008    for breast cancer   • Hypertension    • Laryngopharyngeal reflux (LPR)    • Lumbar spinal stenosis    • Osteoarthritis of spine with radiculopathy, lumbar region    • Osteopenia    • Pelvic floor dysfunction    • Piriformis syndrome of left side    • Plantar fasciitis    • Right knee pain    • Sciatica    • Spondylosis of cervical region without myelopathy or radiculopathy       Past Surgical History:   Procedure Laterality Date   • BREAST BIOPSY Left 07/30/2008    malignant   • BREAST CYST ASPIRATION Left 1998   • BREAST LUMPECTOMY Left 08/21/2008   • BREAST SURGERY     • COLONOSCOPY  08/31/2020   • NE ESOPHAGOGASTRODUODENOSCOPY TRANSORAL DIAGNOSTIC N/A 2/15/2017    Procedure: ESOPHAGOGASTRODUODENOSCOPY (EGD); Surgeon: Federica Gil MD;  Location:  GI LAB; Service: Gastroenterology   • NE ESOPHAGOGASTRODUODENOSCOPY TRANSORAL DIAGNOSTIC N/A 2/1/2018    Procedure: ESOPHAGOGASTRODUODENOSCOPY (EGD); Surgeon: Federica Gil MD;  [facility];   Service: Gastroenterology   • UPPER GASTROINTESTINAL ENDOSCOPY  01/2020 • [address] MSK PROCEDURE  2020      Family History   Problem Relation Age of Onset   • Osteoporosis Mother    • Hypertension Mother    • Coronary artery disease Father    • Diabetes Father    • COPD Father    • Hypertension Father    • No Known Problems Maternal Aunt    • Breast cancer Paternal Aunt 68   • No Known Problems Paternal Aunt    • No Known Problems Maternal Aunt    • Colon cancer Neg Hx      Social History     Tobacco Use   • Smoking status: Former     Packs/day: 1.00     Years: 25.00     Total pack years: 25.00     Types: Cigarettes     Quit date: 1997     Years since quittin.1   • Smokeless tobacco: Never   • Tobacco comments:     already quit smoking in    Substance Use Topics   • Alcohol use: Yes     Alcohol/week: 7.0 standard drinks of alcohol     Types: 7 Glasses of wine per week     Comment: once a night     Allergies   Allergen Reactions   • Fentanyl Vomiting   • Codeine    • Morphine And Related Nausea Only, GI Intolerance and Vomiting     Note:  this allergy is not being included in drug screening. Please inactivate this item and re-enter it to enable screening. • Morphine Sulfate [Morphine] Nausea Only, GI Intolerance and Vomiting   • Naprosyn [Naproxen] Nausea Only, GI Intolerance and Vomiting   • Oxycodone GI Intolerance and Vomiting   • Penicillins Other (See Comments)     Unknown reaction.    • Sulfa Antibiotics Rash and Fever         Current Outpatient Medications:   •  azelastine (ASTELIN) 0.1 % nasal spray, 1 spray into each nostril 2 (two) times a day, Disp: 90 mL, Rfl: 3  •  Cholecalciferol (VITAMIN D-3 PO), Take 1,000 mg by mouth daily  , Disp: , Rfl:   •  lisinopril (ZESTRIL) 10 mg tablet, Take 10 mg by mouth daily, Disp: , Rfl:   •  montelukast (SINGULAIR) 10 mg tablet, Take 1 tablet (10 mg total) by mouth daily at bedtime, Disp: 30 tablet, Rfl: 11  •  Multiple Vitamins-Minerals (CENTRUM ADULTS PO), Take 1 tablet by mouth daily, Disp: , Rfl:   • Omeprazole-Sodium Bicarbonate (Zegerid OTC)  MG CAPS, Take 1 capsule by mouth daily at bedtime, Disp: 30 capsule, Rfl: 11  •  Probiotic Product (PROBIOTIC PO), Take 1 tablet by mouth, Disp: , Rfl:   •  RABEprazole (ACIPHEX) 20 MG tablet, TAKE 1 TABLET BY MOUTH EVERY DAY IN THE MORNING, Disp: 90 tablet, Rfl: 3  •  Cyclobenzaprine HCl (FLEXERIL PO), Take 1 tablet by mouth daily, Disp: , Rfl:     Physical Exam:  Body mass index is 24.93 kg/m². /78 (BP Location: Right arm, Patient Position: Sitting, Cuff Size: Adult)   Pulse 90   Ht 5' 7" (1.702 m)   Wt 72.2 kg (159 lb 3.2 oz)   LMP  (LMP Unknown)   SpO2 98%   BMI 24.93 kg/m²    Wt Readings from Last 3 Encounters:   10/05/23 72.2 kg (159 lb 3.2 oz)   09/18/23 71.7 kg (158 lb)   09/11/23 71.2 kg (157 lb)       Physical Exam   Gen: appears well-developed and well-nourished. No apparent distress. Head: Normocephalic and atraumatic. Eyes: no stare or proptosis, no periorbital edema  E/N/M nl facies, hearing grossly intact  Neck: range of motion nl   Pulmonary/Chest: breathing  comfortably, no accessory muscle use, effort normal.   Musculoskeletal: moves all 4 extremities, gait nl  Neurological: alert and oriented to person, place, and time.  No upper ext tremor appreciated  Skin: does not appear diaphoretic, no facial plethora  Psychiatric: normal mood and affect; behavior is normal; no gross lapses in memory, answer questions appropriately      DATA:  Labs:       Lab Results   Component Value Date    FREET4 0.92 06/06/2019     Lab Results   Component Value Date    SODIUM 139 06/16/2023    K 5.1 06/16/2023     (H) 06/16/2023    CO2 26 06/16/2023    BUN 22 06/16/2023    CREATININE 0.69 06/16/2023    GLUC 118 03/13/2023    CALCIUM 10.1 06/16/2023      Lab Results   Component Value Date    PTH 84.7 (H) 03/13/2023    CALCIUM 10.1 06/16/2023    PHOS 3.4 04/19/2023      No results found for: "LABPROT"   4/2023 25-oh D:  21  ICA   3/23- 1.26  1/23 - 1.32    Radiology    05/01/2018   COMPARISON:  Prior study of April 7, 2016     RESULTS:   LUMBAR SPINE:  L1-L4:  BMD 1.0 81 gm/cm2  T-score 0.3  Z-score 2.3     LEFT TOTAL HIP:  BMD 0.845 gm/cm2  T-score -0.8  Z-score 0.6     LEFT FEMORAL NECK:  BMD 0.702 gm/cm2  T-score -1.3  Z-score 0.3     LEFT FOREARM :  BMD 0.628 gm/sq-cm,  T-score is  -1.0  Z-score is  1.0          IMPRESSION:  1. Based on the [facility] classification, the T-score of -1.3 in the left hip is consistent with low bone mineral density. 2. When compared to the prior examination, there has been a 2  % DECREASE in the total bone mineral density of the lumbar spine and a  3 % DECREASE in the total bone mineral density of the left hip. 3.  Any secondary causes of low bone mineral density should be excluded prior to treatment, if clinically indicated. 4.  A daily intake of at least 1200 mg calcium and 800 to 1000 IU of Vitamin D, as well as weight bearing and muscle strengthening exercise, fall prevention and avoidance of tobacco and excessive alcohol intake as basic preventive measures are suggested. 5.  Repeat DXA  in 18 - 24 months, on the same machine, as clinically indicated. Impression:  1. Hyperparathyroidism ([address])    2. Vitamin D deficiency           Plan:    Kendra Buckley was seen today for abnormal calcium. Diagnoses and all orders for this visit:    Hyperparathyroidism ([address])  -     Calcium, ionized; Future  -     PTH, intact- Lab Collect; Future    Vitamin D deficiency  -     Vitamin D 25 hydroxy; Future         1. Hyperparathyroidism: ionized calcium, mag and phos have been normal. I will repat a PTH and iCa as she ahs increased her vit D. She has adequate dietary calcium     2. Vitamin D deficiency: daily cholecalciferol 2000units. Will repeat 25 OHD    Discussed with the patient and all questioned fully answered.       If above labs are normal, then unlikely to need additional endocrine appointments         Lisa Khan MD

## 2023-10-13 ENCOUNTER — APPOINTMENT (OUTPATIENT)
Dept: LAB | Facility: IMAGING CENTER | Age: 73
End: 2023-10-13
Payer: COMMERCIAL

## 2023-10-13 DIAGNOSIS — E55.9 VITAMIN D DEFICIENCY: ICD-10-CM

## 2023-10-13 DIAGNOSIS — E21.3 HYPERPARATHYROIDISM (HCC): ICD-10-CM

## 2023-10-13 LAB
25(OH)D3 SERPL-MCNC: 35.5 NG/ML (ref 30–100)
CA-I BLD-SCNC: 1.28 MMOL/L (ref 1.12–1.32)
PTH-INTACT SERPL-MCNC: 89.6 PG/ML (ref 12–88)

## 2023-10-13 PROCEDURE — 82306 VITAMIN D 25 HYDROXY: CPT

## 2023-10-13 PROCEDURE — 83970 ASSAY OF PARATHORMONE: CPT

## 2023-10-13 PROCEDURE — 82330 ASSAY OF CALCIUM: CPT

## 2023-10-13 PROCEDURE — 36415 COLL VENOUS BLD VENIPUNCTURE: CPT

## 2023-10-16 ENCOUNTER — ESTABLISHED COMPREHENSIVE EXAM (OUTPATIENT)
Dept: URBAN - METROPOLITAN AREA CLINIC 6 | Facility: CLINIC | Age: 73
End: 2023-10-16

## 2023-10-16 DIAGNOSIS — Z96.1: ICD-10-CM

## 2023-10-16 DIAGNOSIS — H35.3130: ICD-10-CM

## 2023-10-16 DIAGNOSIS — H04.123: ICD-10-CM

## 2023-10-16 PROCEDURE — 92014 COMPRE OPH EXAM EST PT 1/>: CPT

## 2023-10-16 PROCEDURE — 92134 CPTRZ OPH DX IMG PST SGM RTA: CPT

## 2023-10-16 ASSESSMENT — TONOMETRY
OS_IOP_MMHG: 15
OD_IOP_MMHG: 14

## 2023-10-16 ASSESSMENT — VISUAL ACUITY
OS_SC: 20/30-2
OD_SC: 20/30-2

## 2023-10-27 ENCOUNTER — OFFICE VISIT (OUTPATIENT)
Dept: OBGYN CLINIC | Facility: CLINIC | Age: 73
End: 2023-10-27
Payer: COMMERCIAL

## 2023-10-27 ENCOUNTER — TELEPHONE (OUTPATIENT)
Dept: GASTROENTEROLOGY | Facility: MEDICAL CENTER | Age: 73
End: 2023-10-27

## 2023-10-27 VITALS
HEART RATE: 67 BPM | WEIGHT: 157 LBS | DIASTOLIC BLOOD PRESSURE: 90 MMHG | HEIGHT: 67 IN | BODY MASS INDEX: 24.64 KG/M2 | SYSTOLIC BLOOD PRESSURE: 145 MMHG

## 2023-10-27 DIAGNOSIS — M17.11 PATELLOFEMORAL ARTHRITIS OF RIGHT KNEE: Primary | ICD-10-CM

## 2023-10-27 PROCEDURE — 99213 OFFICE O/P EST LOW 20 MIN: CPT | Performed by: ORTHOPAEDIC SURGERY

## 2023-10-27 PROCEDURE — 20610 DRAIN/INJ JOINT/BURSA W/O US: CPT | Performed by: ORTHOPAEDIC SURGERY

## 2023-10-27 RX ORDER — BUPIVACAINE HYDROCHLORIDE 2.5 MG/ML
1 INJECTION, SOLUTION INFILTRATION; PERINEURAL
Status: COMPLETED | OUTPATIENT
Start: 2023-10-27 | End: 2023-10-27

## 2023-10-27 RX ORDER — METHYLPREDNISOLONE ACETATE 40 MG/ML
2 INJECTION, SUSPENSION INTRA-ARTICULAR; INTRALESIONAL; INTRAMUSCULAR; SOFT TISSUE
Status: COMPLETED | OUTPATIENT
Start: 2023-10-27 | End: 2023-10-27

## 2023-10-27 RX ORDER — KETOROLAC TROMETHAMINE 30 MG/ML
30 INJECTION, SOLUTION INTRAMUSCULAR; INTRAVENOUS
Status: COMPLETED | OUTPATIENT
Start: 2023-10-27 | End: 2023-10-27

## 2023-10-27 RX ADMIN — METHYLPREDNISOLONE ACETATE 2 ML: 40 INJECTION, SUSPENSION INTRA-ARTICULAR; INTRALESIONAL; INTRAMUSCULAR; SOFT TISSUE at 11:45

## 2023-10-27 RX ADMIN — KETOROLAC TROMETHAMINE 30 MG: 30 INJECTION, SOLUTION INTRAMUSCULAR; INTRAVENOUS at 11:45

## 2023-10-27 RX ADMIN — BUPIVACAINE HYDROCHLORIDE 1 ML: 2.5 INJECTION, SOLUTION INFILTRATION; PERINEURAL at 11:45

## 2023-10-27 NOTE — TELEPHONE ENCOUNTER
Spoke with patient to inform of 10/30 cancellation. Offered patient 11/1 but patient declined, unable to make appointment. Offered patient next available with Dr. Isaías Gill or offered to have patient see PA. Patient requested only Dr. Isaías Gill, but stated that she will call back to schedule appointment. Number provided to patient for callback.

## 2023-10-27 NOTE — PROGRESS NOTES
Assessment:   Diagnosis ICD-10-CM Associated Orders   1. Patellofemoral arthritis of right knee  M17.11           Plan:  Emphasized the importance of doing her exercises to keep her quadriceps strong to help the patella track better. This will help decrease her pain tremendously. She understands but she is currently taking care of her . A cortisone injection was administered to the right knee today, which she tolerated well. Exercise program was provided in the patient's AVS    To do next visit:  Return in about 4 months (around 2/27/2024) for right knee . The above stated was discussed in layman's terms and the patient expressed understanding. All questions were answered to the patient's satisfaction. Scribe Attestation      I,:  Rip Holt am acting as a scribe while in the presence of the attending physician.:       I,:  Ramone Valdivia MD personally performed the services described in this documentation    as scribed in my presence.:               Subjective:   Marysol Smith is a 68 y.o. female who presents today for a 3-month follow-up for her right knee pain due to patellofemoral arthritis. She has been treating conservatively with periodic intra-articular right knee injections. Patient wished to proceed with this plan of care and have a cortisone injection today. Patient was offered physical therapy at her last visit on 4/14/2023 but declined. Most of her pain is located over the anterior aspect of the knee with difficulty going up and down stairs.       Review of systems negative unless otherwise specified in HPI  Review of Systems    Past Medical History:   Diagnosis Date    Breast cancer ([address]) 08/21/2008    age 62    Cancer ([address])     left breast, tx with rx    Chronic low back pain     Chronic pain disorder     Clostridium difficile colitis     Colon polyp     DDD (degenerative disc disease), lumbar     GERD (gastroesophageal reflux disease)     History of radiation therapy 2008    for breast cancer    Hypertension     Laryngopharyngeal reflux (LPR)     Lumbar spinal stenosis     Osteoarthritis of spine with radiculopathy, lumbar region     Osteopenia     Pelvic floor dysfunction     Piriformis syndrome of left side     Plantar fasciitis     Right knee pain     Sciatica     Spondylosis of cervical region without myelopathy or radiculopathy        Past Surgical History:   Procedure Laterality Date    BREAST BIOPSY Left 2008    malignant    BREAST CYST ASPIRATION Left 1998    BREAST LUMPECTOMY Left 2008    BREAST SURGERY      COLONOSCOPY  2020    HI ESOPHAGOGASTRODUODENOSCOPY TRANSORAL DIAGNOSTIC N/A 2/15/2017    Procedure: ESOPHAGOGASTRODUODENOSCOPY (EGD); Surgeon: Tong Estes MD;  Location: [facility]; Service: Gastroenterology    HI ESOPHAGOGASTRODUODENOSCOPY TRANSORAL DIAGNOSTIC N/A 2018    Procedure: ESOPHAGOGASTRODUODENOSCOPY (EGD); Surgeon: Tong Estes MD;  Location: [facility]; Service: Gastroenterology    UPPER GASTROINTESTINAL ENDOSCOPY  2020    US GUIDED MSK PROCEDURE  2020       Family History   Problem Relation Age of Onset    Osteoporosis Mother     Hypertension Mother     Coronary artery disease Father     Diabetes Father     COPD Father     Hypertension Father     No Known Problems Maternal Aunt     Breast cancer Paternal Aunt 68    No Known Problems Paternal Aunt     No Known Problems Maternal Aunt     Colon cancer Neg Hx        Social History     Occupational History    Not on file   Tobacco Use    Smoking status: Former     Packs/day: 1.00     Years: 25.00     Total pack years: 25.00     Types: Cigarettes     Quit date: 1997     Years since quittin.1    Smokeless tobacco: Never    Tobacco comments:     already quit smoking in    Vaping Use    Vaping Use: Never used   Substance and Sexual Activity    Alcohol use:  Yes     Alcohol/week: 7.0 standard drinks of alcohol     Types: 7 Glasses of wine per week Comment: once a night    Drug use: No    Sexual activity: Not Currently     Partners: Male     Birth control/protection: Post-menopausal         Current Outpatient Medications:     azelastine (ASTELIN) 0.1 % nasal spray, 1 spray into each nostril 2 (two) times a day, Disp: 90 mL, Rfl: 3    Cholecalciferol (VITAMIN D-3 PO), Take 1,000 mg by mouth daily  , Disp: , Rfl:     Cyclobenzaprine HCl (FLEXERIL PO), Take 1 tablet by mouth daily, Disp: , Rfl:     lisinopril (ZESTRIL) 10 mg tablet, Take 10 mg by mouth daily, Disp: , Rfl:     montelukast (SINGULAIR) 10 mg tablet, Take 1 tablet (10 mg total) by mouth daily at bedtime, Disp: 30 tablet, Rfl: 11    Multiple Vitamins-Minerals (CENTRUM ADULTS PO), Take 1 tablet by mouth daily, Disp: , Rfl:     Omeprazole-Sodium Bicarbonate (Zegerid OTC)  MG CAPS, Take 1 capsule by mouth daily at bedtime, Disp: 30 capsule, Rfl: 11    Probiotic Product (PROBIOTIC PO), Take 1 tablet by mouth, Disp: , Rfl:     RABEprazole (ACIPHEX) 20 MG tablet, TAKE 1 TABLET BY MOUTH EVERY DAY IN THE MORNING, Disp: 90 tablet, Rfl: 3    Allergies   Allergen Reactions    Fentanyl Vomiting    Codeine     Morphine And Related Nausea Only, GI Intolerance and Vomiting     Note:  this allergy is not being included in drug screening. Please inactivate this item and re-enter it to enable screening. Morphine Sulfate [Morphine] Nausea Only, GI Intolerance and Vomiting    Naprosyn [Naproxen] Nausea Only, GI Intolerance and Vomiting    Oxycodone GI Intolerance and Vomiting    Penicillins Other (See Comments)     Unknown reaction. Sulfa Antibiotics Rash and Fever            Vitals:    10/27/23 1221   BP: 145/90   Pulse: 67       Objective:                    Right Knee Exam     Tenderness   The patient is experiencing tenderness in the patella and patellar tendon.     Range of Motion   Extension:  0   Flexion:  120     Tests   Varus: negative Valgus: negative    Other   Erythema: absent  Sensation: normal  Pulse: present  Swelling: none  Effusion: no effusion present    Comments:  Calf is soft and nontender            Diagnostics, reviewed and taken today if performed as documented:    None performed      The attending physician has personally reviewed the pertinent films in PACS and interpretation is as follows:      Procedures, if performed today:    Large joint arthrocentesis: R knee  Universal Protocol:  Consent: Verbal consent obtained. Risks and benefits: risks, benefits and alternatives were discussed  Consent given by: patient  Time out: Immediately prior to procedure a "time out" was called to verify the correct patient, procedure, equipment, support staff and site/side marked as required. Timeout called at: 10/27/2023 12:51 PM.  Patient understanding: patient states understanding of the procedure being performed  Site marked: the operative site was marked  Patient identity confirmed: verbally with patient  Supporting Documentation  Indications: pain   Procedure Details  Location: knee - R knee  Preparation: Patient was prepped and draped in the usual sterile fashion  Needle size: 22 G  Ultrasound guidance: no  Approach: anterolateral  Medications administered: 2 mL methylPREDNISolone acetate 40 mg/mL; 1 mL bupivacaine 0.25 %; 30 mg ketorolac 30 mg/mL    Patient tolerance: patient tolerated the procedure well with no immediate complications  Dressing:  Sterile dressing applied          Portions of the record may have been created with voice recognition software. Occasional wrong word or "sound a like" substitutions may have occurred due to the inherent limitations of voice recognition software. Read the chart carefully and recognize, using context, where substitutions have occurred.

## 2023-11-28 PROBLEM — J34.2 DEVIATED NASAL SEPTUM: Status: ACTIVE | Noted: 2023-11-28

## 2023-11-28 PROBLEM — J34.3 HYPERTROPHY OF BOTH INFERIOR NASAL TURBINATES: Status: ACTIVE | Noted: 2023-11-28

## 2023-11-28 PROBLEM — J33.9 NASAL POLYPOSIS: Status: ACTIVE | Noted: 2023-11-28

## 2023-12-19 ENCOUNTER — APPOINTMENT (OUTPATIENT)
Dept: LAB | Facility: IMAGING CENTER | Age: 73
End: 2023-12-19
Payer: COMMERCIAL

## 2023-12-19 DIAGNOSIS — E78.2 MIXED HYPERLIPIDEMIA: ICD-10-CM

## 2023-12-19 DIAGNOSIS — R73.01 IMPAIRED FASTING GLUCOSE: ICD-10-CM

## 2023-12-19 DIAGNOSIS — R10.30 INGUINAL PAIN, UNSPECIFIED LATERALITY: ICD-10-CM

## 2023-12-19 LAB
ALBUMIN SERPL BCP-MCNC: 4.4 G/DL (ref 3.5–5)
ALP SERPL-CCNC: 76 U/L (ref 34–104)
ALT SERPL W P-5'-P-CCNC: 18 U/L (ref 7–52)
ANION GAP SERPL CALCULATED.3IONS-SCNC: 9 MMOL/L
AST SERPL W P-5'-P-CCNC: 19 U/L (ref 13–39)
BASOPHILS # BLD AUTO: 0.03 THOUSANDS/ÂΜL (ref 0–0.1)
BASOPHILS NFR BLD AUTO: 1 % (ref 0–1)
BILIRUB SERPL-MCNC: 0.87 MG/DL (ref 0.2–1)
BUN SERPL-MCNC: 16 MG/DL (ref 5–25)
CALCIUM SERPL-MCNC: 9.6 MG/DL (ref 8.4–10.2)
CHLORIDE SERPL-SCNC: 104 MMOL/L (ref 96–108)
CHOLEST SERPL-MCNC: 215 MG/DL
CO2 SERPL-SCNC: 28 MMOL/L (ref 21–32)
CREAT SERPL-MCNC: 0.57 MG/DL (ref 0.6–1.3)
EOSINOPHIL # BLD AUTO: 0.29 THOUSAND/ÂΜL (ref 0–0.61)
EOSINOPHIL NFR BLD AUTO: 5 % (ref 0–6)
ERYTHROCYTE [DISTWIDTH] IN BLOOD BY AUTOMATED COUNT: 14 % (ref 11.6–15.1)
EST. AVERAGE GLUCOSE BLD GHB EST-MCNC: 120 MG/DL
GFR SERPL CREATININE-BSD FRML MDRD: 92 ML/MIN/1.73SQ M
GLUCOSE P FAST SERPL-MCNC: 107 MG/DL (ref 65–99)
HBA1C MFR BLD: 5.8 %
HCT VFR BLD AUTO: 41.3 % (ref 34.8–46.1)
HDLC SERPL-MCNC: 63 MG/DL
HGB BLD-MCNC: 13 G/DL (ref 11.5–15.4)
IMM GRANULOCYTES # BLD AUTO: 0.01 THOUSAND/UL (ref 0–0.2)
IMM GRANULOCYTES NFR BLD AUTO: 0 % (ref 0–2)
LDLC SERPL CALC-MCNC: 115 MG/DL (ref 0–100)
LYMPHOCYTES # BLD AUTO: 2.24 THOUSANDS/ÂΜL (ref 0.6–4.47)
LYMPHOCYTES NFR BLD AUTO: 38 % (ref 14–44)
MCH RBC QN AUTO: 29.4 PG (ref 26.8–34.3)
MCHC RBC AUTO-ENTMCNC: 31.5 G/DL (ref 31.4–37.4)
MCV RBC AUTO: 93 FL (ref 82–98)
MONOCYTES # BLD AUTO: 0.56 THOUSAND/ÂΜL (ref 0.17–1.22)
MONOCYTES NFR BLD AUTO: 9 % (ref 4–12)
NEUTROPHILS # BLD AUTO: 2.8 THOUSANDS/ÂΜL (ref 1.85–7.62)
NEUTS SEG NFR BLD AUTO: 47 % (ref 43–75)
NONHDLC SERPL-MCNC: 152 MG/DL
NRBC BLD AUTO-RTO: 0 /100 WBCS
PLATELET # BLD AUTO: 238 THOUSANDS/UL (ref 149–390)
PMV BLD AUTO: 10.6 FL (ref 8.9–12.7)
POTASSIUM SERPL-SCNC: 4.3 MMOL/L (ref 3.5–5.3)
PROT SERPL-MCNC: 7 G/DL (ref 6.4–8.4)
RBC # BLD AUTO: 4.42 MILLION/UL (ref 3.81–5.12)
SODIUM SERPL-SCNC: 141 MMOL/L (ref 135–147)
TRIGL SERPL-MCNC: 186 MG/DL
WBC # BLD AUTO: 5.93 THOUSAND/UL (ref 4.31–10.16)

## 2023-12-19 PROCEDURE — 80053 COMPREHEN METABOLIC PANEL: CPT

## 2023-12-19 PROCEDURE — 36415 COLL VENOUS BLD VENIPUNCTURE: CPT

## 2023-12-19 PROCEDURE — 83036 HEMOGLOBIN GLYCOSYLATED A1C: CPT

## 2023-12-19 PROCEDURE — 85025 COMPLETE CBC W/AUTO DIFF WBC: CPT

## 2023-12-19 PROCEDURE — 80061 LIPID PANEL: CPT

## 2024-02-08 ENCOUNTER — HOSPITAL ENCOUNTER (OUTPATIENT)
Dept: NON INVASIVE DIAGNOSTICS | Facility: CLINIC | Age: 74
Discharge: HOME | End: 2024-02-08
Payer: COMMERCIAL

## 2024-02-08 VITALS
DIASTOLIC BLOOD PRESSURE: 90 MMHG | SYSTOLIC BLOOD PRESSURE: 145 MMHG | WEIGHT: 157 LBS | HEIGHT: 67 IN | HEART RATE: 67 BPM | BODY MASS INDEX: 24.64 KG/M2

## 2024-02-08 DIAGNOSIS — R06.00 DYSPNEA, UNSPECIFIED TYPE: ICD-10-CM

## 2024-02-08 LAB
AORTIC ROOT: 3.2 CM
APICAL FOUR CHAMBER EJECTION FRACTION: 65 %
ASCENDING AORTA: 3.5 CM
BSA FOR ECHO PROCEDURE: 1.82 M2
E WAVE DECELERATION TIME: 228 MS
E/A RATIO: 0.73
FRACTIONAL SHORTENING: 33 (ref 28–44)
INTERVENTRICULAR SEPTUM IN DIASTOLE (PARASTERNAL SHORT AXIS VIEW): 1.1 CM
INTERVENTRICULAR SEPTUM: 1.1 CM (ref 0.6–1.1)
IVC: 12 MM
LAAS-AP2: 7.2 CM2
LAAS-AP4: 10.4 CM2
LEFT ATRIUM SIZE: 2.7 CM
LEFT ATRIUM VOLUME (MOD BIPLANE): 18 ML
LEFT ATRIUM VOLUME INDEX (MOD BIPLANE): 9.9 ML/M2
LEFT INTERNAL DIMENSION IN SYSTOLE: 2.4 CM (ref 2.1–4)
LEFT VENTRICULAR INTERNAL DIMENSION IN DIASTOLE: 3.6 CM (ref 3.5–6)
LEFT VENTRICULAR POSTERIOR WALL IN END DIASTOLE: 0.8 CM
LEFT VENTRICULAR STROKE VOLUME: 36 ML
LVSV (TEICH): 36 ML
MV E'TISSUE VEL-SEP: 12 CM/S
MV PEAK A VEL: 1.1 M/S
MV PEAK E VEL: 80 CM/S
MV STENOSIS PRESSURE HALF TIME: 66 MS
MV VALVE AREA P 1/2 METHOD: 3.33
RA PRESSURE ESTIMATED: 3 MMHG
RIGHT ATRIUM AREA SYSTOLE A4C: 8.9 CM2
RIGHT VENTRICLE ID DIMENSION: 3.2 CM
SL CV LEFT ATRIUM LENGTH A2C: 3.2 CM
SL CV LV EF: 65
SL CV PED ECHO LEFT VENTRICLE DIASTOLIC VOLUME (MOD BIPLANE) 2D: 56 ML
SL CV PED ECHO LEFT VENTRICLE SYSTOLIC VOLUME (MOD BIPLANE) 2D: 20 ML
TRICUSPID ANNULAR PLANE SYSTOLIC EXCURSION: 1.9 CM

## 2024-02-08 PROCEDURE — 93306 TTE W/DOPPLER COMPLETE: CPT

## 2024-02-08 PROCEDURE — 93306 TTE W/DOPPLER COMPLETE: CPT | Performed by: INTERNAL MEDICINE

## 2024-02-21 PROBLEM — R05.9 COUGH: Status: RESOLVED | Noted: 2019-06-19 | Resolved: 2024-02-21

## 2024-02-26 ENCOUNTER — HOSPITAL ENCOUNTER (OUTPATIENT)
Dept: RADIOLOGY | Facility: IMAGING CENTER | Age: 74
Discharge: HOME | End: 2024-02-26
Payer: COMMERCIAL

## 2024-02-26 DIAGNOSIS — R06.00 DYSPNEA, UNSPECIFIED TYPE: ICD-10-CM

## 2024-02-26 PROCEDURE — 71046 X-RAY EXAM CHEST 2 VIEWS: CPT

## 2024-02-29 ENCOUNTER — TELEPHONE (OUTPATIENT)
Dept: MULTI SPECIALTY CLINIC | Facility: CLINIC | Age: 74
End: 2024-02-29

## 2024-02-29 DIAGNOSIS — J04.0 REFLUX LARYNGITIS: ICD-10-CM

## 2024-02-29 DIAGNOSIS — K21.9 REFLUX LARYNGITIS: ICD-10-CM

## 2024-02-29 DIAGNOSIS — R05.3 CHRONIC COUGH: ICD-10-CM

## 2024-02-29 DIAGNOSIS — R09.89 THROAT CLEARING: ICD-10-CM

## 2024-02-29 DIAGNOSIS — K21.9 GASTROESOPHAGEAL REFLUX DISEASE WITHOUT ESOPHAGITIS: ICD-10-CM

## 2024-02-29 RX ORDER — RABEPRAZOLE SODIUM 20 MG/1
40 TABLET, DELAYED RELEASE ORAL DAILY
Qty: 180 TABLET | Refills: 1 | Status: SHIPPED | OUTPATIENT
Start: 2024-02-29 | End: 2024-08-27

## 2024-02-29 NOTE — TELEPHONE ENCOUNTER
----- Message from Tara Rosado RN sent at 2/29/2024 10:21 AM EST -----  Regarding: change script to 90 day  Patient called that [facility] contacted her and would like her prescription for Rabeprazole changed to a 90 day supply please.  It would help with cost  Could one of you take care of that please

## 2024-03-01 ENCOUNTER — OFFICE VISIT (OUTPATIENT)
Dept: OBGYN CLINIC | Facility: CLINIC | Age: 74
End: 2024-03-01
Payer: COMMERCIAL

## 2024-03-01 VITALS — WEIGHT: 161 LBS | HEIGHT: 67 IN | BODY MASS INDEX: 25.27 KG/M2

## 2024-03-01 DIAGNOSIS — M17.11 PATELLOFEMORAL ARTHRITIS OF RIGHT KNEE: Primary | ICD-10-CM

## 2024-03-01 DIAGNOSIS — M76.32 ILIOTIBIAL BAND SYNDROME OF LEFT SIDE: ICD-10-CM

## 2024-03-01 PROCEDURE — 20610 DRAIN/INJ JOINT/BURSA W/O US: CPT | Performed by: ORTHOPAEDIC SURGERY

## 2024-03-01 PROCEDURE — 99213 OFFICE O/P EST LOW 20 MIN: CPT | Performed by: ORTHOPAEDIC SURGERY

## 2024-03-01 RX ORDER — KETOROLAC TROMETHAMINE 30 MG/ML
30 INJECTION, SOLUTION INTRAMUSCULAR; INTRAVENOUS
Status: COMPLETED | OUTPATIENT
Start: 2024-03-01 | End: 2024-03-01

## 2024-03-01 RX ORDER — BUPIVACAINE HYDROCHLORIDE 5 MG/ML
1 INJECTION, SOLUTION PERINEURAL
Status: COMPLETED | OUTPATIENT
Start: 2024-03-01 | End: 2024-03-01

## 2024-03-01 RX ORDER — METHYLPREDNISOLONE ACETATE 40 MG/ML
2 INJECTION, SUSPENSION INTRA-ARTICULAR; INTRALESIONAL; INTRAMUSCULAR; SOFT TISSUE
Status: COMPLETED | OUTPATIENT
Start: 2024-03-01 | End: 2024-03-01

## 2024-03-01 RX ADMIN — BUPIVACAINE HYDROCHLORIDE 1 ML: 5 INJECTION, SOLUTION PERINEURAL at 10:30

## 2024-03-01 RX ADMIN — KETOROLAC TROMETHAMINE 30 MG: 30 INJECTION, SOLUTION INTRAMUSCULAR; INTRAVENOUS at 10:30

## 2024-03-01 RX ADMIN — METHYLPREDNISOLONE ACETATE 2 ML: 40 INJECTION, SUSPENSION INTRA-ARTICULAR; INTRALESIONAL; INTRAMUSCULAR; SOFT TISSUE at 10:30

## 2024-03-01 NOTE — PROGRESS NOTES
Assessment:   Diagnosis ICD-10-CM Associated Orders   1. Patellofemoral arthritis of right knee  M17.11       2. Iliotibial band syndrome of left side  M76.32           Plan:  VMO and IT band stretching exercises were provided in AVS  Advised to perform the exercises daily for both lower extremities   The decision was made to proceed with a repeat right knee CSI   Right knee CSI was performed in the office without complication   Post injection protocol/expectations were reviewed   The CSI may be repeated on a 3/4 month basis as needed   Follow up in 4 months time for clinical re-evaluation     To do next visit:  Return in about 4 months (around 7/1/2024).    The above stated was discussed in layman's terms and the patient expressed understanding.  All questions were answered to the patient's satisfaction.       Scribe Attestation      I,:  Consuelo Zhang am acting as a scribe while in the presence of the attending physician.:       I,:  Naila Vazquez MD personally performed the services described in this documentation    as scribed in my presence.:               Subjective:   Denise Brown is a 73 y.o. female who presents to the office for a follow up regarding right knee pain secondary to patellofemoral osteoarthritis. She was last seen in the office on 10/27/23, at which time she underwent a right knee CSI. She was also provided with a physician directed HEP. She notes that the CSI was beneficial for her, but not as beneficial as the CSI's have been in the past for her. She notes anterior medial knee pain with steps or with knee flexion. She will use Voltaren Gel and Advil on an as needed basis.       Review of systems negative unless otherwise specified in HPI  Review of Systems   Constitutional:  Negative for chills, fever and unexpected weight change.   HENT:  Negative for hearing loss, nosebleeds and sore throat.    Eyes:  Negative for pain, redness and visual disturbance.   Respiratory:  Negative  for cough, shortness of breath and wheezing.    Cardiovascular:  Negative for chest pain, palpitations and leg swelling.   Gastrointestinal:  Negative for abdominal pain, nausea and vomiting.   Endocrine: Negative for polydipsia and polyuria.   Genitourinary:  Negative for difficulty urinating and hematuria.   Musculoskeletal:  Positive for arthralgias. Negative for joint swelling and myalgias.   Skin:  Negative for rash and wound.   Neurological:  Negative for dizziness, numbness and headaches.   Psychiatric/Behavioral:  Negative for decreased concentration, dysphoric mood and suicidal ideas. The patient is not nervous/anxious.        Past Medical History:   Diagnosis Date    Breast cancer ([facility]) 08/21/2008    age 58    Cancer ([facility])     left breast, tx with rx    Chronic low back pain     Chronic pain disorder     Clostridium difficile colitis     Colon polyp     DDD (degenerative disc disease), lumbar     GERD (gastroesophageal reflux disease)     History of radiation therapy 09/2008    for breast cancer    Hypertension     Laryngopharyngeal reflux (LPR)     Lumbar spinal stenosis     Osteoarthritis of spine with radiculopathy, lumbar region     Osteopenia     Pelvic floor dysfunction     Piriformis syndrome of left side     Plantar fasciitis     Right knee pain     Sciatica     Spondylosis of cervical region without myelopathy or radiculopathy        Past Surgical History:   Procedure Laterality Date    BREAST BIOPSY Left 07/30/2008    malignant    BREAST CYST ASPIRATION Left 1998    BREAST LUMPECTOMY Left 08/21/2008    BREAST SURGERY      COLONOSCOPY  08/31/2020    PA ESOPHAGOGASTRODUODENOSCOPY TRANSORAL DIAGNOSTIC N/A 2/15/2017    Procedure: ESOPHAGOGASTRODUODENOSCOPY (EGD);  Surgeon: Dustin Phoenix MD;  Location: [facility];  Service: Gastroenterology    PA ESOPHAGOGASTRODUODENOSCOPY TRANSORAL DIAGNOSTIC N/A 2/1/2018    Procedure: ESOPHAGOGASTRODUODENOSCOPY (EGD);  Surgeon: Dustin Phoenix MD;  [facility];  Service: Gastroenterology    UPPER GASTROINTESTINAL ENDOSCOPY  2020    US GUIDED MSK PROCEDURE  2020       Family History   Problem Relation Age of Onset    Osteoporosis Mother     Hypertension Mother     Coronary artery disease Father     Diabetes Father     COPD Father     Hypertension Father     No Known Problems Maternal Aunt     Breast cancer Paternal Aunt 73    No Known Problems Paternal Aunt     No Known Problems Maternal Aunt     Colon cancer Neg Hx        Social History     Occupational History    Not on file   Tobacco Use    Smoking status: Former     Current packs/day: 0.00     Average packs/day: 1 pack/day for 25.0 years (25.0 ttl pk-yrs)     Types: Cigarettes     Start date: 1972     Quit date: 1997     Years since quittin.5    Smokeless tobacco: Never    Tobacco comments:     already quit smoking in    Vaping Use    Vaping status: Never Used   Substance and Sexual Activity    Alcohol use: Yes     Alcohol/week: 7.0 standard drinks of alcohol     Types: 7 Glasses of wine per week     Comment: once a night    Drug use: No    Sexual activity: Not Currently     Partners: Male     Birth control/protection: Post-menopausal         Current Outpatient Medications:     azelastine (ASTELIN) 0.1 % nasal spray, 1 spray into each nostril 2 (two) times a day, Disp: 90 mL, Rfl: 3    Cholecalciferol (VITAMIN D-3 PO), Take 1,000 mg by mouth daily  , Disp: , Rfl:     Cyclobenzaprine HCl (FLEXERIL PO), Take 1 tablet by mouth daily, Disp: , Rfl:     lisinopril (ZESTRIL) 10 mg tablet, Take 10 mg by mouth daily, Disp: , Rfl:     montelukast (SINGULAIR) 10 mg tablet, Take 1 tablet (10 mg total) by mouth daily at bedtime, Disp: 30 tablet, Rfl: 11    Multiple Vitamins-Minerals (CENTRUM ADULTS PO), Take 1 tablet by mouth daily, Disp: , Rfl:     Omeprazole-Sodium Bicarbonate (Zegerid OTC)  MG CAPS, Take 1 capsule by mouth daily at bedtime, Disp: 30 capsule, Rfl: 11    Probiotic Product  "(PROBIOTIC PO), Take 1 tablet by mouth, Disp: , Rfl:     RABEprazole (ACIPHEX) 20 MG tablet, Take 2 tablets (40 mg total) by mouth daily, Disp: 180 tablet, Rfl: 1    Allergies   Allergen Reactions    Fentanyl Vomiting    Codeine     Morphine And Related Nausea Only, GI Intolerance and Vomiting     Note:  this allergy is not being included in drug screening.  Please inactivate this item and re-enter it to enable screening.    Morphine Sulfate [Morphine] Nausea Only, GI Intolerance and Vomiting    Naprosyn [Naproxen] Nausea Only, GI Intolerance and Vomiting    Oxycodone GI Intolerance and Vomiting    Penicillins Other (See Comments)     Unknown reaction.    Sulfa Antibiotics Rash and Fever            Vitals:       Body mass index is 25.22 kg/m².  Wt Readings from Last 3 Encounters:   03/01/24 73 kg (161 lb)   02/08/24 71.2 kg (157 lb)   01/31/24 71.2 kg (157 lb)       Objective:                    Right Knee Exam     Range of Motion   Extension:  0   Flexion:  120     Tests   Varus: negative Valgus: negative    Other   Erythema: absent  Scars: absent  Sensation: normal  Pulse: present  Swelling: none  Effusion: no effusion present    Comments:  VMO pain   Calf is soft and non tender         Left Knee Exam     Other   Erythema: absent  Scars: absent  Sensation: normal  Pulse: present  Swelling: none  Effusion: no effusion present    Comments:  IT band tenderness             Diagnostics, reviewed and taken today if performed as documented:    None performed      The attending physician has personally reviewed the pertinent films in PACS and interpretation is as follows: No new imaging to review       Procedures, if performed today:    Large joint arthrocentesis: R knee  Universal Protocol:  Consent: Verbal consent obtained. Written consent not obtained.  Risks and benefits: risks, benefits and alternatives were discussed  Consent given by: patient  Time out: Immediately prior to procedure a \"time out\" was called to " "verify the correct patient, procedure, equipment, support staff and site/side marked as required.  Patient understanding: patient states understanding of the procedure being performed  Site marked: the operative site was marked  Patient identity confirmed: verbally with patient  Supporting Documentation  Indications: pain   Procedure Details  Location: knee - R knee  Preparation: Patient was prepped and draped in the usual sterile fashion  Needle size: 22 G  Ultrasound guidance: no  Medications administered: 1 mL bupivacaine 0.5 %; 2 mL methylPREDNISolone acetate 40 mg/mL; 30 mg ketorolac 30 mg/mL    Patient tolerance: patient tolerated the procedure well with no immediate complications  Dressing:  Sterile dressing applied        Portions of the record may have been created with voice recognition software.  Occasional wrong word or \"sound a like\" substitutions may have occurred due to the inherent limitations of voice recognition software.  Read the chart carefully and recognize, using context, where substitutions have occurred.    "

## 2024-03-06 PROBLEM — M48.9 CERVICAL SPINE DISEASE: Status: ACTIVE | Noted: 2024-03-06

## 2024-03-13 ENCOUNTER — TELEPHONE (OUTPATIENT)
Dept: GASTROENTEROLOGY | Facility: MEDICAL CENTER | Age: 74
End: 2024-03-13

## 2024-03-13 ENCOUNTER — OFFICE VISIT (OUTPATIENT)
Dept: GASTROENTEROLOGY | Facility: MEDICAL CENTER | Age: 74
End: 2024-03-13
Payer: COMMERCIAL

## 2024-03-13 VITALS
BODY MASS INDEX: 25.03 KG/M2 | DIASTOLIC BLOOD PRESSURE: 73 MMHG | SYSTOLIC BLOOD PRESSURE: 129 MMHG | HEART RATE: 84 BPM | WEIGHT: 159.8 LBS | TEMPERATURE: 97.6 F

## 2024-03-13 DIAGNOSIS — K21.9 LARYNGOPHARYNGEAL REFLUX (LPR): Primary | ICD-10-CM

## 2024-03-13 DIAGNOSIS — K21.9 GASTROESOPHAGEAL REFLUX DISEASE WITHOUT ESOPHAGITIS: ICD-10-CM

## 2024-03-13 PROCEDURE — 99214 OFFICE O/P EST MOD 30 MIN: CPT | Performed by: INTERNAL MEDICINE

## 2024-03-13 NOTE — TELEPHONE ENCOUNTER
Procedure: EGD w/Bravo  Date: 04/25/2024  Physician performing: Dr. Price  Location of procedure:  [facility]  Instructions given to patient: Yes  Diabetic: No  Clearances: N/A

## 2024-03-13 NOTE — PROGRESS NOTES
Outpatient Follow up  [facility] [address]  [address]  [place] [address]  Robinson Price MD  Ph : [phone]  Fax : [phone]  Mobile : [phone]  Email : [email or web address]  Also available on Tiger Text    Denise Brown 73 y.o. female MRN: [id number]    PCP: Lopez Schuler DO  Referring: Lopez Schuler DO  [address]  [place] [id number]    Denise Brown presented for a follow up visit. My recommendations are included. Please do not hesitate to contact me with any questions you may have.     ASSESSMENT AND PLAN:      No problem-specific Assessment & Plan notes found for this encounter.      Diagnoses and all orders for this visit:    Laryngopharyngeal reflux (LPR)  -     EGD; Future  -     Bravo pH Monitoring (must also order EGD separate); Future    Gastroesophageal reflux disease without esophagitis  -     EGD; Future  -     Bravo pH Monitoring (must also order EGD separate); Future    Other orders  -     Diet NPO; Sips with meds; Standing  -     Void on call to OR; Standing  -     Insert peripheral IV; Standing        GERD : EGD with BRAVO (off meds before and then 2 days off and 2 days on). After may consider treatment for hernia (if it is a concern). Recommend stop the Zegerid and Aciphex 2 weeks before the procedure.   Colon polyp : repeat in 5 years (last in 2020).   Evaluation by pulmonary - Dr. Castellon.   ______________________________________________________________________    SUBJECTIVE:  73 y.o. year old who presents with Follow-up (Pt is here for a f/up LPR and mucase is worst -had stretta done 3 years ago sit didn't make a difference ,burping frequently )     She had an endoscopy done with Stretta in December 2021.    Last GI office visit : March 2023    Interval history :    At the last visit we had discussed about her reflux.  She is on rabeprazole.  She has had Stretta in the past.  She also has been having  issues with sleep and mucus.  She was started on Elavil after discussion with Dr. Schuler.  She did see Dr. Tovar after her last appointment with me.  This was in April 2023.  At that time it was thought that her cough had improved significantly possibly following gastritis.  Recent throat clearing was still present but more tolerable.  Mucous is in the morning.   She does have burping.   At times bloated  Constipation once in a while.   She has had some shortness of breath.     PRIOR EVALUATION :     Endoscopy : yes    Colonoscopy : 08/31/2020 diverticulosis, small polyp in the sigmoid colon.    Last Cologuard: Not on file      Lab Results:     Reviewed : yes    Radiology Results:     I have not personally reviewed the imaging studies.       REVIEW OF SYSTEMS IS OTHERWISE NEGATIVE.      Historical Information   Past Medical History:   Diagnosis Date    Breast cancer (HCC) 08/21/2008    age 58    Cancer (HCC)     left breast, tx with rx    Chronic low back pain     Chronic pain disorder     Clostridium difficile colitis     Colon polyp     DDD (degenerative disc disease), lumbar     GERD (gastroesophageal reflux disease)     History of radiation therapy 09/2008    for breast cancer    Hypertension     Laryngopharyngeal reflux (LPR)     Lumbar spinal stenosis     Osteoarthritis of spine with radiculopathy, lumbar region     Osteopenia     Pelvic floor dysfunction     Piriformis syndrome of left side     Plantar fasciitis     Right knee pain     Sciatica     Spondylosis of cervical region without myelopathy or radiculopathy      Past Surgical History:   Procedure Laterality Date    BREAST BIOPSY Left 07/30/2008    malignant    BREAST CYST ASPIRATION Left 1998    BREAST LUMPECTOMY Left 08/21/2008    BREAST SURGERY      COLONOSCOPY  08/31/2020    NE ESOPHAGOGASTRODUODENOSCOPY TRANSORAL DIAGNOSTIC N/A 2/15/2017    Procedure: ESOPHAGOGASTRODUODENOSCOPY (EGD);  Surgeon: Dustin Phoenix MD;  Location: [facility];  Service:  Gastroenterology    TX ESOPHAGOGASTRODUODENOSCOPY TRANSORAL DIAGNOSTIC N/A 2018    Procedure: ESOPHAGOGASTRODUODENOSCOPY (EGD);  Surgeon: Dustin Phoenix MD;  Location: [facility];  Service: Gastroenterology    UPPER GASTROINTESTINAL ENDOSCOPY  2020    US GUIDED MSK PROCEDURE  2020     Social History   Social History     Substance and Sexual Activity   Alcohol Use Yes    Alcohol/week: 7.0 standard drinks of alcohol    Types: 7 Glasses of wine per week    Comment: once a night     Social History     Substance and Sexual Activity   Drug Use No     Social History     Tobacco Use   Smoking Status Former    Current packs/day: 0.00    Average packs/day: 1 pack/day for 25.0 years (25.0 ttl pk-yrs)    Types: Cigarettes    Start date: 1972    Quit date: 1997    Years since quittin.5   Smokeless Tobacco Never   Tobacco Comments    already quit smoking in      Family History   Problem Relation Age of Onset    Osteoporosis Mother     Hypertension Mother     Coronary artery disease Father     Diabetes Father     COPD Father     Hypertension Father     No Known Problems Maternal Aunt     Breast cancer Paternal Aunt 73    No Known Problems Paternal Aunt     No Known Problems Maternal Aunt     Colon cancer Neg Hx        Meds/Allergies       Current Outpatient Medications:     azelastine (ASTELIN) 0.1 % nasal spray    Cholecalciferol (VITAMIN D-3 PO)    lisinopril (ZESTRIL) 10 mg tablet    montelukast (SINGULAIR) 10 mg tablet    Multiple Vitamins-Minerals (CENTRUM ADULTS PO)    Omeprazole-Sodium Bicarbonate (Zegerid OTC)  MG CAPS    Probiotic Product (PROBIOTIC PO)    RABEprazole (ACIPHEX) 20 MG tablet    Cyclobenzaprine HCl (FLEXERIL PO)    Allergies   Allergen Reactions    Fentanyl Vomiting    Codeine     Morphine And Related Nausea Only, GI Intolerance and Vomiting     Note:  this allergy is not being included in drug screening.  Please inactivate this item and re-enter it to enable screening.     Morphine Sulfate [Morphine] Nausea Only, GI Intolerance and Vomiting    Naprosyn [Naproxen] Nausea Only, GI Intolerance and Vomiting    Oxycodone GI Intolerance and Vomiting    Penicillins Other (See Comments)     Unknown reaction.    Sulfa Antibiotics Rash and Fever           Objective     Blood pressure 129/73, pulse 84, temperature 97.6 °F (36.4 °C), weight 72.5 kg (159 lb 12.8 oz), not currently breastfeeding. Body mass index is 25.03 kg/m².      PHYSICAL EXAM:      Physical Exam  Constitutional:       Appearance: Normal appearance. She is well-developed.   HENT:      Head: Normocephalic and atraumatic.   Eyes:      General: No scleral icterus.     Conjunctiva/sclera: Conjunctivae normal.      Pupils: Pupils are equal, round, and reactive to light.   Cardiovascular:      Rate and Rhythm: Normal rate and regular rhythm.      Heart sounds: Normal heart sounds.   Pulmonary:      Effort: Pulmonary effort is normal. No respiratory distress.      Breath sounds: Normal breath sounds.   Abdominal:      General: Bowel sounds are normal. There is no distension.      Palpations: Abdomen is soft. There is no mass.      Tenderness: There is no abdominal tenderness.      Hernia: No hernia is present.   Musculoskeletal:         General: Normal range of motion.      Cervical back: Normal range of motion.   Lymphadenopathy:      Cervical: No cervical adenopathy.   Skin:     General: Skin is warm.   Neurological:      Mental Status: She is alert and oriented to person, place, and time.   Psychiatric:         Behavior: Behavior normal.         Thought Content: Thought content normal.

## 2024-03-14 ENCOUNTER — HOSPITAL ENCOUNTER (OUTPATIENT)
Dept: RADIOLOGY | Age: 74
Discharge: HOME | End: 2024-03-14
Payer: COMMERCIAL

## 2024-03-14 DIAGNOSIS — Z13.820 SCREENING FOR OSTEOPOROSIS: ICD-10-CM

## 2024-03-14 DIAGNOSIS — M85.80 OSTEOPENIA AFTER MENOPAUSE: ICD-10-CM

## 2024-03-14 DIAGNOSIS — Z78.0 OSTEOPENIA AFTER MENOPAUSE: ICD-10-CM

## 2024-03-14 PROCEDURE — 77080 DXA BONE DENSITY AXIAL: CPT

## 2024-03-18 ENCOUNTER — TELEPHONE (OUTPATIENT)
Age: 74
End: 2024-03-18

## 2024-03-25 NOTE — TELEPHONE ENCOUNTER
Pt is scheduled for 4/11 with Arabella Gaspar. When labs are ordered please inform patient and advise if fasting. Pt will have labs done at

## 2024-04-11 ENCOUNTER — OFFICE VISIT (OUTPATIENT)
Dept: ENDOCRINOLOGY | Facility: CLINIC | Age: 74
End: 2024-04-11
Payer: COMMERCIAL

## 2024-04-11 VITALS
SYSTOLIC BLOOD PRESSURE: 120 MMHG | BODY MASS INDEX: 25.18 KG/M2 | HEART RATE: 86 BPM | WEIGHT: 160.4 LBS | HEIGHT: 67 IN | OXYGEN SATURATION: 98 % | DIASTOLIC BLOOD PRESSURE: 68 MMHG

## 2024-04-11 DIAGNOSIS — E21.3 HYPERPARATHYROIDISM (HCC): Primary | ICD-10-CM

## 2024-04-11 DIAGNOSIS — M81.0 OSTEOPOROSIS, UNSPECIFIED OSTEOPOROSIS TYPE, UNSPECIFIED PATHOLOGICAL FRACTURE PRESENCE: ICD-10-CM

## 2024-04-11 DIAGNOSIS — E55.9 VITAMIN D DEFICIENCY: ICD-10-CM

## 2024-04-11 PROCEDURE — 99214 OFFICE O/P EST MOD 30 MIN: CPT

## 2024-04-11 NOTE — ASSESSMENT & PLAN NOTE
Most recent corrected calcium is normal, PTH is high. Patient reports she does not take any calcium supplements. She is unsure how much calcium she consumes daily via her diet    Recommend:  - list of calcium rich foods given. Advise patient to get an idea of how much calcium she gets daily through her diet. If below 1200 mg, she should supplement with calcium supplement which she can obtain over the counter or she can increase her dietary intake as necessary. Repeat albumin, calcium and PTH after 1 month. Continue vitamin D supplement. She is due for repeat level as well. Order given.

## 2024-04-11 NOTE — PATIENT INSTRUCTIONS
Zoledronic Acid (By injection)   Zoledronic Acid ([id number] AS-id)  Treats high blood calcium levels. Also treats bone damage caused by Paget disease, multiple myeloma, and cancers that spread to the bone. Also treats osteoporosis and reduces the risk of hip fractures in certain patients.   Brand Name(s): Reclast, Zoledronic Acid Novaplus   There may be other brand names for this medicine.  When This Medicine Should Not Be Used:   This medicine is not right for everyone. You should not receive it if you had an allergic reaction to zoledronic acid, or if you are pregnant.  How to Use This Medicine:   Injectable  A nurse or other health provider will give you this medicine.  Your doctor will prescribe your dose and schedule. This medicine is given through a needle placed in a vein.  Your doctor may tell you to drink extra liquids before your treatment to prevent kidney problems.  Your doctor may also give you vitamin D and calcium supplements. Tell your doctor if you are not able to take these medicines.  This medicine should come with a Medication Guide. Ask your pharmacist for a copy if you do not have one.  Missed dose:You must use this medicine on a fixed schedule. Call your doctor or pharmacist if you miss a dose.  Drugs and Foods to Avoid:   Ask your doctor or pharmacist before using any other medicine, including over-the-counter medicines, vitamins, and herbal products.  Do not use other medicines that also contain zoledronic acid. Do not use zoledronic acid together with another bisphosphonate medicine.  Some foods and medicines can affect how zoledronic acid works. Tell your doctor if you are using digoxin, antibiotics, diuretics (water pills), an NSAID pain or arthritis medicine (such as aspirin, celecoxib, ibuprofen, naproxen), steroid medicines, or cancer medicines.  Warnings While Using This Medicine:   It is not safe to take this medicine during pregnancy. It could harm an unborn baby. Tell your  doctor right away if you become pregnant.  Tell your doctor if you are breastfeeding, or if you have kidney disease, anemia, aspirin-sensitive asthma, bleeding problems, cancer, congestive heart failure, low blood calcium levels, stomach absorption problems, mineral imbalance, dental problems or gum disease. Also tell your doctor if you had surgery on your bowel or parathyroid or thyroid gland.  This medicine may cause the following problems:  Jaw or teeth problems  Severe bone, joint, or muscle pain  Increased risk of thigh bone fracture  Low calcium levels in your blood  You must have regular dental exams while you are being treated with this medicine. Tell your dentist or oral surgeon that you are using this medicine.  Your doctor will do lab tests at regular visits to check on the effects of this medicine. Keep all appointments.  Possible Side Effects While Using This Medicine:   Call your doctor right away if you notice any of these side effects:  Allergic reaction: Itching or hives, swelling in your face or hands, swelling or tingling in your mouth or throat, chest tightness, trouble breathing  Chest pain, trouble breathing, fast or uneven heartbeat  Decrease in how much or how often you urinate, blood in the urine, lower back or side pain, burning or painful urination  Muscle spasm or twitching, or numbness or tingling in your fingers, feet, or around your mouth  Pain, swelling, or numbness in the mouth or jaw, loose teeth or other teeth problems  Severe muscle, bone, or joint pain  Unusual pain in your thigh, groin, or hip  If you notice these less serious side effects, talk with your doctor:   Fever, chills, cough, sore throat, and body aches  Headache  Mild nausea, constipation, diarrhea, stomach pain or upset  Redness, pain, or swelling of your skin where the needle is placed  If you notice other side effects that you think are caused by this medicine, tell your doctor.   Call your doctor for medical  advice about side effects. You may report side effects to FDA at 6-519-FDA-6140  © Copyright [employer] 2023 Information is for End User's use only and may not be sold, redistributed or otherwise used for commercial purposes.  The above information is an  only. It is not intended as medical advice for individual conditions or treatments. Talk to your doctor, nurse or pharmacist before following any medical regimen to see if it is safe and effective for you.          Goal intake: 1200 mg/day ideally through diet. If falling short, start supplement which can be obtained over the counter       A Guide to Calcium-Rich Foods  We all know that milk is a great source of calcium, but you may be surprised by all the different foods you can work into your diet to reach your daily recommended amount of calcium. Use the guide below to get ideas of additional calcium-rich foods to add to your weekly shopping list.  Produce Serving Size Estimated Calcium*   Maynor greens, frozen 8 oz 360 mg   Broccoli ting 8 oz 200 mg   Kale, frozen 8 oz 180 mg   Soy Beans, green, boiled 8 oz 175 mg   Bok Diana, cooked, boiled 8 oz 160 mg   Figs, dried 2 figs 65 mg   Broccoli, fresh, cooked 8 oz 60 mg   Oranges 1 whole 55 mg   Seafood Serving Size Estimated Calcium*   Sardines, canned with bones 3 oz 325 mg   [place], canned with bones 3 oz 180 mg   Shrimp, canned 3 oz 125 mg   Dairy Serving Size Estimated Calcium*   Ricotta, part-skim 4 oz 335 mg   Yogurt, plain, low-fat 6 oz 310 mg   Milk, skim, low-fat, whole 8 oz 300 mg   Yogurt with fruit, low-fat 6 oz 260 mg   Mozzarella, part-skim 1 oz 210 mg   Cheddar 1 oz 205 mg   Yogurt, Greek 6 oz 200 mg   American Cheese 1 oz 195 mg   Feta Cheese 4 oz 140 mg   Cottage Cheese, 2% 4 oz 105 mg   Frozen yogurt, vanilla 8 oz 105 mg   Ice Cream, vanilla 8 oz 85 mg   Parmesan 1 tbsp 55 mg   Fortified Food Serving Size Estimated Calcium*   Vernal milk, rice milk or soy milk, fortified 8 oz 300 mg    Orange juice and other fruit juices, fortified 8 oz 300 mg   Tofu, prepared with calcium 4 oz 205 mg   Waffle, frozen, fortified 2 pieces 200 mg   Oatmeal, fortified 1 packet 140 mg   English muffin, fortified 1 muffin 100 mg   Cereal, fortified 8 oz 100-1,000 mg   Other Serving Size Estimated Calcium*   Mac & cheese, frozen 1 package 325 mg   Pizza, cheese, frozen 1 serving 115 mg   Pudding, chocolate, prepared with 2% milk 4 oz 160 mg   Beans, baked, canned 4 oz 160 mg   *The calcium content listed for most foods is estimated and can vary due to multiple factors. Check the food label to determine how much calcium is in a particular product.

## 2024-04-11 NOTE — PROGRESS NOTES
Established Patient Progress Note    CC: Follow up for osteoporosis and hyperparathyroidism    Impression & Plan:    Problem List Items Addressed This Visit       Hyperparathyroidism (HCC) - Primary     Most recent corrected calcium is normal, PTH is high. Patient reports she does not take any calcium supplements. She is unsure how much calcium she consumes daily via her diet    Recommend:  - list of calcium rich foods given. Advise patient to get an idea of how much calcium she gets daily through her diet. If below 1200 mg, she should supplement with calcium supplement which she can obtain over the counter or she can increase her dietary intake as necessary. Repeat albumin, calcium and PTH after 1 month. Continue vitamin D supplement. She is due for repeat level as well. Order given.         Relevant Orders    PTH, intact    Calcium    Albumin    Vitamin D 25 hydroxy    Vitamin D deficiency     Continue vitamin D supplement         Osteoporosis     Most recent DEXA shows she has osteopenia and FRAX score >3% in the hip. Recommend patient start treatment with bisphosphonate. Given history of GERD, she would not tolerate PO formulation. Therefore, I would advise IV reclast. Side effects discussed today including ONJ, AFF and self-limiting flu-like symptoms after the infusion. Information for this provided in AVS. She is not interested in Prolia. She has a dentist appointment tomorrow and will discuss if any major dental work is anticipated. Information for reclast provided in AVS. She would like to talk this over with her  and let us know what she decides. She would be interested in going to [facility].             Orders Placed This Encounter   Procedures    PTH, intact     Standing Status:   Future     Standing Expiration Date:   4/11/2025    Calcium     Standing Status:   Future     Standing Expiration Date:   4/11/2025    Albumin     Standing Status:   Future     Standing Expiration Date:    4/11/2025    Vitamin D 25 hydroxy     Standing Status:   Future     Standing Expiration Date:   4/11/2025       History of Present Illness:   Denise Brown is a 73 y.o. female with a history of hypercalcemia and elevated PTH. Repeat testing shows improved calcium level into the normal range however PTH remains elevated. She is not on a calcium supplement. She is unsure how much calcium she receives through her diet. She is taking 2,000 IU daily vitamin D3. Previous work up revealed normal 24 hour urine calcium.     Her mother had osteoporosis with several vertebral fractures.     Per chart review, she has no history of kidney stones. She had one ankle fracture over 30 years ago. She does have a history of breat CA s/p surgery and radiation seeding. No chemo. Cancer free since 2008. She does have a history significant for GERD. She is on omeprazole combo medication.       Patient Active Problem List   Diagnosis    Lumbar radiculopathy    Chronic bilateral low back pain with right-sided sciatica    Sacroiliitis (HCC)    Lumbar spondylosis    Trochanteric bursitis of right hip    Spinal stenosis of lumbar region    Subacromial bursitis of right shoulder joint    Dysphonia    Pharyngoesophageal dysphagia    Reflux laryngitis    Gastroesophageal reflux disease    Paresis of right vocal fold    Laryngeal edema    Allergic rhinitis    Chronic rhinitis    Esophageal dysmotilities    Dyspepsia    Dyspnea on exertion    Essential hypertension    Nausea    Glottic insufficiency    Muscle tension dysphonia    Radial neuritis, right    Arthritis of right acromioclavicular joint    Lateral epicondylitis of right elbow    Colon polyp    Rectal pain    Laryngopharyngeal reflux (LPR)    Pelvic floor dysfunction in female    PONV (postoperative nausea and vomiting)    TMJPDS (temporomandibular joint pain dysfunction syndrome)    Sleep disorder    Hypercalcemia    Hyperparathyroidism (HCC)    Vitamin D deficiency    Nasal  polyposis    Deviated nasal septum    Hypertrophy of both inferior nasal turbinates    Cervical spine disease    Osteoporosis      Past Medical History:   Diagnosis Date    Breast cancer (HCC) 08/21/2008    age 58    Cancer (HCC)     left breast, tx with rx    Chronic low back pain     Chronic pain disorder     Clostridium difficile colitis     Colon polyp     DDD (degenerative disc disease), lumbar     GERD (gastroesophageal reflux disease)     History of radiation therapy 09/2008    for breast cancer    Hypertension     Laryngopharyngeal reflux (LPR)     Lumbar spinal stenosis     Osteoarthritis of spine with radiculopathy, lumbar region     Osteopenia     Pelvic floor dysfunction     Piriformis syndrome of left side     Plantar fasciitis     Right knee pain     Sciatica     Spondylosis of cervical region without myelopathy or radiculopathy       Past Surgical History:   Procedure Laterality Date    BREAST BIOPSY Left 07/30/2008    malignant    BREAST CYST ASPIRATION Left 1998    BREAST LUMPECTOMY Left 08/21/2008    BREAST SURGERY      COLONOSCOPY  08/31/2020    ID ESOPHAGOGASTRODUODENOSCOPY TRANSORAL DIAGNOSTIC N/A 2/15/2017    Procedure: ESOPHAGOGASTRODUODENOSCOPY (EGD);  Surgeon: Dustin Phoenix MD;  Location: [facility];  Service: Gastroenterology    ID ESOPHAGOGASTRODUODENOSCOPY TRANSORAL DIAGNOSTIC N/A 2/1/2018    Procedure: ESOPHAGOGASTRODUODENOSCOPY (EGD);  Surgeon: Dustin Phoenix MD;  Location: [facility];  Service: Gastroenterology    UPPER GASTROINTESTINAL ENDOSCOPY  01/2020    US GUIDED MSK PROCEDURE  2/6/2020      Family History   Problem Relation Age of Onset    Osteoporosis Mother     Hypertension Mother     Coronary artery disease Father     Diabetes Father     COPD Father     Hypertension Father     No Known Problems Maternal Aunt     Breast cancer Paternal Aunt 73    No Known Problems Paternal Aunt     No Known Problems Maternal Aunt     Colon cancer Neg Hx      Social History     Tobacco Use     Smoking status: Former     Current packs/day: 0.00     Average packs/day: 1 pack/day for 25.0 years (25.0 ttl pk-yrs)     Types: Cigarettes     Start date: 1972     Quit date: 1997     Years since quittin.6    Smokeless tobacco: Never    Tobacco comments:     already quit smoking in    Substance Use Topics    Alcohol use: Yes     Alcohol/week: 7.0 standard drinks of alcohol     Types: 7 Glasses of wine per week     Comment: once a night     Allergies   Allergen Reactions    Fentanyl Vomiting    Codeine     Morphine And Related Nausea Only, GI Intolerance and Vomiting     Note:  this allergy is not being included in drug screening.  Please inactivate this item and re-enter it to enable screening.    Morphine Sulfate [Morphine] Nausea Only, GI Intolerance and Vomiting    Naprosyn [Naproxen] Nausea Only, GI Intolerance and Vomiting    Oxycodone GI Intolerance and Vomiting    Penicillins Other (See Comments)     Unknown reaction.    Sulfa Antibiotics Rash and Fever         Current Outpatient Medications:     azelastine (ASTELIN) 0.1 % nasal spray, 1 spray into each nostril 2 (two) times a day, Disp: 90 mL, Rfl: 3    Cholecalciferol (VITAMIN D-3 PO), Take 1,000 mg by mouth daily  , Disp: , Rfl:     lisinopril (ZESTRIL) 10 mg tablet, Take 10 mg by mouth daily, Disp: , Rfl:     montelukast (SINGULAIR) 10 mg tablet, Take 1 tablet (10 mg total) by mouth daily at bedtime, Disp: 30 tablet, Rfl: 11    Multiple Vitamins-Minerals (CENTRUM ADULTS PO), Take 1 tablet by mouth daily, Disp: , Rfl:     Omeprazole-Sodium Bicarbonate (Zegerid OTC)  MG CAPS, Take 1 capsule by mouth daily at bedtime, Disp: 30 capsule, Rfl: 11    Probiotic Product (PROBIOTIC PO), Take 1 tablet by mouth, Disp: , Rfl:     RABEprazole (ACIPHEX) 20 MG tablet, Take 2 tablets (40 mg total) by mouth daily, Disp: 180 tablet, Rfl: 1    Cyclobenzaprine HCl (FLEXERIL PO), Take 1 tablet by mouth daily, Disp: , Rfl:     Review of Systems  "  Constitutional:  Negative for chills and fever.   HENT:  Negative for ear pain and sore throat.    Eyes:  Negative for pain and visual disturbance.   Respiratory:  Negative for cough and shortness of breath.    Cardiovascular:  Negative for chest pain and palpitations.   Gastrointestinal:  Negative for abdominal pain and vomiting.   Genitourinary:  Negative for dysuria and hematuria.   Musculoskeletal:  Negative for arthralgias and back pain.   Skin:  Negative for color change and rash.   Neurological:  Negative for seizures and syncope.   All other systems reviewed and are negative.      Physical Exam:  Body mass index is 25.12 kg/m².  /68   Pulse 86   Ht 5' 7\" (1.702 m)   Wt 72.8 kg (160 lb 6.4 oz)   LMP  (LMP Unknown)   SpO2 98%   BMI 25.12 kg/m²    Wt Readings from Last 3 Encounters:   04/11/24 72.8 kg (160 lb 6.4 oz)   03/13/24 72.5 kg (159 lb 12.8 oz)   03/01/24 73 kg (161 lb)       Physical Exam  Vitals reviewed.   Constitutional:       Appearance: Normal appearance.   HENT:      Head: Normocephalic and atraumatic.   Cardiovascular:      Rate and Rhythm: Normal rate.   Pulmonary:      Effort: Pulmonary effort is normal.   Neurological:      Mental Status: She is alert and oriented to person, place, and time.   Psychiatric:         Mood and Affect: Mood normal.         Behavior: Behavior normal.         Labs:   Lab Results   Component Value Date    HGBA1C 5.8 (H) 12/19/2023    HGBA1C 5.6 06/16/2023    HGBA1C 5.4 12/13/2022     Lab Results   Component Value Date    CREATININE 0.57 (L) 12/19/2023    CREATININE 0.69 06/16/2023    CREATININE 0.66 03/13/2023    BUN 16 12/19/2023     10/22/2015    K 4.3 12/19/2023     12/19/2023    CO2 28 12/19/2023     eGFR   Date Value Ref Range Status   12/19/2023 92 ml/min/1.73sq m Final     Lab Results   Component Value Date    CHOL 231 10/22/2015    HDL 63 12/19/2023    TRIG 186 (H) 12/19/2023     Lab Results   Component Value Date    ALT 18 " 12/19/2023    AST 19 12/19/2023    ALKPHOS 76 12/19/2023    BILITOT 0.79 10/22/2015     Lab Results   Component Value Date    FBT4NNDIQWCA 1.460 07/15/2021    ZMU1VPTKRZVW 1.510 06/06/2019    FHD2PZMPSZMN 1.530 05/21/2018     Lab Results   Component Value Date    FREET4 0.92 06/06/2019         Patient Instructions     Zoledronic Acid (By injection)   Zoledronic Acid ([id number] AS-id)  Treats high blood calcium levels. Also treats bone damage caused by Paget disease, multiple myeloma, and cancers that spread to the bone. Also treats osteoporosis and reduces the risk of hip fractures in certain patients.   Brand Name(s): Reclast, Zoledronic Acid Novaplus   There may be other brand names for this medicine.  When This Medicine Should Not Be Used:   This medicine is not right for everyone. You should not receive it if you had an allergic reaction to zoledronic acid, or if you are pregnant.  How to Use This Medicine:   Injectable  A nurse or other health provider will give you this medicine.  Your doctor will prescribe your dose and schedule. This medicine is given through a needle placed in a vein.  Your doctor may tell you to drink extra liquids before your treatment to prevent kidney problems.  Your doctor may also give you vitamin D and calcium supplements. Tell your doctor if you are not able to take these medicines.  This medicine should come with a Medication Guide. Ask your pharmacist for a copy if you do not have one.  Missed dose:You must use this medicine on a fixed schedule. Call your doctor or pharmacist if you miss a dose.  Drugs and Foods to Avoid:   Ask your doctor or pharmacist before using any other medicine, including over-the-counter medicines, vitamins, and herbal products.  Do not use other medicines that also contain zoledronic acid. Do not use zoledronic acid together with another bisphosphonate medicine.  Some foods and medicines can affect how zoledronic acid works. Tell your doctor if you  are using digoxin, antibiotics, diuretics (water pills), an NSAID pain or arthritis medicine (such as aspirin, celecoxib, ibuprofen, naproxen), steroid medicines, or cancer medicines.  Warnings While Using This Medicine:   It is not safe to take this medicine during pregnancy. It could harm an unborn baby. Tell your doctor right away if you become pregnant.  Tell your doctor if you are breastfeeding, or if you have kidney disease, anemia, aspirin-sensitive asthma, bleeding problems, cancer, congestive heart failure, low blood calcium levels, stomach absorption problems, mineral imbalance, dental problems or gum disease. Also tell your doctor if you had surgery on your bowel or parathyroid or thyroid gland.  This medicine may cause the following problems:  Jaw or teeth problems  Severe bone, joint, or muscle pain  Increased risk of thigh bone fracture  Low calcium levels in your blood  You must have regular dental exams while you are being treated with this medicine. Tell your dentist or oral surgeon that you are using this medicine.  Your doctor will do lab tests at regular visits to check on the effects of this medicine. Keep all appointments.  Possible Side Effects While Using This Medicine:   Call your doctor right away if you notice any of these side effects:  Allergic reaction: Itching or hives, swelling in your face or hands, swelling or tingling in your mouth or throat, chest tightness, trouble breathing  Chest pain, trouble breathing, fast or uneven heartbeat  Decrease in how much or how often you urinate, blood in the urine, lower back or side pain, burning or painful urination  Muscle spasm or twitching, or numbness or tingling in your fingers, feet, or around your mouth  Pain, swelling, or numbness in the mouth or jaw, loose teeth or other teeth problems  Severe muscle, bone, or joint pain  Unusual pain in your thigh, groin, or hip  If you notice these less serious side effects, talk with your doctor:    Fever, chills, cough, sore throat, and body aches  Headache  Mild nausea, constipation, diarrhea, stomach pain or upset  Redness, pain, or swelling of your skin where the needle is placed  If you notice other side effects that you think are caused by this medicine, tell your doctor.   Call your doctor for medical advice about side effects. You may report side effects to FDA at 5-288-QSF-6680  © Copyright [employer] 2023 Information is for End User's use only and may not be sold, redistributed or otherwise used for commercial purposes.  The above information is an  only. It is not intended as medical advice for individual conditions or treatments. Talk to your doctor, nurse or pharmacist before following any medical regimen to see if it is safe and effective for you.          Goal intake: 1200 mg/day ideally through diet. If falling short, start supplement which can be obtained over the counter       A Guide to Calcium-Rich Foods  We all know that milk is a great source of calcium, but you may be surprised by all the different foods you can work into your diet to reach your daily recommended amount of calcium. Use the guide below to get ideas of additional calcium-rich foods to add to your weekly shopping list.  Produce Serving Size Estimated Calcium*   [place] greens, frozen 8 oz 360 mg   Broccoli ting 8 oz 200 mg   Kale, frozen 8 oz 180 mg   Soy Beans, green, boiled 8 oz 175 mg   Bok [place], cooked, boiled 8 oz 160 mg   Figs, dried 2 figs 65 mg   Broccoli, fresh, cooked 8 oz 60 mg   Oranges 1 whole 55 mg   Seafood Serving Size Estimated Calcium*   Sardines, canned with bones 3 oz 325 mg   [place], canned with bones 3 oz 180 mg   Shrimp, canned 3 oz 125 mg   Dairy Serving Size Estimated Calcium*   Ricotta, part-skim 4 oz 335 mg   Yogurt, plain, low-fat 6 oz 310 mg   Milk, skim, low-fat, whole 8 oz 300 mg   Yogurt with fruit, low-fat 6 oz 260 mg   Mozzarella, part-skim 1 oz 210 mg   Cheddar 1 oz 205 mg    Yogurt, Greek 6 oz 200 mg   American Cheese 1 oz 195 mg   Feta Cheese 4 oz 140 mg   Cottage Cheese, 2% 4 oz 105 mg   Frozen yogurt, vanilla 8 oz 105 mg   Ice Cream, vanilla 8 oz 85 mg   Parmesan 1 tbsp 55 mg   Fortified Food Serving Size Estimated Calcium*   [place] milk, rice milk or soy milk, fortified 8 oz 300 mg   Orange juice and other fruit juices, fortified 8 oz 300 mg   Tofu, prepared with calcium 4 oz 205 mg   Waffle, frozen, fortified 2 pieces 200 mg   Oatmeal, fortified 1 packet 140 mg   English muffin, fortified 1 muffin 100 mg   Cereal, fortified 8 oz 100-1,000 mg   Other Serving Size Estimated Calcium*   Mac & cheese, frozen 1 package 325 mg   Pizza, cheese, frozen 1 serving 115 mg   Pudding, chocolate, prepared with 2% milk 4 oz 160 mg   Beans, baked, canned 4 oz 160 mg   *The calcium content listed for most foods is estimated and can vary due to multiple factors. Check the food label to determine how much calcium is in a particular product.        Discussed with the patient and all questioned fully answered. She will call me if any problems arise.

## 2024-04-11 NOTE — ASSESSMENT & PLAN NOTE
Most recent DEXA shows she has osteopenia and FRAX score >3% in the hip. Recommend patient start treatment with bisphosphonate. Given history of GERD, she would not tolerate PO formulation. Therefore, I would advise IV reclast. Side effects discussed today including ONJ, AFF and self-limiting flu-like symptoms after the infusion. Information for this provided in AVS. She is not interested in Prolia. She has a dentist appointment tomorrow and will discuss if any major dental work is anticipated. Information for reclast provided in AVS. She would like to talk this over with her  and let us know what she decides. She would be interested in going to [facility].

## 2024-04-15 ENCOUNTER — TELEPHONE (OUTPATIENT)
Dept: PULMONOLOGY | Facility: CLINIC | Age: 74
End: 2024-04-15

## 2024-04-18 ENCOUNTER — CONSULT (OUTPATIENT)
Dept: PULMONOLOGY | Facility: CLINIC | Age: 74
End: 2024-04-18
Payer: COMMERCIAL

## 2024-04-18 VITALS
TEMPERATURE: 98.7 F | OXYGEN SATURATION: 98 % | DIASTOLIC BLOOD PRESSURE: 62 MMHG | SYSTOLIC BLOOD PRESSURE: 112 MMHG | HEART RATE: 89 BPM

## 2024-04-18 DIAGNOSIS — R05.3 PERSISTENT COUGH: ICD-10-CM

## 2024-04-18 DIAGNOSIS — T78.40XA ALLERGY, INITIAL ENCOUNTER: ICD-10-CM

## 2024-04-18 DIAGNOSIS — Z87.891 HISTORY OF TOBACCO ABUSE: ICD-10-CM

## 2024-04-18 DIAGNOSIS — R06.02 SHORTNESS OF BREATH: Primary | ICD-10-CM

## 2024-04-18 PROCEDURE — 99204 OFFICE O/P NEW MOD 45 MIN: CPT | Performed by: INTERNAL MEDICINE

## 2024-04-18 PROCEDURE — G2211 COMPLEX E/M VISIT ADD ON: HCPCS | Performed by: INTERNAL MEDICINE

## 2024-04-18 RX ORDER — FLUTICASONE PROPIONATE AND SALMETEROL 100; 50 UG/1; UG/1
1 POWDER RESPIRATORY (INHALATION) 2 TIMES DAILY
Qty: 60 BLISTER | Refills: 3 | Status: SHIPPED | OUTPATIENT
Start: 2024-04-18

## 2024-04-18 NOTE — PROGRESS NOTES
Pulmonary Consultation   Denise Brown 73 y.o. female MRN: [id number]  4/18/2024      Assessment:    1. Shortness of breath  Unclear etiology, this is occurring primarily when going up an incline.  She is able to walk on level ground without any issues  Most recent cardiac echo is overall normal  Will check complete pulmonary function testing  -     Complete PFT with post bronchodilator; Future    2. Persistent cough  Unclear etiology, she reports having some postnasal drip and is seeing ENT and is being treated with Singulair and nasal sprays  She also has persistent acid reflux and LPR and is being managed by GI and is currently on antireflux medications  Overall which she is describing seems to be consistent with a globus sensation  The other differential would include due to an ACE inhibitor since she is on lisinopril, cough variant asthma, or an allergic cough since she reports having allergies to cats and has a cat at home  Given the persistence of a cough I will check a CT chest without contrast for completeness  Will start Advair 1 puff twice daily  I recommend that she follow-up with her PCP and be switched to an alternative antihypertensive medication other than an ACE or an ARB  Will also refer to allergy and immunology  -     CT chest without contrast; Future; Expected date: 04/18/2024  -     Fluticasone-Salmeterol (Advair) 100-50 mcg/dose inhaler; Inhale 1 puff 2 (two) times a day Rinse mouth after use.    3. Allergy, initial encounter  Reports having allergies to cats and does have a cat at home  I recommend a follow-up with allergy and immunology for skin testing  She can trial an over-the-counter antihistamine  -     Ambulatory Referral to Allergy; Future    4. History of tobacco abuse  Smoked on and off 1 pack/day for 20 years and quit 27 years ago  Does not qualify for lung cancer screening CT        Plan:    Diagnoses and all orders for this visit:    Shortness of breath  -     Complete PFT  with post bronchodilator; Future    Persistent cough  -     CT chest without contrast; Future  -     Fluticasone-Salmeterol (Advair) 100-50 mcg/dose inhaler; Inhale 1 puff 2 (two) times a day Rinse mouth after use.    Allergy, initial encounter  -     Ambulatory Referral to Allergy; Future    History of tobacco abuse        History of Present Illness   HPI:  Denise Brown is a 73 y.o. female who presents for evaluation of shortness of breath and a cough.    She first noticed the shortness of breath with exertion in 2019 and at that time was being seen by cardiologist and had workup that was all negative.  She cannot recall when the symptom subsided but it did get resolved completely.  This past February 2024 she began having the shortness of breath with exertion again particularly when going up the steps.  She does walk on level ground outside every day when the weather is nice and does not have any shortness of breath with exertion.  She did have another recent cardiac echo that was normal.    Her cough is nonproductive and has been occurring for many years.  This has been primarily attributed to LPR.  She is being seen by gastroenterology as well as ear nose and throat.  She is on Singulair, nasal sprays.  She is being treated with antireflux medications.  She has an upcoming EGD.  She has also been on lisinopril for many years    She has a history of smoking 1 pack/day off and on for about 20 years and quit 27 years ago.  She denies any drugs or alcohol abuse.  She is a retired  for the [employer].  Her father had COPD and was a smoker.  She does report having a cat and is allergic to cats.    Review of Systems   Constitutional:  Negative for chills and fever.   HENT:  Negative for congestion, postnasal drip and rhinorrhea.    Eyes:  Negative for itching.   Respiratory:  Negative for cough, shortness of breath, wheezing and stridor.    Cardiovascular:  Negative for chest pain,  palpitations and leg swelling.   Gastrointestinal:  Negative for abdominal distention, abdominal pain, nausea and vomiting.   Genitourinary:  Negative for dysuria and flank pain.   Musculoskeletal:  Negative for arthralgias and myalgias.   Skin:  Negative for color change.   Neurological:  Negative for dizziness, light-headedness and headaches.   Psychiatric/Behavioral: Negative.         Historical Information   Past Medical History:   Diagnosis Date    Allergic rhinitis     Breast cancer (HCC) 08/21/2008    age 58    Cancer (HCC)     left breast, tx with rx    Chronic low back pain     Chronic pain disorder     Clostridium difficile colitis     Colon polyp     DDD (degenerative disc disease), lumbar     GERD (gastroesophageal reflux disease)     History of radiation therapy 09/2008    for breast cancer    Hypertension     Laryngopharyngeal reflux (LPR)     Lumbar spinal stenosis     Osteoarthritis of spine with radiculopathy, lumbar region     Osteopenia     Pelvic floor dysfunction     Piriformis syndrome of left side     Plantar fasciitis     Right knee pain     Sciatica     Sinusitis     Spondylosis of cervical region without myelopathy or radiculopathy      Past Surgical History:   Procedure Laterality Date    BREAST BIOPSY Left 07/30/2008    malignant    BREAST CYST ASPIRATION Left 1998    BREAST LUMPECTOMY Left 08/21/2008    BREAST SURGERY      COLONOSCOPY  08/31/2020    [place] ESOPHAGOGASTRODUODENOSCOPY TRANSORAL DIAGNOSTIC N/A 2/15/2017    Procedure: ESOPHAGOGASTRODUODENOSCOPY (EGD);  Surgeon: Dustin Phoenix MD;  Location: [facility];  Service: Gastroenterology    [place] ESOPHAGOGASTRODUODENOSCOPY TRANSORAL DIAGNOSTIC N/A 2/1/2018    Procedure: ESOPHAGOGASTRODUODENOSCOPY (EGD);  Surgeon: Dustin Phoenix MD;  [facility];  Service: Gastroenterology    UPPER GASTROINTESTINAL ENDOSCOPY  01/2020    US GUIDED MSK PROCEDURE  2/6/2020     Family History   Problem Relation Age of Onset    Osteoporosis Mother      Hypertension Mother     Coronary artery disease Father     Diabetes Father     COPD Father     Hypertension Father     No Known Problems Maternal Aunt     Breast cancer Paternal Aunt 73    No Known Problems Paternal Aunt     No Known Problems Maternal Aunt     Colon cancer Neg Hx        Occupational History: Retired  for the [employer]-the [employer] department    Social History: Remote tobacco abuse, denies any drugs or alcohol immediate    Meds/Allergies     Current Outpatient Medications:     azelastine (ASTELIN) 0.1 % nasal spray, 1 spray into each nostril 2 (two) times a day, Disp: 90 mL, Rfl: 3    Cholecalciferol (VITAMIN D-3 PO), Take 1,000 mg by mouth daily  , Disp: , Rfl:     Fluticasone-Salmeterol (Advair) 100-50 mcg/dose inhaler, Inhale 1 puff 2 (two) times a day Rinse mouth after use., Disp: 60 blister, Rfl: 3    lisinopril (ZESTRIL) 10 mg tablet, Take 10 mg by mouth daily, Disp: , Rfl:     montelukast (SINGULAIR) 10 mg tablet, Take 1 tablet (10 mg total) by mouth daily at bedtime, Disp: 30 tablet, Rfl: 11    Multiple Vitamins-Minerals (CENTRUM ADULTS PO), Take 1 tablet by mouth daily, Disp: , Rfl:     Omeprazole-Sodium Bicarbonate (Zegerid OTC)  MG CAPS, Take 1 capsule by mouth daily at bedtime, Disp: 30 capsule, Rfl: 11    Probiotic Product (PROBIOTIC PO), Take 1 tablet by mouth, Disp: , Rfl:     RABEprazole (ACIPHEX) 20 MG tablet, Take 2 tablets (40 mg total) by mouth daily, Disp: 180 tablet, Rfl: 1    Cyclobenzaprine HCl (FLEXERIL PO), Take 1 tablet by mouth daily, Disp: , Rfl:   Allergies   Allergen Reactions    Fentanyl Vomiting    Codeine     Morphine And Related Nausea Only, GI Intolerance and Vomiting     Note:  this allergy is not being included in drug screening.  Please inactivate this item and re-enter it to enable screening.    Morphine Sulfate [Morphine] Nausea Only, GI Intolerance and Vomiting    Naprosyn [Naproxen] Nausea Only, GI Intolerance and Vomiting    Oxycodone GI Intolerance and  Vomiting    Penicillins Other (See Comments)     Unknown reaction.    Sulfa Antibiotics Rash and Fever       Vitals: Blood pressure 112/62, pulse 89, temperature 98.7 °F (37.1 °C), temperature source Tympanic, SpO2 98%, not currently breastfeeding., There is no height or weight on file to calculate BMI. Oxygen Therapy  SpO2: 98 %  Oxygen Therapy: None (Room air)    Physical Exam  Physical Exam  Constitutional:       General: She is not in acute distress.     Appearance: She is not diaphoretic.   HENT:      Head: Normocephalic and atraumatic.      Nose: Nose normal.      Mouth/Throat:      Pharynx: No oropharyngeal exudate.   Eyes:      General: No scleral icterus.     Conjunctiva/sclera: Conjunctivae normal.      Pupils: Pupils are equal, round, and reactive to light.   Neck:      Thyroid: No thyromegaly.      Vascular: No JVD.      Trachea: No tracheal deviation.   Cardiovascular:      Rate and Rhythm: Normal rate and regular rhythm.      Heart sounds: Normal heart sounds. No murmur heard.     No friction rub. No gallop.   Pulmonary:      Effort: Pulmonary effort is normal. No respiratory distress.      Breath sounds: Normal breath sounds. No stridor. No wheezing or rales.   Abdominal:      General: Bowel sounds are normal. There is no distension.      Palpations: Abdomen is soft.      Tenderness: There is no abdominal tenderness. There is no guarding or rebound.   Musculoskeletal:         General: No deformity. Normal range of motion.      Cervical back: Normal range of motion and neck supple.   Lymphadenopathy:      Cervical: No cervical adenopathy.   Skin:     General: Skin is warm.      Findings: No erythema or rash.   Neurological:      Mental Status: She is alert and oriented to person, place, and time.      Cranial Nerves: No cranial nerve deficit.      Sensory: No sensory deficit.         Labs: I have personally reviewed pertinent lab results.  Lab Results   Component Value Date    WBC 5.93 12/19/2023     "HGB 13.0 12/19/2023    HCT 41.3 12/19/2023    MCV 93 12/19/2023     12/19/2023     Lab Results   Component Value Date    GLUCOSE 109 10/22/2015    CALCIUM 9.6 12/19/2023     10/22/2015    K 4.3 12/19/2023    CO2 28 12/19/2023     12/19/2023    BUN 16 12/19/2023    CREATININE 0.57 (L) 12/19/2023     No results found for: \"IGE\"  Lab Results   Component Value Date    ALT 18 12/19/2023    AST 19 12/19/2023    ALKPHOS 76 12/19/2023    BILITOT 0.79 10/22/2015       Imaging and other studies: I have personally reviewed pertinent reports.   and I have personally reviewed pertinent films in PACS  Chest x-ray-clear bilateral lung fields    Pulmonary function testing: None on file    EKG, Pathology, and Other Studies: I have personally reviewed pertinent reports.   and I have personally reviewed pertinent films in PACS    Ritu Leyva MD  Pulmonary and Critical Care   [facility]  "

## 2024-04-25 ENCOUNTER — ANESTHESIA EVENT (OUTPATIENT)
Dept: GASTROENTEROLOGY | Facility: HOSPITAL | Age: 74
End: 2024-04-25

## 2024-04-25 ENCOUNTER — HOSPITAL ENCOUNTER (OUTPATIENT)
Dept: GASTROENTEROLOGY | Facility: HOSPITAL | Age: 74
Setting detail: OUTPATIENT SURGERY
End: 2024-04-25
Attending: INTERNAL MEDICINE
Payer: COMMERCIAL

## 2024-04-25 ENCOUNTER — ANESTHESIA (OUTPATIENT)
Dept: GASTROENTEROLOGY | Facility: HOSPITAL | Age: 74
End: 2024-04-25

## 2024-04-25 VITALS
WEIGHT: 157 LBS | OXYGEN SATURATION: 97 % | DIASTOLIC BLOOD PRESSURE: 68 MMHG | HEART RATE: 81 BPM | BODY MASS INDEX: 24.64 KG/M2 | TEMPERATURE: 97.1 F | SYSTOLIC BLOOD PRESSURE: 118 MMHG | RESPIRATION RATE: 18 BRPM | HEIGHT: 67 IN

## 2024-04-25 DIAGNOSIS — K21.9 GASTROESOPHAGEAL REFLUX DISEASE WITHOUT ESOPHAGITIS: ICD-10-CM

## 2024-04-25 DIAGNOSIS — K21.9 LARYNGOPHARYNGEAL REFLUX (LPR): ICD-10-CM

## 2024-04-25 PROCEDURE — 43239 EGD BIOPSY SINGLE/MULTIPLE: CPT | Performed by: INTERNAL MEDICINE

## 2024-04-25 PROCEDURE — 88305 TISSUE EXAM BY PATHOLOGIST: CPT | Performed by: PATHOLOGY

## 2024-04-25 RX ORDER — PROPOFOL 10 MG/ML
INJECTION, EMULSION INTRAVENOUS AS NEEDED
Status: DISCONTINUED | OUTPATIENT
Start: 2024-04-25 | End: 2024-04-25

## 2024-04-25 RX ORDER — LIDOCAINE HYDROCHLORIDE 20 MG/ML
INJECTION, SOLUTION EPIDURAL; INFILTRATION; INTRACAUDAL; PERINEURAL AS NEEDED
Status: DISCONTINUED | OUTPATIENT
Start: 2024-04-25 | End: 2024-04-25

## 2024-04-25 RX ORDER — SODIUM CHLORIDE 9 MG/ML
INJECTION, SOLUTION INTRAVENOUS CONTINUOUS PRN
Status: DISCONTINUED | OUTPATIENT
Start: 2024-04-25 | End: 2024-04-25

## 2024-04-25 RX ADMIN — PROPOFOL 60 MG: 10 INJECTION, EMULSION INTRAVENOUS at 07:39

## 2024-04-25 RX ADMIN — PROPOFOL 140 MG: 10 INJECTION, EMULSION INTRAVENOUS at 07:36

## 2024-04-25 RX ADMIN — LIDOCAINE HYDROCHLORIDE 5 ML: 20 INJECTION, SOLUTION EPIDURAL; INFILTRATION; INTRACAUDAL at 07:36

## 2024-04-25 RX ADMIN — PROPOFOL 20 MG: 10 INJECTION, EMULSION INTRAVENOUS at 07:44

## 2024-04-25 RX ADMIN — PROPOFOL 30 MG: 10 INJECTION, EMULSION INTRAVENOUS at 07:42

## 2024-04-25 RX ADMIN — SODIUM CHLORIDE: 0.9 INJECTION, SOLUTION INTRAVENOUS at 07:30

## 2024-04-25 NOTE — ANESTHESIA POSTPROCEDURE EVALUATION
Post-Op Assessment Note    CV Status:  Stable  Pain Score: 0    Pain management: adequate       Mental Status:  Sleepy and arousable   Hydration Status:  Euvolemic   PONV Controlled:  Controlled   Airway Patency:  Patent     Post Op Vitals Reviewed: Yes    No anethesia notable event occurred.    Staff: CRNA               BP (!) 86/54 (04/25/24 0752)    Temp (!) 97.1 °F (36.2 °C) (04/25/24 0752)    Pulse 75 (04/25/24 0752)   Resp 16 (04/25/24 0752)    SpO2 95 % (04/25/24 0752)

## 2024-04-25 NOTE — H&P
History and Physical - [facility]  Denise Brwon 73 y.o. female MRN: [id number]    HPI: Denise Brown is a 73 y.o. year old female who presents with GERD.       Review of Systems    Historical Information   Past Medical History:   Diagnosis Date    Allergic rhinitis     Breast cancer (HCC) 08/21/2008    age 58    Cancer (HCC)     left breast, tx with rx    Chronic low back pain     Chronic pain disorder     Clostridium difficile colitis     Colon polyp     DDD (degenerative disc disease), lumbar     GERD (gastroesophageal reflux disease)     History of radiation therapy 09/2008    for breast cancer    Hypertension     Laryngopharyngeal reflux (LPR)     Lumbar spinal stenosis     Osteoarthritis of spine with radiculopathy, lumbar region     Osteopenia     Pelvic floor dysfunction     Piriformis syndrome of left side     Plantar fasciitis     Right knee pain     Sciatica     Sinusitis     Spondylosis of cervical region without myelopathy or radiculopathy      Past Surgical History:   Procedure Laterality Date    BREAST BIOPSY Left 07/30/2008    malignant    BREAST CYST ASPIRATION Left 1998    BREAST LUMPECTOMY Left 08/21/2008    BREAST SURGERY      COLONOSCOPY  08/31/2020    [place] ESOPHAGOGASTRODUODENOSCOPY TRANSORAL DIAGNOSTIC N/A 2/15/2017    Procedure: ESOPHAGOGASTRODUODENOSCOPY (EGD);  Surgeon: Dustin Phoenix MD;  Location: [facility];  Service: Gastroenterology    [place] ESOPHAGOGASTRODUODENOSCOPY TRANSORAL DIAGNOSTIC N/A 2/1/2018    Procedure: ESOPHAGOGASTRODUODENOSCOPY (EGD);  Surgeon: Dustin Phoenix MD;  [facility];  Service: Gastroenterology    UPPER GASTROINTESTINAL ENDOSCOPY  01/2020    US GUIDED MSK PROCEDURE  2/6/2020     Social History   Social History     Substance and Sexual Activity   Alcohol Use Yes    Alcohol/week: 7.0 standard drinks of alcohol    Types: 7 Glasses of wine per week    Comment: once a night     Social History     Substance and Sexual Activity   Drug Use  "No     Social History     Tobacco Use   Smoking Status Former    Current packs/day: 0.00    Average packs/day: 1 pack/day for 25.0 years (25.0 ttl pk-yrs)    Types: Cigarettes    Start date: 1972    Quit date: 1997    Years since quittin.6   Smokeless Tobacco Never   Tobacco Comments    already quit smoking in      Family History   Problem Relation Age of Onset    Osteoporosis Mother     Hypertension Mother     Coronary artery disease Father     Diabetes Father     COPD Father     Hypertension Father     No Known Problems Maternal Aunt     Breast cancer Paternal Aunt 73    No Known Problems Paternal Aunt     No Known Problems Maternal Aunt     Colon cancer Neg Hx        Meds/Allergies     (Not in a hospital admission)      Allergies   Allergen Reactions    Fentanyl Vomiting    Codeine     Morphine And Related Nausea Only, GI Intolerance and Vomiting     Note:  this allergy is not being included in drug screening.  Please inactivate this item and re-enter it to enable screening.    Morphine Sulfate [Morphine] Nausea Only, GI Intolerance and Vomiting    Naprosyn [Naproxen] Nausea Only, GI Intolerance and Vomiting    Oxycodone GI Intolerance and Vomiting    Penicillins Other (See Comments)     Unknown reaction.    Sulfa Antibiotics Rash and Fever       Objective     /76   Pulse 77   Temp (!) 96.9 °F (36.1 °C) (Tympanic)   Resp 16   Ht 5' 7\" (1.702 m)   Wt 71.2 kg (157 lb)   LMP  (LMP Unknown)   SpO2 99%   BMI 24.59 kg/m²       PHYSICAL EXAM    Gen: NAD  CV: RRR  CHEST: Clear  ABD: soft, NT/ND  EXT: no edema  Neuro: AAO      ASSESSMENT/PLAN:  This is a 73 y.o. year old female here for EGD with BRAVO for GERD.     PLAN:   Procedure: EGD with BRAVO      "

## 2024-04-25 NOTE — ANESTHESIA PREPROCEDURE EVALUATION
Procedure:  EGD  BRAVO PH MONITORING    Relevant Problems   ANESTHESIA   (+) PONV (postoperative nausea and vomiting)      CARDIO   (+) Dyspnea on exertion   (+) Essential hypertension      ENDO   (+) Hyperparathyroidism (HCC)      GI/HEPATIC   (+) Gastroesophageal reflux disease   (+) Pharyngoesophageal dysphagia      MUSCULOSKELETAL   (+) Arthritis of right acromioclavicular joint   (+) Chronic bilateral low back pain with right-sided sciatica   (+) Lateral epicondylitis of right elbow   (+) Lumbar spondylosis   (+) Pelvic floor dysfunction in female   (+) Sacroiliitis (HCC)      NEURO/PSYCH   (+) Chronic bilateral low back pain with right-sided sciatica      PULMONARY   (+) Dyspnea on exertion      2/2024: normal biventricular systolic function       Anesthesia Plan  ASA Score- 3     Anesthesia Type- IV sedation with anesthesia with ASA Monitors.         Additional Monitors:     Airway Plan:     Comment: IV sedation,  standard ASA monitors. Risks and benefits discussed with patient; patient consented and agrees to proceed.    I saw and evaluated the patient. If seen with CRNA, we have discussed the anesthetic plan and I am in agreement that the plan is appropriate for the patient.  .       Plan Factors-    Chart reviewed.   Existing labs reviewed.                   Induction- intravenous.    Postoperative Plan-     Informed Consent- Anesthetic plan and risks discussed with patient.  I personally reviewed this patient with the CRNA. Discussed and agreed on the Anesthesia Plan with the CRNA..

## 2024-04-30 PROCEDURE — 88305 TISSUE EXAM BY PATHOLOGIST: CPT | Performed by: PATHOLOGY

## 2024-05-01 PROCEDURE — 91035 G-ESOPH REFLX TST W/ELECTROD: CPT | Performed by: INTERNAL MEDICINE

## 2024-05-01 NOTE — RESULT ENCOUNTER NOTE
Inform patient via [employer].  Please review the pathology/lab result of further discussion.    Copied from [employer] message :       Annie Lizama,     Your stomach biopsies showed mild inflammation but otherwise unremarkable. No infection was seen. Esophagus / food pipe biopsies were unremarkable. I will update you on the BRAVO results.     Best regards,     Robinson Price MD

## 2024-05-02 ENCOUNTER — APPOINTMENT (OUTPATIENT)
Dept: LAB | Age: 74
End: 2024-05-02
Payer: COMMERCIAL

## 2024-05-02 DIAGNOSIS — E21.3 HYPERPARATHYROIDISM (HCC): ICD-10-CM

## 2024-05-02 LAB
25(OH)D3 SERPL-MCNC: 44.7 NG/ML (ref 30–100)
ALBUMIN SERPL BCP-MCNC: 4.7 G/DL (ref 3.5–5)
CALCIUM SERPL-MCNC: 10.2 MG/DL (ref 8.4–10.2)
PTH-INTACT SERPL-MCNC: 97.1 PG/ML (ref 12–88)

## 2024-05-02 PROCEDURE — 83970 ASSAY OF PARATHORMONE: CPT

## 2024-05-02 PROCEDURE — 36415 COLL VENOUS BLD VENIPUNCTURE: CPT

## 2024-05-02 PROCEDURE — 82040 ASSAY OF SERUM ALBUMIN: CPT

## 2024-05-02 PROCEDURE — 82306 VITAMIN D 25 HYDROXY: CPT

## 2024-05-14 ENCOUNTER — HOSPITAL ENCOUNTER (OUTPATIENT)
Dept: PULMONOLOGY | Facility: HOSPITAL | Age: 74
Discharge: HOME | End: 2024-05-14
Attending: INTERNAL MEDICINE
Payer: COMMERCIAL

## 2024-05-14 ENCOUNTER — HOSPITAL ENCOUNTER (OUTPATIENT)
Dept: RADIOLOGY | Facility: HOSPITAL | Age: 74
Discharge: HOME | End: 2024-05-14
Attending: INTERNAL MEDICINE
Payer: COMMERCIAL

## 2024-05-14 DIAGNOSIS — R05.3 PERSISTENT COUGH: ICD-10-CM

## 2024-05-14 DIAGNOSIS — R06.02 SHORTNESS OF BREATH: ICD-10-CM

## 2024-05-14 PROCEDURE — 71250 CT THORAX DX C-: CPT

## 2024-05-14 PROCEDURE — 94060 EVALUATION OF WHEEZING: CPT | Performed by: INTERNAL MEDICINE

## 2024-05-14 PROCEDURE — 94060 EVALUATION OF WHEEZING: CPT

## 2024-05-14 PROCEDURE — 94726 PLETHYSMOGRAPHY LUNG VOLUMES: CPT | Performed by: INTERNAL MEDICINE

## 2024-05-14 PROCEDURE — 94760 N-INVAS EAR/PLS OXIMETRY 1: CPT

## 2024-05-14 PROCEDURE — 94729 DIFFUSING CAPACITY: CPT | Performed by: INTERNAL MEDICINE

## 2024-05-14 PROCEDURE — 94726 PLETHYSMOGRAPHY LUNG VOLUMES: CPT

## 2024-05-14 PROCEDURE — 94729 DIFFUSING CAPACITY: CPT

## 2024-05-14 RX ORDER — ALBUTEROL SULFATE 2.5 MG/3ML
2.5 SOLUTION RESPIRATORY (INHALATION) ONCE
Status: COMPLETED | OUTPATIENT
Start: 2024-05-14 | End: 2024-05-14

## 2024-05-14 RX ADMIN — ALBUTEROL SULFATE 2.5 MG: 2.5 SOLUTION RESPIRATORY (INHALATION) at 15:08

## 2024-05-22 ENCOUNTER — TELEPHONE (OUTPATIENT)
Dept: UROLOGY | Facility: CLINIC | Age: 74
End: 2024-05-22

## 2024-05-23 NOTE — TELEPHONE ENCOUNTER
Patient returned call and rescheduled appt that was rescheduled by the office due to the time of day. Pt can't do early AM.    Pt is rescheduled for 6/26 at 1 PM in [place] with PRANAV Holder.    No further action is needed at this time.

## 2024-05-24 ENCOUNTER — TELEPHONE (OUTPATIENT)
Age: 74
End: 2024-05-24

## 2024-05-24 DIAGNOSIS — R91.1 PULMONARY NODULE: Primary | ICD-10-CM

## 2024-05-30 NOTE — TELEPHONE ENCOUNTER
Pt called in to advise us she was able to hear Roma voice message on her results . Patient wants to confirm if she should keep her FU appt in June or can she cancel Please advise

## 2024-05-31 ENCOUNTER — TELEPHONE (OUTPATIENT)
Dept: GASTROENTEROLOGY | Facility: MEDICAL CENTER | Age: 74
End: 2024-05-31

## 2024-06-04 NOTE — TELEPHONE ENCOUNTER
Pt returning Charla's call from Friday, appt Charla offered no longer available. I offered pt aguila Dr. Price had today at 4 pm and pt said she could not take that appt but to keep her on wait list.

## 2024-06-11 ENCOUNTER — APPOINTMENT (OUTPATIENT)
Dept: LAB | Facility: IMAGING CENTER | Age: 74
End: 2024-06-11
Payer: COMMERCIAL

## 2024-06-11 DIAGNOSIS — R73.01 IMPAIRED FASTING GLUCOSE: ICD-10-CM

## 2024-06-11 DIAGNOSIS — E78.2 MIXED HYPERLIPIDEMIA: ICD-10-CM

## 2024-06-11 LAB
ALBUMIN SERPL BCP-MCNC: 4.5 G/DL (ref 3.5–5)
ALP SERPL-CCNC: 83 U/L (ref 34–104)
ALT SERPL W P-5'-P-CCNC: 18 U/L (ref 7–52)
ANION GAP SERPL CALCULATED.3IONS-SCNC: 9 MMOL/L (ref 4–13)
AST SERPL W P-5'-P-CCNC: 20 U/L (ref 13–39)
BILIRUB SERPL-MCNC: 0.89 MG/DL (ref 0.2–1)
BUN SERPL-MCNC: 20 MG/DL (ref 5–25)
CALCIUM SERPL-MCNC: 10.1 MG/DL (ref 8.4–10.2)
CHLORIDE SERPL-SCNC: 103 MMOL/L (ref 96–108)
CHOLEST SERPL-MCNC: 208 MG/DL
CO2 SERPL-SCNC: 29 MMOL/L (ref 21–32)
CREAT SERPL-MCNC: 0.6 MG/DL (ref 0.6–1.3)
EST. AVERAGE GLUCOSE BLD GHB EST-MCNC: 108 MG/DL
GFR SERPL CREATININE-BSD FRML MDRD: 90 ML/MIN/1.73SQ M
GLUCOSE P FAST SERPL-MCNC: 105 MG/DL (ref 65–99)
HBA1C MFR BLD: 5.4 %
HDLC SERPL-MCNC: 54 MG/DL
LDLC SERPL CALC-MCNC: 98 MG/DL (ref 0–100)
NONHDLC SERPL-MCNC: 154 MG/DL
POTASSIUM SERPL-SCNC: 4.8 MMOL/L (ref 3.5–5.3)
PROT SERPL-MCNC: 7 G/DL (ref 6.4–8.4)
SODIUM SERPL-SCNC: 141 MMOL/L (ref 135–147)
TRIGL SERPL-MCNC: 281 MG/DL

## 2024-06-11 PROCEDURE — 80053 COMPREHEN METABOLIC PANEL: CPT

## 2024-06-11 PROCEDURE — 80061 LIPID PANEL: CPT

## 2024-06-11 PROCEDURE — 83036 HEMOGLOBIN GLYCOSYLATED A1C: CPT

## 2024-06-11 PROCEDURE — 36415 COLL VENOUS BLD VENIPUNCTURE: CPT

## 2024-06-26 ENCOUNTER — OFFICE VISIT (OUTPATIENT)
Dept: UROLOGY | Facility: CLINIC | Age: 74
End: 2024-06-26
Payer: COMMERCIAL

## 2024-06-26 VITALS
SYSTOLIC BLOOD PRESSURE: 112 MMHG | OXYGEN SATURATION: 97 % | BODY MASS INDEX: 24.64 KG/M2 | DIASTOLIC BLOOD PRESSURE: 64 MMHG | HEART RATE: 93 BPM | WEIGHT: 157 LBS | HEIGHT: 67 IN

## 2024-06-26 DIAGNOSIS — R82.90 ABNORMAL URINE ODOR: Primary | ICD-10-CM

## 2024-06-26 LAB — POST-VOID RESIDUAL VOLUME, ML POC: 0 ML

## 2024-06-26 PROCEDURE — 99203 OFFICE O/P NEW LOW 30 MIN: CPT

## 2024-06-26 PROCEDURE — 51798 US URINE CAPACITY MEASURE: CPT

## 2024-06-26 RX ORDER — CYCLOBENZAPRINE HCL 5 MG
TABLET ORAL
COMMUNITY
Start: 2024-05-27

## 2024-06-26 NOTE — PROGRESS NOTES
6/26/2024      No chief complaint on file.        Assessment and Plan    73 y.o. female managed by NEW PATIENT     History of UTIs  -Last UTI August 2023, urine culture was positive for E.coli. No recent UTIs.  -We did discuss preventative measures for UTIs, adequate hydration, cranberry supplements, probiotics (patient already taking), and 2 grams daily of d-mannose. This supplement can be purchased online on [employer] and is available in a capsule or powder.   -Experiences urinary frequency for many years, has completed pelvic floor PT. Has not previously needed medications for frequency. Content with management.  -PVR 0 mL today. Patient previously voided prior to appointment, unable to obtain urine specimen.  -She states over the last 5 to 6 years she has foul smelling urine which she describes as smelling like bleach.   -US kidney and bladder was unremarkable (3/16/22). Small right renal cysts which do not require follow-up imaging.   -CMP on 6/11/24 does not show kidney or liver lab abnormalities.   -We discussed that urine odor is not indicative of a UTI. It can be from a mix of diet, medications/supplements. It is not to be of concern.   -She can follow-up with us on an as-needed basis. All questions and concerns addressed. Patient was advised to reach out to us for any urinary concerns.          History of Present Illness  Denise Brown is a 73 y.o. female here for evaluation of history of recurrent UTIs. Past medical history consists of hypertension, reflux laryngitis, hyperparathyroidism, hypercalcemia, chronic back pain, arthritis, osteoporosis. Her last UTI was in August 2023. UC positive for e.coli (8/01/23). She is concerned about malodorous urine over the last 5-6 years. She denies dysuria, flank pain, and gross hematuria. She states that she experiences frequency but that she has experienced this for many years. Content with voiding. She drinks 1 cup of decaf coffee and 1 cup of decaf tea daily.  "PVR 0 mL today. She denies history of hospitalizations for UTIs. No history of kidney stones. US kidney and bladder (3/22) unremarkable.        Review of Systems   Constitutional:  Negative for chills and fever.   HENT:  Negative for ear pain and sore throat.    Eyes:  Negative for pain and visual disturbance.   Respiratory:  Negative for cough and shortness of breath.    Cardiovascular:  Negative for chest pain and palpitations.   Gastrointestinal:  Negative for abdominal pain and vomiting.   Genitourinary:  Positive for frequency. Negative for dysuria, hematuria and urgency.   Musculoskeletal:  Negative for arthralgias and back pain.   Skin:  Negative for color change and rash.   Neurological:  Negative for seizures and syncope.   All other systems reviewed and are negative.               Vitals  Vitals:    06/26/24 1259   BP: 112/64   BP Location: Left arm   Patient Position: Sitting   Cuff Size: Standard   Pulse: 93   SpO2: 97%   Weight: 71.2 kg (157 lb)   Height: 5' 7\" (1.702 m)       Physical Exam  Constitutional:       Appearance: Normal appearance.   HENT:      Head: Normocephalic.   Eyes:      Extraocular Movements: Extraocular movements intact.      Pupils: Pupils are equal, round, and reactive to light.   Pulmonary:      Effort: Pulmonary effort is normal. No respiratory distress.   Musculoskeletal:         General: Normal range of motion.      Cervical back: Normal range of motion.   Neurological:      Mental Status: She is alert and oriented to person, place, and time.   Psychiatric:         Mood and Affect: Mood normal.         Behavior: Behavior normal.         Thought Content: Thought content normal.         Judgment: Judgment normal.           Past History  Past Medical History:   Diagnosis Date    Allergic rhinitis     Breast cancer (HCC) 08/21/2008    age 58    Cancer (HCC)     left breast, tx with rx    Chronic low back pain     Chronic pain disorder     Clostridium difficile colitis     Colon " polyp     DDD (degenerative disc disease), lumbar     GERD (gastroesophageal reflux disease)     History of radiation therapy 2008    for breast cancer    Hypertension     Laryngopharyngeal reflux (LPR)     Lumbar spinal stenosis     Osteoarthritis of spine with radiculopathy, lumbar region     Osteopenia     Pelvic floor dysfunction     Piriformis syndrome of left side     Plantar fasciitis     Right knee pain     Sciatica     Sinusitis     Spondylosis of cervical region without myelopathy or radiculopathy      Social History     Socioeconomic History    Marital status: /Civil Union     Spouse name: None    Number of children: None    Years of education: None    Highest education level: None   Occupational History    None   Tobacco Use    Smoking status: Former     Current packs/day: 0.00     Average packs/day: 1 pack/day for 25.0 years (25.0 ttl pk-yrs)     Types: Cigarettes     Start date: 1972     Quit date: 1997     Years since quittin.8    Smokeless tobacco: Never    Tobacco comments:     already quit smoking in    Vaping Use    Vaping status: Never Used   Substance and Sexual Activity    Alcohol use: Yes     Alcohol/week: 7.0 standard drinks of alcohol     Types: 7 Glasses of wine per week     Comment: once a night    Drug use: No    Sexual activity: Not Currently     Partners: Male     Birth control/protection: Post-menopausal   Other Topics Concern    None   Social History Narrative    None     Social Determinants of Health     Financial Resource Strain: Not on file   Food Insecurity: Not on file   Transportation Needs: Not on file   Physical Activity: Not on file   Stress: Not on file   Social Connections: Not on file   Intimate Partner Violence: Not on file   Housing Stability: Not on file     Social History     Tobacco Use   Smoking Status Former    Current packs/day: 0.00    Average packs/day: 1 pack/day for 25.0 years (25.0 ttl pk-yrs)    Types: Cigarettes    Start date:  "1972    Quit date: 1997    Years since quittin.8   Smokeless Tobacco Never   Tobacco Comments    already quit smoking in      Family History   Problem Relation Age of Onset    Osteoporosis Mother     Hypertension Mother     Coronary artery disease Father     Diabetes Father     COPD Father     Hypertension Father     No Known Problems Maternal Aunt     Breast cancer Paternal Aunt 73    No Known Problems Paternal Aunt     No Known Problems Maternal Aunt     Colon cancer Neg Hx        The following portions of the patient's history were reviewed and updated as appropriate: allergies, current medications, past medical history, past social history, past surgical history and problem list.    Results  Recent Results (from the past 1 hour(s))   POCT Measure PVR    Collection Time: 24 12:59 PM   Result Value Ref Range    POST-VOID RESIDUAL VOLUME, ML POC 0 mL   ]  No results found for: \"PSA\"  Lab Results   Component Value Date    GLUCOSE 109 10/22/2015    CALCIUM 10.1 2024     10/22/2015    K 4.8 2024    CO2 29 2024     2024    BUN 20 2024    CREATININE 0.60 2024     Lab Results   Component Value Date    WBC 5.93 2023    HGB 13.0 2023    HCT 41.3 2023    MCV 93 2023     2023       SANDEEP Stone  "

## 2024-06-27 ENCOUNTER — TELEPHONE (OUTPATIENT)
Dept: PULMONOLOGY | Facility: CLINIC | Age: 74
End: 2024-06-27

## 2024-06-27 NOTE — TELEPHONE ENCOUNTER
Called patient to Reschedule Appointment due to No Power at the [place] office on 6/27/2024  and left a voicemail message.

## 2024-06-29 PROBLEM — K44.9 HIATAL HERNIA: Status: ACTIVE | Noted: 2024-06-29

## 2024-07-03 ENCOUNTER — HOSPITAL ENCOUNTER (OUTPATIENT)
Dept: RADIOLOGY | Facility: HOSPITAL | Age: 74
Discharge: HOME | End: 2024-07-03
Attending: FAMILY MEDICINE
Payer: COMMERCIAL

## 2024-07-03 DIAGNOSIS — M89.8X8 OTHER SPECIFIED DISORDERS OF BONE, OTHER SITE: ICD-10-CM

## 2024-07-03 PROCEDURE — A9503 TC99M MEDRONATE: HCPCS

## 2024-07-03 PROCEDURE — 78306 BONE IMAGING WHOLE BODY: CPT

## 2024-07-10 ENCOUNTER — FOLLOW UP (OUTPATIENT)
Dept: URBAN - METROPOLITAN AREA CLINIC 6 | Facility: CLINIC | Age: 74
End: 2024-07-10

## 2024-07-10 DIAGNOSIS — H11.441: ICD-10-CM

## 2024-07-10 DIAGNOSIS — H52.4: ICD-10-CM

## 2024-07-10 DIAGNOSIS — H35.3130: ICD-10-CM

## 2024-07-10 DIAGNOSIS — H04.123: ICD-10-CM

## 2024-07-10 PROCEDURE — 92134 CPTRZ OPH DX IMG PST SGM RTA: CPT

## 2024-07-10 PROCEDURE — 92015 DETERMINE REFRACTIVE STATE: CPT

## 2024-07-10 PROCEDURE — 92012 INTRM OPH EXAM EST PATIENT: CPT

## 2024-07-10 ASSESSMENT — TONOMETRY
OS_IOP_MMHG: 17
OD_IOP_MMHG: 16

## 2024-07-10 ASSESSMENT — VISUAL ACUITY
OS_CC: 20/30
OD_CC: 20/30

## 2024-07-12 ENCOUNTER — OFFICE VISIT (OUTPATIENT)
Dept: OBGYN CLINIC | Facility: CLINIC | Age: 74
End: 2024-07-12
Payer: COMMERCIAL

## 2024-07-12 VITALS
BODY MASS INDEX: 24.8 KG/M2 | DIASTOLIC BLOOD PRESSURE: 80 MMHG | SYSTOLIC BLOOD PRESSURE: 122 MMHG | WEIGHT: 158 LBS | HEART RATE: 80 BPM | HEIGHT: 67 IN

## 2024-07-12 DIAGNOSIS — M17.11 PATELLOFEMORAL ARTHRITIS OF RIGHT KNEE: Primary | ICD-10-CM

## 2024-07-12 PROCEDURE — 99213 OFFICE O/P EST LOW 20 MIN: CPT | Performed by: ORTHOPAEDIC SURGERY

## 2024-07-12 PROCEDURE — 20610 DRAIN/INJ JOINT/BURSA W/O US: CPT | Performed by: PHYSICIAN ASSISTANT

## 2024-07-12 RX ORDER — KETOROLAC TROMETHAMINE 30 MG/ML
30 INJECTION, SOLUTION INTRAMUSCULAR; INTRAVENOUS
Status: COMPLETED | OUTPATIENT
Start: 2024-07-12 | End: 2024-07-12

## 2024-07-12 RX ORDER — METHYLPREDNISOLONE ACETATE 40 MG/ML
2 INJECTION, SUSPENSION INTRA-ARTICULAR; INTRALESIONAL; INTRAMUSCULAR; SOFT TISSUE
Status: COMPLETED | OUTPATIENT
Start: 2024-07-12 | End: 2024-07-12

## 2024-07-12 RX ORDER — BUPIVACAINE HYDROCHLORIDE 2.5 MG/ML
2 INJECTION, SOLUTION INFILTRATION; PERINEURAL
Status: COMPLETED | OUTPATIENT
Start: 2024-07-12 | End: 2024-07-12

## 2024-07-12 RX ADMIN — BUPIVACAINE HYDROCHLORIDE 2 ML: 2.5 INJECTION, SOLUTION INFILTRATION; PERINEURAL at 11:45

## 2024-07-12 RX ADMIN — KETOROLAC TROMETHAMINE 30 MG: 30 INJECTION, SOLUTION INTRAMUSCULAR; INTRAVENOUS at 11:45

## 2024-07-12 RX ADMIN — METHYLPREDNISOLONE ACETATE 2 ML: 40 INJECTION, SUSPENSION INTRA-ARTICULAR; INTRALESIONAL; INTRAMUSCULAR; SOFT TISSUE at 11:45

## 2024-07-12 NOTE — PROGRESS NOTES
Orthopedics          Denise Brown 73 y.o. female MRN: [id number]      Chief Complaint:   right knee pain    HPI:   73 y.o.female complaining of right knee pain.  She states the pain in her right knee is worse with weightbearing worse with going up and down stairs she had been diagnosed patellofemoral arthritis in the past.  She has had an injection 4 months ago with significant pain relief however pain since returned.  Denies any instability to her right knee denies numbness tingling states her pain began approximately 3 weeks ago is been worsening over the past few weeks time.  Denies any radiation of pain                Review Of Systems:   Skin: Normal  Neuro: See HPI  Musculoskeletal: See HPI  All other systems reviewed and are negative    Past Medical History:   Past Medical History:   Diagnosis Date    Allergic rhinitis     Breast cancer (HCC) 08/21/2008    age 58    Cancer (HCC)     left breast, tx with rx    Chronic low back pain     Chronic pain disorder     Clostridium difficile colitis     Colon polyp     DDD (degenerative disc disease), lumbar     GERD (gastroesophageal reflux disease)     History of radiation therapy 09/2008    for breast cancer    Hypertension     Laryngopharyngeal reflux (LPR)     Lumbar spinal stenosis     Osteoarthritis of spine with radiculopathy, lumbar region     Osteopenia     Pelvic floor dysfunction     Piriformis syndrome of left side     Plantar fasciitis     Right knee pain     Sciatica     Sinusitis     Spondylosis of cervical region without myelopathy or radiculopathy        Past Surgical History:   Past Surgical History:   Procedure Laterality Date    BREAST BIOPSY Left 07/30/2008    malignant    BREAST CYST ASPIRATION Left 1998    BREAST LUMPECTOMY Left 08/21/2008    BREAST SURGERY      COLONOSCOPY  08/31/2020    CT ESOPHAGOGASTRODUODENOSCOPY TRANSORAL DIAGNOSTIC N/A 2/15/2017    Procedure: ESOPHAGOGASTRODUODENOSCOPY (EGD);  Surgeon: Dustin Phoenix MD;  Location: [facility];  Service: Gastroenterology    [facility] ESOPHAGOGASTRODUODENOSCOPY TRANSORAL DIAGNOSTIC N/A 2018    Procedure: ESOPHAGOGASTRODUODENOSCOPY (EGD);  Surgeon: Dustin Phoenix MD;  Location: [facility];  Service: Gastroenterology    UPPER GASTROINTESTINAL ENDOSCOPY  2020    US GUIDED MSK PROCEDURE  2020       Family History:  Family history reviewed and non-contributory  Family History   Problem Relation Age of Onset    Osteoporosis Mother     Hypertension Mother     Coronary artery disease Father     Diabetes Father     COPD Father     Hypertension Father     No Known Problems Maternal Aunt     Breast cancer Paternal Aunt 73    No Known Problems Paternal Aunt     No Known Problems Maternal Aunt     Colon cancer Neg Hx          Social History:  Social History     Socioeconomic History    Marital status: /Civil Union     Spouse name: None    Number of children: None    Years of education: None    Highest education level: None   Occupational History    None   Tobacco Use    Smoking status: Former     Current packs/day: 0.00     Average packs/day: 1 pack/day for 25.0 years (25.0 ttl pk-yrs)     Types: Cigarettes     Start date: 1972     Quit date: 1997     Years since quittin.8    Smokeless tobacco: Never    Tobacco comments:     already quit smoking in    Vaping Use    Vaping status: Never Used   Substance and Sexual Activity    Alcohol use: Yes     Alcohol/week: 7.0 standard drinks of alcohol     Types: 7 Glasses of wine per week     Comment: once a night    Drug use: No    Sexual activity: Not Currently     Partners: Male     Birth control/protection: Post-menopausal   Other Topics Concern    None   Social History Narrative    None     Social Determinants of Health     Financial Resource Strain: Not on file   Food Insecurity: Not on file   Transportation Needs: Not on file   Physical Activity: Not on file   Stress: Not on file   Social Connections: Not on file   Intimate Partner  Violence: Not on file   Housing Stability: Not on file       Allergies:   Allergies   Allergen Reactions    Fentanyl Vomiting    Codeine     Morphine And Codeine Nausea Only, GI Intolerance and Vomiting     Note:  this allergy is not being included in drug screening.  Please inactivate this item and re-enter it to enable screening.    Morphine Sulfate [Morphine] Nausea Only, GI Intolerance and Vomiting    Naprosyn [Naproxen] Nausea Only, GI Intolerance and Vomiting    Oxycodone GI Intolerance and Vomiting    Penicillins Other (See Comments)     Unknown reaction.    Sulfa Antibiotics Rash and Fever       Labs:  0   Lab Value Date/Time    HCT 41.3 12/19/2023 0709    HCT 43.8 06/16/2023 0707    HCT 45.6 12/13/2022 0707    HGB 13.0 12/19/2023 0709    HGB 13.2 06/16/2023 0707    HGB 13.6 12/13/2022 0707    WBC 5.93 12/19/2023 0709    WBC 6.85 06/16/2023 0707    WBC 7.00 12/13/2022 0707       Meds:    Current Outpatient Medications:     Azelastine HCl 137 MCG/SPRAY SOLN, SPRAY 1 SPRAY INTO EACH NOSTRIL TWICE A DAY, Disp: 30 mL, Rfl: 11    Cholecalciferol (VITAMIN D-3 PO), Take 1,000 mg by mouth daily  , Disp: , Rfl:     cyclobenzaprine (FLEXERIL) 5 mg tablet, TAKE 1 TO 2 TABLETS BY MOUTH 3 TIMES A DAY AS NEEDED, Disp: , Rfl:     lisinopril (ZESTRIL) 10 mg tablet, Take 10 mg by mouth daily, Disp: , Rfl:     montelukast (SINGULAIR) 10 mg tablet, Take 1 tablet (10 mg total) by mouth daily at bedtime, Disp: 30 tablet, Rfl: 11    Multiple Vitamins-Minerals (CENTRUM ADULTS PO), Take 1 tablet by mouth daily, Disp: , Rfl:     Omeprazole-Sodium Bicarbonate (Zegerid OTC)  MG CAPS, Take 1 capsule by mouth daily at bedtime, Disp: 30 capsule, Rfl: 11    Probiotic Product (PROBIOTIC PO), Take 1 tablet by mouth, Disp: , Rfl:     RABEprazole (ACIPHEX) 20 MG tablet, Take 2 tablets (40 mg total) by mouth daily, Disp: 180 tablet, Rfl: 1    Body mass index is 24.75 kg/m².  Wt Readings from Last 3 Encounters:   07/12/24 71.7 kg (158  lb)   06/26/24 71.2 kg (157 lb)   05/22/24 71.2 kg (157 lb)     Physical Exam:   Vitals:    07/12/24 1147   BP: 122/80   Pulse: 80       General Appearance:    Alert, cooperative, no distress, appears stated age   Head:    Normocephalic, without obvious abnormality, atraumatic   Eyes:    conjunctiva/corneas clear, both eyes        Nose:   Nares normal, septum midline, no drainage    Throat:   Lips normal; teeth and gums normal   Neck:    symmetrical, trachea midline, ;     thyroid:  no enlargement/   Back:     Symmetric, no curvature, ROM normal   Lungs:   No audible wheezing or labored breathing   Chest Wall:    No tenderness or deformity    Heart:    Regular rate and rhythm               Pulses:   2+ and symmetric all extremities   Skin:   Skin color, texture, turgor normal, no rashes or lesions   Neurologic:   normal strength, sensation and reflexes     throughout       Musculoskeletal: right lower extremity  On examination of the right knee there is no effusion, no erythema.  Range of motion to full active extension and flexion to greater than 120°.  Pain on palpation medial and lateral joint lines.  There is crepitus with range of motion, no warmth to palpation, bony enlargement noted. No pain on palpation pes anserine bursa region or distal iliotibial band.  Stable to varus and valgus stress without pain or gapping.  Negative anterior and posterior drawer testing.  Sensation intact distal pulses present.    Large joint arthrocentesis: R knee  Universal Protocol:  Consent: Verbal consent obtained.  Consent given by: patient  Patient understanding: patient states understanding of the procedure being performed  Site marked: the operative site was marked  Patient identity confirmed: verbally with patient  Supporting Documentation  Indications: pain   Procedure Details  Location: knee - R knee  Needle size: 22 G  Approach: anterolateral  Medications administered: 2 mL bupivacaine 0.25 %; 2 mL methylPREDNISolone  acetate 40 mg/mL; 30 mg ketorolac 30 mg/mL    Patient tolerance: patient tolerated the procedure well with no immediate complications        _*_*_*_*_*_*_*_*_*_*_*_*_*_*_*_*_*_*_*_*_*_*_*_*_*_*_*_*_*_*_*_*_*_*_*_*_*_*_*_*_*    Assessment:  73 y.o.female with right knee pain due to arthritis/patellofemoral arthritis    Plan:   Weight bearing as tolerated  right lower extremity  Right knee intra-articular corticosteroid Toradol injection ministered as noted above  Patient advised should they develop any increasing pain, redness, drainage, numbness, tingling or swelling surrounding the injection site, they are to contact our office or present to the emergency department.   Follow up in 4 months or sooner if needed      Clayton Edouard PA-C                    .

## 2024-07-22 ENCOUNTER — TELEPHONE (OUTPATIENT)
Dept: GASTROENTEROLOGY | Facility: CLINIC | Age: 74
End: 2024-07-22

## 2024-07-31 PROBLEM — J30.81 ALLERGIC RHINITIS DUE TO CATS: Status: ACTIVE | Noted: 2024-07-31

## 2024-07-31 PROBLEM — J30.89 ALLERGIC RHINITIS DUE TO MOLD: Status: ACTIVE | Noted: 2024-07-31

## 2024-07-31 PROBLEM — J30.1 CHRONIC SEASONAL ALLERGIC RHINITIS DUE TO POLLEN: Status: ACTIVE | Noted: 2024-07-31

## 2024-08-06 NOTE — PROGRESS NOTES
Pulmonary Follow-up   Denise Brown 74 y.o. female MRN: [id number]  8/6/2024    Assessment:  Abnormal CT Chest  CT Chest 5/2024: small focus of microcystic changes at the RUL with no associated solid density.   Possibly due to small focus of emphysema at the right apex  Chronic cough  Likely in the setting of refractory LPR and UACS.  PFT 5/2024: normal spirometry. No bronchodilator response.  Laryngophargyneal reflux  Followed by ENT Allergic to cats (patient has a cat at home), mold, tree, ragweed.  Takes both Rabeprazole (aciphex) and omeprazole (Zegrid)  GERD without esophagitis  EGD 4/25/24: mild gastritis. Path negative for intestinal metaplasia/dysplasia. No eosinophils. No H. Pylori.  UACS  On azelastine and fluticasone (Exhance)  Uses Neti Pot  History of Tobacco use  1ppd for 20 years, but quit 27 years ago.    Plan:  Return to pulmonary clinic in 4 months  Repeat CT chest to follow up cystic lesion in the RUL 11/29/2024.   F/u with ENT for LPR as well as GI for GERD without esophagitis  Cw aciphex and zegrid for acid reflux as prescribed by GI  Cw azelastine & xhance (fluticasone) spray, and neti pot as directed by ENT.  Discussed possibility of removing cat from her home due to her allergy, but patient is not amenable to removing the cat at this time.    History of Present Illness   HPI:  Denise Brown is a 74 y.o. female with Hx of LPR, GERD without esophagitis, UACS, former tobacco use, who presents to pulmonary clinic for abnormal CT chest. Patient notes no unintentional weight loss, fever, chills, hemoptysis. Patient denies family history of lung cancer or liver disease. As far as her chronic cough. Patient reports that she has had no change in the mucus production which she believes occurs after she has a globus sensation in her throat. She currently is on 2 different PPIs for LPR & GERD which has somewhat helped. Patient also has a post nasal drip which is currently being treated with  azelastine and fluticasone which has somewhat helped as well. She has been seen by both GI and ENT for years. Recent EGD showed mild gastritis, but no intestinal metaplasia or H.pylori.       Answers submitted by the patient for this visit:  Pulmonology Questionnaire (Submitted on 7/31/2024)  Chief Complaint: Primary symptoms  Do you experience frequent throat clearing?: Yes  Chronicity: recurrent  When did you first notice your symptoms?: more than 1 year ago  How often do your symptoms occur?: intermittently  Since you first noticed this problem, how has it changed?: unchanged  Do you have shortness of breath that occurs with effort or exertion?: No  Do you have ear congestion?: No  Do you have heartburn?: No  Do you have fatigue?: No  Do you have nasal congestion?: Yes  Do you have shortness of breath when lying flat?: No  Do you have shortness of breath when you wake up?: No  Do you have sweats?: No  Have you experienced weight loss?: No  Which of the following makes your symptoms worse?: animal exposure, change in weather, exposure to smoke, pollen    Review of Systems   Constitutional:  Negative for appetite change and fever.   HENT:  Positive for postnasal drip, rhinorrhea and sneezing. Negative for ear pain, sore throat and trouble swallowing.    Respiratory:  Positive for cough.    Cardiovascular:  Negative for chest pain.   Musculoskeletal:  Positive for myalgias.   Neurological:  Negative for headaches.       Preventive   Most Recent Immunizations   Administered Date(s) Administered    COVID-19 J&J (Sergei) vaccine 0.5 mL 03/11/2021    COVID-19 MODERNA VACC 0.5 ML IM 04/26/2022    COVID-19 Moderna Vac BIVALENT 12 Yr+ IM 0.5 ML 10/27/2022    INFLUENZA 10/25/2023    Influenza Split High Dose Preservative Free IM 09/30/2019    Influenza, seasonal, injectable 10/29/2014    Pneumococcal Conjugate 13-Valent 08/03/2020    Pneumococcal Polysaccharide PPV23 02/07/2018    Respiratory Syncytial Virus Vaccine  (Recombinant, Adjuvanted) 11/28/2023    Td (adult), adsorbed 02/28/2015    Tdap 10/12/2009    Zoster 10/20/2015       Historical Information   Past Medical History:   Diagnosis Date    Allergic rhinitis     Arthritis     Breast cancer (HCC) 08/21/2008    age 58    Cancer (HCC)     left breast, tx with rx    Cataract     surgery    Chronic low back pain     Chronic pain disorder     Clostridium difficile colitis     Colon polyp     DDD (degenerative disc disease), lumbar     GERD (gastroesophageal reflux disease)     History of chickenpox     History of measles     History of mumps     History of radiation therapy 09/2008    for breast cancer    Hypertension     Laryngopharyngeal reflux (LPR)     Lumbar spinal stenosis     Osteoarthritis of spine with radiculopathy, lumbar region     Osteopenia     Osteopenia     Pelvic floor dysfunction     Piriformis syndrome of left side     Plantar fasciitis     Right knee pain     Sciatica     Sinusitis     Spondylosis of cervical region without myelopathy or radiculopathy     Wears glasses     reading     Past Surgical History:   Procedure Laterality Date    BREAST BIOPSY Left 07/30/2008    malignant    BREAST CYST ASPIRATION Left 1998    BREAST LUMPECTOMY Left 08/21/2008    BREAST SURGERY      COLONOSCOPY  08/31/2020    WI ESOPHAGOGASTRODUODENOSCOPY TRANSORAL DIAGNOSTIC N/A 2/15/2017    Procedure: ESOPHAGOGASTRODUODENOSCOPY (EGD);  Surgeon: Dustin Phoenix MD;  Location: [facility];  Service: Gastroenterology    WI ESOPHAGOGASTRODUODENOSCOPY TRANSORAL DIAGNOSTIC N/A 2/1/2018    Procedure: ESOPHAGOGASTRODUODENOSCOPY (EGD);  Surgeon: Dustin Phoenix MD;  [facility];  Service: Gastroenterology    UPPER GASTROINTESTINAL ENDOSCOPY  01/2020    US GUIDED MSK PROCEDURE  2/6/2020     Family History   Problem Relation Age of Onset    Osteoporosis Mother     Hypertension Mother     Coronary artery disease Father     Diabetes Father     COPD Father     Hypertension Father     No  Known Problems Maternal Aunt     Breast cancer Paternal Aunt 73    No Known Problems Paternal Aunt     No Known Problems Maternal Aunt     Colon cancer Neg Hx        Social History:   Social History     Tobacco Use   Smoking Status Former    Current packs/day: 0.00    Average packs/day: 1 pack/day for 25.0 years (25.0 ttl pk-yrs)    Types: Cigarettes    Start date: 1972    Quit date: 1997    Years since quittin.9   Smokeless Tobacco Never   Tobacco Comments    already quit smoking in        Meds/Allergies     Current Outpatient Medications:     Azelastine HCl 137 MCG/SPRAY SOLN, SPRAY 1 SPRAY INTO EACH NOSTRIL TWICE A DAY, Disp: 30 mL, Rfl: 11    cetirizine (ZyrTEC) 10 mg tablet, Take 10 mg by mouth daily, Disp: , Rfl:     Cholecalciferol (VITAMIN D-3 PO), Take 1,000 mg by mouth daily  , Disp: , Rfl:     Cholecalciferol (VITAMIN D3 PO), Take 1,200 mg by mouth in the morning, Disp: , Rfl:     cyclobenzaprine (FLEXERIL) 5 mg tablet, TAKE 1 TO 2 TABLETS BY MOUTH 3 TIMES A DAY AS NEEDED, Disp: , Rfl:     Fluticasone Propionate (Xhance) 93 MCG/ACT EXHU, into each nostril, Disp: , Rfl:     lisinopril (ZESTRIL) 10 mg tablet, Take 10 mg by mouth daily, Disp: , Rfl:     montelukast (SINGULAIR) 10 mg tablet, Take 1 tablet (10 mg total) by mouth daily at bedtime, Disp: 30 tablet, Rfl: 11    Multiple Vitamins-Minerals (CENTRUM ADULTS PO), Take 1 tablet by mouth daily, Disp: , Rfl:     Multiple Vitamins-Minerals (PRESERVISION AREDS PO), Take by mouth, Disp: , Rfl:     Omeprazole-Sodium Bicarbonate (Zegerid OTC)  MG CAPS, Take 1 capsule by mouth daily at bedtime, Disp: 30 capsule, Rfl: 11    Probiotic Product (PROBIOTIC PO), Take 1 tablet by mouth, Disp: , Rfl:     RABEprazole (ACIPHEX) 20 MG tablet, Take 2 tablets (40 mg total) by mouth daily, Disp: 180 tablet, Rfl: 1  Allergies   Allergen Reactions    Fentanyl Vomiting    Codeine     Morphine And Codeine Nausea Only, GI Intolerance and Vomiting     Note:   this allergy is not being included in drug screening.  Please inactivate this item and re-enter it to enable screening.    Morphine Sulfate [Morphine] Nausea Only, GI Intolerance and Vomiting    Naprosyn [Naproxen] Nausea Only, GI Intolerance and Vomiting    Oxycodone GI Intolerance and Vomiting    Penicillins Other (See Comments)     Unknown reaction.    Sulfa Antibiotics Rash and Fever       Vitals: not currently breastfeeding., There is no height or weight on file to calculate BMI.      Physical Exam  Physical Exam  HENT:      Head: Normocephalic.      Right Ear: There is no impacted cerumen.      Left Ear: There is no impacted cerumen.      Nose: Nose normal.      Mouth/Throat:      Mouth: Mucous membranes are moist.   Eyes:      General:         Right eye: No discharge.         Left eye: No discharge.      Pupils: Pupils are equal, round, and reactive to light.   Cardiovascular:      Rate and Rhythm: Normal rate.      Pulses: Normal pulses.      Heart sounds: No murmur heard.     No friction rub. No gallop.   Pulmonary:      Effort: Pulmonary effort is normal. No respiratory distress.      Breath sounds: No stridor. No wheezing, rhonchi or rales.   Chest:      Chest wall: No tenderness.   Abdominal:      General: Abdomen is flat.   Musculoskeletal:         General: Normal range of motion.      Cervical back: Normal range of motion.      Right lower leg: No edema.      Left lower leg: No edema.   Skin:     General: Skin is warm.      Capillary Refill: Capillary refill takes less than 2 seconds.      Coloration: Skin is not jaundiced.   Neurological:      General: No focal deficit present.      Mental Status: She is alert and oriented to person, place, and time.   Psychiatric:         Mood and Affect: Mood normal.         Labs: I have personally reviewed pertinent lab results.  Lab Results   Component Value Date    WBC 5.93 12/19/2023    HGB 13.0 12/19/2023    HCT 41.3 12/19/2023    MCV 93 12/19/2023      "2023     Lab Results   Component Value Date    GLUCOSE 109 10/22/2015    CALCIUM 10.1 2024     10/22/2015    K 4.8 2024    CO2 29 2024     2024    BUN 20 2024    CREATININE 0.60 2024     No results found for: \"IGE\"  Lab Results   Component Value Date    ALT 18 2024    AST 20 2024    ALKPHOS 83 2024    BILITOT 0.79 10/22/2015     Imaging and other studies: I have personally reviewed pertinent reports.    NM bone scan whole body    Result Date: 2024  Impression: 1. No focal tracer activity characteristic of osseous metastatic disease. Specifically, there is no focal tracer activity involving the T10 vertebral body/lower thoracic spine in the region of indeterminant sclerotic lesion seen on CT of chest dated 2022. 2. Nonspecific subtle asymmetric tracer activity involving the right upper to mid lumbar spine favored to be degenerative in nature which can be further characterized with MRI of the lumbar spine as clinically indicated. Workstation performed: [id number]     Pulmonary function testin/2024:  Results:  FEV1/FVC Ratio: 74 %  Forced Vital Capacity: 2.91 L           95 % predicted  FEV1: 2.16 L92 % predicted     After administration of bronchodilator   FEV1/FVC Ratio: 77%  FVC: 2.81 L, 92% predicted, -3% change  FEV1: 2.16 L, 92% predicted, 0% change     Lung volumes by body plethysmography:   Total Lung Capacity 97 % predicted   Residual volume 105 % predicted     DLCO corrected for patients hemoglobin level: 86 %    Interpretation:     Normal Spirometry     No significant response to the administration of bronchodilator per ATS Standards     Normal Lung volumes     Normal diffusion capacity     Flow volume loop is normal      EKG, Pathology, and Other Studies: I have personally reviewed pertinent films in PACS  ECHO 2024: LVEF 65%. Wall thickness is mildly increased. RAP 3. IVC 12mm.    Disclaimer: Portions of the record may " "have been created with voice recognition software. Occasional wrong word or \"sound a like\" substitutions may have occurred due to the inherent limitations of voice recognition software. Careful consideration should be taken to recognize, using context, where substitutions have occurred.    Rohit Mccord DO   Pulmonary & Critical Care Medicine Fellow PGY-V  [facility]  "

## 2024-08-07 ENCOUNTER — OFFICE VISIT (OUTPATIENT)
Dept: PULMONOLOGY | Facility: CLINIC | Age: 74
End: 2024-08-07
Payer: COMMERCIAL

## 2024-08-07 VITALS
OXYGEN SATURATION: 98 % | DIASTOLIC BLOOD PRESSURE: 78 MMHG | HEIGHT: 67 IN | SYSTOLIC BLOOD PRESSURE: 124 MMHG | HEART RATE: 80 BPM | WEIGHT: 157 LBS | BODY MASS INDEX: 24.64 KG/M2

## 2024-08-07 DIAGNOSIS — Z87.891 HISTORY OF TOBACCO ABUSE: ICD-10-CM

## 2024-08-07 DIAGNOSIS — R05.3 CHRONIC COUGH: ICD-10-CM

## 2024-08-07 DIAGNOSIS — R06.09 DYSPNEA ON EXERTION: Primary | ICD-10-CM

## 2024-08-07 DIAGNOSIS — R05.8 UPPER AIRWAY COUGH SYNDROME: ICD-10-CM

## 2024-08-07 PROCEDURE — 99214 OFFICE O/P EST MOD 30 MIN: CPT | Performed by: STUDENT IN AN ORGANIZED HEALTH CARE EDUCATION/TRAINING PROGRAM

## 2024-08-07 PROCEDURE — G2211 COMPLEX E/M VISIT ADD ON: HCPCS | Performed by: STUDENT IN AN ORGANIZED HEALTH CARE EDUCATION/TRAINING PROGRAM

## 2024-08-07 NOTE — PROGRESS NOTES
Attending Statement    I was the supervising physician and personally saw/examined the patient on 8/7/2024.  I agree with the Resident/Fellow's note with the following additions/exceptions:    Imaging Studies were reviewed personally on the PAC system:   CT Chest 5/14  1.  Small focus of microcystic change at the right upper lobe with no associated solid density identified. Recommend follow-up CT chest without contrast in 6 months.  2.  1.2 cm sclerotic lesion at posterior T10 vertebral body. Differential considerations include osseous metastasis; other etiologies are not excluded. Recommend further evaluation with bone scan.    Physical Exam:  General: NAD  Neuro: AxO 3  Heart: RRR  Lungs: CTAB  Abdomen: Soft NT   Extremities: No Edema    Assessment:  74F with a PMH of Tobacco Use, UAC syndrome, Laryngeal reflux who presents due to increased mucous production found to have cystic? RUL lesion    Plan:  RUL lesion - Looks like a cluster of smoking related emphysematous changes on CT. Malignancy could have this appearance and plan to monitor it with serial CT scans.   - Follow up CT Chest November    UAC syndrome - difficult to manage due to severity of reflux disease and inability to manage allergy exposure. Patient has extensive workup and agree with treatment regimens outlined by GI and ENT.    Guzman Mcgee MD  [facility]

## 2024-08-09 ENCOUNTER — TELEPHONE (OUTPATIENT)
Dept: PULMONOLOGY | Facility: CLINIC | Age: 74
End: 2024-08-09

## 2024-08-22 ENCOUNTER — OFFICE VISIT (OUTPATIENT)
Dept: ENDOCRINOLOGY | Facility: CLINIC | Age: 74
End: 2024-08-22
Payer: COMMERCIAL

## 2024-08-22 VITALS
BODY MASS INDEX: 24.96 KG/M2 | WEIGHT: 159 LBS | DIASTOLIC BLOOD PRESSURE: 80 MMHG | SYSTOLIC BLOOD PRESSURE: 118 MMHG | HEART RATE: 85 BPM | HEIGHT: 67 IN

## 2024-08-22 DIAGNOSIS — M81.0 OSTEOPOROSIS, UNSPECIFIED OSTEOPOROSIS TYPE, UNSPECIFIED PATHOLOGICAL FRACTURE PRESENCE: ICD-10-CM

## 2024-08-22 DIAGNOSIS — E55.9 VITAMIN D DEFICIENCY: ICD-10-CM

## 2024-08-22 DIAGNOSIS — E21.3 HYPERPARATHYROIDISM (HCC): Primary | ICD-10-CM

## 2024-08-22 PROCEDURE — 99214 OFFICE O/P EST MOD 30 MIN: CPT

## 2024-08-22 NOTE — ASSESSMENT & PLAN NOTE
Patient has osteopenia with FRAX score of 3.8% in the hip.  Treatment options discussed including IV Reclast or Prolia.  She would not be a good candidate for oral bisphosphonate therapy due to history of GERD.  She would like to discuss her options further with Dr. Vega therefore for now we will repeat lab work prior to next visit.  Counseled on fall prevention and weightbearing exercises

## 2024-08-22 NOTE — ASSESSMENT & PLAN NOTE
Most recent corrected calcium is 9.7.  This is likely secondary hyper para given her history of elevated PTH in the past with normal calcium level.  She has started taking a calcium supplement of 1200 mg daily since her last visit in April.   -Continue with vitamin D and calcium supplements  -Repeat lab work prior to next visit

## 2024-08-22 NOTE — PROGRESS NOTES
Established Patient Progress Note    CC: Follow up for hyperparathyroidism and osteopenia with FRAX score >3% in hip    Impression & Plan:    Problem List Items Addressed This Visit       Hyperparathyroidism (HCC) - Primary     Most recent corrected calcium is 9.7.  This is likely secondary hyper para given her history of elevated PTH in the past with normal calcium level.  She has started taking a calcium supplement of 1200 mg daily since her last visit in April.   -Continue with vitamin D and calcium supplements  -Repeat lab work prior to next visit         Relevant Orders    Calcium    Albumin    PTH, intact    Vitamin D deficiency     Stable.  Continue with supplement         Relevant Orders    Vitamin D 25 hydroxy    Osteoporosis     Patient has osteopenia with FRAX score of 3.8% in the hip.  Treatment options discussed including IV Reclast or Prolia.  She would not be a good candidate for oral bisphosphonate therapy due to history of GERD.  She would like to discuss her options further with Dr. Vega therefore for now we will repeat lab work prior to next visit.  Counseled on fall prevention and weightbearing exercises            Orders Placed This Encounter   Procedures    Calcium     Standing Status:   Future     Standing Expiration Date:   8/22/2025    Albumin     Standing Status:   Future     Standing Expiration Date:   8/22/2025    PTH, intact     Standing Status:   Future     Standing Expiration Date:   8/22/2025    Vitamin D 25 hydroxy     Standing Status:   Future     Standing Expiration Date:   8/22/2025       History of Present Illness:   Denise Brown is a 74 y.o. female with a history of hypercalcemia and elevated PTH. Repeat testing shows improved calcium level into the normal range however PTH remains elevated. She started taking a 1200 mg citracal supplement after the last appointment. She is also 3,000 IU vitamin D3 daily.      Per chart review, she has no history of kidney stones. She had  one ankle fracture over 30 years ago. She does have a history of breat CA s/p surgery and radiation seeding. No chemo. Cancer free since 2008. She does have a history significant for GERD. She is on omeprazole combo medication.     Her mother had osteoporosis with several vertebral fractures.     Patient Active Problem List   Diagnosis    Lumbar radiculopathy    Chronic bilateral low back pain with right-sided sciatica    Sacroiliitis (HCC)    Lumbar spondylosis    Trochanteric bursitis of right hip    Spinal stenosis of lumbar region    Subacromial bursitis of right shoulder joint    Dysphonia    Pharyngoesophageal dysphagia    Reflux laryngitis    Gastroesophageal reflux disease    Paresis of right vocal fold    Laryngeal edema    Allergic rhinitis    Chronic rhinitis    Esophageal dysmotilities    Dyspepsia    Dyspnea on exertion    Essential hypertension    Nausea    Glottic insufficiency    Muscle tension dysphonia    Radial neuritis, right    Arthritis of right acromioclavicular joint    Lateral epicondylitis of right elbow    Colon polyp    Rectal pain    Laryngopharyngeal reflux (LPR)    Pelvic floor dysfunction in female    PONV (postoperative nausea and vomiting)    TMJPDS (temporomandibular joint pain dysfunction syndrome)    Sleep disorder    Hypercalcemia    Hyperparathyroidism (HCC)    Vitamin D deficiency    Nasal polyposis    Deviated nasal septum    Hypertrophy of both inferior nasal turbinates    Cervical spine disease    Osteoporosis    Hiatal hernia    Allergic rhinitis due to cats    Chronic seasonal allergic rhinitis due to pollen    Allergic rhinitis due to mold      Past Medical History:   Diagnosis Date    Allergic rhinitis     Arthritis     Breast cancer (HCC) 08/21/2008    age 58    Cancer (HCC)     left breast, tx with rx    Cataract     surgery    Chronic low back pain     Chronic pain disorder     Clostridium difficile colitis     Colon polyp     DDD (degenerative disc disease), lumbar      GERD (gastroesophageal reflux disease)     History of chickenpox     History of measles     History of mumps     History of radiation therapy 2008    for breast cancer    Hypertension     Laryngopharyngeal reflux (LPR)     Lumbar spinal stenosis     Osteoarthritis of spine with radiculopathy, lumbar region     Osteopenia     Osteopenia     Pelvic floor dysfunction     Piriformis syndrome of left side     Plantar fasciitis     Right knee pain     Sciatica     Sinusitis     Spondylosis of cervical region without myelopathy or radiculopathy     Wears glasses     reading      Past Surgical History:   Procedure Laterality Date    BREAST BIOPSY Left 2008    malignant    BREAST CYST ASPIRATION Left 1998    BREAST LUMPECTOMY Left 2008    BREAST SURGERY      COLONOSCOPY  2020    AK ESOPHAGOGASTRODUODENOSCOPY TRANSORAL DIAGNOSTIC N/A 2/15/2017    Procedure: ESOPHAGOGASTRODUODENOSCOPY (EGD);  Surgeon: Dustin Phoenix MD;  Location: [facility];  Service: Gastroenterology    AK ESOPHAGOGASTRODUODENOSCOPY TRANSORAL DIAGNOSTIC N/A 2018    Procedure: ESOPHAGOGASTRODUODENOSCOPY (EGD);  Surgeon: Dustin Phoenix MD;  Location: [facility];  Service: Gastroenterology    UPPER GASTROINTESTINAL ENDOSCOPY  2020    US GUIDED MSK PROCEDURE  2020      Family History   Problem Relation Age of Onset    Osteoporosis Mother     Hypertension Mother     Coronary artery disease Father     Diabetes Father     COPD Father     Hypertension Father     No Known Problems Maternal Aunt     Breast cancer Paternal Aunt 73    No Known Problems Paternal Aunt     No Known Problems Maternal Aunt     Colon cancer Neg Hx      Social History     Tobacco Use    Smoking status: Former     Current packs/day: 0.00     Average packs/day: 1 pack/day for 25.0 years (25.0 ttl pk-yrs)     Types: Cigarettes     Start date: 1972     Quit date: 1997     Years since quittin.9    Smokeless tobacco: Never    Tobacco comments:      already quit smoking in 1997   Substance Use Topics    Alcohol use: Yes     Alcohol/week: 7.0 standard drinks of alcohol     Types: 7 Glasses of wine per week     Comment: once a night     Allergies   Allergen Reactions    Fentanyl Vomiting    Codeine     Morphine And Codeine Nausea Only, GI Intolerance and Vomiting     Note:  this allergy is not being included in drug screening.  Please inactivate this item and re-enter it to enable screening.    Morphine Sulfate [Morphine] Nausea Only, GI Intolerance and Vomiting    Naprosyn [Naproxen] Nausea Only, GI Intolerance and Vomiting    Oxycodone GI Intolerance and Vomiting    Penicillins Other (See Comments)     Unknown reaction.    Sulfa Antibiotics Rash and Fever         Current Outpatient Medications:     Azelastine HCl 137 MCG/SPRAY SOLN, SPRAY 1 SPRAY INTO EACH NOSTRIL TWICE A DAY, Disp: 30 mL, Rfl: 11    cetirizine (ZyrTEC) 10 mg tablet, Take 10 mg by mouth daily, Disp: , Rfl:     Cholecalciferol (VITAMIN D-3 PO), Take 1,000 mg by mouth daily  , Disp: , Rfl:     Cholecalciferol (VITAMIN D3 PO), Take 1,200 mg by mouth in the morning, Disp: , Rfl:     cyclobenzaprine (FLEXERIL) 5 mg tablet, TAKE 1 TO 2 TABLETS BY MOUTH 3 TIMES A DAY AS NEEDED, Disp: , Rfl:     Fluticasone Propionate (Xhance) 93 MCG/ACT EXHU, into each nostril, Disp: , Rfl:     lisinopril (ZESTRIL) 10 mg tablet, Take 10 mg by mouth daily, Disp: , Rfl:     montelukast (SINGULAIR) 10 mg tablet, Take 1 tablet (10 mg total) by mouth daily at bedtime, Disp: 30 tablet, Rfl: 11    Multiple Vitamins-Minerals (CENTRUM ADULTS PO), Take 1 tablet by mouth daily, Disp: , Rfl:     Multiple Vitamins-Minerals (PRESERVISION AREDS PO), Take by mouth, Disp: , Rfl:     Omeprazole-Sodium Bicarbonate (Zegerid OTC)  MG CAPS, Take 1 capsule by mouth daily at bedtime, Disp: 30 capsule, Rfl: 11    Probiotic Product (PROBIOTIC PO), Take 1 tablet by mouth, Disp: , Rfl:     RABEprazole (ACIPHEX) 20 MG tablet, Take 2  "tablets (40 mg total) by mouth daily, Disp: 180 tablet, Rfl: 1    Review of Systems   Constitutional:  Negative for chills and fever.   HENT:  Negative for ear pain and sore throat.    Eyes:  Negative for pain and visual disturbance.   Respiratory:  Negative for cough and shortness of breath.    Cardiovascular:  Negative for chest pain and palpitations.   Gastrointestinal:  Negative for abdominal pain and vomiting.   Genitourinary:  Negative for dysuria and hematuria.   Musculoskeletal:  Negative for arthralgias and back pain.   Skin:  Negative for color change and rash.   Neurological:  Negative for seizures and syncope.   All other systems reviewed and are negative.      Physical Exam:  Body mass index is 24.9 kg/m².  /80   Pulse 85   Ht 5' 7\" (1.702 m)   Wt 72.1 kg (159 lb)   LMP  (LMP Unknown)   BMI 24.90 kg/m²    Wt Readings from Last 3 Encounters:   08/22/24 72.1 kg (159 lb)   08/07/24 71.2 kg (157 lb)   07/31/24 71.7 kg (158 lb)       Physical Exam  Vitals reviewed.   Constitutional:       Appearance: Normal appearance.   HENT:      Head: Normocephalic and atraumatic.   Cardiovascular:      Rate and Rhythm: Normal rate.   Pulmonary:      Effort: Pulmonary effort is normal.   Neurological:      Mental Status: She is alert and oriented to person, place, and time.   Psychiatric:         Mood and Affect: Mood normal.         Behavior: Behavior normal.         Labs:   Lab Results   Component Value Date    HGBA1C 5.4 06/11/2024    HGBA1C 5.8 (H) 12/19/2023    HGBA1C 5.6 06/16/2023     Lab Results   Component Value Date    CREATININE 0.60 06/11/2024    CREATININE 0.57 (L) 12/19/2023    CREATININE 0.69 06/16/2023    BUN 20 06/11/2024     10/22/2015    K 4.8 06/11/2024     06/11/2024    CO2 29 06/11/2024     eGFR   Date Value Ref Range Status   06/11/2024 90 ml/min/1.73sq m Final     Lab Results   Component Value Date    CHOL 231 10/22/2015    HDL 54 06/11/2024    TRIG 281 (H) 06/11/2024     Lab " Results   Component Value Date    ALT 18 06/11/2024    AST 20 06/11/2024    ALKPHOS 83 06/11/2024    BILITOT 0.79 10/22/2015     Lab Results   Component Value Date    SIP9JVJBCKIO 1.460 07/15/2021    UVE4RYRJRKNT 1.510 06/06/2019    GEE5OMSCRIXW 1.530 05/21/2018     Lab Results   Component Value Date    FREET4 0.92 06/06/2019         There are no Patient Instructions on file for this visit.      Discussed with the patient and all questioned fully answered. She will call me if any problems arise.

## 2024-09-16 ENCOUNTER — HOSPITAL ENCOUNTER (OUTPATIENT)
Dept: RADIOLOGY | Age: 74
Discharge: HOME | End: 2024-09-16
Payer: COMMERCIAL

## 2024-09-16 VITALS — HEIGHT: 67 IN | WEIGHT: 159 LBS | BODY MASS INDEX: 24.96 KG/M2

## 2024-09-16 DIAGNOSIS — Z12.31 SCREENING MAMMOGRAM FOR BREAST CANCER: ICD-10-CM

## 2024-09-16 PROCEDURE — 77067 SCR MAMMO BI INCL CAD: CPT

## 2024-09-16 PROCEDURE — 77063 BREAST TOMOSYNTHESIS BI: CPT

## 2024-09-30 ENCOUNTER — ANNUAL EXAM (OUTPATIENT)
Dept: GYNECOLOGY | Facility: CLINIC | Age: 74
End: 2024-09-30
Payer: COMMERCIAL

## 2024-09-30 VITALS
HEIGHT: 67 IN | DIASTOLIC BLOOD PRESSURE: 66 MMHG | SYSTOLIC BLOOD PRESSURE: 110 MMHG | BODY MASS INDEX: 24.96 KG/M2 | WEIGHT: 159 LBS

## 2024-09-30 DIAGNOSIS — Z12.31 SCREENING MAMMOGRAM FOR BREAST CANCER: Primary | ICD-10-CM

## 2024-09-30 DIAGNOSIS — Z01.411 ENCOUNTER FOR GYNECOLOGICAL EXAMINATION WITH ABNORMAL FINDING: ICD-10-CM

## 2024-09-30 DIAGNOSIS — N95.2 VAGINAL ATROPHY: ICD-10-CM

## 2024-09-30 PROCEDURE — G0101 CA SCREEN;PELVIC/BREAST EXAM: HCPCS | Performed by: OBSTETRICS & GYNECOLOGY

## 2024-09-30 NOTE — PROGRESS NOTES
"Ambulatory Visit  Name: Denise Brown      : 1950      MRN: [id number]  Encounter Provider: Sandro Evans MD  Encounter Date: 2024   Encounter department: [facility] [place]    Assessment & Plan  Vaginal atrophy         Encounter for gynecological examination with abnormal finding           Normal breast exam  Vaginal atrophy  Normal mammogram 2024  Normal colonoscopy 2020  DEXA scan showing osteopenia 2024    Plan: Rx mammogram.  Recommend weightbearing and balancing exercises.  Recommend healthy diet.    History of Present Illness G0    Denise Brown is a 74 y.o. female who presents for annual exam with no complaints.  Postmenopausal denies any pelvic pain vaginal bleeding breast bowel or bladder issues.  Presently not sexually active.  Medications reviewed.  No change in family history paternal aunt (breast cancer).    Patient seemed upset that her  wakes up late in the morning watches TV all day and then goes to bed.  He does not participate in any outside activities.  Patient does everything around the house.          Objective     /66   Ht 5' 7\" (1.702 m)   Wt 72.1 kg (159 lb)   LMP  (LMP Unknown)   BMI 24.90 kg/m²     Physical Exam  Vitals and nursing note reviewed.   Constitutional:       General: She is not in acute distress.     Appearance: She is well-developed.   HENT:      Head: Normocephalic and atraumatic.   Eyes:      Conjunctiva/sclera: Conjunctivae normal.   Cardiovascular:      Rate and Rhythm: Normal rate and regular rhythm.      Heart sounds: No murmur heard.  Pulmonary:      Effort: Pulmonary effort is normal. No respiratory distress.      Breath sounds: Normal breath sounds.   Chest:   Breasts:     Right: Normal.      Left: Normal.   Abdominal:      Palpations: Abdomen is soft.      Tenderness: There is no abdominal tenderness.   Genitourinary:     Vagina: Normal.      Cervix: Normal.      Uterus: Normal.       Adnexa: " Right adnexa normal and left adnexa normal.      Rectum: Normal.      Comments: External genitalia atrophic.  Vagina clear.  No uterine prolapse cystocele or rectocele.  Musculoskeletal:         General: No swelling.      Cervical back: Neck supple.   Skin:     General: Skin is warm and dry.      Capillary Refill: Capillary refill takes less than 2 seconds.   Neurological:      Mental Status: She is alert.   Psychiatric:         Mood and Affect: Mood normal.

## 2024-11-21 ENCOUNTER — HOSPITAL ENCOUNTER (OUTPATIENT)
Dept: RADIOLOGY | Facility: IMAGING CENTER | Age: 74
End: 2024-11-21
Payer: COMMERCIAL

## 2024-11-21 DIAGNOSIS — R91.1 PULMONARY NODULE: ICD-10-CM

## 2024-11-21 PROCEDURE — 71250 CT THORAX DX C-: CPT

## 2024-12-13 ENCOUNTER — OFFICE VISIT (OUTPATIENT)
Dept: GASTROENTEROLOGY | Facility: MEDICAL CENTER | Age: 74
End: 2024-12-13
Payer: COMMERCIAL

## 2024-12-13 VITALS
OXYGEN SATURATION: 98 % | TEMPERATURE: 98.4 F | SYSTOLIC BLOOD PRESSURE: 115 MMHG | BODY MASS INDEX: 24.48 KG/M2 | HEART RATE: 77 BPM | HEIGHT: 67 IN | WEIGHT: 156 LBS | DIASTOLIC BLOOD PRESSURE: 77 MMHG

## 2024-12-13 DIAGNOSIS — M54.2 NECK PAIN: ICD-10-CM

## 2024-12-13 DIAGNOSIS — K44.9 HIATAL HERNIA: ICD-10-CM

## 2024-12-13 DIAGNOSIS — R10.13 DYSPEPSIA: ICD-10-CM

## 2024-12-13 DIAGNOSIS — R13.14 PHARYNGOESOPHAGEAL DYSPHAGIA: ICD-10-CM

## 2024-12-13 DIAGNOSIS — K21.9 GASTROESOPHAGEAL REFLUX DISEASE WITHOUT ESOPHAGITIS: Primary | ICD-10-CM

## 2024-12-13 PROCEDURE — 99214 OFFICE O/P EST MOD 30 MIN: CPT | Performed by: INTERNAL MEDICINE

## 2024-12-13 PROCEDURE — G2211 COMPLEX E/M VISIT ADD ON: HCPCS | Performed by: INTERNAL MEDICINE

## 2024-12-13 RX ORDER — FAMOTIDINE 40 MG/1
40 TABLET, FILM COATED ORAL
Qty: 30 TABLET | Refills: 4 | Status: SHIPPED | OUTPATIENT
Start: 2024-12-13

## 2024-12-13 NOTE — ASSESSMENT & PLAN NOTE
Chronic >1 year. Ongoing symptoms including mucus production, had very bad refractory GERD while off medications prior to Bravo. Discussed decreasing PPI to Aciphex daily in AM, stop omeprazole, can do pepcid for breakthrough symptoms limiting use to avoid tachyphylaxis. Will repeat barium swallow. Pending results, will discuss with motility specialist to determine next best step- medication therapy with CCB vs baclofen vs possible referral for endoFLIP and botox   Orders:    FL barium swallow; Future    famotidine (PEPCID) 40 MG tablet; Take 1 tablet (40 mg total) by mouth daily at bedtime as needed for heartburn

## 2024-12-13 NOTE — PROGRESS NOTES
Name: Denise Brown      : 1950      MRN: [id number]  Encounter Provider: Robinson Price MD  Encounter Date: 2024   Encounter department: [facility]  :  Assessment & Plan  Gastroesophageal reflux disease without esophagitis  Chronic >1 year. Ongoing symptoms including mucus production, had very bad refractory GERD while off medications prior to Bravo. Discussed decreasing PPI to Aciphex daily in AM, stop omeprazole, can do pepcid for breakthrough symptoms limiting use to avoid tachyphylaxis. Will repeat barium swallow. Pending results, will discuss with motility specialist to determine next best step- medication therapy with CCB vs baclofen vs possible referral for endoFLIP and botox   Orders:    FL barium swallow; Future    famotidine (PEPCID) 40 MG tablet; Take 1 tablet (40 mg total) by mouth daily at bedtime as needed for heartburn    Pharyngoesophageal dysphagia  As above   Orders:    FL barium swallow; Future    Hiatal hernia  Small, seen on retroflexion during last EGD per report that was reviewed during visit. Doubt contribution but will repeat barium swallow to evaluate   Orders:    FL barium swallow; Future    Dyspepsia  Recommended GES by ENT in past which may contribute to reflux sx and dyspepsia. She is hesitant to obtain but will think about it.   Orders:    NM gastric emptying; Future    Neck pain  Chronic >1 year. Offered referral to PT. She plans to discuss with PCP regarding need for x-rays or US and will request referral at that time.            History of Present Illness   HPI  Denise Brown is a 74 y.o. female who presents for follow up.     Last seen in GI by Dr. Price 3/13/2024. At that time, had EGD with Thakkar ordered due to ongoing LPR and worsening reflux sx. Last colonoscopy  with polyps, due .     PMH includes Stretta but didn't make a difference.     EGD with Bravo reviewed showing acid reflux first two days which improved  with medications. SI >50%, SAP > 95%. [place] 26.9. EGD showed normal esophagus with grade A esophagitis at GEJ. Mild gastritis. Pathology reviewed, biopsies from stomach and esophagus benign without eosinophilia or H pylori, dysplasia, or metaplasia.     Lately feels like more coming from sinus. Every day is different. Mucus comes from water, food, spicy foods, etc. Sometimes can eat spicy meal and has no problem. Has not identified triggers. On aciphex BID and Zegerid qhs.       History obtained from: patient    Review of Systems  ROS negative unless noted above.   Objective   LMP  (LMP Unknown)      Physical Exam  Constitutional:       General: She is not in acute distress.  HENT:      Head: Normocephalic and atraumatic.      Mouth/Throat:      Mouth: Mucous membranes are moist.   Neck:      Comments: Fullness in supraclavicular regions, symmetrical, consistent with fat pads. Small nodule palpated in R trapezius muscle, mobile, stable in size for 4+ years per pt   Cardiovascular:      Rate and Rhythm: Normal rate and regular rhythm.   Pulmonary:      Effort: Pulmonary effort is normal. No respiratory distress.   Abdominal:      General: There is no distension.      Palpations: Abdomen is soft.      Tenderness: There is no abdominal tenderness.   Musculoskeletal:         General: No swelling.      Cervical back: Neck supple. No tenderness.   Lymphadenopathy:      Cervical: No cervical adenopathy.   Skin:     General: Skin is warm and dry.      Capillary Refill: Capillary refill takes less than 2 seconds.   Neurological:      Mental Status: She is alert.   Psychiatric:         Mood and Affect: Mood normal.

## 2024-12-13 NOTE — ASSESSMENT & PLAN NOTE
Small, seen on retroflexion during last EGD per report that was reviewed during visit. Doubt contribution but will repeat barium swallow to evaluate   Orders:    FL barium swallow; Future

## 2024-12-13 NOTE — ASSESSMENT & PLAN NOTE
Recommended GES by ENT in past which may contribute to reflux sx and dyspepsia. She is hesitant to obtain but will think about it.   Orders:    NM gastric emptying; Future

## 2024-12-18 ENCOUNTER — OFFICE VISIT (OUTPATIENT)
Dept: PULMONOLOGY | Facility: CLINIC | Age: 74
End: 2024-12-18
Payer: COMMERCIAL

## 2024-12-18 VITALS
SYSTOLIC BLOOD PRESSURE: 138 MMHG | HEART RATE: 83 BPM | DIASTOLIC BLOOD PRESSURE: 96 MMHG | BODY MASS INDEX: 24.75 KG/M2 | WEIGHT: 158 LBS | TEMPERATURE: 98.4 F | OXYGEN SATURATION: 99 %

## 2024-12-18 DIAGNOSIS — R06.09 DOE (DYSPNEA ON EXERTION): ICD-10-CM

## 2024-12-18 DIAGNOSIS — Z87.891 FORMER TOBACCO USE: ICD-10-CM

## 2024-12-18 DIAGNOSIS — R93.89 ABNORMAL CT OF THE CHEST: Primary | ICD-10-CM

## 2024-12-18 DIAGNOSIS — J38.01 PARESIS OF RIGHT VOCAL FOLD: ICD-10-CM

## 2024-12-18 DIAGNOSIS — R05.8 UPPER AIRWAY COUGH SYNDROME: ICD-10-CM

## 2024-12-18 DIAGNOSIS — K21.9 GASTROESOPHAGEAL REFLUX DISEASE WITHOUT ESOPHAGITIS: ICD-10-CM

## 2024-12-18 DIAGNOSIS — K21.9 LPRD (LARYNGOPHARYNGEAL REFLUX DISEASE): ICD-10-CM

## 2024-12-18 DIAGNOSIS — R05.3 CHRONIC COUGH: ICD-10-CM

## 2024-12-18 PROCEDURE — 99213 OFFICE O/P EST LOW 20 MIN: CPT | Performed by: INTERNAL MEDICINE

## 2024-12-18 PROCEDURE — G2211 COMPLEX E/M VISIT ADD ON: HCPCS | Performed by: INTERNAL MEDICINE

## 2024-12-18 NOTE — ASSESSMENT & PLAN NOTE
EGD 4/25/24: mild gastritis. Path negative for intestinal metaplasia/dysplasia. No eosinophils. No H. Pylori.   Barium swallow 2018: no gastroesophageal reflux observed. Small sliding hiatal hernia. Mild occational dysmotility with tertiary contractions.  EGD with Stretta 12/2/21

## 2024-12-18 NOTE — PROGRESS NOTES
Attending Statement:  I have seen and examined the patient. I have discussed the patient's care with the pulmonary fellow.    I agree with the findings, assessment, and plan as outlined in the note by Dr Mccord with the addition of the following:    Imaging Studies were reviewed personally on the PAC system:   CT Chest 11/2024: Stable small focus of microcystic change in the right upper lobe without suspicious pulmonary nodules.  2. Stable 1.2 cm sclerotic lesion in the posterior T10 vertebral body  A/P:  74F former smoker, chronic cough, allergies, GERD right VF paresis, presenting for follow up of dyspnea.    # Former smoker  # Dyspnea  25PY smoker, quit 27 yrs ago  PFTs normal, no COPD  - continue not smoking    # Abnormal CT - RUL microcystic change  Small, Stable between May and Nov 2024, no solid component, likely related to emphysema   - can check in 1 year    # Chronic Cough  # GERD  # LPR and mild R vocal fold paresis  Follows w ENT, has throat clearing and sensation of food stuck in throat. Sp ENT eval and GI workup  Symptoms sound primarily upper airway  - consider stopping lisinopril w PCP  - tried flonase and azelastine  - continue clifford Avila MD  [facility]

## 2024-12-18 NOTE — PROGRESS NOTES
Pulmonary Follow-up   Name: Denise Brown      : 1950      MRN: [id number]  Encounter Provider: Rohit Yousif DO  Encounter Date: 2024   Encounter department: [facility]  :  Assessment & Plan  Abnormal CT of the chest  CT Chest 2024: stable focus of microcystic changes in the RUL.  CT Chest 2024: small focus of microcystic changes at the RUL with no associated solid density. Possibly due to small focus of emphysema at the right apex         ARMENTA (dyspnea on exertion)  PFT 2024: FEV1/FVC 74%, FEV1: 2.16L 92%, FVC 2.9L, 92%. No bronchodilator response. Normal diffusion capacity.  2D ECHO : : LVEF 65%, LA normal in size, mild asymmetric hypertrophy of the basal septal wall.   Exercise stress test 2019: normal study after maximal exercise without reproduction of symptoms.   Hgb 13 (at baseline)       Chronic cough  Likely in the setting of refractory LPR and UACS.  Eosinophils: 150-290    Gastroesophageal reflux disease without esophagitis  EGD 24: mild gastritis. Path negative for intestinal metaplasia/dysplasia. No eosinophils. No H. Pylori.   Barium swallow 2018: no gastroesophageal reflux observed. Small sliding hiatal hernia. Mild occational dysmotility with tertiary contractions.  EGD with Stretta 21       LPRD (laryngopharyngeal reflux disease)  Followed by ENT Allergic to cats (patient has a cat at home), mold, tree, ragweed.  CT neck 2018: paranasal sinsues patent, nasal septal deviation, hypertrophy  Takes both Rabeprazole (aciphex) and omeprazole (Zegrid)       Paresis of right vocal fold         Upper airway cough syndrome  On azelastine and fluticasone (Exhance)  Uses Neti Pot       Former tobacco use  1ppd for 20 years, but quit 27 years ago.          Plan:  F/u in pulmonary clinic in 1 year  Will reach out to PMD regarding transitioning lisinopril to alternative antihypertensive in the interim.  Repeat CT chest in 1 year, if no  change in cystic lesion may discontinue monitoring at that time.  Follow-up with ENT for LPR, gastroenterology for GERD.  Recommend Neti pot with distilled water and salt, try flonase on more consistent basis.      History of Present Illness     History of Present Illness   HPI:  Denise Brown is a 74 y.o. female Hx of LPR, GERD without esophagitis, UACS, former tobacco use who presents to the pulmonary clinic for follow up after repeat CT Chest and management of her frequent throat clearing and productive.  Patient notes that she has not had a cough, but she has had frequent throat clearing as well as gastric reflux which is exacerbated by certain fatty foods and chocolate.  Patient also has a postnasal drip which also contributes to the sensation of mucus stuck in her throat.  She denies night sweats, fevers, hemoptysis, unexplained weight loss.  Patient also denies smoking, and has not smoked for over 27 years.     Office visit 8/7/2024: Patient notes no unintentional weight loss, fever, chills, hemoptysis. Patient denies family history of lung cancer or liver disease. As far as her chronic cough. Patient reports that she has had no change in the mucus production which she believes occurs after she has a globus sensation in her throat. She currently is on 2 different PPIs for LPR & GERD which has somewhat helped. Patient also has a post nasal drip which is currently being treated with azelastine and fluticasone which has somewhat helped as well. She has been seen by both GI and ENT for years. Recent EGD showed mild gastritis, but no intestinal metaplasia or H.pylori.      Answers submitted by the patient for this visit:  Pulmonology Questionnaire (Submitted on 12/11/2024)  Chief Complaint: Primary symptoms  Do you experience frequent throat clearing?: Yes  Chronicity: recurrent  When did you first notice your symptoms?: more than 1 year ago  How often do your symptoms occur?: intermittently  Do you have  shortness of breath that occurs with effort or exertion?: No  Do you have ear congestion?: No  Do you have heartburn?: No  Do you have fatigue?: No  Do you have nasal congestion?: Yes  Do you have shortness of breath when lying flat?: No  Do you have shortness of breath when you wake up?: No  Do you have sweats?: No  Have you experienced weight loss?: No  Which of the following makes your symptoms worse?: change in weather, pollen  Which of the following makes your symptoms better?: nothing      Review of Systems   Constitutional:  Negative for appetite change and fever.   HENT:  Positive for postnasal drip, rhinorrhea and sneezing. Negative for ear pain, sore throat and trouble swallowing.    Cardiovascular:  Negative for chest pain.   Musculoskeletal:  Positive for myalgias.   Neurological:  Negative for headaches.       Preventive   Most Recent Immunizations   Administered Date(s) Administered    COVID-19 J&J (MySongToYou) vaccine 0.5 mL 03/11/2021    COVID-19 MODERNA VACC 0.5 ML IM 04/26/2022    COVID-19 Moderna Vac BIVALENT 12 Yr+ IM 0.5 ML 10/27/2022    COVID-19 Pfizer mRNA vacc PF james-sucrose 12 yr and older (Comirnaty) 12/03/2024    INFLUENZA 10/25/2023    Influenza Split High Dose Preservative Free IM 09/30/2019    Influenza, seasonal, injectable 10/29/2014    Pneumococcal Conjugate 13-Valent 08/03/2020    Pneumococcal Polysaccharide PPV23 02/07/2018    Respiratory Syncytial Virus Vaccine (Recombinant, Adjuvanted) 11/28/2023    Td (adult), adsorbed 02/28/2015    Tdap 10/12/2009    Zoster 10/20/2015       Historical Information   Past Medical History:   Diagnosis Date    Allergic rhinitis     Arthritis     Breast cancer (HCC) 08/21/2008    age 58    Cancer (HCC)     left breast, tx with rx    Cataract     surgery    Chronic low back pain     Chronic pain disorder     Clostridium difficile colitis     Colon polyp     DDD (degenerative disc disease), lumbar     GERD (gastroesophageal reflux disease)     History  of chickenpox     History of measles     History of mumps     History of radiation therapy 2008    for breast cancer    Hypertension     Laryngopharyngeal reflux (LPR)     Lumbar spinal stenosis     Osteoarthritis of spine with radiculopathy, lumbar region     Osteopenia     Osteopenia     Pelvic floor dysfunction     Piriformis syndrome of left side     Plantar fasciitis     Right knee pain     Sciatica     Sinusitis     Spondylosis of cervical region without myelopathy or radiculopathy     Wears glasses     reading     Past Surgical History:   Procedure Laterality Date    BREAST BIOPSY Left 2008    malignant    BREAST CYST ASPIRATION Left 1998    BREAST LUMPECTOMY Left 2008    BREAST SURGERY      COLONOSCOPY  2020    [place] ESOPHAGOGASTRODUODENOSCOPY TRANSORAL DIAGNOSTIC N/A 2/15/2017    Procedure: ESOPHAGOGASTRODUODENOSCOPY (EGD);  Surgeon: Dustin Phoenix MD;  Location: [facility];  Service: Gastroenterology    [place] ESOPHAGOGASTRODUODENOSCOPY TRANSORAL DIAGNOSTIC N/A 2018    Procedure: ESOPHAGOGASTRODUODENOSCOPY (EGD);  Surgeon: Dustin Phoenix MD;  [facility];  Service: Gastroenterology    UPPER GASTROINTESTINAL ENDOSCOPY  2020    US GUIDED MSK PROCEDURE  2020     Family History   Problem Relation Age of Onset    Osteoporosis Mother     Hypertension Mother     Coronary artery disease Father     Diabetes Father     COPD Father     Hypertension Father     No Known Problems Maternal Aunt     Breast cancer Paternal Aunt 73    No Known Problems Paternal Aunt     No Known Problems Maternal Aunt     Colon cancer Neg Hx        Social History:   Social History     Tobacco Use   Smoking Status Former    Current packs/day: 0.00    Average packs/day: 1 pack/day for 25.0 years (25.0 ttl pk-yrs)    Types: Cigarettes    Start date: 1972    Quit date: 1997    Years since quittin.3   Smokeless Tobacco Never   Tobacco Comments    already quit smoking in        Meds/Allergies      Current Outpatient Medications:     Azelastine HCl 137 MCG/SPRAY SOLN, SPRAY 1 SPRAY INTO EACH NOSTRIL TWICE A DAY, Disp: 30 mL, Rfl: 11    cetirizine (ZyrTEC) 10 mg tablet, Take 10 mg by mouth daily, Disp: , Rfl:     Cholecalciferol (VITAMIN D-3 PO), Take 1,000 mg by mouth daily  , Disp: , Rfl:     Cholecalciferol (VITAMIN D3 PO), Take 1,200 mg by mouth in the morning, Disp: , Rfl:     cyclobenzaprine (FLEXERIL) 5 mg tablet, TAKE 1 TO 2 TABLETS BY MOUTH 3 TIMES A DAY AS NEEDED, Disp: , Rfl:     famotidine (PEPCID) 40 MG tablet, Take 1 tablet (40 mg total) by mouth daily at bedtime as needed for heartburn, Disp: 30 tablet, Rfl: 4    Fluticasone Propionate (Xhance) 93 MCG/ACT EXHU, into each nostril (Patient not taking: Reported on 10/28/2024), Disp: , Rfl:     lisinopril (ZESTRIL) 10 mg tablet, Take 10 mg by mouth daily, Disp: , Rfl:     metroNIDAZOLE (METROCREAM) 0.75 % cream, Apply 45 g topically 2 (two) times a day, Disp: , Rfl:     montelukast (SINGULAIR) 10 mg tablet, Take 1 tablet (10 mg total) by mouth daily at bedtime, Disp: 30 tablet, Rfl: 11    Multiple Vitamins-Minerals (CENTRUM ADULTS PO), Take 1 tablet by mouth daily, Disp: , Rfl:     Multiple Vitamins-Minerals (PRESERVISION AREDS PO), Take by mouth, Disp: , Rfl:     Omeprazole-Sodium Bicarbonate (Zegerid OTC)  MG CAPS, Take 1 capsule by mouth daily at bedtime, Disp: 30 capsule, Rfl: 11    Probiotic Product (PROBIOTIC PO), Take 1 tablet by mouth, Disp: , Rfl:     RABEprazole (ACIPHEX) 20 MG tablet, Take 2 tablets (40 mg total) by mouth daily, Disp: 180 tablet, Rfl: 1  Allergies   Allergen Reactions    Fentanyl Vomiting    Codeine     Morphine And Codeine Nausea Only, GI Intolerance and Vomiting     Note:  this allergy is not being included in drug screening.  Please inactivate this item and re-enter it to enable screening.    Morphine Sulfate [Morphine] Nausea Only, GI Intolerance and Vomiting    Naprosyn [Naproxen] Nausea Only, GI  "Intolerance and Vomiting    Oxycodone GI Intolerance and Vomiting    Penicillins Other (See Comments)     Unknown reaction.    Sulfa Antibiotics Rash and Fever       Vitals: not currently breastfeeding., There is no height or weight on file to calculate BMI.      Physical Exam  Physical Exam    Labs: I have personally reviewed pertinent lab results.  Lab Results   Component Value Date    WBC 5.93 2023    HGB 13.0 2023    HCT 41.3 2023    MCV 93 2023     2023     Lab Results   Component Value Date    GLUCOSE 109 10/22/2015    CALCIUM 10.1 2024     10/22/2015    K 4.8 2024    CO2 29 2024     2024    BUN 20 2024    CREATININE 0.60 2024     No results found for: \"IGE\"  Lab Results   Component Value Date    ALT 18 2024    AST 20 2024    ALKPHOS 83 2024    BILITOT 0.79 10/22/2015       Imaging and other studies: Results Review Statement: I personally reviewed the following image studies in PACS and associated radiology reports: CT chest, procedure reports, and Echocardiogram. My interpretation of the radiology images/reports is: as above.  CT chest wo contrast  Result Date: 2024  Impression: 1. Stable small focus of microcystic change in the right upper lobe without suspicious pulmonary nodules. 2. Stable 1.2 cm sclerotic lesion in the posterior T10 vertebral body. Workstation performed: [id number]       Pulmonary function testin2024:  Results:  FEV1/FVC Ratio: 74 %  Forced Vital Capacity: 2.91 L           95 % predicted  FEV1: 2.16 L92 % predicted     After administration of bronchodilator   FEV1/FVC Ratio: 77%  FVC: 2.81 L, 92% predicted, -3% change  FEV1: 2.16 L, 92% predicted, 0% change     Lung volumes by body plethysmography:   Total Lung Capacity 97 % predicted   Residual volume 105 % predicted     DLCO corrected for patients hemoglobin level: 86 %       Interpretation:     Normal Spirometry   " "  No significant response to the administration of bronchodilator per ATS Standards     Normal Lung volumes     Normal diffusion capacity     Flow volume loop is normal     EKG, Pathology, and Other Studies: Results Review Statement: I personally reviewed the following image studies in PACS and associated radiology reports: CT chest, procedure reports, Ultrasound(s), and Echocardiogram. My interpretation of the radiology images/reports is: as above.    Disclaimer: Portions of the record may have been created with voice recognition software. Occasional wrong word or \"sound a like\" substitutions may have occurred due to the inherent limitations of voice recognition software. Careful consideration should be taken to recognize, using context, where substitutions have occurred.    Rohit Mccord,    Pulmonary & Critical Care Medicine Fellow PGY-V  [facility]  "

## 2024-12-26 ENCOUNTER — APPOINTMENT (OUTPATIENT)
Dept: LAB | Facility: IMAGING CENTER | Age: 74
End: 2024-12-26
Payer: COMMERCIAL

## 2024-12-26 DIAGNOSIS — E78.2 MIXED HYPERLIPIDEMIA: ICD-10-CM

## 2024-12-26 DIAGNOSIS — E55.9 VITAMIN D DEFICIENCY: ICD-10-CM

## 2024-12-26 DIAGNOSIS — E83.52 HYPERCALCEMIA: ICD-10-CM

## 2024-12-26 DIAGNOSIS — M89.8X8 MASS OF STERNUM: ICD-10-CM

## 2024-12-26 DIAGNOSIS — R73.01 IMPAIRED FASTING GLUCOSE: ICD-10-CM

## 2024-12-26 DIAGNOSIS — E21.3 HYPERPARATHYROIDISM (HCC): ICD-10-CM

## 2024-12-26 LAB
25(OH)D3 SERPL-MCNC: 33.7 NG/ML (ref 30–100)
ALBUMIN SERPL BCG-MCNC: 4.7 G/DL (ref 3.5–5)
ALP SERPL-CCNC: 93 U/L (ref 34–104)
ALT SERPL W P-5'-P-CCNC: 22 U/L (ref 7–52)
ANION GAP SERPL CALCULATED.3IONS-SCNC: 8 MMOL/L (ref 4–13)
AST SERPL W P-5'-P-CCNC: 24 U/L (ref 13–39)
BASOPHILS # BLD AUTO: 0.03 THOUSANDS/ÂΜL (ref 0–0.1)
BASOPHILS NFR BLD AUTO: 1 % (ref 0–1)
BILIRUB SERPL-MCNC: 0.66 MG/DL (ref 0.2–1)
BUN SERPL-MCNC: 20 MG/DL (ref 5–25)
CALCIUM SERPL-MCNC: 10.3 MG/DL (ref 8.4–10.2)
CHLORIDE SERPL-SCNC: 103 MMOL/L (ref 96–108)
CHOLEST SERPL-MCNC: 205 MG/DL (ref ?–200)
CO2 SERPL-SCNC: 29 MMOL/L (ref 21–32)
CREAT SERPL-MCNC: 0.62 MG/DL (ref 0.6–1.3)
EOSINOPHIL # BLD AUTO: 0.28 THOUSAND/ÂΜL (ref 0–0.61)
EOSINOPHIL NFR BLD AUTO: 5 % (ref 0–6)
ERYTHROCYTE [DISTWIDTH] IN BLOOD BY AUTOMATED COUNT: 13.6 % (ref 11.6–15.1)
EST. AVERAGE GLUCOSE BLD GHB EST-MCNC: 114 MG/DL
GFR SERPL CREATININE-BSD FRML MDRD: 89 ML/MIN/1.73SQ M
GLUCOSE P FAST SERPL-MCNC: 105 MG/DL (ref 65–99)
HBA1C MFR BLD: 5.6 %
HCT VFR BLD AUTO: 43.7 % (ref 34.8–46.1)
HDLC SERPL-MCNC: 57 MG/DL
HGB BLD-MCNC: 13.3 G/DL (ref 11.5–15.4)
IMM GRANULOCYTES # BLD AUTO: 0.02 THOUSAND/UL (ref 0–0.2)
IMM GRANULOCYTES NFR BLD AUTO: 0 % (ref 0–2)
LDLC SERPL CALC-MCNC: 103 MG/DL (ref 0–100)
LYMPHOCYTES # BLD AUTO: 2.3 THOUSANDS/ÂΜL (ref 0.6–4.47)
LYMPHOCYTES NFR BLD AUTO: 38 % (ref 14–44)
MCH RBC QN AUTO: 28.4 PG (ref 26.8–34.3)
MCHC RBC AUTO-ENTMCNC: 30.4 G/DL (ref 31.4–37.4)
MCV RBC AUTO: 93 FL (ref 82–98)
MONOCYTES # BLD AUTO: 0.52 THOUSAND/ÂΜL (ref 0.17–1.22)
MONOCYTES NFR BLD AUTO: 9 % (ref 4–12)
NEUTROPHILS # BLD AUTO: 2.88 THOUSANDS/ÂΜL (ref 1.85–7.62)
NEUTS SEG NFR BLD AUTO: 47 % (ref 43–75)
NONHDLC SERPL-MCNC: 148 MG/DL
NRBC BLD AUTO-RTO: 0 /100 WBCS
PLATELET # BLD AUTO: 225 THOUSANDS/UL (ref 149–390)
PMV BLD AUTO: 10.8 FL (ref 8.9–12.7)
POTASSIUM SERPL-SCNC: 5.1 MMOL/L (ref 3.5–5.3)
PROT SERPL-MCNC: 7.2 G/DL (ref 6.4–8.4)
PTH-INTACT SERPL-MCNC: 111.8 PG/ML (ref 12–88)
RBC # BLD AUTO: 4.68 MILLION/UL (ref 3.81–5.12)
SODIUM SERPL-SCNC: 140 MMOL/L (ref 135–147)
TRIGL SERPL-MCNC: 227 MG/DL (ref ?–150)
WBC # BLD AUTO: 6.03 THOUSAND/UL (ref 4.31–10.16)

## 2024-12-26 PROCEDURE — 80053 COMPREHEN METABOLIC PANEL: CPT

## 2024-12-26 PROCEDURE — 80061 LIPID PANEL: CPT

## 2024-12-26 PROCEDURE — 36415 COLL VENOUS BLD VENIPUNCTURE: CPT

## 2024-12-26 PROCEDURE — 85025 COMPLETE CBC W/AUTO DIFF WBC: CPT

## 2024-12-26 PROCEDURE — 82306 VITAMIN D 25 HYDROXY: CPT

## 2024-12-26 PROCEDURE — 83970 ASSAY OF PARATHORMONE: CPT

## 2024-12-26 PROCEDURE — 83036 HEMOGLOBIN GLYCOSYLATED A1C: CPT

## 2024-12-27 ENCOUNTER — RESULTS FOLLOW-UP (OUTPATIENT)
Dept: ENDOCRINOLOGY | Facility: CLINIC | Age: 74
End: 2024-12-27

## 2025-01-13 ENCOUNTER — HOSPITAL ENCOUNTER (OUTPATIENT)
Dept: RADIOLOGY | Facility: IMAGING CENTER | Age: 75
Discharge: HOME | End: 2025-01-13
Payer: COMMERCIAL

## 2025-01-13 DIAGNOSIS — R22.1 NECK MASS: ICD-10-CM

## 2025-01-13 PROCEDURE — 76536 US EXAM OF HEAD AND NECK: CPT

## 2025-01-22 ENCOUNTER — ESTABLISHED COMPREHENSIVE EXAM (OUTPATIENT)
Dept: URBAN - METROPOLITAN AREA CLINIC 6 | Facility: CLINIC | Age: 75
End: 2025-01-22

## 2025-01-22 DIAGNOSIS — H04.123: ICD-10-CM

## 2025-01-22 DIAGNOSIS — Z96.1: ICD-10-CM

## 2025-01-22 DIAGNOSIS — H35.3130: ICD-10-CM

## 2025-01-22 PROCEDURE — 92014 COMPRE OPH EXAM EST PT 1/>: CPT

## 2025-01-22 PROCEDURE — 92134 CPTRZ OPH DX IMG PST SGM RTA: CPT

## 2025-01-22 ASSESSMENT — VISUAL ACUITY
OD_CC: 20/30-1
OU_CC: J1
OS_CC: 20/40-2

## 2025-01-22 ASSESSMENT — TONOMETRY
OD_IOP_MMHG: 19
OS_IOP_MMHG: 16

## 2025-01-30 ENCOUNTER — HOSPITAL ENCOUNTER (OUTPATIENT)
Dept: RADIOLOGY | Facility: HOSPITAL | Age: 75
Discharge: HOME | End: 2025-01-30
Payer: COMMERCIAL

## 2025-01-30 DIAGNOSIS — R13.14 PHARYNGOESOPHAGEAL DYSPHAGIA: ICD-10-CM

## 2025-01-30 DIAGNOSIS — K44.9 HIATAL HERNIA: ICD-10-CM

## 2025-01-30 DIAGNOSIS — K21.9 GASTROESOPHAGEAL REFLUX DISEASE WITHOUT ESOPHAGITIS: ICD-10-CM

## 2025-01-30 PROCEDURE — 74220 X-RAY XM ESOPHAGUS 1CNTRST: CPT

## 2025-02-03 ENCOUNTER — OFFICE VISIT (OUTPATIENT)
Dept: ENDOCRINOLOGY | Facility: CLINIC | Age: 75
End: 2025-02-03
Payer: COMMERCIAL

## 2025-02-03 VITALS
BODY MASS INDEX: 25.43 KG/M2 | DIASTOLIC BLOOD PRESSURE: 82 MMHG | SYSTOLIC BLOOD PRESSURE: 130 MMHG | HEIGHT: 67 IN | WEIGHT: 162 LBS | OXYGEN SATURATION: 98 % | HEART RATE: 80 BPM

## 2025-02-03 DIAGNOSIS — M81.0 OSTEOPOROSIS, UNSPECIFIED OSTEOPOROSIS TYPE, UNSPECIFIED PATHOLOGICAL FRACTURE PRESENCE: ICD-10-CM

## 2025-02-03 DIAGNOSIS — E21.3 HYPERPARATHYROIDISM (HCC): Primary | ICD-10-CM

## 2025-02-03 DIAGNOSIS — E83.52 HYPERCALCEMIA: ICD-10-CM

## 2025-02-03 PROCEDURE — 99214 OFFICE O/P EST MOD 30 MIN: CPT | Performed by: INTERNAL MEDICINE

## 2025-02-03 NOTE — ASSESSMENT & PLAN NOTE
Reviewed testing including blood work and DXA. Her calcium has been high normal but PTH is progressing. She does not want pharmacologic intervention for metabolic bone disease. She has had urine and blood testing in the past and does not have evidence of FHH. At this time, discussed sestamibi scan to lateralize a potential parathyroid adenoma for possible surgery. She is agreeable and this is ordered.

## 2025-02-03 NOTE — PROGRESS NOTES
Chief Complaint   Patient presents with   • Hyperparathyroidism      Referring Provider  No referring provider defined for this encounter.     History of Present Illness:   Denise Brown is a 74 y.o. female with hypercalcemia seen in f/u. Last seen in the office in Aug 2024 with Charla HOPKINS for elevated PTH.     PTH was checked due to higher total calcium levels. Her PTH has been rising over time.     She has a hx of one broken ankle ~30yrs ago. She had a dx of breast CA stage 0 with surgery and radiation seeding. No chemo and she is cancer free since 2008.  Denies hx of renal stones but has had GERD for over 10yrs.  Denies increased thirst, but has frequent urination. 1-2 episodes nocturia which is unchanged. Denies fatigue or renal stones.       Dietary Calcium intake: eats cheese, skim milk in cereal, yogurt. Will eat spinach, broccoli, or zucchini  Calcium/Vitamin D supplementation (including MVI) : 2000units daily, MVI (total 3000: citracal 1200mg daily.   Weight bearing exercises: walking during the day, but does have at least 30mins per day    Wanted to discuss her DXA which was done 3/2024. While her BMD and T scores show osteopenia, the FRAX score for the hip is elevated at 3.8%. She does not want pharmacologic treatment at this time.   Sees Dr. Tovar for ENT and has seen Dr. Santiago for surgery in the past.     Fhx: mother with OP     Patient Active Problem List   Diagnosis   • Lumbar radiculopathy   • Chronic bilateral low back pain with right-sided sciatica   • Sacroiliitis (HCC)   • Lumbar spondylosis   • Trochanteric bursitis of right hip   • Spinal stenosis of lumbar region   • Subacromial bursitis of right shoulder joint   • Dysphonia   • Pharyngoesophageal dysphagia   • Reflux laryngitis   • Gastroesophageal reflux disease   • Paresis of right vocal fold   • Laryngeal edema   • Allergic rhinitis   • Chronic rhinitis   • Esophageal dysmotilities   • Dyspepsia   • Dyspnea on exertion   •  Essential hypertension   • Nausea   • Glottic insufficiency   • Muscle tension dysphonia   • Radial neuritis, right   • Arthritis of right acromioclavicular joint   • Lateral epicondylitis of right elbow   • Colon polyp   • Rectal pain   • Laryngopharyngeal reflux (LPR)   • Pelvic floor dysfunction in female   • PONV (postoperative nausea and vomiting)   • TMJPDS (temporomandibular joint pain dysfunction syndrome)   • Sleep disorder   • Hypercalcemia   • Hyperparathyroidism (HCC)   • Vitamin D deficiency   • Nasal polyposis   • Deviated nasal septum   • Hypertrophy of both inferior nasal turbinates   • Cervical spine disease   • Osteoporosis   • Hiatal hernia   • Allergic rhinitis due to cats      Past Medical History:   Diagnosis Date   • Allergic rhinitis    • Arthritis    • Breast cancer (HCC) 08/21/2008    age 58   • Cancer (HCC)     left breast, tx with rx   • Cataract     surgery   • Chronic low back pain    • Chronic pain disorder    • Clostridium difficile colitis    • Colon polyp    • DDD (degenerative disc disease), lumbar    • GERD (gastroesophageal reflux disease)    • History of chickenpox    • History of measles    • History of mumps    • History of radiation therapy 09/2008    for breast cancer   • Hypertension    • Laryngopharyngeal reflux (LPR)    • Lumbar spinal stenosis    • Osteoarthritis of spine with radiculopathy, lumbar region    • Osteopenia    • Osteopenia    • Pelvic floor dysfunction    • Piriformis syndrome of left side    • Plantar fasciitis    • Right knee pain    • Sciatica    • Sinusitis    • Spondylosis of cervical region without myelopathy or radiculopathy    • Wears glasses     reading      Past Surgical History:   Procedure Laterality Date   • BREAST BIOPSY Left 07/30/2008    malignant   • BREAST CYST ASPIRATION Left 1998   • BREAST LUMPECTOMY Left 08/21/2008   • BREAST SURGERY     • COLONOSCOPY  08/31/2020   • [place] ESOPHAGOGASTRODUODENOSCOPY TRANSORAL DIAGNOSTIC N/A 2/15/2017     Procedure: ESOPHAGOGASTRODUODENOSCOPY (EGD);  Surgeon: Dustin Phoenix MD;  Location: [facility];  Service: Gastroenterology   • IN ESOPHAGOGASTRODUODENOSCOPY TRANSORAL DIAGNOSTIC N/A 2018    Procedure: ESOPHAGOGASTRODUODENOSCOPY (EGD);  Surgeon: Dustin Phoenix MD;  [facility];  Service: Gastroenterology   • UPPER GASTROINTESTINAL ENDOSCOPY  2020   • US GUIDED MSK PROCEDURE  2020      Family History   Problem Relation Age of Onset   • Osteoporosis Mother    • Hypertension Mother    • Coronary artery disease Father    • Diabetes Father    • COPD Father    • Hypertension Father    • No Known Problems Maternal Aunt    • Breast cancer Paternal Aunt 73   • No Known Problems Paternal Aunt    • No Known Problems Maternal Aunt    • Colon cancer Neg Hx      Social History     Tobacco Use   • Smoking status: Former     Current packs/day: 0.00     Average packs/day: 1 pack/day for 25.0 years (25.0 ttl pk-yrs)     Types: Cigarettes     Start date: 1972     Quit date: 1997     Years since quittin.4   • Smokeless tobacco: Never   • Tobacco comments:     already quit smoking in    Substance Use Topics   • Alcohol use: Yes     Alcohol/week: 2.0 standard drinks of alcohol     Types: 2 Glasses of wine per week     Allergies   Allergen Reactions   • Fentanyl Vomiting   • Codeine    • Morphine And Codeine Nausea Only, GI Intolerance and Vomiting     Note:  this allergy is not being included in drug screening.  Please inactivate this item and re-enter it to enable screening.   • Morphine Sulfate [Morphine] Nausea Only, GI Intolerance and Vomiting   • Naprosyn [Naproxen] Nausea Only, GI Intolerance and Vomiting   • Oxycodone GI Intolerance and Vomiting   • Penicillins Other (See Comments)     Unknown reaction.   • Sulfa Antibiotics Rash and Fever         Current Outpatient Medications:   •  Azelastine HCl 137 MCG/SPRAY SOLN, SPRAY 1 SPRAY INTO EACH NOSTRIL TWICE A DAY, Disp: 30 mL, Rfl: 11  •   "cetirizine (ZyrTEC) 10 mg tablet, Take 10 mg by mouth daily, Disp: , Rfl:   •  Cholecalciferol (VITAMIN D-3 PO), Take 1,000 mg by mouth daily  , Disp: , Rfl:   •  cyclobenzaprine (FLEXERIL) 5 mg tablet, TAKE 1 TO 2 TABLETS BY MOUTH 3 TIMES A DAY AS NEEDED, Disp: , Rfl:   •  famotidine (PEPCID) 40 MG tablet, Take 1 tablet (40 mg total) by mouth daily at bedtime as needed for heartburn, Disp: 30 tablet, Rfl: 4  •  lisinopril (ZESTRIL) 10 mg tablet, Take 10 mg by mouth daily, Disp: , Rfl:   •  metroNIDAZOLE (METROCREAM) 0.75 % cream, Apply 45 g topically 2 (two) times a day, Disp: , Rfl:   •  montelukast (SINGULAIR) 10 mg tablet, Take 1 tablet (10 mg total) by mouth daily at bedtime, Disp: 30 tablet, Rfl: 11  •  Multiple Vitamins-Minerals (CENTRUM ADULTS PO), Take 1 tablet by mouth daily, Disp: , Rfl:   •  Multiple Vitamins-Minerals (PRESERVISION AREDS PO), Take by mouth, Disp: , Rfl:   •  Probiotic Product (PROBIOTIC PO), Take 1 tablet by mouth, Disp: , Rfl:   •  RABEprazole (ACIPHEX) 20 MG tablet, Take 2 tablets (40 mg total) by mouth daily, Disp: 180 tablet, Rfl: 1  •  Fluticasone Propionate (Xhance) 93 MCG/ACT EXHU, into each nostril (Patient not taking: Reported on 10/28/2024), Disp: , Rfl:   •  Omeprazole-Sodium Bicarbonate (Zegerid OTC)  MG CAPS, Take 1 capsule by mouth daily at bedtime (Patient not taking: Reported on 12/18/2024), Disp: 30 capsule, Rfl: 11    Physical Exam:  Body mass index is 25.37 kg/m².  /82   Pulse 80   Ht 5' 7\" (1.702 m)   Wt 73.5 kg (162 lb)   LMP  (LMP Unknown)   SpO2 98%   BMI 25.37 kg/m²    Wt Readings from Last 3 Encounters:   02/03/25 73.5 kg (162 lb)   12/18/24 71.7 kg (158 lb)   12/13/24 70.8 kg (156 lb)       Physical Exam   Gen: appears well-developed and well-nourished. No apparent distress.   Head: Normocephalic and atraumatic.   Eyes: no stare or proptosis, no periorbital edema  E/N/M nl facies, hearing grossly intact  Neck: range of motion nl "   Pulmonary/Chest: breathing  comfortably, no accessory muscle use, effort normal.   Musculoskeletal: moves all 4 extremities, gait nl  Neurological: alert and oriented to person, place, and time. No upper ext tremor appreciated  Skin: does not appear diaphoretic, no facial plethora  Psychiatric: normal mood and affect; behavior is normal; no gross lapses in memory, answer questions appropriately      DATA:  Labs:       Lab Results   Component Value Date    FREET4 0.92 06/06/2019     Lab Results   Component Value Date    SODIUM 140 12/26/2024    K 5.1 12/26/2024     12/26/2024    CO2 29 12/26/2024    BUN 20 12/26/2024    CREATININE 0.62 12/26/2024    GLUC 118 03/13/2023    CALCIUM 10.3 (H) 12/26/2024      Lab Results   Component Value Date    .8 (H) 12/26/2024    CALCIUM 10.3 (H) 12/26/2024    PHOS 3.4 04/19/2023 12/2024 vit D 33.7  ICA   3/23- 1.26  1/23 - 1.32    Radiology    03/14/2024   COMPARISON:  Prior study of April 7, 2016     RESULTS:   LUMBAR SPINE:  L1-L4:  BMD 1.0 81 gm/cm2  T-score 0.3  Z-score 2.3     LEFT TOTAL HIP:  BMD 0.845 gm/cm2  T-score -0.8  Z-score 0.6     LEFT FEMORAL NECK:  BMD 0.702 gm/cm2  T-score -1.3  Z-score 0.3     LEFT FOREARM :  BMD 0.628 gm/sq-cm,  T-score is  -1.0  Z-score is  1.0          IMPRESSION:  1.  Based on the WHO classification, the T-score of -1.3 in the left hip is consistent with low bone mineral density.  2. When compared to the prior examination, there has been a 2  % DECREASE in the total bone mineral density of the lumbar spine and a  3 % DECREASE in the total bone mineral density of the left hip.  3. The 10 year risk of hip fracture is 3.8% with the 10 year risk of major osteoporotic fracture being 19% as calculated by the [employer] fracture risk assessment tool (FRAX, which is based on data generated by the WHO Collaborating [place]   for Metabolic Bone Diseases).    Impression/Plan:  Problem List Items Addressed This Visit      Hypercalcemia    Hyperparathyroidism (HCC) - Primary    Reviewed testing including blood work and DXA. Her calcium has been high normal but PTH is progressing. She does not want pharmacologic intervention for metabolic bone disease. She has had urine and blood testing in the past and does not have evidence of FHH. At this time, discussed sestamibi scan to lateralize a potential parathyroid adenoma for possible surgery. She is agreeable and this is ordered.          Relevant Orders    NM parathyroid scan w spect    Osteoporosis    FRAX score does support treatment, but she would like to wait on this. See also plan fp PHPT                           Diana Vega MD

## 2025-02-07 ENCOUNTER — OFFICE VISIT (OUTPATIENT)
Dept: OBGYN CLINIC | Facility: CLINIC | Age: 75
End: 2025-02-07
Payer: COMMERCIAL

## 2025-02-07 ENCOUNTER — APPOINTMENT (OUTPATIENT)
Dept: RADIOLOGY | Facility: AMBULARY SURGERY CENTER | Age: 75
End: 2025-02-07
Attending: ORTHOPAEDIC SURGERY
Payer: COMMERCIAL

## 2025-02-07 ENCOUNTER — RESULTS FOLLOW-UP (OUTPATIENT)
Dept: GASTROENTEROLOGY | Facility: CLINIC | Age: 75
End: 2025-02-07

## 2025-02-07 VITALS — BODY MASS INDEX: 25.27 KG/M2 | HEIGHT: 67 IN | WEIGHT: 161 LBS

## 2025-02-07 DIAGNOSIS — M17.11 PATELLOFEMORAL ARTHRITIS OF RIGHT KNEE: Primary | ICD-10-CM

## 2025-02-07 DIAGNOSIS — M17.12 PATELLOFEMORAL ARTHRITIS OF LEFT KNEE: ICD-10-CM

## 2025-02-07 DIAGNOSIS — M17.11 PATELLOFEMORAL ARTHRITIS OF RIGHT KNEE: ICD-10-CM

## 2025-02-07 DIAGNOSIS — M25.562 LEFT KNEE PAIN, UNSPECIFIED CHRONICITY: ICD-10-CM

## 2025-02-07 PROCEDURE — 20610 DRAIN/INJ JOINT/BURSA W/O US: CPT | Performed by: PHYSICIAN ASSISTANT

## 2025-02-07 PROCEDURE — 99213 OFFICE O/P EST LOW 20 MIN: CPT | Performed by: ORTHOPAEDIC SURGERY

## 2025-02-07 PROCEDURE — 73562 X-RAY EXAM OF KNEE 3: CPT

## 2025-02-07 RX ORDER — METHYLPREDNISOLONE ACETATE 40 MG/ML
2 INJECTION, SUSPENSION INTRA-ARTICULAR; INTRALESIONAL; INTRAMUSCULAR; SOFT TISSUE
Status: COMPLETED | OUTPATIENT
Start: 2025-02-07 | End: 2025-02-07

## 2025-02-07 RX ORDER — BUPIVACAINE HYDROCHLORIDE 2.5 MG/ML
2 INJECTION, SOLUTION INFILTRATION; PERINEURAL
Status: COMPLETED | OUTPATIENT
Start: 2025-02-07 | End: 2025-02-07

## 2025-02-07 RX ORDER — KETOROLAC TROMETHAMINE 30 MG/ML
30 INJECTION, SOLUTION INTRAMUSCULAR; INTRAVENOUS
Status: COMPLETED | OUTPATIENT
Start: 2025-02-07 | End: 2025-02-07

## 2025-02-07 RX ADMIN — METHYLPREDNISOLONE ACETATE 2 ML: 40 INJECTION, SUSPENSION INTRA-ARTICULAR; INTRALESIONAL; INTRAMUSCULAR; SOFT TISSUE at 11:45

## 2025-02-07 RX ADMIN — BUPIVACAINE HYDROCHLORIDE 2 ML: 2.5 INJECTION, SOLUTION INFILTRATION; PERINEURAL at 11:45

## 2025-02-07 RX ADMIN — KETOROLAC TROMETHAMINE 30 MG: 30 INJECTION, SOLUTION INTRAMUSCULAR; INTRAVENOUS at 11:45

## 2025-02-07 NOTE — PROGRESS NOTES
"Name: Denise Brown      : 1950      MRN: [id number]  Encounter Provider: Naila Vazquez MD  Encounter Date: 2025   Encounter department: [facility]  :  Assessment & Plan  Patellofemoral arthritis of right knee  Weightbearing as tolerated bilateral lower extremities  Bilateral knee intra-articular corticosteroid and Toradol injection ministered as noted above  Patient advised should they develop any increasing pain, redness, drainage, numbness, tingling or swelling surrounding the injection site, they are to contact our office or present to the emergency department.  Advised patient home range of motion stretching exercises including VMO strengthening exercises  Activity as tolerated  Follow-up in 4 months time repeat evaluation bilateral knees  Orders:    XR knee 3 vw right non injury; Future    Left knee pain, unspecified chronicity    Orders:    XR knee 3 vw left non injury; Future        History of Present Illness   HPI  Denise Brown is a 74 y.o. female who presents today regarding bilateral knee pain right worse than left.  Patient has previously been diagnosed with patellofemoral arthritis worse on the right knee than the left.  She does have a history of injections in her bilateral knees she states the pain is worse after standing long period time going to standing as well as going up and down stairs pain is localized in anterior portion of her bilateral knees she states her right knee is significantly more painful than her left.  She is very limited due to her bilateral knee pain she states over-the-counter medication gives only mild pain relief.  Denies any changes in numbness and tingling.  History obtained from: patient         Objective   Ht 5' 7\" (1.702 m)   Wt 73 kg (161 lb)   LMP  (LMP Unknown)   BMI 25.22 kg/m²          Review Of Systems:   Skin: Normal  Neuro: See HPI  Musculoskeletal: See HPI  All other systems reviewed and are " negative    Past Medical History:   Past Medical History:   Diagnosis Date    Allergic rhinitis     Arthritis     Breast cancer (HCC) 08/21/2008    age 58    Cancer (HCC)     left breast, tx with rx    Cataract     surgery    Chronic low back pain     Chronic pain disorder     Clostridium difficile colitis     Colon polyp     DDD (degenerative disc disease), lumbar     GERD (gastroesophageal reflux disease)     History of chickenpox     History of measles     History of mumps     History of radiation therapy 09/2008    for breast cancer    Hypertension     Laryngopharyngeal reflux (LPR)     Lumbar spinal stenosis     Osteoarthritis of spine with radiculopathy, lumbar region     Osteopenia     Osteopenia     Pelvic floor dysfunction     Piriformis syndrome of left side     Plantar fasciitis     Right knee pain     Sciatica     Sinusitis     Spondylosis of cervical region without myelopathy or radiculopathy     Wears glasses     reading       Past Surgical History:   Past Surgical History:   Procedure Laterality Date    BREAST BIOPSY Left 07/30/2008    malignant    BREAST CYST ASPIRATION Left 1998    BREAST LUMPECTOMY Left 08/21/2008    BREAST SURGERY      COLONOSCOPY  08/31/2020    [place] ESOPHAGOGASTRODUODENOSCOPY TRANSORAL DIAGNOSTIC N/A 2/15/2017    Procedure: ESOPHAGOGASTRODUODENOSCOPY (EGD);  Surgeon: Dustin Phoenix MD;  Location: [facility];  Service: Gastroenterology    [place] ESOPHAGOGASTRODUODENOSCOPY TRANSORAL DIAGNOSTIC N/A 2/1/2018    Procedure: ESOPHAGOGASTRODUODENOSCOPY (EGD);  Surgeon: Dustin Phoenix MD;  [facility];  Service: Gastroenterology    UPPER GASTROINTESTINAL ENDOSCOPY  01/2020    US GUIDED MSK PROCEDURE  2/6/2020       Family History:  Family history reviewed and non-contributory  Family History   Problem Relation Age of Onset    Osteoporosis Mother     Hypertension Mother     Coronary artery disease Father     Diabetes Father     COPD Father     Hypertension Father     No Known Problems  Maternal Aunt     Breast cancer Paternal Aunt 73    No Known Problems Paternal Aunt     No Known Problems Maternal Aunt     Colon cancer Neg Hx          Social History:  Social History     Socioeconomic History    Marital status: /Civil Union     Spouse name: Geoffrey    Number of children: 0    Years of education: None    Highest education level: None   Occupational History    None   Tobacco Use    Smoking status: Former     Current packs/day: 0.00     Average packs/day: 1 pack/day for 25.0 years (25.0 ttl pk-yrs)     Types: Cigarettes     Start date: 1972     Quit date: 1997     Years since quittin.4    Smokeless tobacco: Never    Tobacco comments:     already quit smoking in    Vaping Use    Vaping status: Never Used   Substance and Sexual Activity    Alcohol use: Yes     Alcohol/week: 2.0 standard drinks of alcohol     Types: 2 Glasses of wine per week    Drug use: No    Sexual activity: Not Currently     Partners: Male     Birth control/protection: Post-menopausal   Other Topics Concern    None   Social History Narrative    Who lives in your home:      What type of home do you live in: Single house    Age of your home:     How long have you been living there: 19 yrs     Type of heat: Other Heat pump     Type of fuel: Electric    What type of hunter is in your bedroom: Carpet    Do you have the following in or near your home:    Air products: Central air, Humidifier, and Dehumidifier    Pests: None    Pets: Cat    Are pets allowed in bedroom: No    Open fields, wooded areas nearby: Wooded areas    Basement: Dry, Musty, and Finished    Exposure to second hand smoke: No        Habits:    Caffeine: soda rarely-hot tea once and awhile     Chocolate: dark chocolate 2 x a week     Other:              Social Drivers of Health     Financial Resource Strain: Not on file   Food Insecurity: Not on file   Transportation Needs: Not on file   Physical Activity: Sufficiently Active (2024)     Exercise Vital Sign     Days of Exercise per Week: 7 days     Minutes of Exercise per Session: 50 min   Stress: Not on file   Social Connections: Not on file   Intimate Partner Violence: Not on file   Housing Stability: Not on file       Allergies:   Allergies   Allergen Reactions    Fentanyl Vomiting    Codeine     Morphine And Codeine Nausea Only, GI Intolerance and Vomiting     Note:  this allergy is not being included in drug screening.  Please inactivate this item and re-enter it to enable screening.    Morphine Sulfate [Morphine] Nausea Only, GI Intolerance and Vomiting    Naprosyn [Naproxen] Nausea Only, GI Intolerance and Vomiting    Oxycodone GI Intolerance and Vomiting    Penicillins Other (See Comments)     Unknown reaction.    Sulfa Antibiotics Rash and Fever       Labs:  0   Lab Value Date/Time    HCT 43.7 12/26/2024 0708    HCT 41.3 12/19/2023 0709    HCT 43.8 06/16/2023 0707    HGB 13.3 12/26/2024 0708    HGB 13.0 12/19/2023 0709    HGB 13.2 06/16/2023 0707    WBC 6.03 12/26/2024 0708    WBC 5.93 12/19/2023 0709    WBC 6.85 06/16/2023 0707       Meds:    Current Outpatient Medications:     Azelastine HCl 137 MCG/SPRAY SOLN, SPRAY 1 SPRAY INTO EACH NOSTRIL TWICE A DAY, Disp: 30 mL, Rfl: 11    cetirizine (ZyrTEC) 10 mg tablet, Take 10 mg by mouth daily, Disp: , Rfl:     Cholecalciferol (VITAMIN D-3 PO), Take 1,000 mg by mouth daily  , Disp: , Rfl:     cyclobenzaprine (FLEXERIL) 5 mg tablet, TAKE 1 TO 2 TABLETS BY MOUTH 3 TIMES A DAY AS NEEDED, Disp: , Rfl:     famotidine (PEPCID) 40 MG tablet, Take 1 tablet (40 mg total) by mouth daily at bedtime as needed for heartburn, Disp: 30 tablet, Rfl: 4    Fluticasone Propionate (Xhance) 93 MCG/ACT EXHU, into each nostril (Patient not taking: Reported on 10/28/2024), Disp: , Rfl:     lisinopril (ZESTRIL) 10 mg tablet, Take 10 mg by mouth daily, Disp: , Rfl:     metroNIDAZOLE (METROCREAM) 0.75 % cream, Apply 45 g topically 2 (two) times a day, Disp: , Rfl:      montelukast (SINGULAIR) 10 mg tablet, Take 1 tablet (10 mg total) by mouth daily at bedtime, Disp: 30 tablet, Rfl: 11    Multiple Vitamins-Minerals (CENTRUM ADULTS PO), Take 1 tablet by mouth daily, Disp: , Rfl:     Multiple Vitamins-Minerals (PRESERVISION AREDS PO), Take by mouth, Disp: , Rfl:     Omeprazole-Sodium Bicarbonate (Zegerid OTC)  MG CAPS, Take 1 capsule by mouth daily at bedtime (Patient not taking: Reported on 12/18/2024), Disp: 30 capsule, Rfl: 11    Probiotic Product (PROBIOTIC PO), Take 1 tablet by mouth, Disp: , Rfl:     RABEprazole (ACIPHEX) 20 MG tablet, Take 2 tablets (40 mg total) by mouth daily, Disp: 180 tablet, Rfl: 1    Body mass index is 25.22 kg/m².  Wt Readings from Last 3 Encounters:   02/07/25 73 kg (161 lb)   02/03/25 73.5 kg (162 lb)   12/18/24 71.7 kg (158 lb)     Physical Exam:   There were no vitals filed for this visit.    General Appearance:    Alert, cooperative, no distress, appears stated age   Head:    Normocephalic, without obvious abnormality, atraumatic   Eyes:    conjunctiva/corneas clear, both eyes        Nose:   Nares normal, septum midline, no drainage    Throat:   Lips normal; teeth and gums normal   Neck:    symmetrical, trachea midline, ;     thyroid:  no enlargement/   Back:     Symmetric, no curvature, ROM normal   Lungs:   No audible wheezing or labored breathing   Chest Wall:    No tenderness or deformity    Heart:    Regular rate and rhythm               Pulses:   2+ and symmetric all extremities   Skin:   Skin color, texture, turgor normal, no rashes or lesions   Neurologic:   normal strength, sensation and reflexes     throughout       Musculoskeletal: bilateral lower extremity  On examination of the right knee there is no effusion no erythema no laceration no abrasion no ecchymosis.  Range of motion full active extension flexion greater than 120°.  There is no pain on palpation medial and lateral joint lines, pes anserine bursa region, distal  iliotibial band.  No pain or laxity with varus or valgus stress.  Quadriceps, hamstring strength 5/5.  Sensation intact, distal pulses present.  Positive patellar grind  On examination of the left knee there is no effusion no erythema no laceration no abrasion no ecchymosis.  Range of motion full active extension flexion greater than 120°.  There is no pain on palpation medial and lateral joint lines, pes anserine bursa region, distal iliotibial band.  No pain or laxity with varus or valgus stress.  Quadriceps, hamstring strength 5/5.  Sensation intact, distal pulses present.  Positive patellar grind    X-rays reviewed personally in the office today bilateral knees x-rays reveal significant osteophyte formation joint space narrowing of patellofemoral joint right worse than left as well as subchondral sclerosis and deformity patellofemoral arthritis    Large joint arthrocentesis: R knee  Universal Protocol:  Consent: Verbal consent obtained.  Consent given by: patient  Patient understanding: patient states understanding of the procedure being performed  Site marked: the operative site was marked  Patient identity confirmed: verbally with patient  Supporting Documentation  Indications: pain   Procedure Details  Location: knee - R knee  Needle size: 22 G  Approach: superior  Medications administered: 2 mL bupivacaine 0.25 %; 2 mL methylPREDNISolone acetate 40 mg/mL; 30 mg ketorolac 30 mg/mL    Patient tolerance: patient tolerated the procedure well with no immediate complications      Large joint arthrocentesis: L knee  Universal Protocol:  Consent: Verbal consent obtained.  Consent given by: patient  Patient understanding: patient states understanding of the procedure being performed  Site marked: the operative site was marked  Patient identity confirmed: verbally with patient  Supporting Documentation  Indications: pain   Procedure Details  Location: knee - L knee  Needle size: 22 G  Approach: superior  Medications  administered: 2 mL bupivacaine 0.25 %; 2 mL methylPREDNISolone acetate 40 mg/mL; 30 mg ketorolac 30 mg/mL    Patient tolerance: patient tolerated the procedure well with no immediate complications        .

## 2025-05-12 ENCOUNTER — HOSPITAL ENCOUNTER (OUTPATIENT)
Dept: RADIOLOGY | Facility: IMAGING CENTER | Age: 75
Discharge: HOME | End: 2025-05-12
Attending: OTOLARYNGOLOGY
Payer: COMMERCIAL

## 2025-05-12 DIAGNOSIS — E07.89 THYROID FULLNESS: ICD-10-CM

## 2025-05-12 PROCEDURE — 76536 US EXAM OF HEAD AND NECK: CPT

## 2025-05-20 ENCOUNTER — OFFICE VISIT (OUTPATIENT)
Dept: PODIATRY | Facility: CLINIC | Age: 75
End: 2025-05-20
Payer: COMMERCIAL

## 2025-05-20 ENCOUNTER — APPOINTMENT (OUTPATIENT)
Dept: RADIOLOGY | Age: 75
End: 2025-05-20
Attending: PODIATRIST
Payer: COMMERCIAL

## 2025-05-20 VITALS — BODY MASS INDEX: 24.64 KG/M2 | HEIGHT: 67 IN | WEIGHT: 157 LBS

## 2025-05-20 DIAGNOSIS — M79.675 PAIN OF LEFT GREAT TOE: ICD-10-CM

## 2025-05-20 DIAGNOSIS — M25.775 OSTEOPHYTE, LEFT FOOT: ICD-10-CM

## 2025-05-20 DIAGNOSIS — S90.212S CONTUSION OF LEFT GREAT TOE WITH DAMAGE TO NAIL, SEQUELA: Primary | ICD-10-CM

## 2025-05-20 PROCEDURE — 99203 OFFICE O/P NEW LOW 30 MIN: CPT | Performed by: PODIATRIST

## 2025-05-20 PROCEDURE — 73660 X-RAY EXAM OF TOE(S): CPT

## 2025-05-20 RX ORDER — CHOLECALCIFEROL (VITAMIN D3) 125 MCG
CAPSULE ORAL
COMMUNITY

## 2025-05-20 NOTE — PROGRESS NOTES
PATIENT:  Denise Brown  1950       ASSESSMENT:     1. Contusion of left great toe with damage to nail, sequela        2. Osteophyte, left foot        3. Pain of left great toe  XR toe left great min 2 views                PLAN:  1. Reviewed medical records.  Patient was counseled and educated on the condition and the diagnosis.    2. X-ray was obtained and personally reviewed.  The radiological findings were discussed with the patient.  There is no significant bone spur in left great toe.    3. The diagnosis, treatment options and prognosis were discussed with the patient.    4. She has no significant symptom at this time and removing of toenail is not indicated.    5. I would still consider total nail matrixectomy if it becomes more problem.  6. She may return as needed.      Imaging: I have personally reviewed pertinent films in PACS  Labs, pathology, and Other Studies: I have personally reviewed pertinent reports.        Subjective:       HPI  The patient presents with chief complaint of deformed nail in left great toe.  She had it for years and was seen by Dr. Nguyen.  She was told that she had bone spur on left great toe and surgery was suggested at that time.  She has deformed nail in left great toe after a contusion and the toenail fell off.  Her nail grew funny after that.  She was treated with Jublia as well, but it did not help her.  No significant pain at this time.  She also has nail injury in left 2nd toe in the past.  No history of diabetes.      The following portions of the patient's history were reviewed and updated as appropriate: allergies, current medications, past family history, past medical history, past social history, past surgical history and problem list.  All pertinent labs and images were reviewed.      Past Medical History  Past Medical History:   Diagnosis Date    Allergic rhinitis     Arthritis     Breast cancer (HCC) 08/21/2008    age 58    Cancer (HCC)      left breast, tx with rx    Cataract     surgery    Chronic low back pain     Chronic pain disorder     Clostridium difficile colitis     Colon polyp     DDD (degenerative disc disease), lumbar     GERD (gastroesophageal reflux disease)     History of chickenpox     History of measles     History of mumps     History of radiation therapy 09/2008    for breast cancer    Hypertension     Laryngopharyngeal reflux (LPR)     Lumbar spinal stenosis     Osteoarthritis of spine with radiculopathy, lumbar region     Osteopenia     Osteopenia     Pelvic floor dysfunction     Piriformis syndrome of left side     Plantar fasciitis     Right knee pain     Sciatica     Sinusitis     Spondylosis of cervical region without myelopathy or radiculopathy     Wears glasses     reading       Past Surgical History  Past Surgical History:   Procedure Laterality Date    BREAST BIOPSY Left 07/30/2008    malignant    BREAST CYST ASPIRATION Left 1998    BREAST LUMPECTOMY Left 08/21/2008    BREAST SURGERY      COLONOSCOPY  08/31/2020    [place] ESOPHAGOGASTRODUODENOSCOPY TRANSORAL DIAGNOSTIC N/A 2/15/2017    Procedure: ESOPHAGOGASTRODUODENOSCOPY (EGD);  Surgeon: Dustin Phoenix MD;  Location: [facility];  Service: Gastroenterology    [place] ESOPHAGOGASTRODUODENOSCOPY TRANSORAL DIAGNOSTIC N/A 2/1/2018    Procedure: ESOPHAGOGASTRODUODENOSCOPY (EGD);  Surgeon: Dustin Phoenix MD;  [facility];  Service: Gastroenterology    UPPER GASTROINTESTINAL ENDOSCOPY  01/2020    [place] GUIDED MSK PROCEDURE  2/6/2020        Allergies:  Fentanyl, Codeine, Morphine and codeine, Morphine sulfate [morphine], Naprosyn [naproxen], Other, Oxycodone, Penicillins, and Sulfa antibiotics    Medications:  Current Medications[1]    Social History:  Social History     Socioeconomic History    Marital status: /Civil Union     Spouse name: Geoffrey    Number of children: 0    Years of education: None    Highest education level: None   Occupational History    None   Tobacco Use     Smoking status: Former     Current packs/day: 0.00     Average packs/day: 1 pack/day for 25.0 years (25.0 ttl pk-yrs)     Types: Cigarettes     Start date: 1972     Quit date: 1997     Years since quittin.7    Smokeless tobacco: Never    Tobacco comments:     already quit smoking in    Vaping Use    Vaping status: Never Used   Substance and Sexual Activity    Alcohol use: Yes     Alcohol/week: 2.0 standard drinks of alcohol     Types: 2 Glasses of wine per week    Drug use: No    Sexual activity: Not Currently     Partners: Male     Birth control/protection: Post-menopausal   Other Topics Concern    None   Social History Narrative    Who lives in your home:      What type of home do you live in: Single house    Age of your home:     How long have you been living there: 19 yrs     Type of heat: Other Heat pump     Type of fuel: Electric    What type of hunter is in your bedroom: Carpet    Do you have the following in or near your home:    Air products: Central air, Humidifier, and Dehumidifier    Pests: None    Pets: Cat    Are pets allowed in bedroom: No    Open fields, wooded areas nearby: Wooded areas    Basement: Dry, Musty, and Finished    Exposure to second hand smoke: No        Habits:    Caffeine: soda rarely-hot tea once and awhile     Chocolate: dark chocolate 2 x a week     Other:              Social Drivers of Health     Financial Resource Strain: Not on file   Food Insecurity: Not on file   Transportation Needs: Not on file   Physical Activity: Sufficiently Active (2024)    Exercise Vital Sign     Days of Exercise per Week: 7 days     Minutes of Exercise per Session: 50 min   Stress: Not on file   Social Connections: Not on file   Intimate Partner Violence: Not on file   Housing Stability: Not on file          Review of Systems   Constitutional:  Negative for chills and fever.   Respiratory:  Negative for cough and shortness of breath.    Cardiovascular:  Negative for  "chest pain.   Gastrointestinal:  Negative for nausea and vomiting.   Musculoskeletal:  Positive for arthralgias.   Skin:  Negative for wound.   Allergic/Immunologic: Negative for immunocompromised state.   Neurological:  Negative for weakness and numbness.   Hematological: Negative.    Psychiatric/Behavioral:  Negative for confusion.          Objective:      Ht 5' 7\" (1.702 m) Comment: verbal  Wt 71.2 kg (157 lb)   LMP  (LMP Unknown)   BMI 24.59 kg/m²          Physical Exam  Vitals reviewed.   Constitutional:       General: She is not in acute distress.     Appearance: She is not toxic-appearing or diaphoretic.   HENT:      Head: Normocephalic and atraumatic.     Eyes:      Extraocular Movements: Extraocular movements intact.       Cardiovascular:      Rate and Rhythm: Normal rate and regular rhythm.      Pulses: Normal pulses.           Dorsalis pedis pulses are 2+ on the right side and 2+ on the left side.        Posterior tibial pulses are 2+ on the right side and 2+ on the left side.   Pulmonary:      Effort: Pulmonary effort is normal. No respiratory distress.     Musculoskeletal:         General: No swelling or signs of injury.      Cervical back: Normal range of motion and neck supple.      Right foot: No foot drop.      Left foot: No foot drop.     Skin:     General: Skin is warm.      Capillary Refill: Capillary refill takes less than 2 seconds.      Coloration: Skin is not cyanotic or mottled.      Findings: No abscess.      Nails: There is no clubbing.      Comments: Hypertrophy and discoloration of nail in left great toe with onycholysis.  She has dystrophy of left 2nd toenail.     Neurological:      General: No focal deficit present.      Mental Status: She is alert and oriented to person, place, and time.      Cranial Nerves: No cranial nerve deficit.      Sensory: No sensory deficit.      Motor: No weakness.      Coordination: Coordination normal.     Psychiatric:         Mood and Affect: Mood " normal.         Behavior: Behavior normal.         Thought Content: Thought content normal.         Judgment: Judgment normal.                [1]   Current Outpatient Medications   Medication Sig Dispense Refill    Azelastine HCl 137 MCG/SPRAY SOLN SPRAY 1 SPRAY INTO EACH NOSTRIL TWICE A DAY 30 mL 11    cetirizine (ZyrTEC) 10 mg tablet Take 10 mg by mouth in the morning.      Cholecalciferol (VITAMIN D-3 PO) Take 1,000 mg by mouth in the morning.      cyclobenzaprine (FLEXERIL) 5 mg tablet       Lactobacillus (Probiotic Acidophilus) CAPS Take by mouth      lisinopril (ZESTRIL) 10 mg tablet Take 10 mg by mouth in the morning.      montelukast (SINGULAIR) 10 mg tablet Take 1 tablet (10 mg total) by mouth daily at bedtime 30 tablet 11    Multiple Vitamins-Minerals (CENTRUM ADULTS PO) Take 1 tablet by mouth in the morning.      Multiple Vitamins-Minerals (PRESERVISION AREDS PO) Take by mouth      Probiotic Product (PROBIOTIC PO) Take 1 tablet by mouth      RABEprazole (ACIPHEX) 20 MG tablet Take 2 tablets (40 mg total) by mouth daily 180 tablet 1    famotidine (PEPCID) 40 MG tablet Take 1 tablet (40 mg total) by mouth daily at bedtime as needed for heartburn 30 tablet 4    Fluticasone Propionate (Xhance) 93 MCG/ACT EXHU into each nostril (Patient not taking: Reported on 10/28/2024)      metroNIDAZOLE (METROCREAM) 0.75 % cream Apply 45 g topically 2 (two) times a day      Omeprazole-Sodium Bicarbonate (Zegerid OTC)  MG CAPS Take 1 capsule by mouth daily at bedtime (Patient not taking: Reported on 12/18/2024) 30 capsule 11     No current facility-administered medications for this visit.

## 2025-05-27 ENCOUNTER — APPOINTMENT (OUTPATIENT)
Dept: LAB | Facility: IMAGING CENTER | Age: 75
End: 2025-05-27
Payer: COMMERCIAL

## 2025-05-27 DIAGNOSIS — R35.0 URINARY FREQUENCY: ICD-10-CM

## 2025-05-27 LAB
BACTERIA UR QL AUTO: NORMAL /HPF
BILIRUB UR QL STRIP: NEGATIVE
CLARITY UR: CLEAR
COLOR UR: COLORLESS
GLUCOSE UR STRIP-MCNC: NEGATIVE MG/DL
HGB UR QL STRIP.AUTO: NEGATIVE
KETONES UR STRIP-MCNC: NEGATIVE MG/DL
LEUKOCYTE ESTERASE UR QL STRIP: NEGATIVE
NITRITE UR QL STRIP: NEGATIVE
NON-SQ EPI CELLS URNS QL MICRO: NORMAL /HPF
PH UR STRIP.AUTO: 7 [PH]
PROT UR STRIP-MCNC: NEGATIVE MG/DL
RBC #/AREA URNS AUTO: NORMAL /HPF
SP GR UR STRIP.AUTO: 1.01 (ref 1–1.03)
UROBILINOGEN UR STRIP-ACNC: <2 MG/DL
WBC #/AREA URNS AUTO: NORMAL /HPF

## 2025-05-27 PROCEDURE — 81001 URINALYSIS AUTO W/SCOPE: CPT

## 2025-05-27 PROCEDURE — 87086 URINE CULTURE/COLONY COUNT: CPT

## 2025-05-28 LAB — BACTERIA UR CULT: NORMAL

## 2025-06-10 ENCOUNTER — HOSPITAL ENCOUNTER (EMERGENCY)
Facility: HOSPITAL | Age: 75
Discharge: HOME | End: 2025-06-10
Attending: EMERGENCY MEDICINE | Admitting: EMERGENCY MEDICINE
Payer: COMMERCIAL

## 2025-06-10 ENCOUNTER — APPOINTMENT (EMERGENCY)
Dept: RADIOLOGY | Facility: HOSPITAL | Age: 75
End: 2025-06-10
Payer: COMMERCIAL

## 2025-06-10 VITALS
HEART RATE: 78 BPM | SYSTOLIC BLOOD PRESSURE: 143 MMHG | OXYGEN SATURATION: 98 % | TEMPERATURE: 97.6 F | DIASTOLIC BLOOD PRESSURE: 83 MMHG | RESPIRATION RATE: 18 BRPM

## 2025-06-10 DIAGNOSIS — S09.90XA INJURY OF HEAD, INITIAL ENCOUNTER: Primary | ICD-10-CM

## 2025-06-10 PROCEDURE — 72125 CT NECK SPINE W/O DYE: CPT

## 2025-06-10 PROCEDURE — 99284 EMERGENCY DEPT VISIT MOD MDM: CPT

## 2025-06-10 PROCEDURE — 99284 EMERGENCY DEPT VISIT MOD MDM: CPT | Performed by: EMERGENCY MEDICINE

## 2025-06-10 PROCEDURE — 70450 CT HEAD/BRAIN W/O DYE: CPT

## 2025-06-10 RX ORDER — ACETAMINOPHEN 325 MG/1
650 TABLET ORAL ONCE
Status: COMPLETED | OUTPATIENT
Start: 2025-06-10 | End: 2025-06-10

## 2025-06-10 RX ADMIN — ACETAMINOPHEN 650 MG: 325 TABLET ORAL at 16:56

## 2025-06-10 NOTE — ED ATTENDING ATTESTATION
Final Diagnoses:     1. Injury of head, initial encounter           I, Von Boyle MD, saw and evaluated the patient. All available labs and X-rays were ordered by me or the resident / non-physician and have been reviewed by myself. I discussed the patient with the resident / non-physician and agree with the resident's / non-physician practitioner's findings and plan as documented in the resident's / non-physician practicitioner's note, except where noted.   At this point, I agree with the current assessment done in the ED.   I was present during key portions of all procedures performed unless otherwise stated.     HPI:  NURSING TRIAGE:    This is a 74 y.o. female presenting for evaluation of fall.  Had a fall on Sunday.  No f/ch/n/v/cp/sob.  No blood thinners  +headache  So came in  No AC/AP Chief Complaint   Patient presents with    Head Injury     Pt fell on Sunday and hit head, -loc, -thinners, +HA      PHYSICAL: ASSESSMENT + PLAN:   Pertinent: no palpable deformity.   Normal gait  No c-spine tenerness BUT has significant right paracervical tenderness. Says something about an ENT telling her she has something wrong with neck?    General: VS reviewed  Appears in NAD  awake, alert.   Well-nourished, well-developed. Appears stated age.   Speaking normally in full sentences.   Head: Normocephalic, atraumatic  Eyes: EOM-I. No diplopia.   No hyphema.   No subconjunctival hemorrhages.  Symmetrical lids.   ENT: Atraumatic external nose and ears.    MMM  No malocclusion. No stridor. Normal phonation. No drooling. Normal swallowing.   Neck: No JVD.  CV: No pallor noted  Lungs:   No tachypnea  No respiratory distress  Abd: soft nt nd no rebound/guarding  MSK:   FROM spontaneously  Skin: Dry, intact.   Neuro: Awake, alert, GCS15, CN II-XII grossly intact.   Motor grossly intact.  Psychiatric/Behavioral: interacting normally; appropriate mood/affect.    Exam: deferred    Vitals:    06/10/25 1314 06/10/25 1641   BP:  (!) 163/103 143/83   BP Location: Left arm    Pulse: 94 78   Resp: 18 18   Temp: 97.6 °F (36.4 °C)    TempSrc: Temporal    SpO2: 98% 98%    CTH/CTC. DC if negative.      There are no obvious limitations to social determinants of care.   Nursing note reviewed.   Vitals reviewed.   Orders placed by myself and/or advanced practitioner / resident.    Previous chart was reviewed  History obtained from: Patient  Language barrier: None  Limitations to the history obtained: None Critical Care Time:      Past Medical: Past Surgical:    has a past medical history of Allergic rhinitis, Arthritis, Breast cancer (HCC) (08/21/2008), Cancer (HCC), Cataract, Chronic low back pain, Chronic pain disorder, Clostridium difficile colitis, Colon polyp, DDD (degenerative disc disease), lumbar, GERD (gastroesophageal reflux disease), History of chickenpox, History of measles, History of mumps, History of radiation therapy (09/2008), Hypertension, Laryngopharyngeal reflux (LPR), Lumbar spinal stenosis, Osteoarthritis of spine with radiculopathy, lumbar region, Osteopenia, Osteopenia, Pelvic floor dysfunction, Piriformis syndrome of left side, Plantar fasciitis, Right knee pain, Sciatica, Sinusitis, Spondylosis of cervical region without myelopathy or radiculopathy, and Wears glasses.  has a past surgical history that includes Breast surgery; pr esophagogastroduodenoscopy transoral diagnostic (N/A, 2/15/2017); pr esophagogastroduodenoscopy transoral diagnostic (N/A, 2/1/2018); Breast biopsy (Left, 07/30/2008); Breast lumpectomy (Left, 08/21/2008); Breast cyst aspiration (Left, 1998); US guided msk procedure (2/6/2020); Colonoscopy (08/31/2020); and Upper gastrointestinal endoscopy (01/2020).   Social: Cardiac (Echo/Cath)   Social History     Substance and Sexual Activity   Alcohol Use Yes    Alcohol/week: 2.0 standard drinks of alcohol    Types: 2 Glasses of wine per week     Tobacco Use History[1]  Social History     Substance and Sexual  Activity   Drug Use No    Results for orders placed during the hospital encounter of 19    Echo complete with contrast if indicated    [place] [address]  [phone]    Transthoracic Echocardiogram  2D, M-mode, Doppler, and Color Doppler    Study date:  11-Sep-2019    Patient: LORAINE DENNIS  MR number: [id number]  Account number: [id number]  : 1950  Age: 69 years  Gender: Female  Status: Outpatient  Location: [address] [place] [address]  Height: 66 in  Weight: 170 lb  BP: 132/ 84 mmHg    Indications: Shortness of breath.    Diagnoses: R06.02 - Shortness of breath    Sonographer:  Melisa Turpin RDCS  Primary Physician:  Lopez Schuler DO  Referring Physician:  Madan Calhoun MD  Group:  [facility]  Interpreting Physician:  Lazaro Garrido MD    SUMMARY    LEFT VENTRICLE:  Systolic function was normal. Ejection fraction was estimated to be 65 %.  There were no regional wall motion abnormalities.  Doppler parameters were consistent with abnormal left ventricular relaxation (grade 1 diastolic dysfunction).    RIGHT VENTRICLE:  The size was normal.  Systolic function was normal.    HISTORY: PRIOR HISTORY: GERD, hypertension    PROCEDURE: The study was performed in the [address] [place] [address]. This was a routine study. The transthoracic approach was used. The study included complete 2D imaging, M-mode, complete spectral Doppler, and color Doppler. The  heart rate was 74 bpm, at the start of the study. Images were obtained from the parasternal, apical, subcostal, and suprasternal notch acoustic windows. Image quality was adequate.    LEFT VENTRICLE: Size was normal. Systolic function was normal. Ejection fraction was estimated to be 65 %. There were no regional wall motion abnormalities. Wall thickness was normal. There was no evidence of concentric hypertrophy.  DOPPLER: Doppler parameters  were consistent with abnormal left ventricular relaxation (grade 1 diastolic dysfunction).    RIGHT VENTRICLE: The size was normal. Systolic function was normal. Wall thickness was normal.    LEFT ATRIUM: Size was normal.    RIGHT ATRIUM: Size was normal.    MITRAL VALVE: Valve structure was normal. There was normal leaflet separation. DOPPLER: The transmitral velocity was within the normal range. There was no evidence for stenosis. There was no significant regurgitation.    AORTIC VALVE: The valve was trileaflet. Leaflets exhibited normal thickness and normal cuspal separation. DOPPLER: Transaortic velocity was within the normal range. There was no evidence for stenosis. There was no significant  regurgitation.    TRICUSPID VALVE: The valve structure was normal. There was normal leaflet separation. DOPPLER: The transtricuspid velocity was within the normal range. There was no evidence for stenosis. There was trace regurgitation. The tricuspid jet  envelope definition was inadequate for estimation of RV systolic pressure.    PULMONIC VALVE: Leaflets exhibited normal thickness, no calcification, and normal cuspal separation. DOPPLER: The transpulmonic velocity was within the normal range. There was no significant regurgitation.    PERICARDIUM: There was no pericardial effusion. The pericardium was normal in appearance.    AORTA: The root exhibited normal size.    SYSTEMIC VEINS: IVC: The inferior vena cava was normal in size. Respirophasic changes were normal.    SYSTEM MEASUREMENT TABLES    2D  %FS: 40.74 %  Ao Diam: 3.04 cm  EDV(Teich): 87.03 ml  EF(Cube): 79.19 %  EF(Teich): 71.76 %  ESV(Cube): 17.56 ml  ESV(Teich): 24.58 ml  IVSd: 0.93 cm  LA Area: 14.6 cm2  LA Diam: 2.75 cm  LVEDV MOD A4C: 74.43 ml  LVEF MOD A4C: 67.05 %  LVESV MOD A4C: 24.52 ml  LVIDd: 4.39 cm  LVIDs: 2.6 cm  LVLd A4C: 8.14 cm  LVLs A4C: 6.72 cm  LVPWd: 0.88 cm  RA Area: 14.2 cm2  RV Diam.: 2.9 cm  SV MOD A4C: 49.91 ml  SV(Cube): 66.82  ml  SV(Teich): 62.45 ml    MM  TAPSE: 2.29 cm    PW  E': 0.05 m/s  E/E': 12.7  MV A Erlin: 0.97 m/s  MV Dec Foster: 2.38 m/s2  MV DecT: 270.44 ms  MV E Erlin: 0.64 m/s  MV E/A Ratio: 0.66    [facility] Accredited Echocardiography Laboratory    Prepared and electronically signed by    Lazaro Garrido MD  Signed 11-Sep-2019 10:37:31    No results found for this or any previous visit.    No results found for this or any previous visit.     Labs: Imaging:   Labs Reviewed - No data to display CT head without contrast   Final Result      No intracranial hemorrhage or calvarial fracture.   Mild, chronic microangiopathy                  Workstation performed: [id number]         CT cervical spine without contrast   Final Result      No cervical spine fracture or traumatic malalignment.                  Workstation performed: [id number]            Medications: Code Status:   Medications   acetaminophen (TYLENOL) tablet 650 mg (650 mg Oral Given 6/10/25 1656)    Code Status: No Order  Advance Directive and Living Will:      Power of :    POLST:       Orders Placed This Encounter   Procedures    CT head without contrast    CT cervical spine without contrast     Time reflects when diagnosis was documented in both MDM as applicable and the Disposition within this note       Time User Action Codes Description Comment    6/10/2025  2:09 PM Sandro Santos Add [S09.90XA] Injury of head, initial encounter           ED Disposition       ED Disposition   Discharge    Condition   Stable    Date/Time   Tue Thiago 10, 2025  5:11 PM    Comment   Denise Brown discharge to home/self care.                   Follow-up Information       Follow up With Specialties Details Why Contact Info Additional Information    [facility] Emergency Medicine Go to  If symptoms worsen [address] [facility] [id number] [phone] [facility], [address]  [place], [place], [id number] [phone]    Lopez Schuler, DO Family Medicine Go to  If symptoms worsen [address]  [place] [address] [phone]             Discharge Medication List as of 6/10/2025  5:11 PM        CONTINUE these medications which have NOT CHANGED    Details   Azelastine HCl 137 MCG/SPRAY SOLN SPRAY 1 SPRAY INTO EACH NOSTRIL TWICE A DAY, Normal      cetirizine (ZyrTEC) 10 mg tablet Take 10 mg by mouth in the morning., Historical Med      Cholecalciferol (VITAMIN D-3 PO) Take 1,000 mg by mouth in the morning., Historical Med      cyclobenzaprine (FLEXERIL) 5 mg tablet Historical Med      famotidine (PEPCID) 40 MG tablet Take 1 tablet (40 mg total) by mouth daily at bedtime as needed for heartburn, Starting Fri 12/13/2024, Normal      Fluticasone Propionate (Xhance) 93 MCG/ACT EXHU into each nostril, Historical Med      Lactobacillus (Probiotic Acidophilus) CAPS Take by mouth, Historical Med      lisinopril (ZESTRIL) 10 mg tablet Take 10 mg by mouth in the morning., Historical Med      metroNIDAZOLE (METROCREAM) 0.75 % cream Apply 45 g topically 2 (two) times a day, Starting Fri 10/18/2024, Historical Med      montelukast (SINGULAIR) 10 mg tablet Take 1 tablet (10 mg total) by mouth daily at bedtime, Starting Tue 9/27/2022, Normal      !! Multiple Vitamins-Minerals (CENTRUM ADULTS PO) Take 1 tablet by mouth in the morning., Historical Med      !! Multiple Vitamins-Minerals (PRESERVISION AREDS PO) Take by mouth, Historical Med      Omeprazole-Sodium Bicarbonate (Zegerid OTC)  MG CAPS Take 1 capsule by mouth daily at bedtime, Starting Wed 9/28/2022, Normal      Probiotic Product (PROBIOTIC PO) Take 1 tablet by mouth, Historical Med      RABEprazole (ACIPHEX) 20 MG tablet Take 2 tablets (40 mg total) by mouth daily, Starting Thu 2/6/2025, Until Tue 8/5/2025, Normal       !! - Potential duplicate medications found. Please discuss with provider.        No discharge procedures on  "file.  Prior to Admission Medications   Prescriptions Last Dose Informant Patient Reported? Taking?   Azelastine HCl 137 MCG/SPRAY SOLN  Self No No   Sig: SPRAY 1 SPRAY INTO EACH NOSTRIL TWICE A DAY   Cholecalciferol (VITAMIN D-3 PO)  Self Yes No   Sig: Take 1,000 mg by mouth in the morning.   Fluticasone Propionate (Xhance) 93 MCG/ACT EXHU  Self Yes No   Sig: into each nostril   Patient not taking: Reported on 10/28/2024   Lactobacillus (Probiotic Acidophilus) CAPS   Yes No   Sig: Take by mouth   Multiple Vitamins-Minerals (CENTRUM ADULTS PO)  Self Yes No   Sig: Take 1 tablet by mouth in the morning.   Multiple Vitamins-Minerals (PRESERVISION AREDS PO)  Self Yes No   Sig: Take by mouth   Omeprazole-Sodium Bicarbonate (Zegerid OTC)  MG CAPS  Self No No   Sig: Take 1 capsule by mouth daily at bedtime   Patient not taking: Reported on 12/18/2024   Probiotic Product (PROBIOTIC PO)  Self Yes No   Sig: Take 1 tablet by mouth   RABEprazole (ACIPHEX) 20 MG tablet  Self No No   Sig: Take 2 tablets (40 mg total) by mouth daily   cetirizine (ZyrTEC) 10 mg tablet  Self Yes No   Sig: Take 10 mg by mouth in the morning.   cyclobenzaprine (FLEXERIL) 5 mg tablet  Self Yes No   famotidine (PEPCID) 40 MG tablet  Self No No   Sig: Take 1 tablet (40 mg total) by mouth daily at bedtime as needed for heartburn   lisinopril (ZESTRIL) 10 mg tablet  Self Yes No   Sig: Take 10 mg by mouth in the morning.   metroNIDAZOLE (METROCREAM) 0.75 % cream  Self Yes No   Sig: Apply 45 g topically 2 (two) times a day   montelukast (SINGULAIR) 10 mg tablet  Self No No   Sig: Take 1 tablet (10 mg total) by mouth daily at bedtime      Facility-Administered Medications: None                        Portions of the record may have been created with voice recognition software. Occasional wrong word or \"sound a like\" substitutions may have occurred due to the inherent limitations of voice recognition software. Read the chart carefully and recognize, " using context, where substitutions have occurred.    Electronically signed by:  Von Boyle         [1]   Social History  Tobacco Use   Smoking Status Former    Current packs/day: 0.00    Average packs/day: 1 pack/day for 25.0 years (25.0 ttl pk-yrs)    Types: Cigarettes    Start date: 1972    Quit date: 1997    Years since quittin.7   Smokeless Tobacco Never   Tobacco Comments    already quit smoking in

## 2025-06-10 NOTE — ED PROVIDER NOTES
Time reflects when diagnosis was documented in both MDM as applicable and the Disposition within this note       Time User Action Codes Description Comment    6/10/2025  2:09 PM Sandro Santos Add [S09.90XA] Injury of head, initial encounter           ED Disposition       ED Disposition   Discharge    Condition   Stable    Date/Time   Tue Thiago 10, 2025  5:11 PM    Comment   Denise Brown discharge to home/self care.                   Assessment & Plan       Medical Decision Making  Patient is a 74-year-old female presenting for concerns of mechanical fall with head strike.  DDx: Head contusion, paraspinal neck pain, rule out intracranial hemorrhage/C-spine fracture  Based on patient presentation and physical exam findings can primary concerns to rule intracranial hemorrhage/abnormality and C-spine fracture.  Plan for CT head and C-spine.  Dispo pending imaging and reassessment.    Problems Addressed:  Injury of head, initial encounter: acute illness or injury    Amount and/or Complexity of Data Reviewed  Radiology: ordered.    Risk  OTC drugs.             Medications   acetaminophen (TYLENOL) tablet 650 mg (650 mg Oral Given 6/10/25 1656)       ED Risk Strat Scores                    No data recorded        SBIRT 20yo+      Flowsheet Row Most Recent Value   Initial Alcohol Screen: US AUDIT-C     1. How often do you have a drink containing alcohol? 0 Filed at: 06/10/2025 1314   2. How many drinks containing alcohol do you have on a typical day you are drinking?  0 Filed at: 06/10/2025 1314   3a. Male UNDER 65: How often do you have five or more drinks on one occasion? 0 Filed at: 06/10/2025 1314   3b. FEMALE Any Age, or MALE 65+: How often do you have 4 or more drinks on one occassion? 0 Filed at: 06/10/2025 1314   Audit-C Score 0 Filed at: 06/10/2025 1314   GEENA: How many times in the past year have you...    Used an illegal drug or used a prescription medication for non-medical reasons? Never Filed at: 06/10/2025  1314                            History of Present Illness       Chief Complaint   Patient presents with    Head Injury     Pt fell on Sunday and hit head, -loc, -thinners, +HA       Past Medical History[1]   Past Surgical History[2]   Family History[3]   Social History[4]   E-Cigarette/Vaping    E-Cigarette Use Never User       E-Cigarette/Vaping Substances    Nicotine No     THC No     CBD No       I have reviewed and agree with the history as documented.     HPI  Patient is 74-year-old female presenting for mechanical fall with head strike that occurred last week when she was cleaning her house, slipped and fell and hit her head.  Patient is not any blood thinners, denies any loss of consciousness, no nausea or vomiting.  Patient endorsing mild paraspinal neck pain and head pain.  Does endorse history of cervical neuralgia.  Has some bruises on her left elbow and left knee but has been ambulating and ranging her left leg and left elbow without difficulty.    Review of Systems   Constitutional: Negative.    HENT: Negative.     Eyes: Negative.    Respiratory: Negative.     Cardiovascular: Negative.    Gastrointestinal: Negative.    Endocrine: Negative.    Genitourinary: Negative.    Musculoskeletal:  Positive for neck pain.   Skin: Negative.    Allergic/Immunologic: Negative.    Neurological: Negative.    Hematological: Negative.    Psychiatric/Behavioral: Negative.     All other systems reviewed and are negative.          Objective       ED Triage Vitals   Temperature Pulse Blood Pressure Respirations SpO2 Patient Position - Orthostatic VS   06/10/25 1314 06/10/25 1314 06/10/25 1314 06/10/25 1314 06/10/25 1314 --   97.6 °F (36.4 °C) 94 (!) 163/103 18 98 %       Temp Source Heart Rate Source BP Location FiO2 (%) Pain Score    06/10/25 1314 06/10/25 1314 06/10/25 1314 -- 06/10/25 1359    Temporal Monitor Left arm  4      Vitals      Date and Time Temp Pulse SpO2 Resp BP Pain Score FACES Pain Rating User   06/10/25  1641 -- 78 98 % 18 143/83 3 -- KIY   06/10/25 1359 -- -- -- -- -- 4 -- KIY   06/10/25 1314 97.6 °F (36.4 °C) 94 98 % 18 163/103 -- -- JS            Physical Exam  Vitals and nursing note reviewed.   Constitutional:       Appearance: Normal appearance. She is normal weight.   HENT:      Head: Normocephalic and atraumatic.      Comments: Patient has no signs of ecchymosis abrasions lacerations on scalp examination no tenderness.  Patient has no midline tenderness palpation on C-spine examination no step-offs no deformities.     Right Ear: Tympanic membrane, ear canal and external ear normal.      Left Ear: Tympanic membrane, ear canal and external ear normal.      Nose: Nose normal.      Mouth/Throat:      Mouth: Mucous membranes are moist.      Pharynx: Oropharynx is clear.     Eyes:      Extraocular Movements: Extraocular movements intact.      Conjunctiva/sclera: Conjunctivae normal.      Pupils: Pupils are equal, round, and reactive to light.       Cardiovascular:      Rate and Rhythm: Normal rate and regular rhythm.      Pulses: Normal pulses.      Heart sounds: Normal heart sounds.   Pulmonary:      Effort: Pulmonary effort is normal.      Breath sounds: Normal breath sounds.   Abdominal:      General: Abdomen is flat. Bowel sounds are normal.      Palpations: Abdomen is soft.     Musculoskeletal:         General: Normal range of motion.      Cervical back: Normal range of motion and neck supple.     Skin:     General: Skin is warm and dry.      Capillary Refill: Capillary refill takes less than 2 seconds.     Neurological:      General: No focal deficit present.      Mental Status: She is alert and oriented to person, place, and time.     Psychiatric:         Mood and Affect: Mood normal.         Behavior: Behavior normal.         Thought Content: Thought content normal.         Judgment: Judgment normal.         Results Reviewed       None            CT head without contrast   Final Interpretation by Jude  Hi Huitron MD (06/10 8816)      No intracranial hemorrhage or calvarial fracture.   Mild, chronic microangiopathy                  Workstation performed: [id number]         CT cervical spine without contrast   Final Interpretation by Jude Huitron MD (06/10 1659)      No cervical spine fracture or traumatic malalignment.                  Workstation performed: [id number]             Procedures    ED Medication and Procedure Management   Prior to Admission Medications   Prescriptions Last Dose Informant Patient Reported? Taking?   Azelastine HCl 137 MCG/SPRAY SOLN  Self No No   Sig: SPRAY 1 SPRAY INTO EACH NOSTRIL TWICE A DAY   Cholecalciferol (VITAMIN D-3 PO)  Self Yes No   Sig: Take 1,000 mg by mouth in the morning.   Fluticasone Propionate (Xhance) 93 MCG/ACT EXHU  Self Yes No   Sig: into each nostril   Patient not taking: Reported on 10/28/2024   Lactobacillus (Probiotic Acidophilus) CAPS   Yes No   Sig: Take by mouth   Multiple Vitamins-Minerals (CENTRUM ADULTS PO)  Self Yes No   Sig: Take 1 tablet by mouth in the morning.   Multiple Vitamins-Minerals (PRESERVISION AREDS PO)  Self Yes No   Sig: Take by mouth   Omeprazole-Sodium Bicarbonate (Zegerid OTC)  MG CAPS  Self No No   Sig: Take 1 capsule by mouth daily at bedtime   Patient not taking: Reported on 12/18/2024   Probiotic Product (PROBIOTIC PO)  Self Yes No   Sig: Take 1 tablet by mouth   RABEprazole (ACIPHEX) 20 MG tablet  Self No No   Sig: Take 2 tablets (40 mg total) by mouth daily   cetirizine (ZyrTEC) 10 mg tablet  Self Yes No   Sig: Take 10 mg by mouth in the morning.   cyclobenzaprine (FLEXERIL) 5 mg tablet  Self Yes No   famotidine (PEPCID) 40 MG tablet  Self No No   Sig: Take 1 tablet (40 mg total) by mouth daily at bedtime as needed for heartburn   lisinopril (ZESTRIL) 10 mg tablet  Self Yes No   Sig: Take 10 mg by mouth in the morning.   metroNIDAZOLE (METROCREAM) 0.75 % cream  Self Yes No   Sig: Apply 45 g topically 2 (two) times  a day   montelukast (SINGULAIR) 10 mg tablet  Self No No   Sig: Take 1 tablet (10 mg total) by mouth daily at bedtime      Facility-Administered Medications: None     Patient's Medications   Discharge Prescriptions    No medications on file     No discharge procedures on file.  ED SEPSIS DOCUMENTATION   Time reflects when diagnosis was documented in both MDM as applicable and the Disposition within this note       Time User Action Codes Description Comment    6/10/2025  2:09 PM Sandro Santos [S09.90XA] Injury of head, initial encounter                      [1]   Past Medical History:  Diagnosis Date    Allergic rhinitis     Arthritis     Breast cancer (HCC) 08/21/2008    age 58    Cancer (HCC)     left breast, tx with rx    Cataract     surgery    Chronic low back pain     Chronic pain disorder     Clostridium difficile colitis     Colon polyp     DDD (degenerative disc disease), lumbar     GERD (gastroesophageal reflux disease)     History of chickenpox     History of measles     History of mumps     History of radiation therapy 09/2008    for breast cancer    Hypertension     Laryngopharyngeal reflux (LPR)     Lumbar spinal stenosis     Osteoarthritis of spine with radiculopathy, lumbar region     Osteopenia     Osteopenia     Pelvic floor dysfunction     Piriformis syndrome of left side     Plantar fasciitis     Right knee pain     Sciatica     Sinusitis     Spondylosis of cervical region without myelopathy or radiculopathy     Wears glasses     reading   [2]   Past Surgical History:  Procedure Laterality Date    BREAST BIOPSY Left 07/30/2008    malignant    BREAST CYST ASPIRATION Left 1998    BREAST LUMPECTOMY Left 08/21/2008    BREAST SURGERY      COLONOSCOPY  08/31/2020    MA ESOPHAGOGASTRODUODENOSCOPY TRANSORAL DIAGNOSTIC N/A 2/15/2017    Procedure: ESOPHAGOGASTRODUODENOSCOPY (EGD);  Surgeon: Dustin Phoenix MD;  Location: [facility];  Service: Gastroenterology    MA ESOPHAGOGASTRODUODENOSCOPY TRANSORAL  DIAGNOSTIC N/A 2018    Procedure: ESOPHAGOGASTRODUODENOSCOPY (EGD);  Surgeon: Dustin Phoenix MD;  Location: [facility];  Service: Gastroenterology    UPPER GASTROINTESTINAL ENDOSCOPY  2020    US GUIDED MSK PROCEDURE  2020   [3]   Family History  Problem Relation Name Age of Onset    Osteoporosis Mother Amparo     Hypertension Mother Amparo     Coronary artery disease Father Sandro     Diabetes Father Sandro     COPD Father Sandro     Hypertension Father Sandro     No Known Problems Maternal Aunt      Breast cancer Paternal Aunt  73    No Known Problems Paternal Aunt      No Known Problems Maternal Aunt      Colon cancer Neg Hx     [4]   Social History  Tobacco Use    Smoking status: Former     Current packs/day: 0.00     Average packs/day: 1 pack/day for 25.0 years (25.0 ttl pk-yrs)     Types: Cigarettes     Start date: 1972     Quit date: 1997     Years since quittin.7    Smokeless tobacco: Never    Tobacco comments:     already quit smoking in    Vaping Use    Vaping status: Never Used   Substance Use Topics    Alcohol use: Yes     Alcohol/week: 2.0 standard drinks of alcohol     Types: 2 Glasses of wine per week    Drug use: No        Sandro Santos MD  06/10/25 3674

## 2025-06-17 NOTE — PROGRESS NOTES
PT Evaluation     Today's date: 2025  Patient name: Denise Brown  : 1950  MRN: [id number]  Referring provider: Clayton Tovar MD  Dx:   Encounter Diagnosis     ICD-10-CM    1. Neck tightness  R29.898 Ambulatory Referral to Physical Therapy      2. Cervical spine disease  M48.9 Ambulatory Referral to Physical Therapy      3. Otalgia of right ear  H92.01 Ambulatory Referral to Physical Therapy                     Assessment  Impairments: abnormal muscle firing, abnormal muscle tone, abnormal or restricted ROM, activity intolerance, impaired physical strength, lacks appropriate home exercise program, pain with function and poor posture     Assessment details: Denise Brown is a 74 y.o. female who presents with signs and symptoms consistent of persistent right sided neck pain with otalgia. Patient presents with cervical mobility deficits, postural abnormalities, and restrictions throughout cervical spine musculature. She has increased tone and tenderness at the right upper trap and SCM. Due to these impairments, Patient has difficulty driving, looking up, rotating, and sleeping. Patient would benefit from skilled physical therapy to address the impairments, improve their level of function, and to improve their overall quality of life.    Primary movement impairments:   1) Cervical mobility deficit, OA  2) Flexibility restrictions  3) Postural abnormality c/ dowager's hump      Goals  Short Term Goals: to be achieved by 4 weeks  1) Patient to be independent with basic HEP.  2) Decrease pain to 2/10 at its worst.  3) Increase cervical spine ROM by 5-10 degrees in all deficient planes.  4) Improve joint mobility in cervical spine to normal     Long Term Goals: to be achieved by discharge  1) FOTO equal to or greater than expected.  2) Patient to be independent with comprehensive HEP.  3) Cervical spine ROM WNL all planes to improve a/iadls.          Plan  Planned modality interventions: low level  laser therapy    Planned therapy interventions: joint mobilization, manual therapy, neuromuscular re-education, therapeutic activities, therapeutic exercise and home exercise program    Frequency: 2x per week for 6 weeks.  Treatment plan discussed with: patient  Plan details: Due to proximity and current life stressors, patient will be transferring to our [place] office.         Subjective Evaluation    History of Present Illness  Mechanism of injury: History of Current Injury: Pt referred to PT from Dr. Tovar secondary to neck discomfort, otalgia, and cervicogenic symptoms. Pt has been having neck discomfort, specifically on the right side of years. She notes having a muscular nodule in the right upper trap with pain that extends to the occiput and behind the right ear. Unfortunately, patient fell last week and his her head on the closet door. She went to ED to get evaluated. Pt has not had any treatment for this before.   Pt denies any weakness. She is right hand dominant. She is considering receiving injections with Dr. Tovar.   Pt fell last week with strike to the head. Went to ED for medical workup and was discharged to stable condition.   Pain location/Descriptors: Pain present in right upper trap toward occiput and behind the back of her ear- this pain can be sharp and shooting. Positive for headaches (temporal pressure)- this is intermittent  Aggravating factors: Prolonged positions, looking up, rotating, sleeping positions  Easing factors: Advil, positional changes, Voltran   24 HR pattern: Experiences symptoms at night time when going to bed. She positions her self elevated due to LPR.   Imaging: CT of head: IMPRESSION:  No cervical spine fracture or traumatic malalignment.  CT of Head:   IMPRESSION:  No intracranial hemorrhage or calvarial fracture.  Mild, chronic microangiopathy    Special Questions: Pt denies any N&T. Denies dizziness, fainting or drop attacks, diplopia, or gait abnormalities.    Patient goals:  Manage symptoms  Hobbies/Interest: Walks, cooking, house work.   Occupation: Retired.       Patient Goals  Patient goals for therapy: decreased pain and increased motion    Pain  Current pain ratin  At worst pain ratin    Treatments  No previous or current treatments        Objective     Postural Observations  Seated posture: fair    Additional Postural Observation Details  Lynda torres.  Forward head rounded shoulders  Thoracic kyphosis noted.     Active Range of Motion   Cervical/Thoracic Spine       Cervical  Subcranial protraction:  WFL   Subcranial retraction: Active cervical subcranial retraction: Able to reach neutral.   Flexion: 55 degrees   Extension: 50 degrees      Left lateral flexion: 30 degrees      Right lateral flexion: 25 degrees      Left rotation: 72 degrees  Right rotation: 68 degrees       Joint Play     Hypomobile: C2, C3, C4, C5, C6, C7, T1 and 1st rib     Pain: C2, C3, C4, C5, C6, C7, T1 and 1st rib     Strength/Myotome Testing   Cervical Spine     Left   Interossei strength (t1): 5    Right   Interossei strength (t1): 5    Left Shoulder     Planes of Motion   Flexion: 5   Abduction: 5     Right Shoulder     Planes of Motion   Flexion: 5   Abduction: 5     Left Elbow   Flexion: 5  Extension: 5    Right Elbow   Flexion: 5  Extension: 5    Left Wrist/Hand   Wrist extension: 5  Wrist flexion: 5  Thumb extension: 5    Right Wrist/Hand   Wrist extension: 5  Wrist flexion: 5  Thumb extension: 5    Tests   Cervical     Left   Positive Spurling's Test B.     Right   Positive Spurling's Test B.     Additional Tests Details  AA rotation in sittin degrees  OA mobility: restricted bilaterally    +Tone and tenderness in right UT with radiation of symptoms into Right SCM and occiput   Traction increases pain in right ear and occiput             Precautions: Osteoporosis, muscle tension dysphonia, paresis of right vocal fold, GERD, TMJD      Manuals                                                                  Neuro Re-Ed                                                                                                        Ther Ex                                                                                                                     Ther Activity                                       Gait Training                                       Modalities

## 2025-06-18 ENCOUNTER — EVALUATION (OUTPATIENT)
Dept: PHYSICAL THERAPY | Facility: CLINIC | Age: 75
End: 2025-06-18
Payer: COMMERCIAL

## 2025-06-18 DIAGNOSIS — M48.9 CERVICAL SPINE DISEASE: ICD-10-CM

## 2025-06-18 DIAGNOSIS — R29.898 NECK TIGHTNESS: Primary | ICD-10-CM

## 2025-06-18 DIAGNOSIS — H92.01 OTALGIA OF RIGHT EAR: ICD-10-CM

## 2025-06-18 PROCEDURE — 97162 PT EVAL MOD COMPLEX 30 MIN: CPT | Performed by: PHYSICAL THERAPIST

## 2025-06-18 PROCEDURE — 97110 THERAPEUTIC EXERCISES: CPT | Performed by: PHYSICAL THERAPIST

## 2025-06-18 NOTE — HOME EXERCISE EDUCATION
Program_ID:[id number]   Access Code: [email or web address]  URL: [email or web address]  Date: 06-  Prepared By: Eliud Harmon    Program Notes      Exercises      - Supine Cervical Retraction with Towel - 1 x daily - 7 x weekly - 2 sets - 10 reps - 3 hold      - Thoracic Extension Mobilization on Foam Roll - 1 x daily - 7 x weekly -  sets - 10 reps - 5 hold      - Seated Levator Scapulae Stretch - 1 x daily - 7 x weekly -  sets - 5 reps - 10 hold

## 2025-06-25 ENCOUNTER — APPOINTMENT (OUTPATIENT)
Dept: LAB | Facility: IMAGING CENTER | Age: 75
End: 2025-06-25
Payer: COMMERCIAL

## 2025-06-25 DIAGNOSIS — R53.83 OTHER FATIGUE: ICD-10-CM

## 2025-06-25 DIAGNOSIS — E78.2 MIXED HYPERLIPIDEMIA: ICD-10-CM

## 2025-06-25 DIAGNOSIS — R73.01 IMPAIRED FASTING GLUCOSE: ICD-10-CM

## 2025-06-25 LAB
ALBUMIN SERPL BCG-MCNC: 4.6 G/DL (ref 3.5–5)
ALP SERPL-CCNC: 88 U/L (ref 34–104)
ALT SERPL W P-5'-P-CCNC: 16 U/L (ref 7–52)
ANION GAP SERPL CALCULATED.3IONS-SCNC: 7 MMOL/L (ref 4–13)
AST SERPL W P-5'-P-CCNC: 19 U/L (ref 13–39)
BILIRUB SERPL-MCNC: 0.86 MG/DL (ref 0.2–1)
BUN SERPL-MCNC: 16 MG/DL (ref 5–25)
CALCIUM SERPL-MCNC: 10.1 MG/DL (ref 8.4–10.2)
CHLORIDE SERPL-SCNC: 105 MMOL/L (ref 96–108)
CHOLEST SERPL-MCNC: 204 MG/DL (ref ?–200)
CO2 SERPL-SCNC: 27 MMOL/L (ref 21–32)
CREAT SERPL-MCNC: 0.57 MG/DL (ref 0.6–1.3)
EST. AVERAGE GLUCOSE BLD GHB EST-MCNC: 108 MG/DL
GFR SERPL CREATININE-BSD FRML MDRD: 91 ML/MIN/1.73SQ M
GLUCOSE P FAST SERPL-MCNC: 112 MG/DL (ref 65–99)
HBA1C MFR BLD: 5.4 %
HDLC SERPL-MCNC: 60 MG/DL
LDLC SERPL CALC-MCNC: 103 MG/DL (ref 0–100)
NONHDLC SERPL-MCNC: 144 MG/DL
POTASSIUM SERPL-SCNC: 5 MMOL/L (ref 3.5–5.3)
PROT SERPL-MCNC: 7 G/DL (ref 6.4–8.4)
SODIUM SERPL-SCNC: 139 MMOL/L (ref 135–147)
TRIGL SERPL-MCNC: 203 MG/DL (ref ?–150)
TSH SERPL DL<=0.05 MIU/L-ACNC: 1.54 UIU/ML (ref 0.45–4.5)

## 2025-06-25 PROCEDURE — 83036 HEMOGLOBIN GLYCOSYLATED A1C: CPT

## 2025-06-25 PROCEDURE — 36415 COLL VENOUS BLD VENIPUNCTURE: CPT

## 2025-06-25 PROCEDURE — 80053 COMPREHEN METABOLIC PANEL: CPT

## 2025-06-25 PROCEDURE — 80061 LIPID PANEL: CPT

## 2025-06-25 PROCEDURE — 84443 ASSAY THYROID STIM HORMONE: CPT

## 2025-06-27 ENCOUNTER — OFFICE VISIT (OUTPATIENT)
Dept: OBGYN CLINIC | Facility: CLINIC | Age: 75
End: 2025-06-27
Payer: COMMERCIAL

## 2025-06-27 VITALS — BODY MASS INDEX: 24.48 KG/M2 | HEIGHT: 67 IN | WEIGHT: 156 LBS

## 2025-06-27 DIAGNOSIS — M17.12 PRIMARY OSTEOARTHRITIS OF LEFT KNEE: ICD-10-CM

## 2025-06-27 DIAGNOSIS — M25.561 CHRONIC PAIN OF RIGHT KNEE: ICD-10-CM

## 2025-06-27 DIAGNOSIS — M17.11 PRIMARY OSTEOARTHRITIS OF RIGHT KNEE: Primary | ICD-10-CM

## 2025-06-27 DIAGNOSIS — G89.29 CHRONIC PAIN OF RIGHT KNEE: ICD-10-CM

## 2025-06-27 DIAGNOSIS — G89.29 CHRONIC PAIN OF LEFT KNEE: ICD-10-CM

## 2025-06-27 DIAGNOSIS — M25.562 CHRONIC PAIN OF LEFT KNEE: ICD-10-CM

## 2025-06-27 PROCEDURE — 99213 OFFICE O/P EST LOW 20 MIN: CPT | Performed by: ORTHOPAEDIC SURGERY

## 2025-06-27 PROCEDURE — 20610 DRAIN/INJ JOINT/BURSA W/O US: CPT | Performed by: PHYSICIAN ASSISTANT

## 2025-06-27 RX ORDER — METHYLPREDNISOLONE ACETATE 40 MG/ML
2 INJECTION, SUSPENSION INTRA-ARTICULAR; INTRALESIONAL; INTRAMUSCULAR; SOFT TISSUE
Status: COMPLETED | OUTPATIENT
Start: 2025-06-27 | End: 2025-06-27

## 2025-06-27 RX ORDER — KETOROLAC TROMETHAMINE 30 MG/ML
30 INJECTION, SOLUTION INTRAMUSCULAR; INTRAVENOUS
Status: COMPLETED | OUTPATIENT
Start: 2025-06-27 | End: 2025-06-27

## 2025-06-27 RX ORDER — BUPIVACAINE HYDROCHLORIDE 2.5 MG/ML
2 INJECTION, SOLUTION INFILTRATION; PERINEURAL
Status: COMPLETED | OUTPATIENT
Start: 2025-06-27 | End: 2025-06-27

## 2025-06-27 RX ADMIN — METHYLPREDNISOLONE ACETATE 2 ML: 40 INJECTION, SUSPENSION INTRA-ARTICULAR; INTRALESIONAL; INTRAMUSCULAR; SOFT TISSUE at 11:45

## 2025-06-27 RX ADMIN — BUPIVACAINE HYDROCHLORIDE 2 ML: 2.5 INJECTION, SOLUTION INFILTRATION; PERINEURAL at 11:45

## 2025-06-27 RX ADMIN — KETOROLAC TROMETHAMINE 30 MG: 30 INJECTION, SOLUTION INTRAMUSCULAR; INTRAVENOUS at 11:45

## 2025-06-27 NOTE — PROGRESS NOTES
"Name: Denise Brown      : 1950      MRN: [id number]  Encounter Provider: Naila Vazquez MD  Encounter Date: 2025   Encounter department: [facility]  :  Assessment & Plan  Primary osteoarthritis of right knee  Weightbearing as tolerated right lower extremity  Right knee intra-articular corticosteroid and Toradol injection administered as noted  Patient advised should they develop any increasing pain, redness, drainage, numbness, tingling or swelling surrounding the injection site, they are to contact our office or present to the emergency department.  Advised patient home range of motion stretching strengthening exercises  Follow-up in 4 months time repeat evaluation right knee       Primary osteoarthritis of left knee  Weightbearing as tolerated left lower extremity  Left knee intra-articular corticosteroid and Toradol injection administered as noted  Patient advised should they develop any increasing pain, redness, drainage, numbness, tingling or swelling surrounding the injection site, they are to contact our office or present to the emergency department.  Advised patient home range of motion stretching strengthening exercises  Follow-up in 4 months time repeat evaluation left knee       Chronic pain of right knee         Chronic pain of left knee             History of Present Illness   HPI  Denise Brown is a 74 y.o. female who presents to the office today regarding bilateral knee pain.  Patient states pain is aching nature worse weightbearing mildly relieved with rest.  She has had multiple intra-articular corticosteroid injections with significant pain relief.  States her knee pain however since returned and is aching nature localized her bilateral knees worse with activity worse weightbearing mildly relieved with rest denies any numbness tingling stability click or catching of her bilateral knees     Objective   Ht 5' 7\" (1.702 m)   Wt 70.8 kg " (156 lb)   LMP  (LMP Unknown)   BMI 24.43 kg/m²                    Review Of Systems:   Skin: Normal  Neuro: See HPI  Musculoskeletal: See HPI  All other systems reviewed and are negative    Past Medical History:   Past Medical History[1]    Past Surgical History:   Past Surgical History[2]    Family History:  Family history reviewed and non-contributory  Family History[3]      Social History:  Social History[4]    Allergies:   Allergies[5]    Labs:  0   Lab Value Date/Time    HCT 43.7 12/26/2024 0708    HCT 41.3 12/19/2023 0709    HCT 43.8 06/16/2023 0707    HGB 13.3 12/26/2024 0708    HGB 13.0 12/19/2023 0709    HGB 13.2 06/16/2023 0707    WBC 6.03 12/26/2024 0708    WBC 5.93 12/19/2023 0709    WBC 6.85 06/16/2023 0707       Meds:  Current Medications[6]    Body mass index is 24.43 kg/m².  Wt Readings from Last 3 Encounters:   06/27/25 70.8 kg (156 lb)   05/20/25 71.2 kg (157 lb)   04/30/25 73 kg (161 lb)     Physical Exam:   There were no vitals filed for this visit.    General Appearance:    Alert, cooperative, no distress, appears stated age   Head:    Normocephalic, without obvious abnormality, atraumatic   Eyes:    conjunctiva/corneas clear, both eyes        Nose:   Nares normal, septum midline, no drainage    Throat:   Lips normal; teeth and gums normal   Neck:    symmetrical, trachea midline, ;     thyroid:  no enlargement/   Back:     Symmetric, no curvature, ROM normal   Lungs:   No audible wheezing or labored breathing   Chest Wall:    No tenderness or deformity    Heart:    Regular rate and rhythm               Pulses:   2+ and symmetric all extremities   Skin:   Skin color, texture, turgor normal, no rashes or lesions   Neurologic:   normal strength, sensation and reflexes     throughout       Musculoskeletal: bilateral lower extremity  On examination of the right knee there is no effusion, no erythema.  Range of motion to full active extension and flexion to greater than 120°.  Pain on palpation  medial and lateral joint lines.  There is crepitus with range of motion, no warmth to palpation, bony enlargement noted. No pain on palpation pes anserine bursa region or distal iliotibial band.  Stable to varus and valgus stress without pain or gapping.  Negative anterior and posterior drawer testing.  Sensation intact distal pulses present.  On examination of the left knee there is no effusion, no erythema.  Range of motion to full active extension and flexion to greater than 120°.  Pain on palpation medial and lateral joint lines.  There is crepitus with range of motion, no warmth to palpation, bony enlargement noted. No pain on palpation pes anserine bursa region or distal iliotibial band.  Stable to varus and valgus stress without pain or gapping.  Negative anterior and posterior drawer testing.  Sensation intact distal pulses present.      Large joint arthrocentesis: R knee    Performed by: Clayton Edouard PA-C  Authorized by: Clayton Edouard PA-C    [facility] Protocol:  Consent: Verbal consent obtained  Consent given by: patient  Patient understanding: patient states understanding of the procedure being performed  Site marked: the operative site was marked  Patient identity confirmed: verbally with patient  Supporting Documentation  Indications: pain     Is this a Visco injection? NoProcedure Details  Location: knee - R knee  Needle size: 22 G  Ultrasound guidance: no  Approach: anterolateral  Medications administered: 2 mL bupivacaine 0.25 %; 2 mL methylPREDNISolone acetate 40 mg/mL; 30 mg ketorolac 30 mg/mL    Patient tolerance: patient tolerated the procedure well with no immediate complications      Large joint arthrocentesis: L knee    Performed by: Clayton Edouard PA-C  Authorized by: Clayton Edouard PA-C    [facility] Protocol:  Consent: Verbal consent obtained  Consent given by: patient  Patient understanding: patient states understanding of the procedure being  performed  Site marked: the operative site was marked  Patient identity confirmed: verbally with patient  Supporting Documentation  Indications: pain     Is this a Visco injection? NoProcedure Details  Location: knee - L knee  Needle size: 22 G  Ultrasound guidance: no  Approach: anterolateral  Medications administered: 2 mL bupivacaine 0.25 %; 2 mL methylPREDNISolone acetate 40 mg/mL; 30 mg ketorolac 30 mg/mL    Patient tolerance: patient tolerated the procedure well with no immediate complications           [1]   Past Medical History:  Diagnosis Date    Allergic rhinitis     Arthritis     Breast cancer (HCC) 08/21/2008    age 58    Cancer (HCC)     left breast, tx with rx    Cataract     surgery    Chronic low back pain     Chronic pain disorder     Clostridium difficile colitis     Colon polyp     DDD (degenerative disc disease), lumbar     GERD (gastroesophageal reflux disease)     History of chickenpox     History of measles     History of mumps     History of radiation therapy 09/2008    for breast cancer    Hypertension     Laryngopharyngeal reflux (LPR)     Lumbar spinal stenosis     Osteoarthritis of spine with radiculopathy, lumbar region     Osteopenia     Osteopenia     Pelvic floor dysfunction     Piriformis syndrome of left side     Plantar fasciitis     Right knee pain     Sciatica     Sinusitis     Spondylosis of cervical region without myelopathy or radiculopathy     Wears glasses     reading   [2]   Past Surgical History:  Procedure Laterality Date    BREAST BIOPSY Left 07/30/2008    malignant    BREAST CYST ASPIRATION Left 1998    BREAST LUMPECTOMY Left 08/21/2008    BREAST SURGERY      COLONOSCOPY  08/31/2020    LA ESOPHAGOGASTRODUODENOSCOPY TRANSORAL DIAGNOSTIC N/A 2/15/2017    Procedure: ESOPHAGOGASTRODUODENOSCOPY (EGD);  Surgeon: Dustin Phoenix MD;  Location: [facility];  Service: Gastroenterology    LA ESOPHAGOGASTRODUODENOSCOPY TRANSORAL DIAGNOSTIC N/A 2/1/2018    Procedure:  ESOPHAGOGASTRODUODENOSCOPY (EGD);  Surgeon: Dustin Phoenix MD;  [facility];  Service: Gastroenterology    UPPER GASTROINTESTINAL ENDOSCOPY  2020    US GUIDED MSK PROCEDURE  2020   [3]   Family History  Problem Relation Name Age of Onset    Osteoporosis Mother Amparo     Hypertension Mother Amparo     Coronary artery disease Father Sandro     Diabetes Father Sandro     COPD Father Sandro     Hypertension Father Sandro     No Known Problems Maternal Aunt      Breast cancer Paternal Aunt  73    No Known Problems Paternal Aunt      No Known Problems Maternal Aunt      Colon cancer Neg Hx     [4]   Social History  Socioeconomic History    Marital status: /Civil Union     Spouse name: Geoffrey    Number of children: 0   Tobacco Use    Smoking status: Former     Current packs/day: 0.00     Average packs/day: 1 pack/day for 25.0 years (25.0 ttl pk-yrs)     Types: Cigarettes     Start date: 1972     Quit date: 1997     Years since quittin.8    Smokeless tobacco: Never    Tobacco comments:     already quit smoking in    Vaping Use    Vaping status: Never Used   Substance and Sexual Activity    Alcohol use: Yes     Alcohol/week: 2.0 standard drinks of alcohol     Types: 2 Glasses of wine per week    Drug use: No    Sexual activity: Not Currently     Partners: Male     Birth control/protection: Post-menopausal   Social History Narrative    Who lives in your home:      What type of home do you live in: Single house    Age of your home:     How long have you been living there: 19 yrs     Type of heat: Other Heat pump     Type of fuel: Electric    What type of hunter is in your bedroom: Carpet    Do you have the following in or near your home:    Air products: Central air, Humidifier, and Dehumidifier    Pests: None    Pets: Cat    Are pets allowed in bedroom: No    Open fields, wooded areas nearby: Wooded areas    Basement: Dry, Musty, and Finished    Exposure to second hand smoke: No         Habits:    Caffeine: soda rarely-hot tea once and awhile     Chocolate: dark chocolate 2 x a week     Other:              Social Drivers of Health     Physical Activity: Sufficiently Active (7/31/2024)    Exercise Vital Sign     Days of Exercise per Week: 7 days     Minutes of Exercise per Session: 50 min   [5]   Allergies  Allergen Reactions    Fentanyl Vomiting    Codeine     Morphine And Codeine Nausea Only, GI Intolerance and Vomiting     Note:  this allergy is not being included in drug screening.  Please inactivate this item and re-enter it to enable screening.    Morphine Sulfate [Morphine] Nausea Only, GI Intolerance and Vomiting    Naprosyn [Naproxen] Nausea Only, GI Intolerance and Vomiting    Other Other (See Comments)     Propoxyphene, patient claims she does not know the reaction and does not recognize this allergy    Oxycodone GI Intolerance and Vomiting    Penicillins Other (See Comments)     Unknown reaction.    Sulfa Antibiotics Fever and Rash     Rash   [6]   Current Outpatient Medications:     Azelastine HCl 137 MCG/SPRAY SOLN, SPRAY 1 SPRAY INTO EACH NOSTRIL TWICE A DAY, Disp: 30 mL, Rfl: 11    cetirizine (ZyrTEC) 10 mg tablet, Take 10 mg by mouth in the morning., Disp: , Rfl:     Cholecalciferol (VITAMIN D-3 PO), Take 1,000 mg by mouth in the morning., Disp: , Rfl:     cyclobenzaprine (FLEXERIL) 5 mg tablet, , Disp: , Rfl:     famotidine (PEPCID) 40 MG tablet, Take 1 tablet (40 mg total) by mouth daily at bedtime as needed for heartburn, Disp: 30 tablet, Rfl: 4    Fluticasone Propionate (Xhance) 93 MCG/ACT EXHU, into each nostril (Patient not taking: Reported on 10/28/2024), Disp: , Rfl:     Lactobacillus (Probiotic Acidophilus) CAPS, Take by mouth, Disp: , Rfl:     lisinopril (ZESTRIL) 10 mg tablet, Take 10 mg by mouth in the morning., Disp: , Rfl:     metroNIDAZOLE (METROCREAM) 0.75 % cream, Apply 45 g topically 2 (two) times a day, Disp: , Rfl:     montelukast (SINGULAIR) 10 mg tablet,  Take 1 tablet (10 mg total) by mouth daily at bedtime, Disp: 30 tablet, Rfl: 11    Multiple Vitamins-Minerals (CENTRUM ADULTS PO), Take 1 tablet by mouth in the morning., Disp: , Rfl:     Multiple Vitamins-Minerals (PRESERVISION AREDS PO), Take by mouth, Disp: , Rfl:     Omeprazole-Sodium Bicarbonate (Zegerid OTC)  MG CAPS, Take 1 capsule by mouth daily at bedtime (Patient not taking: Reported on 12/18/2024), Disp: 30 capsule, Rfl: 11    Probiotic Product (PROBIOTIC PO), Take 1 tablet by mouth, Disp: , Rfl:     RABEprazole (ACIPHEX) 20 MG tablet, Take 2 tablets (40 mg total) by mouth daily, Disp: 180 tablet, Rfl: 1

## 2025-07-07 ENCOUNTER — HOSPITAL ENCOUNTER (OUTPATIENT)
Dept: RADIOLOGY | Facility: HOSPITAL | Age: 75
Discharge: HOME | End: 2025-07-07
Attending: INTERNAL MEDICINE
Payer: COMMERCIAL

## 2025-07-07 PROCEDURE — A9500 TC99M SESTAMIBI: HCPCS

## 2025-07-07 PROCEDURE — 78072 PARATHYRD PLANAR W/SPECT&CT: CPT

## 2025-07-25 ENCOUNTER — HOSPITAL ENCOUNTER (OUTPATIENT)
Dept: RADIOLOGY | Facility: IMAGING CENTER | Age: 75
End: 2025-07-25
Payer: COMMERCIAL

## 2025-07-25 DIAGNOSIS — M77.42 METATARSALGIA OF LEFT FOOT: ICD-10-CM

## 2025-07-25 PROCEDURE — 73630 X-RAY EXAM OF FOOT: CPT

## 2025-08-04 ENCOUNTER — OFFICE VISIT (OUTPATIENT)
Dept: ENDOCRINOLOGY | Facility: CLINIC | Age: 75
End: 2025-08-04
Payer: COMMERCIAL

## 2025-08-04 VITALS
WEIGHT: 154.4 LBS | OXYGEN SATURATION: 98 % | SYSTOLIC BLOOD PRESSURE: 132 MMHG | BODY MASS INDEX: 24.23 KG/M2 | DIASTOLIC BLOOD PRESSURE: 80 MMHG | HEIGHT: 67 IN | RESPIRATION RATE: 18 BRPM | HEART RATE: 67 BPM

## 2025-08-04 DIAGNOSIS — E55.9 VITAMIN D DEFICIENCY: ICD-10-CM

## 2025-08-04 DIAGNOSIS — E21.3 HYPERPARATHYROIDISM (HCC): Primary | ICD-10-CM

## 2025-08-04 PROCEDURE — G2211 COMPLEX E/M VISIT ADD ON: HCPCS | Performed by: INTERNAL MEDICINE

## 2025-08-04 PROCEDURE — 99214 OFFICE O/P EST MOD 30 MIN: CPT | Performed by: INTERNAL MEDICINE
